# Patient Record
Sex: MALE | Race: WHITE | NOT HISPANIC OR LATINO | Employment: OTHER | URBAN - METROPOLITAN AREA
[De-identification: names, ages, dates, MRNs, and addresses within clinical notes are randomized per-mention and may not be internally consistent; named-entity substitution may affect disease eponyms.]

---

## 2017-01-04 ENCOUNTER — LAB CONVERSION - ENCOUNTER (OUTPATIENT)
Dept: OTHER | Facility: OTHER | Age: 52
End: 2017-01-04

## 2017-01-04 LAB
BUN SERPL-MCNC: 42 MG/DL (ref 7–25)
BUN/CREA RATIO (HISTORICAL): 25 (CALC) (ref 6–22)
CALCIUM SERPL-MCNC: 9.2 MG/DL (ref 8.6–10.3)
CHLORIDE SERPL-SCNC: 104 MMOL/L (ref 98–110)
CO2 SERPL-SCNC: 25 MMOL/L (ref 20–31)
CREAT SERPL-MCNC: 1.69 MG/DL (ref 0.6–1.35)
EGFR AFRICAN AMERICAN (HISTORICAL): 54 ML/MIN/1.73M2
EGFR-AMERICAN CALC (HISTORICAL): 47 ML/MIN/1.73M2
GLUCOSE (HISTORICAL): 171 MG/DL (ref 65–99)
POTASSIUM SERPL-SCNC: 4.7 MMOL/L (ref 3.5–5.3)
SODIUM SERPL-SCNC: 138 MMOL/L (ref 135–146)

## 2017-01-10 ENCOUNTER — ALLSCRIPTS OFFICE VISIT (OUTPATIENT)
Dept: OTHER | Facility: OTHER | Age: 52
End: 2017-01-10

## 2017-01-17 ENCOUNTER — GENERIC CONVERSION - ENCOUNTER (OUTPATIENT)
Dept: OTHER | Facility: OTHER | Age: 52
End: 2017-01-17

## 2017-02-03 ENCOUNTER — GENERIC CONVERSION - ENCOUNTER (OUTPATIENT)
Dept: OTHER | Facility: OTHER | Age: 52
End: 2017-02-03

## 2017-02-11 ENCOUNTER — APPOINTMENT (EMERGENCY)
Dept: RADIOLOGY | Facility: HOSPITAL | Age: 52
DRG: 208 | End: 2017-02-11
Payer: MEDICARE

## 2017-02-11 ENCOUNTER — HOSPITAL ENCOUNTER (INPATIENT)
Facility: HOSPITAL | Age: 52
LOS: 6 days | Discharge: NURSING, REHAB OR LONG-TERM CARE | DRG: 208 | End: 2017-02-17
Attending: EMERGENCY MEDICINE | Admitting: INTERNAL MEDICINE
Payer: MEDICARE

## 2017-02-11 ENCOUNTER — APPOINTMENT (INPATIENT)
Dept: RADIOLOGY | Facility: HOSPITAL | Age: 52
DRG: 208 | End: 2017-02-11
Payer: MEDICARE

## 2017-02-11 DIAGNOSIS — Z79.01 ON WARFARIN THERAPY: ICD-10-CM

## 2017-02-11 DIAGNOSIS — J96.00 ACUTE RESPIRATORY FAILURE (HCC): Primary | ICD-10-CM

## 2017-02-11 DIAGNOSIS — I50.9 CHF (CONGESTIVE HEART FAILURE) (HCC): ICD-10-CM

## 2017-02-11 DIAGNOSIS — E13.9 DIABETES 1.5, MANAGED AS TYPE 2 (HCC): ICD-10-CM

## 2017-02-11 PROBLEM — E87.5 HYPERKALEMIA: Status: ACTIVE | Noted: 2017-02-11

## 2017-02-11 PROBLEM — D72.829 LEUKOCYTOSIS: Status: ACTIVE | Noted: 2017-02-11

## 2017-02-11 PROBLEM — I21.A1 NON-ST ELEVATION MYOCARDIAL INFARCTION (NSTEMI), TYPE 2: Status: ACTIVE | Noted: 2017-02-11

## 2017-02-11 PROBLEM — J96.01 ACUTE RESPIRATORY FAILURE WITH HYPOXIA (HCC): Status: ACTIVE | Noted: 2017-02-11

## 2017-02-11 PROBLEM — I25.5 ISCHEMIC CARDIOMYOPATHY: Status: ACTIVE | Noted: 2017-02-11

## 2017-02-11 LAB
ADDITIONAL SETTING: 14
ALBUMIN SERPL BCP-MCNC: 3.6 G/DL (ref 3.5–5)
ALP SERPL-CCNC: 124 U/L (ref 46–116)
ALT SERPL W P-5'-P-CCNC: 29 U/L (ref 12–78)
ANCILLARY VALUES: ABNORMAL
ANION GAP SERPL CALCULATED.3IONS-SCNC: 10 MMOL/L (ref 4–13)
ANION GAP SERPL CALCULATED.3IONS-SCNC: 12 MMOL/L (ref 4–13)
ANION GAP SERPL CALCULATED.3IONS-SCNC: 8 MMOL/L (ref 4–13)
ANION GAP SERPL CALCULATED.3IONS-SCNC: 8 MMOL/L (ref 4–13)
APTT PPP: 33 SECONDS (ref 24–36)
ARTERIAL PATENCY WRIST A: ABNORMAL
ARTERIAL PATENCY WRIST A: YES
AST SERPL W P-5'-P-CCNC: 23 U/L (ref 5–45)
ATRIAL RATE: 55 BPM
ATRIAL RATE: 61 BPM
BASE EXCESS BLDA CALC-SCNC: -4 MMOL/L (ref -2–3)
BASE EXCESS BLDA CALC-SCNC: -4.8 MMOL/L
BASE EXCESS BLDA CALC-SCNC: -6 MMOL/L (ref -2–3)
BASOPHILS # BLD AUTO: 0.02 THOUSANDS/ΜL (ref 0–0.1)
BASOPHILS # BLD AUTO: 0.06 THOUSANDS/ΜL (ref 0–0.1)
BASOPHILS NFR BLD AUTO: 0 % (ref 0–1)
BASOPHILS NFR BLD AUTO: 0 % (ref 0–1)
BILIRUB SERPL-MCNC: 0.7 MG/DL (ref 0.2–1)
BUN SERPL-MCNC: 42 MG/DL (ref 5–25)
BUN SERPL-MCNC: 43 MG/DL (ref 5–25)
BUN SERPL-MCNC: 47 MG/DL (ref 5–25)
BUN SERPL-MCNC: 51 MG/DL (ref 5–25)
CA-I BLD-SCNC: 1.08 MMOL/L (ref 1.12–1.32)
CA-I BLD-SCNC: 1.15 MMOL/L (ref 1.12–1.32)
CA-I BLD-SCNC: 1.21 MMOL/L (ref 1.12–1.32)
CALCIUM SERPL-MCNC: 8.2 MG/DL (ref 8.3–10.1)
CALCIUM SERPL-MCNC: 8.2 MG/DL (ref 8.3–10.1)
CALCIUM SERPL-MCNC: 8.6 MG/DL (ref 8.3–10.1)
CALCIUM SERPL-MCNC: 9.1 MG/DL (ref 8.3–10.1)
CHLORIDE SERPL-SCNC: 101 MMOL/L (ref 100–108)
CHLORIDE SERPL-SCNC: 102 MMOL/L (ref 100–108)
CHLORIDE SERPL-SCNC: 103 MMOL/L (ref 100–108)
CHLORIDE SERPL-SCNC: 106 MMOL/L (ref 100–108)
CO2 SERPL-SCNC: 21 MMOL/L (ref 21–32)
CO2 SERPL-SCNC: 22 MMOL/L (ref 21–32)
CO2 SERPL-SCNC: 23 MMOL/L (ref 21–32)
CO2 SERPL-SCNC: 24 MMOL/L (ref 21–32)
CREAT SERPL-MCNC: 1.95 MG/DL (ref 0.6–1.3)
CREAT SERPL-MCNC: 2.14 MG/DL (ref 0.6–1.3)
CREAT SERPL-MCNC: 2.24 MG/DL (ref 0.6–1.3)
CREAT SERPL-MCNC: 2.5 MG/DL (ref 0.6–1.3)
DS:DELIVERY SYSTEM: ABNORMAL
EOSINOPHIL # BLD AUTO: 0.01 THOUSAND/ΜL (ref 0–0.61)
EOSINOPHIL # BLD AUTO: 0.16 THOUSAND/ΜL (ref 0–0.61)
EOSINOPHIL NFR BLD AUTO: 0 % (ref 0–6)
EOSINOPHIL NFR BLD AUTO: 1 % (ref 0–6)
ERYTHROCYTE [DISTWIDTH] IN BLOOD BY AUTOMATED COUNT: 15.3 % (ref 11.6–15.1)
ERYTHROCYTE [DISTWIDTH] IN BLOOD BY AUTOMATED COUNT: 15.4 % (ref 11.6–15.1)
FIO2 GAS DIL.REBREATH: 60 L
GFR SERPL CREATININE-BSD FRML MDRD: 27.6 ML/MIN/1.73SQ M
GFR SERPL CREATININE-BSD FRML MDRD: 31.3 ML/MIN/1.73SQ M
GFR SERPL CREATININE-BSD FRML MDRD: 33 ML/MIN/1.73SQ M
GFR SERPL CREATININE-BSD FRML MDRD: 36.7 ML/MIN/1.73SQ M
GLUCOSE SERPL-MCNC: 162 MG/DL (ref 65–140)
GLUCOSE SERPL-MCNC: 180 MG/DL (ref 65–140)
GLUCOSE SERPL-MCNC: 182 MG/DL (ref 65–140)
GLUCOSE SERPL-MCNC: 206 MG/DL (ref 65–140)
GLUCOSE SERPL-MCNC: 248 MG/DL (ref 65–140)
GLUCOSE SERPL-MCNC: 291 MG/DL (ref 65–140)
GLUCOSE SERPL-MCNC: 292 MG/DL (ref 65–140)
GLUCOSE SERPL-MCNC: 298 MG/DL (ref 65–140)
GLUCOSE SERPL-MCNC: 320 MG/DL (ref 65–140)
GLUCOSE SERPL-MCNC: 323 MG/DL (ref 65–140)
HCO3 BLDA-SCNC: 19.9 MMOL/L (ref 22–28)
HCO3 BLDA-SCNC: 20.8 MMOL/L (ref 22–28)
HCO3 BLDA-SCNC: 21.7 MMOL/L (ref 24–30)
HCT VFR BLD AUTO: 34.5 % (ref 36.5–49.3)
HCT VFR BLD AUTO: 36.3 % (ref 36.5–49.3)
HCT VFR BLD CALC: 34 % (ref 36.5–49.3)
HCT VFR BLD CALC: 36 % (ref 36.5–49.3)
HGB BLD-MCNC: 11 G/DL (ref 12–17)
HGB BLD-MCNC: 11.6 G/DL (ref 12–17)
HGB BLDA-MCNC: 11.6 G/DL (ref 12–17)
HGB BLDA-MCNC: 12.2 G/DL (ref 12–17)
HOROWITZ INDEX BLDA+IHG-RTO: 80 MM[HG]
INR PPP: 2.98 (ref 0.86–1.16)
INR PPP: 2.98 (ref 0.86–1.16)
LACTATE SERPL-SCNC: 1.2 MMOL/L (ref 0.5–2)
LYMPHOCYTES # BLD AUTO: 0.37 THOUSANDS/ΜL (ref 0.6–4.47)
LYMPHOCYTES # BLD AUTO: 1.83 THOUSANDS/ΜL (ref 0.6–4.47)
LYMPHOCYTES NFR BLD AUTO: 13 % (ref 14–44)
LYMPHOCYTES NFR BLD AUTO: 2 % (ref 14–44)
MAGNESIUM SERPL-MCNC: 2.2 MG/DL (ref 1.6–2.6)
MCH RBC QN AUTO: 27.1 PG (ref 26.8–34.3)
MCH RBC QN AUTO: 27.3 PG (ref 26.8–34.3)
MCHC RBC AUTO-ENTMCNC: 31.9 G/DL (ref 31.4–37.4)
MCHC RBC AUTO-ENTMCNC: 32 G/DL (ref 31.4–37.4)
MCV RBC AUTO: 85 FL (ref 82–98)
MCV RBC AUTO: 85 FL (ref 82–98)
MONOCYTES # BLD AUTO: 1.32 THOUSAND/ΜL (ref 0.17–1.22)
MONOCYTES # BLD AUTO: 1.4 THOUSAND/ΜL (ref 0.17–1.22)
MONOCYTES NFR BLD AUTO: 9 % (ref 4–12)
MONOCYTES NFR BLD AUTO: 9 % (ref 4–12)
NEUTROPHILS # BLD AUTO: 10.95 THOUSANDS/ΜL (ref 1.85–7.62)
NEUTROPHILS # BLD AUTO: 14.61 THOUSANDS/ΜL (ref 1.85–7.62)
NEUTS SEG NFR BLD AUTO: 77 % (ref 43–75)
NEUTS SEG NFR BLD AUTO: 89 % (ref 43–75)
NT-PROBNP SERPL-MCNC: 2871 PG/ML
O2 CT BLDA-SCNC: 15.1 ML/DL (ref 16–23)
OXYHGB MFR BLDA: 95.8 % (ref 94–97)
P AXIS: 12 DEGREES
P AXIS: 78 DEGREES
PCO2 BLD: 22 MMOL/L (ref 21–32)
PCO2 BLD: 23 MMOL/L (ref 21–32)
PCO2 BLD: 42.3 MM HG (ref 42–50)
PCO2 BLD: 43.8 MM HG (ref 36–44)
PCO2 BLDA: 35.3 MM HG (ref 36–44)
PEEP RESPIRATORY: 10 CM[H2O]
PH BLD: 7.28 [PH] (ref 7.35–7.45)
PH BLD: 7.32 [PH] (ref 7.3–7.4)
PH BLDA: 7.37 [PH] (ref 7.35–7.45)
PHOSPHATE SERPL-MCNC: 3.8 MG/DL (ref 2.7–4.5)
PLATELET # BLD AUTO: 249 THOUSANDS/UL (ref 149–390)
PLATELET # BLD AUTO: 328 THOUSANDS/UL (ref 149–390)
PMV BLD AUTO: 12.6 FL (ref 8.9–12.7)
PMV BLD AUTO: 12.6 FL (ref 8.9–12.7)
PO2 BLD: 36 MM HG (ref 35–45)
PO2 BLD: 85 MM HG (ref 75–129)
PO2 BLDA: 103.9 MM HG (ref 75–129)
POTASSIUM BLD-SCNC: 4.8 MMOL/L (ref 3.5–5.3)
POTASSIUM BLD-SCNC: 6 MMOL/L (ref 3.5–5.3)
POTASSIUM SERPL-SCNC: 4.9 MMOL/L (ref 3.5–5.3)
POTASSIUM SERPL-SCNC: 4.9 MMOL/L (ref 3.5–5.3)
POTASSIUM SERPL-SCNC: 5.2 MMOL/L (ref 3.5–5.3)
POTASSIUM SERPL-SCNC: 6.3 MMOL/L (ref 3.5–5.3)
POTASSIUM SERPL-SCNC: 6.3 MMOL/L (ref 3.5–5.3)
PR INTERVAL: 168 MS
PR INTERVAL: 228 MS
PRESSURE SETTING: 5
PROT SERPL-MCNC: 8.2 G/DL (ref 6.4–8.2)
PROTHROMBIN TIME: 29.7 SECONDS (ref 12–14.3)
PROTHROMBIN TIME: 29.7 SECONDS (ref 12–14.3)
QRS AXIS: -10 DEGREES
QRS AXIS: 4 DEGREES
QRSD INTERVAL: 120 MS
QRSD INTERVAL: 162 MS
QT INTERVAL: 442 MS
QT INTERVAL: 462 MS
QTC INTERVAL: 465 MS
QTC INTERVAL: 561 MS
RBC # BLD AUTO: 4.06 MILLION/UL (ref 3.88–5.62)
RBC # BLD AUTO: 4.25 MILLION/UL (ref 3.88–5.62)
RESPIRATORY RATE: 10
SAMPLE SITE: ABNORMAL
SAO2 % BLD FROM PO2: 64 % (ref 95–98)
SAO2 % BLD FROM PO2: 95 % (ref 95–98)
SODIUM BLD-SCNC: 135 MMOL/L (ref 136–145)
SODIUM BLD-SCNC: 137 MMOL/L (ref 136–145)
SODIUM SERPL-SCNC: 133 MMOL/L (ref 136–145)
SODIUM SERPL-SCNC: 133 MMOL/L (ref 136–145)
SODIUM SERPL-SCNC: 136 MMOL/L (ref 136–145)
SODIUM SERPL-SCNC: 138 MMOL/L (ref 136–145)
SPECIMEN SOURCE: ABNORMAL
T WAVE AXIS: 132 DEGREES
T WAVE AXIS: 226 DEGREES
TROPONIN I SERPL-MCNC: 0.03 NG/ML
TROPONIN I SERPL-MCNC: 0.39 NG/ML
TROPONIN I SERPL-MCNC: 0.9 NG/ML
TROPONIN I SERPL-MCNC: 1.31 NG/ML
TROPONIN I SERPL-MCNC: 1.58 NG/ML
TROPONIN I SERPL-MCNC: 1.61 NG/ML
VENT AC: 16
VENT- AC: AC
VENTILATION VALUE: 0
VENTRICULAR RATE: 61 BPM
VENTRICULAR RATE: 97 BPM
VT SETTING VENT: 500 ML
WBC # BLD AUTO: 14.32 THOUSAND/UL (ref 4.31–10.16)
WBC # BLD AUTO: 16.41 THOUSAND/UL (ref 4.31–10.16)

## 2017-02-11 PROCEDURE — 84100 ASSAY OF PHOSPHORUS: CPT | Performed by: NURSE PRACTITIONER

## 2017-02-11 PROCEDURE — 80048 BASIC METABOLIC PNL TOTAL CA: CPT | Performed by: NURSE PRACTITIONER

## 2017-02-11 PROCEDURE — 71010 HB CHEST X-RAY 1 VIEW FRONTAL: CPT

## 2017-02-11 PROCEDURE — 0BH18EZ INSERTION OF ENDOTRACHEAL AIRWAY INTO TRACHEA, VIA NATURAL OR ARTIFICIAL OPENING ENDOSCOPIC: ICD-10-PCS | Performed by: INTERNAL MEDICINE

## 2017-02-11 PROCEDURE — 83605 ASSAY OF LACTIC ACID: CPT | Performed by: NURSE PRACTITIONER

## 2017-02-11 PROCEDURE — 84295 ASSAY OF SERUM SODIUM: CPT

## 2017-02-11 PROCEDURE — 3E0336Z INTRODUCTION OF NUTRITIONAL SUBSTANCE INTO PERIPHERAL VEIN, PERCUTANEOUS APPROACH: ICD-10-PCS | Performed by: INTERNAL MEDICINE

## 2017-02-11 PROCEDURE — 82330 ASSAY OF CALCIUM: CPT

## 2017-02-11 PROCEDURE — 84132 ASSAY OF SERUM POTASSIUM: CPT

## 2017-02-11 PROCEDURE — 94150 VITAL CAPACITY TEST: CPT

## 2017-02-11 PROCEDURE — 85014 HEMATOCRIT: CPT

## 2017-02-11 PROCEDURE — 94002 VENT MGMT INPAT INIT DAY: CPT

## 2017-02-11 PROCEDURE — 82948 REAGENT STRIP/BLOOD GLUCOSE: CPT

## 2017-02-11 PROCEDURE — 83735 ASSAY OF MAGNESIUM: CPT | Performed by: NURSE PRACTITIONER

## 2017-02-11 PROCEDURE — 84484 ASSAY OF TROPONIN QUANT: CPT | Performed by: EMERGENCY MEDICINE

## 2017-02-11 PROCEDURE — 85025 COMPLETE CBC W/AUTO DIFF WBC: CPT | Performed by: EMERGENCY MEDICINE

## 2017-02-11 PROCEDURE — C9113 INJ PANTOPRAZOLE SODIUM, VIA: HCPCS | Performed by: NURSE PRACTITIONER

## 2017-02-11 PROCEDURE — 71010 HB CHEST X-RAY 1 VIEW FRONTAL (PORTABLE): CPT

## 2017-02-11 PROCEDURE — 93005 ELECTROCARDIOGRAM TRACING: CPT | Performed by: NURSE PRACTITIONER

## 2017-02-11 PROCEDURE — 80053 COMPREHEN METABOLIC PANEL: CPT | Performed by: EMERGENCY MEDICINE

## 2017-02-11 PROCEDURE — 94660 CPAP INITIATION&MGMT: CPT

## 2017-02-11 PROCEDURE — 83880 ASSAY OF NATRIURETIC PEPTIDE: CPT | Performed by: EMERGENCY MEDICINE

## 2017-02-11 PROCEDURE — 82330 ASSAY OF CALCIUM: CPT | Performed by: NURSE PRACTITIONER

## 2017-02-11 PROCEDURE — 84132 ASSAY OF SERUM POTASSIUM: CPT | Performed by: NURSE PRACTITIONER

## 2017-02-11 PROCEDURE — 36600 WITHDRAWAL OF ARTERIAL BLOOD: CPT

## 2017-02-11 PROCEDURE — 82805 BLOOD GASES W/O2 SATURATION: CPT | Performed by: NURSE PRACTITIONER

## 2017-02-11 PROCEDURE — 82803 BLOOD GASES ANY COMBINATION: CPT

## 2017-02-11 PROCEDURE — 82947 ASSAY GLUCOSE BLOOD QUANT: CPT

## 2017-02-11 PROCEDURE — 85730 THROMBOPLASTIN TIME PARTIAL: CPT | Performed by: EMERGENCY MEDICINE

## 2017-02-11 PROCEDURE — 93005 ELECTROCARDIOGRAM TRACING: CPT | Performed by: EMERGENCY MEDICINE

## 2017-02-11 PROCEDURE — 96375 TX/PRO/DX INJ NEW DRUG ADDON: CPT

## 2017-02-11 PROCEDURE — 85610 PROTHROMBIN TIME: CPT | Performed by: EMERGENCY MEDICINE

## 2017-02-11 PROCEDURE — 93005 ELECTROCARDIOGRAM TRACING: CPT

## 2017-02-11 PROCEDURE — 85025 COMPLETE CBC W/AUTO DIFF WBC: CPT | Performed by: NURSE PRACTITIONER

## 2017-02-11 PROCEDURE — 94760 N-INVAS EAR/PLS OXIMETRY 1: CPT

## 2017-02-11 PROCEDURE — 36415 COLL VENOUS BLD VENIPUNCTURE: CPT | Performed by: EMERGENCY MEDICINE

## 2017-02-11 PROCEDURE — 99291 CRITICAL CARE FIRST HOUR: CPT

## 2017-02-11 PROCEDURE — 5A1945Z RESPIRATORY VENTILATION, 24-96 CONSECUTIVE HOURS: ICD-10-PCS | Performed by: INTERNAL MEDICINE

## 2017-02-11 PROCEDURE — 85610 PROTHROMBIN TIME: CPT | Performed by: NURSE PRACTITIONER

## 2017-02-11 PROCEDURE — 96374 THER/PROPH/DIAG INJ IV PUSH: CPT

## 2017-02-11 PROCEDURE — 84484 ASSAY OF TROPONIN QUANT: CPT | Performed by: NURSE PRACTITIONER

## 2017-02-11 RX ORDER — ACETAMINOPHEN 160 MG/5ML
650 SUSPENSION, ORAL (FINAL DOSE FORM) ORAL EVERY 6 HOURS PRN
Status: DISCONTINUED | OUTPATIENT
Start: 2017-02-11 | End: 2017-02-17 | Stop reason: HOSPADM

## 2017-02-11 RX ORDER — ETOMIDATE 2 MG/ML
20 INJECTION INTRAVENOUS ONCE
Status: COMPLETED | OUTPATIENT
Start: 2017-02-11 | End: 2017-02-11

## 2017-02-11 RX ORDER — INSULIN GLARGINE 100 [IU]/ML
45 INJECTION, SOLUTION SUBCUTANEOUS EVERY 12 HOURS SCHEDULED
Status: DISCONTINUED | OUTPATIENT
Start: 2017-02-11 | End: 2017-02-14

## 2017-02-11 RX ORDER — FUROSEMIDE 10 MG/ML
60 INJECTION INTRAMUSCULAR; INTRAVENOUS
Status: DISCONTINUED | OUTPATIENT
Start: 2017-02-12 | End: 2017-02-12

## 2017-02-11 RX ORDER — ONDANSETRON 2 MG/ML
4 INJECTION INTRAMUSCULAR; INTRAVENOUS EVERY 4 HOURS PRN
Status: DISCONTINUED | OUTPATIENT
Start: 2017-02-11 | End: 2017-02-17 | Stop reason: HOSPADM

## 2017-02-11 RX ORDER — NITROGLYCERIN 2.5 MG/D
1 PATCH TRANSDERMAL
COMMUNITY
End: 2018-01-22 | Stop reason: HOSPADM

## 2017-02-11 RX ORDER — ATORVASTATIN CALCIUM 40 MG/1
80 TABLET, FILM COATED ORAL
Status: DISCONTINUED | OUTPATIENT
Start: 2017-02-11 | End: 2017-02-17 | Stop reason: HOSPADM

## 2017-02-11 RX ORDER — CHLORHEXIDINE GLUCONATE 0.12 MG/ML
15 RINSE ORAL EVERY 12 HOURS SCHEDULED
Status: DISCONTINUED | OUTPATIENT
Start: 2017-02-11 | End: 2017-02-14

## 2017-02-11 RX ORDER — WARFARIN SODIUM 2.5 MG/1
5 TABLET ORAL
Status: DISCONTINUED | OUTPATIENT
Start: 2017-02-14 | End: 2017-02-11

## 2017-02-11 RX ORDER — WARFARIN SODIUM 7.5 MG/1
7.5 TABLET ORAL
Status: DISCONTINUED | OUTPATIENT
Start: 2017-02-11 | End: 2017-02-11

## 2017-02-11 RX ORDER — SUCCINYLCHOLINE/SOD CL,ISO/PF 100 MG/5ML
150 SYRINGE (ML) INTRAVENOUS ONCE
Status: COMPLETED | OUTPATIENT
Start: 2017-02-11 | End: 2017-02-11

## 2017-02-11 RX ORDER — FUROSEMIDE 10 MG/ML
60 INJECTION INTRAMUSCULAR; INTRAVENOUS ONCE
Status: COMPLETED | OUTPATIENT
Start: 2017-02-11 | End: 2017-02-11

## 2017-02-11 RX ORDER — FENTANYL CITRATE 50 UG/ML
50 INJECTION, SOLUTION INTRAMUSCULAR; INTRAVENOUS
Status: DISCONTINUED | OUTPATIENT
Start: 2017-02-11 | End: 2017-02-14

## 2017-02-11 RX ORDER — AMLODIPINE BESYLATE 5 MG/1
5 TABLET ORAL DAILY
Status: DISCONTINUED | OUTPATIENT
Start: 2017-02-11 | End: 2017-02-17 | Stop reason: HOSPADM

## 2017-02-11 RX ORDER — PROPOFOL 10 MG/ML
5-50 INJECTION, EMULSION INTRAVENOUS
Status: DISCONTINUED | OUTPATIENT
Start: 2017-02-11 | End: 2017-02-14

## 2017-02-11 RX ORDER — ASPIRIN 81 MG/1
81 TABLET, CHEWABLE ORAL DAILY
Status: DISCONTINUED | OUTPATIENT
Start: 2017-02-11 | End: 2017-02-17 | Stop reason: HOSPADM

## 2017-02-11 RX ORDER — CARVEDILOL 12.5 MG/1
12.5 TABLET ORAL 2 TIMES DAILY WITH MEALS
Status: DISCONTINUED | OUTPATIENT
Start: 2017-02-11 | End: 2017-02-17 | Stop reason: HOSPADM

## 2017-02-11 RX ORDER — LORAZEPAM 2 MG/ML
0.5 INJECTION INTRAMUSCULAR ONCE
Status: COMPLETED | OUTPATIENT
Start: 2017-02-11 | End: 2017-02-11

## 2017-02-11 RX ORDER — DEXTROSE MONOHYDRATE 25 G/50ML
12.5 INJECTION, SOLUTION INTRAVENOUS ONCE
Status: COMPLETED | OUTPATIENT
Start: 2017-02-11 | End: 2017-02-11

## 2017-02-11 RX ORDER — NITROGLYCERIN 20 MG/100ML
5-200 INJECTION INTRAVENOUS
Status: DISCONTINUED | OUTPATIENT
Start: 2017-02-11 | End: 2017-02-14

## 2017-02-11 RX ORDER — AMIODARONE HYDROCHLORIDE 200 MG/1
200 TABLET ORAL
Status: DISCONTINUED | OUTPATIENT
Start: 2017-02-11 | End: 2017-02-17 | Stop reason: HOSPADM

## 2017-02-11 RX ORDER — CLOPIDOGREL BISULFATE 75 MG/1
75 TABLET ORAL DAILY
Status: DISCONTINUED | OUTPATIENT
Start: 2017-02-11 | End: 2017-02-17 | Stop reason: HOSPADM

## 2017-02-11 RX ORDER — PANTOPRAZOLE SODIUM 40 MG/1
40 INJECTION, POWDER, FOR SOLUTION INTRAVENOUS
Status: DISCONTINUED | OUTPATIENT
Start: 2017-02-11 | End: 2017-02-15

## 2017-02-11 RX ADMIN — INSULIN GLARGINE 45 UNITS: 100 INJECTION, SOLUTION SUBCUTANEOUS at 08:14

## 2017-02-11 RX ADMIN — NITROGLYCERIN 5 MCG/MIN: 20 INJECTION INTRAVENOUS at 04:26

## 2017-02-11 RX ADMIN — NITROGLYCERIN 1 INCH: 20 OINTMENT TOPICAL at 00:57

## 2017-02-11 RX ADMIN — LORAZEPAM 0.5 MG: 2 INJECTION INTRAMUSCULAR; INTRAVENOUS at 01:08

## 2017-02-11 RX ADMIN — ETOMIDATE 20 MG: 2 INJECTION, SOLUTION INTRAVENOUS at 02:19

## 2017-02-11 RX ADMIN — Medication 160 MG: at 02:17

## 2017-02-11 RX ADMIN — CALCIUM GLUCONATE 1 G: 94 INJECTION, SOLUTION INTRAVENOUS at 05:47

## 2017-02-11 RX ADMIN — ASPIRIN 81 MG 81 MG: 81 TABLET ORAL at 08:15

## 2017-02-11 RX ADMIN — FUROSEMIDE 60 MG: 10 INJECTION, SOLUTION INTRAMUSCULAR; INTRAVENOUS at 10:45

## 2017-02-11 RX ADMIN — ATORVASTATIN CALCIUM 80 MG: 40 TABLET, FILM COATED ORAL at 15:59

## 2017-02-11 RX ADMIN — FENTANYL CITRATE 50 MCG: 50 INJECTION, SOLUTION INTRAMUSCULAR; INTRAVENOUS at 04:30

## 2017-02-11 RX ADMIN — PROPOFOL 20 MCG/KG/MIN: 10 INJECTION, EMULSION INTRAVENOUS at 19:56

## 2017-02-11 RX ADMIN — CHLORHEXIDINE GLUCONATE 15 ML: 1.2 RINSE ORAL at 21:25

## 2017-02-11 RX ADMIN — FUROSEMIDE 60 MG: 10 INJECTION, SOLUTION INTRAMUSCULAR; INTRAVENOUS at 00:54

## 2017-02-11 RX ADMIN — PROPOFOL 20 MCG/KG/MIN: 10 INJECTION, EMULSION INTRAVENOUS at 15:12

## 2017-02-11 RX ADMIN — CHLORHEXIDINE GLUCONATE 15 ML: 1.2 RINSE ORAL at 08:14

## 2017-02-11 RX ADMIN — INSULIN LISPRO 4 UNITS: 100 INJECTION, SOLUTION INTRAVENOUS; SUBCUTANEOUS at 11:52

## 2017-02-11 RX ADMIN — INSULIN GLARGINE 45 UNITS: 100 INJECTION, SOLUTION SUBCUTANEOUS at 21:26

## 2017-02-11 RX ADMIN — CARVEDILOL 12.5 MG: 12.5 TABLET, FILM COATED ORAL at 08:15

## 2017-02-11 RX ADMIN — INSULIN LISPRO 4 UNITS: 100 INJECTION, SOLUTION INTRAVENOUS; SUBCUTANEOUS at 06:49

## 2017-02-11 RX ADMIN — DEXTROSE MONOHYDRATE 13 ML: 25 INJECTION, SOLUTION INTRAVENOUS at 05:50

## 2017-02-11 RX ADMIN — CLOPIDOGREL BISULFATE 75 MG: 75 TABLET ORAL at 08:15

## 2017-02-11 RX ADMIN — AMIODARONE HYDROCHLORIDE 200 MG: 200 TABLET ORAL at 08:15

## 2017-02-11 RX ADMIN — INSULIN HUMAN 10 UNITS: 100 INJECTION, SOLUTION PARENTERAL at 05:43

## 2017-02-11 RX ADMIN — INSULIN LISPRO 2 UNITS: 100 INJECTION, SOLUTION INTRAVENOUS; SUBCUTANEOUS at 17:56

## 2017-02-11 RX ADMIN — PROPOFOL 10 MCG/KG/MIN: 10 INJECTION, EMULSION INTRAVENOUS at 02:33

## 2017-02-11 RX ADMIN — PANTOPRAZOLE SODIUM 40 MG: 40 INJECTION, POWDER, FOR SOLUTION INTRAVENOUS at 08:14

## 2017-02-11 RX ADMIN — CARVEDILOL 12.5 MG: 12.5 TABLET, FILM COATED ORAL at 15:59

## 2017-02-11 RX ADMIN — PROPOFOL 15 MCG/KG/MIN: 10 INJECTION, EMULSION INTRAVENOUS at 08:13

## 2017-02-11 RX ADMIN — AMLODIPINE BESYLATE 5 MG: 5 TABLET ORAL at 08:15

## 2017-02-12 ENCOUNTER — APPOINTMENT (INPATIENT)
Dept: RADIOLOGY | Facility: HOSPITAL | Age: 52
DRG: 208 | End: 2017-02-12
Payer: MEDICARE

## 2017-02-12 PROBLEM — E87.5 HYPERKALEMIA: Status: RESOLVED | Noted: 2017-02-11 | Resolved: 2017-02-12

## 2017-02-12 PROBLEM — T45.515A WARFARIN-INDUCED COAGULOPATHY (HCC): Status: ACTIVE | Noted: 2017-02-12

## 2017-02-12 PROBLEM — D68.32 WARFARIN-INDUCED COAGULOPATHY (HCC): Status: ACTIVE | Noted: 2017-02-12

## 2017-02-12 LAB
ANION GAP SERPL CALCULATED.3IONS-SCNC: 11 MMOL/L (ref 4–13)
ATRIAL RATE: 64 BPM
BACTERIA UR QL AUTO: ABNORMAL /HPF
BILIRUB UR QL STRIP: NEGATIVE
BUN SERPL-MCNC: 53 MG/DL (ref 5–25)
CALCIUM SERPL-MCNC: 8.5 MG/DL (ref 8.3–10.1)
CHLORIDE SERPL-SCNC: 105 MMOL/L (ref 100–108)
CLARITY UR: CLEAR
CO2 SERPL-SCNC: 23 MMOL/L (ref 21–32)
COLOR UR: YELLOW
CREAT SERPL-MCNC: 2.59 MG/DL (ref 0.6–1.3)
ERYTHROCYTE [DISTWIDTH] IN BLOOD BY AUTOMATED COUNT: 16.1 % (ref 11.6–15.1)
GFR SERPL CREATININE-BSD FRML MDRD: 26.5 ML/MIN/1.73SQ M
GLUCOSE SERPL-MCNC: 163 MG/DL (ref 65–140)
GLUCOSE SERPL-MCNC: 178 MG/DL (ref 65–140)
GLUCOSE SERPL-MCNC: 187 MG/DL (ref 65–140)
GLUCOSE SERPL-MCNC: 190 MG/DL (ref 65–140)
GLUCOSE SERPL-MCNC: 317 MG/DL (ref 65–140)
GLUCOSE UR STRIP-MCNC: NEGATIVE MG/DL
HCT VFR BLD AUTO: 31.9 % (ref 36.5–49.3)
HGB BLD-MCNC: 10 G/DL (ref 12–17)
HGB UR QL STRIP.AUTO: ABNORMAL
INR PPP: 3.14 (ref 0.86–1.16)
KETONES UR STRIP-MCNC: NEGATIVE MG/DL
L PNEUMO1 AG UR QL IA.RAPID: NEGATIVE
LEUKOCYTE ESTERASE UR QL STRIP: ABNORMAL
MAGNESIUM SERPL-MCNC: 2.2 MG/DL (ref 1.6–2.6)
MCH RBC QN AUTO: 26.9 PG (ref 26.8–34.3)
MCHC RBC AUTO-ENTMCNC: 31.3 G/DL (ref 31.4–37.4)
MCV RBC AUTO: 86 FL (ref 82–98)
NITRITE UR QL STRIP: NEGATIVE
NON-SQ EPI CELLS URNS QL MICRO: ABNORMAL /HPF
P AXIS: 41 DEGREES
PH UR STRIP.AUTO: 5.5 [PH] (ref 4.5–8)
PLATELET # BLD AUTO: 201 THOUSANDS/UL (ref 149–390)
PMV BLD AUTO: 12.5 FL (ref 8.9–12.7)
POTASSIUM SERPL-SCNC: 4.7 MMOL/L (ref 3.5–5.3)
PR INTERVAL: 210 MS
PROT UR STRIP-MCNC: ABNORMAL MG/DL
PROTHROMBIN TIME: 30.9 SECONDS (ref 12–14.3)
QRS AXIS: 4 DEGREES
QRSD INTERVAL: 132 MS
QT INTERVAL: 474 MS
QTC INTERVAL: 489 MS
RBC # BLD AUTO: 3.72 MILLION/UL (ref 3.88–5.62)
RBC #/AREA URNS AUTO: ABNORMAL /HPF
S PNEUM AG UR QL: NEGATIVE
SODIUM SERPL-SCNC: 139 MMOL/L (ref 136–145)
SP GR UR STRIP.AUTO: 1.01 (ref 1–1.03)
T WAVE AXIS: 164 DEGREES
UROBILINOGEN UR QL STRIP.AUTO: 0.2 E.U./DL
VENTRICULAR RATE: 64 BPM
WBC # BLD AUTO: 13.24 THOUSAND/UL (ref 4.31–10.16)
WBC #/AREA URNS AUTO: ABNORMAL /HPF

## 2017-02-12 PROCEDURE — 94640 AIRWAY INHALATION TREATMENT: CPT

## 2017-02-12 PROCEDURE — 81001 URINALYSIS AUTO W/SCOPE: CPT | Performed by: PHYSICIAN ASSISTANT

## 2017-02-12 PROCEDURE — 83735 ASSAY OF MAGNESIUM: CPT | Performed by: INTERNAL MEDICINE

## 2017-02-12 PROCEDURE — 94003 VENT MGMT INPAT SUBQ DAY: CPT

## 2017-02-12 PROCEDURE — 87798 DETECT AGENT NOS DNA AMP: CPT | Performed by: PHYSICIAN ASSISTANT

## 2017-02-12 PROCEDURE — 80048 BASIC METABOLIC PNL TOTAL CA: CPT | Performed by: INTERNAL MEDICINE

## 2017-02-12 PROCEDURE — 87147 CULTURE TYPE IMMUNOLOGIC: CPT | Performed by: PHYSICIAN ASSISTANT

## 2017-02-12 PROCEDURE — 85027 COMPLETE CBC AUTOMATED: CPT | Performed by: INTERNAL MEDICINE

## 2017-02-12 PROCEDURE — 87205 SMEAR GRAM STAIN: CPT | Performed by: PHYSICIAN ASSISTANT

## 2017-02-12 PROCEDURE — 87040 BLOOD CULTURE FOR BACTERIA: CPT | Performed by: PHYSICIAN ASSISTANT

## 2017-02-12 PROCEDURE — 87186 SC STD MICRODIL/AGAR DIL: CPT | Performed by: PHYSICIAN ASSISTANT

## 2017-02-12 PROCEDURE — 71010 HB CHEST X-RAY 1 VIEW FRONTAL (PORTABLE): CPT

## 2017-02-12 PROCEDURE — C9113 INJ PANTOPRAZOLE SODIUM, VIA: HCPCS | Performed by: NURSE PRACTITIONER

## 2017-02-12 PROCEDURE — 94760 N-INVAS EAR/PLS OXIMETRY 1: CPT

## 2017-02-12 PROCEDURE — 87086 URINE CULTURE/COLONY COUNT: CPT | Performed by: PHYSICIAN ASSISTANT

## 2017-02-12 PROCEDURE — 82948 REAGENT STRIP/BLOOD GLUCOSE: CPT

## 2017-02-12 PROCEDURE — 87449 NOS EACH ORGANISM AG IA: CPT | Performed by: PHYSICIAN ASSISTANT

## 2017-02-12 PROCEDURE — 94150 VITAL CAPACITY TEST: CPT

## 2017-02-12 PROCEDURE — 87070 CULTURE OTHR SPECIMN AEROBIC: CPT | Performed by: PHYSICIAN ASSISTANT

## 2017-02-12 PROCEDURE — 85610 PROTHROMBIN TIME: CPT | Performed by: INTERNAL MEDICINE

## 2017-02-12 RX ORDER — SODIUM CHLORIDE FOR INHALATION 0.9 %
3 VIAL, NEBULIZER (ML) INHALATION
Status: DISCONTINUED | OUTPATIENT
Start: 2017-02-12 | End: 2017-02-17 | Stop reason: HOSPADM

## 2017-02-12 RX ORDER — ALBUTEROL SULFATE 2.5 MG/3ML
2.5 SOLUTION RESPIRATORY (INHALATION) EVERY 6 HOURS PRN
Status: DISCONTINUED | OUTPATIENT
Start: 2017-02-12 | End: 2017-02-17 | Stop reason: HOSPADM

## 2017-02-12 RX ORDER — LEVALBUTEROL 1.25 MG/.5ML
1.25 SOLUTION, CONCENTRATE RESPIRATORY (INHALATION)
Status: DISCONTINUED | OUTPATIENT
Start: 2017-02-12 | End: 2017-02-17 | Stop reason: HOSPADM

## 2017-02-12 RX ADMIN — ACETAMINOPHEN 650 MG: 160 SUSPENSION ORAL at 08:16

## 2017-02-12 RX ADMIN — PROPOFOL 20 MCG/KG/MIN: 10 INJECTION, EMULSION INTRAVENOUS at 17:58

## 2017-02-12 RX ADMIN — METRONIDAZOLE 500 MG: 500 INJECTION, SOLUTION INTRAVENOUS at 09:35

## 2017-02-12 RX ADMIN — INSULIN LISPRO 2 UNITS: 100 INJECTION, SOLUTION INTRAVENOUS; SUBCUTANEOUS at 00:50

## 2017-02-12 RX ADMIN — ISODIUM CHLORIDE 3 ML: 0.03 SOLUTION RESPIRATORY (INHALATION) at 20:07

## 2017-02-12 RX ADMIN — ISODIUM CHLORIDE 3 ML: 0.03 SOLUTION RESPIRATORY (INHALATION) at 14:19

## 2017-02-12 RX ADMIN — METRONIDAZOLE 500 MG: 500 INJECTION, SOLUTION INTRAVENOUS at 16:07

## 2017-02-12 RX ADMIN — ASPIRIN 81 MG 81 MG: 81 TABLET ORAL at 08:00

## 2017-02-12 RX ADMIN — CLOPIDOGREL BISULFATE 75 MG: 75 TABLET ORAL at 08:00

## 2017-02-12 RX ADMIN — INSULIN LISPRO 8 UNITS: 100 INJECTION, SOLUTION INTRAVENOUS; SUBCUTANEOUS at 17:56

## 2017-02-12 RX ADMIN — CEFEPIME 2000 MG: 2 INJECTION, POWDER, FOR SOLUTION INTRAVENOUS at 10:17

## 2017-02-12 RX ADMIN — CEFEPIME 2000 MG: 2 INJECTION, POWDER, FOR SOLUTION INTRAVENOUS at 20:59

## 2017-02-12 RX ADMIN — FENTANYL CITRATE 50 MCG: 50 INJECTION, SOLUTION INTRAMUSCULAR; INTRAVENOUS at 00:38

## 2017-02-12 RX ADMIN — INSULIN LISPRO 2 UNITS: 100 INJECTION, SOLUTION INTRAVENOUS; SUBCUTANEOUS at 11:49

## 2017-02-12 RX ADMIN — INSULIN LISPRO 2 UNITS: 100 INJECTION, SOLUTION INTRAVENOUS; SUBCUTANEOUS at 06:11

## 2017-02-12 RX ADMIN — PROPOFOL 20 MCG/KG/MIN: 10 INJECTION, EMULSION INTRAVENOUS at 06:15

## 2017-02-12 RX ADMIN — FUROSEMIDE 60 MG: 10 INJECTION, SOLUTION INTRAMUSCULAR; INTRAVENOUS at 08:00

## 2017-02-12 RX ADMIN — LEVALBUTEROL HYDROCHLORIDE 1.25 MG: 1.25 SOLUTION, CONCENTRATE RESPIRATORY (INHALATION) at 14:19

## 2017-02-12 RX ADMIN — INSULIN GLARGINE 45 UNITS: 100 INJECTION, SOLUTION SUBCUTANEOUS at 21:05

## 2017-02-12 RX ADMIN — AMIODARONE HYDROCHLORIDE 200 MG: 200 TABLET ORAL at 08:01

## 2017-02-12 RX ADMIN — CHLORHEXIDINE GLUCONATE 15 ML: 1.2 RINSE ORAL at 21:05

## 2017-02-12 RX ADMIN — PANTOPRAZOLE SODIUM 40 MG: 40 INJECTION, POWDER, FOR SOLUTION INTRAVENOUS at 08:00

## 2017-02-12 RX ADMIN — CHLORHEXIDINE GLUCONATE 15 ML: 1.2 RINSE ORAL at 08:00

## 2017-02-12 RX ADMIN — ATORVASTATIN CALCIUM 80 MG: 40 TABLET, FILM COATED ORAL at 16:07

## 2017-02-12 RX ADMIN — CARVEDILOL 12.5 MG: 12.5 TABLET, FILM COATED ORAL at 16:07

## 2017-02-12 RX ADMIN — VANCOMYCIN HYDROCHLORIDE 1750 MG: 1 INJECTION, POWDER, LYOPHILIZED, FOR SOLUTION INTRAVENOUS at 10:55

## 2017-02-12 RX ADMIN — DOXYCYCLINE 100 MG: 100 INJECTION, POWDER, LYOPHILIZED, FOR SOLUTION INTRAVENOUS at 14:10

## 2017-02-12 RX ADMIN — AMLODIPINE BESYLATE 5 MG: 5 TABLET ORAL at 08:02

## 2017-02-12 RX ADMIN — PROPOFOL 20 MCG/KG/MIN: 10 INJECTION, EMULSION INTRAVENOUS at 01:42

## 2017-02-12 RX ADMIN — CARVEDILOL 12.5 MG: 12.5 TABLET, FILM COATED ORAL at 08:01

## 2017-02-12 RX ADMIN — INSULIN GLARGINE 45 UNITS: 100 INJECTION, SOLUTION SUBCUTANEOUS at 08:00

## 2017-02-12 RX ADMIN — LEVALBUTEROL HYDROCHLORIDE 1.25 MG: 1.25 SOLUTION, CONCENTRATE RESPIRATORY (INHALATION) at 20:07

## 2017-02-13 ENCOUNTER — APPOINTMENT (INPATIENT)
Dept: RADIOLOGY | Facility: HOSPITAL | Age: 52
DRG: 208 | End: 2017-02-13
Payer: MEDICARE

## 2017-02-13 PROBLEM — J18.9 PNEUMONIA: Status: ACTIVE | Noted: 2017-02-13

## 2017-02-13 LAB
ANION GAP SERPL CALCULATED.3IONS-SCNC: 8 MMOL/L (ref 4–13)
ARTERIAL PATENCY WRIST A: YES
BACTERIA UR CULT: NORMAL
BASE EXCESS BLDA CALC-SCNC: -1.6 MMOL/L
BASOPHILS # BLD AUTO: 0.02 THOUSANDS/ΜL (ref 0–0.1)
BASOPHILS NFR BLD AUTO: 0 % (ref 0–1)
BUN SERPL-MCNC: 55 MG/DL (ref 5–25)
CALCIUM SERPL-MCNC: 8.6 MG/DL (ref 8.3–10.1)
CHLORIDE SERPL-SCNC: 106 MMOL/L (ref 100–108)
CO2 SERPL-SCNC: 24 MMOL/L (ref 21–32)
CREAT SERPL-MCNC: 2.35 MG/DL (ref 0.6–1.3)
EOSINOPHIL # BLD AUTO: 0.05 THOUSAND/ΜL (ref 0–0.61)
EOSINOPHIL NFR BLD AUTO: 1 % (ref 0–6)
ERYTHROCYTE [DISTWIDTH] IN BLOOD BY AUTOMATED COUNT: 16 % (ref 11.6–15.1)
FLUAV AG SPEC QL: NORMAL
FLUBV AG SPEC QL: NORMAL
GFR SERPL CREATININE-BSD FRML MDRD: 29.6 ML/MIN/1.73SQ M
GLUCOSE SERPL-MCNC: 239 MG/DL (ref 65–140)
GLUCOSE SERPL-MCNC: 258 MG/DL (ref 65–140)
GLUCOSE SERPL-MCNC: 273 MG/DL (ref 65–140)
GLUCOSE SERPL-MCNC: 275 MG/DL (ref 65–140)
GLUCOSE SERPL-MCNC: 297 MG/DL (ref 65–140)
GLUCOSE SERPL-MCNC: 302 MG/DL (ref 65–140)
GLUCOSE SERPL-MCNC: 306 MG/DL (ref 65–140)
GLUCOSE SERPL-MCNC: 311 MG/DL (ref 65–140)
GLUCOSE SERPL-MCNC: 329 MG/DL (ref 65–140)
HCO3 BLDA-SCNC: 22.9 MMOL/L (ref 22–28)
HCT VFR BLD AUTO: 28.5 % (ref 36.5–49.3)
HGB BLD-MCNC: 8.9 G/DL (ref 12–17)
HOROWITZ INDEX BLDA+IHG-RTO: 40 MM[HG]
INR PPP: 3.53 (ref 0.86–1.16)
LYMPHOCYTES # BLD AUTO: 0.8 THOUSANDS/ΜL (ref 0.6–4.47)
LYMPHOCYTES NFR BLD AUTO: 9 % (ref 14–44)
MAGNESIUM SERPL-MCNC: 2.4 MG/DL (ref 1.6–2.6)
MCH RBC QN AUTO: 27 PG (ref 26.8–34.3)
MCHC RBC AUTO-ENTMCNC: 31.2 G/DL (ref 31.4–37.4)
MCV RBC AUTO: 86 FL (ref 82–98)
MONOCYTES # BLD AUTO: 1.18 THOUSAND/ΜL (ref 0.17–1.22)
MONOCYTES NFR BLD AUTO: 13 % (ref 4–12)
NEUTROPHILS # BLD AUTO: 6.89 THOUSANDS/ΜL (ref 1.85–7.62)
NEUTS SEG NFR BLD AUTO: 77 % (ref 43–75)
O2 CT BLDA-SCNC: 13.6 ML/DL (ref 16–23)
OXYHGB MFR BLDA: 93.2 % (ref 94–97)
PCO2 BLDA: 37.4 MM HG (ref 36–44)
PEEP RESPIRATORY: 5 CM[H2O]
PH BLDA: 7.4 [PH] (ref 7.35–7.45)
PHOSPHATE SERPL-MCNC: 3.5 MG/DL (ref 2.7–4.5)
PLATELET # BLD AUTO: 172 THOUSANDS/UL (ref 149–390)
PMV BLD AUTO: 12.2 FL (ref 8.9–12.7)
PO2 BLDA: 77.7 MM HG (ref 75–129)
POTASSIUM SERPL-SCNC: 4.5 MMOL/L (ref 3.5–5.3)
PROTHROMBIN TIME: 33.8 SECONDS (ref 12–14.3)
RBC # BLD AUTO: 3.3 MILLION/UL (ref 3.88–5.62)
RSV B RNA SPEC QL NAA+PROBE: NORMAL
SODIUM SERPL-SCNC: 138 MMOL/L (ref 136–145)
SPECIMEN SOURCE: ABNORMAL
VENT AC: 16
VENT- AC: AC
VT SETTING VENT: 500 ML
WBC # BLD AUTO: 8.94 THOUSAND/UL (ref 4.31–10.16)

## 2017-02-13 PROCEDURE — 85025 COMPLETE CBC W/AUTO DIFF WBC: CPT | Performed by: PHYSICIAN ASSISTANT

## 2017-02-13 PROCEDURE — C9113 INJ PANTOPRAZOLE SODIUM, VIA: HCPCS | Performed by: NURSE PRACTITIONER

## 2017-02-13 PROCEDURE — 80048 BASIC METABOLIC PNL TOTAL CA: CPT | Performed by: PHYSICIAN ASSISTANT

## 2017-02-13 PROCEDURE — 94003 VENT MGMT INPAT SUBQ DAY: CPT

## 2017-02-13 PROCEDURE — 85610 PROTHROMBIN TIME: CPT | Performed by: PHYSICIAN ASSISTANT

## 2017-02-13 PROCEDURE — 82805 BLOOD GASES W/O2 SATURATION: CPT | Performed by: PHYSICIAN ASSISTANT

## 2017-02-13 PROCEDURE — 71010 HB CHEST X-RAY 1 VIEW FRONTAL (PORTABLE): CPT

## 2017-02-13 PROCEDURE — 82948 REAGENT STRIP/BLOOD GLUCOSE: CPT

## 2017-02-13 PROCEDURE — 84100 ASSAY OF PHOSPHORUS: CPT | Performed by: PHYSICIAN ASSISTANT

## 2017-02-13 PROCEDURE — 94760 N-INVAS EAR/PLS OXIMETRY 1: CPT

## 2017-02-13 PROCEDURE — 36600 WITHDRAWAL OF ARTERIAL BLOOD: CPT

## 2017-02-13 PROCEDURE — 83735 ASSAY OF MAGNESIUM: CPT | Performed by: PHYSICIAN ASSISTANT

## 2017-02-13 PROCEDURE — 94150 VITAL CAPACITY TEST: CPT

## 2017-02-13 PROCEDURE — 94640 AIRWAY INHALATION TREATMENT: CPT

## 2017-02-13 RX ORDER — AMOXICILLIN 250 MG
1 CAPSULE ORAL
Status: DISCONTINUED | OUTPATIENT
Start: 2017-02-13 | End: 2017-02-15

## 2017-02-13 RX ORDER — FUROSEMIDE 10 MG/ML
60 INJECTION INTRAMUSCULAR; INTRAVENOUS DAILY
Status: DISCONTINUED | OUTPATIENT
Start: 2017-02-13 | End: 2017-02-15

## 2017-02-13 RX ORDER — SCOLOPAMINE TRANSDERMAL SYSTEM 1 MG/1
1 PATCH, EXTENDED RELEASE TRANSDERMAL
Status: DISCONTINUED | OUTPATIENT
Start: 2017-02-13 | End: 2017-02-15

## 2017-02-13 RX ORDER — FUROSEMIDE 10 MG/ML
40 INJECTION INTRAMUSCULAR; INTRAVENOUS DAILY
Status: DISCONTINUED | OUTPATIENT
Start: 2017-02-13 | End: 2017-02-13

## 2017-02-13 RX ADMIN — INSULIN GLARGINE 45 UNITS: 100 INJECTION, SOLUTION SUBCUTANEOUS at 20:05

## 2017-02-13 RX ADMIN — CEFEPIME 2000 MG: 2 INJECTION, POWDER, FOR SOLUTION INTRAVENOUS at 20:05

## 2017-02-13 RX ADMIN — SODIUM CHLORIDE 6 UNITS/HR: 9 INJECTION, SOLUTION INTRAVENOUS at 11:36

## 2017-02-13 RX ADMIN — DOXYCYCLINE 100 MG: 100 INJECTION, POWDER, LYOPHILIZED, FOR SOLUTION INTRAVENOUS at 02:16

## 2017-02-13 RX ADMIN — CHLORHEXIDINE GLUCONATE 15 ML: 1.2 RINSE ORAL at 08:25

## 2017-02-13 RX ADMIN — CARVEDILOL 12.5 MG: 12.5 TABLET, FILM COATED ORAL at 07:34

## 2017-02-13 RX ADMIN — AMLODIPINE BESYLATE 5 MG: 5 TABLET ORAL at 08:24

## 2017-02-13 RX ADMIN — SCOPALAMINE 1 PATCH: 1 PATCH, EXTENDED RELEASE TRANSDERMAL at 11:09

## 2017-02-13 RX ADMIN — PANTOPRAZOLE SODIUM 40 MG: 40 INJECTION, POWDER, FOR SOLUTION INTRAVENOUS at 08:25

## 2017-02-13 RX ADMIN — LEVALBUTEROL HYDROCHLORIDE 1.25 MG: 1.25 SOLUTION, CONCENTRATE RESPIRATORY (INHALATION) at 13:10

## 2017-02-13 RX ADMIN — METRONIDAZOLE 500 MG: 500 INJECTION, SOLUTION INTRAVENOUS at 00:16

## 2017-02-13 RX ADMIN — ISODIUM CHLORIDE 3 ML: 0.03 SOLUTION RESPIRATORY (INHALATION) at 19:50

## 2017-02-13 RX ADMIN — PROPOFOL 20 MCG/KG/MIN: 10 INJECTION, EMULSION INTRAVENOUS at 01:34

## 2017-02-13 RX ADMIN — DOXYCYCLINE 100 MG: 100 INJECTION, POWDER, LYOPHILIZED, FOR SOLUTION INTRAVENOUS at 13:58

## 2017-02-13 RX ADMIN — INSULIN LISPRO 8 UNITS: 100 INJECTION, SOLUTION INTRAVENOUS; SUBCUTANEOUS at 06:05

## 2017-02-13 RX ADMIN — AMIODARONE HYDROCHLORIDE 200 MG: 200 TABLET ORAL at 07:33

## 2017-02-13 RX ADMIN — LEVALBUTEROL HYDROCHLORIDE 1.25 MG: 1.25 SOLUTION, CONCENTRATE RESPIRATORY (INHALATION) at 19:50

## 2017-02-13 RX ADMIN — PROPOFOL 20 MCG/KG/MIN: 10 INJECTION, EMULSION INTRAVENOUS at 06:54

## 2017-02-13 RX ADMIN — PROPOFOL 10 MCG/KG/MIN: 10 INJECTION, EMULSION INTRAVENOUS at 20:55

## 2017-02-13 RX ADMIN — FUROSEMIDE 60 MG: 10 INJECTION, SOLUTION INTRAMUSCULAR; INTRAVENOUS at 08:55

## 2017-02-13 RX ADMIN — FENTANYL CITRATE 50 MCG: 50 INJECTION, SOLUTION INTRAMUSCULAR; INTRAVENOUS at 09:06

## 2017-02-13 RX ADMIN — METRONIDAZOLE 500 MG: 500 INJECTION, SOLUTION INTRAVENOUS at 08:21

## 2017-02-13 RX ADMIN — ISODIUM CHLORIDE 3 ML: 0.03 SOLUTION RESPIRATORY (INHALATION) at 07:26

## 2017-02-13 RX ADMIN — CLOPIDOGREL BISULFATE 75 MG: 75 TABLET ORAL at 08:24

## 2017-02-13 RX ADMIN — CEFEPIME 2000 MG: 2 INJECTION, POWDER, FOR SOLUTION INTRAVENOUS at 09:01

## 2017-02-13 RX ADMIN — SENNOSIDES AND DOCUSATE SODIUM 1 TABLET: 8.6; 5 TABLET ORAL at 21:57

## 2017-02-13 RX ADMIN — INSULIN GLARGINE 45 UNITS: 100 INJECTION, SOLUTION SUBCUTANEOUS at 08:24

## 2017-02-13 RX ADMIN — ASPIRIN 81 MG 81 MG: 81 TABLET ORAL at 08:25

## 2017-02-13 RX ADMIN — ACETAMINOPHEN 650 MG: 160 SUSPENSION ORAL at 13:05

## 2017-02-13 RX ADMIN — METRONIDAZOLE 500 MG: 500 INJECTION, SOLUTION INTRAVENOUS at 16:41

## 2017-02-13 RX ADMIN — VANCOMYCIN HYDROCHLORIDE 1750 MG: 1 INJECTION, POWDER, LYOPHILIZED, FOR SOLUTION INTRAVENOUS at 09:53

## 2017-02-13 RX ADMIN — CARVEDILOL 12.5 MG: 12.5 TABLET, FILM COATED ORAL at 16:36

## 2017-02-13 RX ADMIN — CHLORHEXIDINE GLUCONATE 15 ML: 1.2 RINSE ORAL at 20:05

## 2017-02-13 RX ADMIN — ATORVASTATIN CALCIUM 80 MG: 40 TABLET, FILM COATED ORAL at 16:37

## 2017-02-13 RX ADMIN — PROPOFOL 20 MCG/KG/MIN: 10 INJECTION, EMULSION INTRAVENOUS at 16:00

## 2017-02-13 RX ADMIN — ISODIUM CHLORIDE 3 ML: 0.03 SOLUTION RESPIRATORY (INHALATION) at 13:10

## 2017-02-13 RX ADMIN — LEVALBUTEROL HYDROCHLORIDE 1.25 MG: 1.25 SOLUTION, CONCENTRATE RESPIRATORY (INHALATION) at 07:26

## 2017-02-13 RX ADMIN — INSULIN LISPRO 8 UNITS: 100 INJECTION, SOLUTION INTRAVENOUS; SUBCUTANEOUS at 00:12

## 2017-02-14 ENCOUNTER — APPOINTMENT (INPATIENT)
Dept: RADIOLOGY | Facility: HOSPITAL | Age: 52
DRG: 208 | End: 2017-02-14
Payer: MEDICARE

## 2017-02-14 PROBLEM — D72.829 LEUKOCYTOSIS: Status: RESOLVED | Noted: 2017-02-11 | Resolved: 2017-02-14

## 2017-02-14 LAB
ANION GAP SERPL CALCULATED.3IONS-SCNC: 8 MMOL/L (ref 4–13)
BASOPHILS # BLD AUTO: 0.04 THOUSANDS/ΜL (ref 0–0.1)
BASOPHILS NFR BLD AUTO: 1 % (ref 0–1)
BUN SERPL-MCNC: 53 MG/DL (ref 5–25)
CALCIUM SERPL-MCNC: 8.6 MG/DL (ref 8.3–10.1)
CHLORIDE SERPL-SCNC: 108 MMOL/L (ref 100–108)
CO2 SERPL-SCNC: 25 MMOL/L (ref 21–32)
CREAT SERPL-MCNC: 1.98 MG/DL (ref 0.6–1.3)
EOSINOPHIL # BLD AUTO: 0.12 THOUSAND/ΜL (ref 0–0.61)
EOSINOPHIL NFR BLD AUTO: 1 % (ref 0–6)
ERYTHROCYTE [DISTWIDTH] IN BLOOD BY AUTOMATED COUNT: 16 % (ref 11.6–15.1)
GFR SERPL CREATININE-BSD FRML MDRD: 36.1 ML/MIN/1.73SQ M
GLUCOSE SERPL-MCNC: 102 MG/DL (ref 65–140)
GLUCOSE SERPL-MCNC: 102 MG/DL (ref 65–140)
GLUCOSE SERPL-MCNC: 133 MG/DL (ref 65–140)
GLUCOSE SERPL-MCNC: 152 MG/DL (ref 65–140)
GLUCOSE SERPL-MCNC: 166 MG/DL (ref 65–140)
GLUCOSE SERPL-MCNC: 179 MG/DL (ref 65–140)
GLUCOSE SERPL-MCNC: 200 MG/DL (ref 65–140)
GLUCOSE SERPL-MCNC: 235 MG/DL (ref 65–140)
GLUCOSE SERPL-MCNC: 245 MG/DL (ref 65–140)
GLUCOSE SERPL-MCNC: 263 MG/DL (ref 65–140)
GLUCOSE SERPL-MCNC: 287 MG/DL (ref 65–140)
HCT VFR BLD AUTO: 27.3 % (ref 36.5–49.3)
HGB BLD-MCNC: 8.5 G/DL (ref 12–17)
INR PPP: 2.26 (ref 0.86–1.16)
LYMPHOCYTES # BLD AUTO: 0.93 THOUSANDS/ΜL (ref 0.6–4.47)
LYMPHOCYTES NFR BLD AUTO: 11 % (ref 14–44)
MAGNESIUM SERPL-MCNC: 2.6 MG/DL (ref 1.6–2.6)
MCH RBC QN AUTO: 27.2 PG (ref 26.8–34.3)
MCHC RBC AUTO-ENTMCNC: 31.1 G/DL (ref 31.4–37.4)
MCV RBC AUTO: 87 FL (ref 82–98)
MONOCYTES # BLD AUTO: 1.04 THOUSAND/ΜL (ref 0.17–1.22)
MONOCYTES NFR BLD AUTO: 12 % (ref 4–12)
NEUTROPHILS # BLD AUTO: 6.36 THOUSANDS/ΜL (ref 1.85–7.62)
NEUTS SEG NFR BLD AUTO: 75 % (ref 43–75)
PLATELET # BLD AUTO: 193 THOUSANDS/UL (ref 149–390)
PMV BLD AUTO: 12.5 FL (ref 8.9–12.7)
POTASSIUM SERPL-SCNC: 5.2 MMOL/L (ref 3.5–5.3)
PROTHROMBIN TIME: 24.2 SECONDS (ref 12–14.3)
RBC # BLD AUTO: 3.13 MILLION/UL (ref 3.88–5.62)
SODIUM SERPL-SCNC: 141 MMOL/L (ref 136–145)
WBC # BLD AUTO: 8.49 THOUSAND/UL (ref 4.31–10.16)

## 2017-02-14 PROCEDURE — 94640 AIRWAY INHALATION TREATMENT: CPT

## 2017-02-14 PROCEDURE — 93005 ELECTROCARDIOGRAM TRACING: CPT

## 2017-02-14 PROCEDURE — 83735 ASSAY OF MAGNESIUM: CPT | Performed by: INTERNAL MEDICINE

## 2017-02-14 PROCEDURE — 94760 N-INVAS EAR/PLS OXIMETRY 1: CPT

## 2017-02-14 PROCEDURE — C9113 INJ PANTOPRAZOLE SODIUM, VIA: HCPCS | Performed by: NURSE PRACTITIONER

## 2017-02-14 PROCEDURE — 94150 VITAL CAPACITY TEST: CPT

## 2017-02-14 PROCEDURE — 80048 BASIC METABOLIC PNL TOTAL CA: CPT | Performed by: INTERNAL MEDICINE

## 2017-02-14 PROCEDURE — 85025 COMPLETE CBC W/AUTO DIFF WBC: CPT | Performed by: INTERNAL MEDICINE

## 2017-02-14 PROCEDURE — 85610 PROTHROMBIN TIME: CPT | Performed by: PHYSICIAN ASSISTANT

## 2017-02-14 PROCEDURE — 94003 VENT MGMT INPAT SUBQ DAY: CPT

## 2017-02-14 PROCEDURE — 82948 REAGENT STRIP/BLOOD GLUCOSE: CPT

## 2017-02-14 PROCEDURE — 71010 HB CHEST X-RAY 1 VIEW FRONTAL (PORTABLE): CPT

## 2017-02-14 RX ORDER — WARFARIN SODIUM 7.5 MG/1
7.5 TABLET ORAL
Status: DISCONTINUED | OUTPATIENT
Start: 2017-02-17 | End: 2017-02-15

## 2017-02-14 RX ORDER — INSULIN GLARGINE 100 [IU]/ML
50 INJECTION, SOLUTION SUBCUTANEOUS EVERY 12 HOURS SCHEDULED
Status: DISCONTINUED | OUTPATIENT
Start: 2017-02-14 | End: 2017-02-15

## 2017-02-14 RX ORDER — WARFARIN SODIUM 5 MG/1
5 TABLET ORAL
Status: DISCONTINUED | OUTPATIENT
Start: 2017-02-14 | End: 2017-02-15

## 2017-02-14 RX ADMIN — INSULIN LISPRO 6 UNITS: 100 INJECTION, SOLUTION INTRAVENOUS; SUBCUTANEOUS at 15:58

## 2017-02-14 RX ADMIN — WARFARIN SODIUM 5 MG: 5 TABLET ORAL at 18:44

## 2017-02-14 RX ADMIN — CHLORHEXIDINE GLUCONATE 15 ML: 1.2 RINSE ORAL at 08:21

## 2017-02-14 RX ADMIN — METRONIDAZOLE 500 MG: 500 INJECTION, SOLUTION INTRAVENOUS at 15:54

## 2017-02-14 RX ADMIN — AMLODIPINE BESYLATE 5 MG: 5 TABLET ORAL at 08:13

## 2017-02-14 RX ADMIN — LEVALBUTEROL HYDROCHLORIDE 1.25 MG: 1.25 SOLUTION, CONCENTRATE RESPIRATORY (INHALATION) at 12:38

## 2017-02-14 RX ADMIN — LEVALBUTEROL HYDROCHLORIDE 1.25 MG: 1.25 SOLUTION, CONCENTRATE RESPIRATORY (INHALATION) at 19:18

## 2017-02-14 RX ADMIN — INSULIN GLARGINE 50 UNITS: 100 INJECTION, SOLUTION SUBCUTANEOUS at 21:33

## 2017-02-14 RX ADMIN — SENNOSIDES AND DOCUSATE SODIUM 1 TABLET: 8.6; 5 TABLET ORAL at 21:33

## 2017-02-14 RX ADMIN — AMIODARONE HYDROCHLORIDE 200 MG: 200 TABLET ORAL at 07:21

## 2017-02-14 RX ADMIN — DOXYCYCLINE 100 MG: 100 INJECTION, POWDER, LYOPHILIZED, FOR SOLUTION INTRAVENOUS at 13:38

## 2017-02-14 RX ADMIN — LEVALBUTEROL HYDROCHLORIDE 1.25 MG: 1.25 SOLUTION, CONCENTRATE RESPIRATORY (INHALATION) at 07:53

## 2017-02-14 RX ADMIN — CARVEDILOL 12.5 MG: 12.5 TABLET, FILM COATED ORAL at 15:53

## 2017-02-14 RX ADMIN — CARVEDILOL 12.5 MG: 12.5 TABLET, FILM COATED ORAL at 07:21

## 2017-02-14 RX ADMIN — CLOPIDOGREL BISULFATE 75 MG: 75 TABLET ORAL at 08:29

## 2017-02-14 RX ADMIN — ISODIUM CHLORIDE 3 ML: 0.03 SOLUTION RESPIRATORY (INHALATION) at 19:18

## 2017-02-14 RX ADMIN — METRONIDAZOLE 500 MG: 500 INJECTION, SOLUTION INTRAVENOUS at 08:17

## 2017-02-14 RX ADMIN — CEFEPIME 2000 MG: 2 INJECTION, POWDER, FOR SOLUTION INTRAVENOUS at 21:33

## 2017-02-14 RX ADMIN — FUROSEMIDE 60 MG: 10 INJECTION, SOLUTION INTRAMUSCULAR; INTRAVENOUS at 08:12

## 2017-02-14 RX ADMIN — ISODIUM CHLORIDE 3 ML: 0.03 SOLUTION RESPIRATORY (INHALATION) at 12:38

## 2017-02-14 RX ADMIN — ATORVASTATIN CALCIUM 80 MG: 40 TABLET, FILM COATED ORAL at 15:53

## 2017-02-14 RX ADMIN — ASPIRIN 81 MG 81 MG: 81 TABLET ORAL at 08:20

## 2017-02-14 RX ADMIN — CEFEPIME 2000 MG: 2 INJECTION, POWDER, FOR SOLUTION INTRAVENOUS at 08:59

## 2017-02-14 RX ADMIN — DOXYCYCLINE 100 MG: 100 INJECTION, POWDER, LYOPHILIZED, FOR SOLUTION INTRAVENOUS at 01:07

## 2017-02-14 RX ADMIN — METRONIDAZOLE 500 MG: 500 INJECTION, SOLUTION INTRAVENOUS at 00:04

## 2017-02-14 RX ADMIN — INSULIN GLARGINE 45 UNITS: 100 INJECTION, SOLUTION SUBCUTANEOUS at 08:36

## 2017-02-14 RX ADMIN — SODIUM CHLORIDE 12 UNITS/HR: 9 INJECTION, SOLUTION INTRAVENOUS at 01:50

## 2017-02-14 RX ADMIN — PROPOFOL 20 MCG/KG/MIN: 10 INJECTION, EMULSION INTRAVENOUS at 04:20

## 2017-02-14 RX ADMIN — PANTOPRAZOLE SODIUM 40 MG: 40 INJECTION, POWDER, FOR SOLUTION INTRAVENOUS at 08:12

## 2017-02-14 RX ADMIN — ISODIUM CHLORIDE 3 ML: 0.03 SOLUTION RESPIRATORY (INHALATION) at 07:53

## 2017-02-15 LAB
ANION GAP SERPL CALCULATED.3IONS-SCNC: 6 MMOL/L (ref 4–13)
ATRIAL RATE: 66 BPM
BASOPHILS # BLD AUTO: 0.02 THOUSANDS/ΜL (ref 0–0.1)
BASOPHILS NFR BLD AUTO: 0 % (ref 0–1)
BUN SERPL-MCNC: 54 MG/DL (ref 5–25)
CALCIUM SERPL-MCNC: 8.7 MG/DL (ref 8.3–10.1)
CHLORIDE SERPL-SCNC: 104 MMOL/L (ref 100–108)
CO2 SERPL-SCNC: 26 MMOL/L (ref 21–32)
CREAT SERPL-MCNC: 1.82 MG/DL (ref 0.6–1.3)
EOSINOPHIL # BLD AUTO: 0.23 THOUSAND/ΜL (ref 0–0.61)
EOSINOPHIL NFR BLD AUTO: 2 % (ref 0–6)
ERYTHROCYTE [DISTWIDTH] IN BLOOD BY AUTOMATED COUNT: 15.6 % (ref 11.6–15.1)
GFR SERPL CREATININE-BSD FRML MDRD: 39.8 ML/MIN/1.73SQ M
GLUCOSE SERPL-MCNC: 210 MG/DL (ref 65–140)
GLUCOSE SERPL-MCNC: 229 MG/DL (ref 65–140)
GLUCOSE SERPL-MCNC: 238 MG/DL (ref 65–140)
GLUCOSE SERPL-MCNC: 240 MG/DL (ref 65–140)
GLUCOSE SERPL-MCNC: 342 MG/DL (ref 65–140)
HCT VFR BLD AUTO: 29.2 % (ref 36.5–49.3)
HGB BLD-MCNC: 9.3 G/DL (ref 12–17)
INR PPP: 1.67 (ref 0.86–1.16)
LYMPHOCYTES # BLD AUTO: 0.89 THOUSANDS/ΜL (ref 0.6–4.47)
LYMPHOCYTES NFR BLD AUTO: 9 % (ref 14–44)
MAGNESIUM SERPL-MCNC: 2.1 MG/DL (ref 1.6–2.6)
MCH RBC QN AUTO: 27.4 PG (ref 26.8–34.3)
MCHC RBC AUTO-ENTMCNC: 31.8 G/DL (ref 31.4–37.4)
MCV RBC AUTO: 86 FL (ref 82–98)
MONOCYTES # BLD AUTO: 1.13 THOUSAND/ΜL (ref 0.17–1.22)
MONOCYTES NFR BLD AUTO: 11 % (ref 4–12)
NEUTROPHILS # BLD AUTO: 7.66 THOUSANDS/ΜL (ref 1.85–7.62)
NEUTS SEG NFR BLD AUTO: 78 % (ref 43–75)
P AXIS: 55 DEGREES
PLATELET # BLD AUTO: 227 THOUSANDS/UL (ref 149–390)
PMV BLD AUTO: 12 FL (ref 8.9–12.7)
POTASSIUM SERPL-SCNC: 4.4 MMOL/L (ref 3.5–5.3)
PR INTERVAL: 206 MS
PROTHROMBIN TIME: 19.2 SECONDS (ref 12–14.3)
QRS AXIS: -9 DEGREES
QRSD INTERVAL: 110 MS
QT INTERVAL: 370 MS
QTC INTERVAL: 387 MS
RBC # BLD AUTO: 3.4 MILLION/UL (ref 3.88–5.62)
SODIUM SERPL-SCNC: 136 MMOL/L (ref 136–145)
T WAVE AXIS: 173 DEGREES
VENTRICULAR RATE: 66 BPM
WBC # BLD AUTO: 9.93 THOUSAND/UL (ref 4.31–10.16)

## 2017-02-15 PROCEDURE — 83735 ASSAY OF MAGNESIUM: CPT | Performed by: INTERNAL MEDICINE

## 2017-02-15 PROCEDURE — G8979 MOBILITY GOAL STATUS: HCPCS

## 2017-02-15 PROCEDURE — 94660 CPAP INITIATION&MGMT: CPT

## 2017-02-15 PROCEDURE — G8978 MOBILITY CURRENT STATUS: HCPCS

## 2017-02-15 PROCEDURE — 80048 BASIC METABOLIC PNL TOTAL CA: CPT | Performed by: INTERNAL MEDICINE

## 2017-02-15 PROCEDURE — 94640 AIRWAY INHALATION TREATMENT: CPT

## 2017-02-15 PROCEDURE — 82948 REAGENT STRIP/BLOOD GLUCOSE: CPT

## 2017-02-15 PROCEDURE — 94760 N-INVAS EAR/PLS OXIMETRY 1: CPT

## 2017-02-15 PROCEDURE — 97163 PT EVAL HIGH COMPLEX 45 MIN: CPT

## 2017-02-15 PROCEDURE — 97110 THERAPEUTIC EXERCISES: CPT

## 2017-02-15 PROCEDURE — C9113 INJ PANTOPRAZOLE SODIUM, VIA: HCPCS | Performed by: NURSE PRACTITIONER

## 2017-02-15 PROCEDURE — 94668 MNPJ CHEST WALL SBSQ: CPT

## 2017-02-15 PROCEDURE — 85610 PROTHROMBIN TIME: CPT | Performed by: PHYSICIAN ASSISTANT

## 2017-02-15 PROCEDURE — 85025 COMPLETE CBC W/AUTO DIFF WBC: CPT | Performed by: INTERNAL MEDICINE

## 2017-02-15 RX ORDER — INSULIN GLARGINE 100 [IU]/ML
55 INJECTION, SOLUTION SUBCUTANEOUS EVERY 12 HOURS SCHEDULED
Status: DISCONTINUED | OUTPATIENT
Start: 2017-02-15 | End: 2017-02-17 | Stop reason: HOSPADM

## 2017-02-15 RX ORDER — PANTOPRAZOLE SODIUM 40 MG/1
40 TABLET, DELAYED RELEASE ORAL
Status: DISCONTINUED | OUTPATIENT
Start: 2017-02-15 | End: 2017-02-17 | Stop reason: HOSPADM

## 2017-02-15 RX ORDER — DOCUSATE SODIUM 100 MG/1
100 CAPSULE, LIQUID FILLED ORAL 2 TIMES DAILY
Status: DISCONTINUED | OUTPATIENT
Start: 2017-02-15 | End: 2017-02-17 | Stop reason: HOSPADM

## 2017-02-15 RX ORDER — WARFARIN SODIUM 7.5 MG/1
7.5 TABLET ORAL
Status: COMPLETED | OUTPATIENT
Start: 2017-02-15 | End: 2017-02-15

## 2017-02-15 RX ORDER — FUROSEMIDE 40 MG/1
40 TABLET ORAL DAILY
Status: DISCONTINUED | OUTPATIENT
Start: 2017-02-16 | End: 2017-02-17 | Stop reason: HOSPADM

## 2017-02-15 RX ORDER — SPIRONOLACTONE 25 MG/1
12.5 TABLET ORAL DAILY
Status: DISCONTINUED | OUTPATIENT
Start: 2017-02-16 | End: 2017-02-15

## 2017-02-15 RX ORDER — SENNOSIDES 8.6 MG
1 TABLET ORAL
Status: DISCONTINUED | OUTPATIENT
Start: 2017-02-15 | End: 2017-02-17 | Stop reason: HOSPADM

## 2017-02-15 RX ORDER — BISACODYL 10 MG
10 SUPPOSITORY, RECTAL RECTAL DAILY PRN
Status: DISCONTINUED | OUTPATIENT
Start: 2017-02-15 | End: 2017-02-17 | Stop reason: HOSPADM

## 2017-02-15 RX ADMIN — ISODIUM CHLORIDE 3 ML: 0.03 SOLUTION RESPIRATORY (INHALATION) at 08:37

## 2017-02-15 RX ADMIN — INSULIN LISPRO 8 UNITS: 100 INJECTION, SOLUTION INTRAVENOUS; SUBCUTANEOUS at 13:07

## 2017-02-15 RX ADMIN — DOXYCYCLINE 100 MG: 100 INJECTION, POWDER, LYOPHILIZED, FOR SOLUTION INTRAVENOUS at 01:00

## 2017-02-15 RX ADMIN — INSULIN LISPRO 4 UNITS: 100 INJECTION, SOLUTION INTRAVENOUS; SUBCUTANEOUS at 17:00

## 2017-02-15 RX ADMIN — DOCUSATE SODIUM 100 MG: 100 CAPSULE, LIQUID FILLED ORAL at 10:06

## 2017-02-15 RX ADMIN — INSULIN GLARGINE 55 UNITS: 100 INJECTION, SOLUTION SUBCUTANEOUS at 21:00

## 2017-02-15 RX ADMIN — CEFEPIME 2000 MG: 2 INJECTION, POWDER, FOR SOLUTION INTRAVENOUS at 21:06

## 2017-02-15 RX ADMIN — LEVALBUTEROL HYDROCHLORIDE 1.25 MG: 1.25 SOLUTION, CONCENTRATE RESPIRATORY (INHALATION) at 19:23

## 2017-02-15 RX ADMIN — ONDANSETRON 4 MG: 2 INJECTION INTRAMUSCULAR; INTRAVENOUS at 11:20

## 2017-02-15 RX ADMIN — LEVALBUTEROL HYDROCHLORIDE 1.25 MG: 1.25 SOLUTION, CONCENTRATE RESPIRATORY (INHALATION) at 13:55

## 2017-02-15 RX ADMIN — ASPIRIN 81 MG 81 MG: 81 TABLET ORAL at 08:12

## 2017-02-15 RX ADMIN — LEVALBUTEROL HYDROCHLORIDE 1.25 MG: 1.25 SOLUTION, CONCENTRATE RESPIRATORY (INHALATION) at 08:37

## 2017-02-15 RX ADMIN — DOXYCYCLINE 100 MG: 100 INJECTION, POWDER, LYOPHILIZED, FOR SOLUTION INTRAVENOUS at 13:26

## 2017-02-15 RX ADMIN — CLOPIDOGREL BISULFATE 75 MG: 75 TABLET ORAL at 08:12

## 2017-02-15 RX ADMIN — FUROSEMIDE 60 MG: 10 INJECTION, SOLUTION INTRAMUSCULAR; INTRAVENOUS at 08:12

## 2017-02-15 RX ADMIN — AMIODARONE HYDROCHLORIDE 200 MG: 200 TABLET ORAL at 07:59

## 2017-02-15 RX ADMIN — ISODIUM CHLORIDE 3 ML: 0.03 SOLUTION RESPIRATORY (INHALATION) at 13:55

## 2017-02-15 RX ADMIN — METRONIDAZOLE 500 MG: 500 INJECTION, SOLUTION INTRAVENOUS at 17:07

## 2017-02-15 RX ADMIN — WARFARIN SODIUM 7.5 MG: 7.5 TABLET ORAL at 18:57

## 2017-02-15 RX ADMIN — PANTOPRAZOLE SODIUM 40 MG: 40 INJECTION, POWDER, FOR SOLUTION INTRAVENOUS at 08:12

## 2017-02-15 RX ADMIN — INSULIN LISPRO 4 UNITS: 100 INJECTION, SOLUTION INTRAVENOUS; SUBCUTANEOUS at 08:01

## 2017-02-15 RX ADMIN — CEFEPIME 2000 MG: 2 INJECTION, POWDER, FOR SOLUTION INTRAVENOUS at 08:47

## 2017-02-15 RX ADMIN — ATORVASTATIN CALCIUM 80 MG: 40 TABLET, FILM COATED ORAL at 17:06

## 2017-02-15 RX ADMIN — AMLODIPINE BESYLATE 5 MG: 5 TABLET ORAL at 08:12

## 2017-02-15 RX ADMIN — INSULIN LISPRO 2 UNITS: 100 INJECTION, SOLUTION INTRAVENOUS; SUBCUTANEOUS at 22:03

## 2017-02-15 RX ADMIN — METRONIDAZOLE 500 MG: 500 INJECTION, SOLUTION INTRAVENOUS at 08:05

## 2017-02-15 RX ADMIN — DOCUSATE SODIUM 100 MG: 100 CAPSULE, LIQUID FILLED ORAL at 18:57

## 2017-02-15 RX ADMIN — CARVEDILOL 12.5 MG: 12.5 TABLET, FILM COATED ORAL at 17:44

## 2017-02-15 RX ADMIN — METRONIDAZOLE 500 MG: 500 INJECTION, SOLUTION INTRAVENOUS at 00:10

## 2017-02-15 RX ADMIN — VANCOMYCIN HYDROCHLORIDE 1750 MG: 1 INJECTION, POWDER, LYOPHILIZED, FOR SOLUTION INTRAVENOUS at 22:10

## 2017-02-15 RX ADMIN — CARVEDILOL 12.5 MG: 12.5 TABLET, FILM COATED ORAL at 07:59

## 2017-02-15 RX ADMIN — ISODIUM CHLORIDE 3 ML: 0.03 SOLUTION RESPIRATORY (INHALATION) at 19:23

## 2017-02-15 RX ADMIN — INSULIN GLARGINE 50 UNITS: 100 INJECTION, SOLUTION SUBCUTANEOUS at 08:55

## 2017-02-16 LAB
ANION GAP SERPL CALCULATED.3IONS-SCNC: 10 MMOL/L (ref 4–13)
BACTERIA SPT RESP CULT: NORMAL
BACTERIA SPT RESP CULT: NORMAL
BASOPHILS # BLD AUTO: 0.02 THOUSANDS/ΜL (ref 0–0.1)
BASOPHILS NFR BLD AUTO: 0 % (ref 0–1)
BUN SERPL-MCNC: 58 MG/DL (ref 5–25)
CALCIUM SERPL-MCNC: 8.7 MG/DL (ref 8.3–10.1)
CHLORIDE SERPL-SCNC: 104 MMOL/L (ref 100–108)
CO2 SERPL-SCNC: 25 MMOL/L (ref 21–32)
CREAT SERPL-MCNC: 1.81 MG/DL (ref 0.6–1.3)
EOSINOPHIL # BLD AUTO: 0.27 THOUSAND/ΜL (ref 0–0.61)
EOSINOPHIL NFR BLD AUTO: 4 % (ref 0–6)
ERYTHROCYTE [DISTWIDTH] IN BLOOD BY AUTOMATED COUNT: 15.3 % (ref 11.6–15.1)
GFR SERPL CREATININE-BSD FRML MDRD: 40 ML/MIN/1.73SQ M
GLUCOSE SERPL-MCNC: 161 MG/DL (ref 65–140)
GLUCOSE SERPL-MCNC: 171 MG/DL (ref 65–140)
GLUCOSE SERPL-MCNC: 184 MG/DL (ref 65–140)
GLUCOSE SERPL-MCNC: 209 MG/DL (ref 65–140)
GLUCOSE SERPL-MCNC: 241 MG/DL (ref 65–140)
GLUCOSE SERPL-MCNC: 252 MG/DL (ref 65–140)
GRAM STN SPEC: NORMAL
HCT VFR BLD AUTO: 29.5 % (ref 36.5–49.3)
HGB BLD-MCNC: 9.1 G/DL (ref 12–17)
LYMPHOCYTES # BLD AUTO: 1.03 THOUSANDS/ΜL (ref 0.6–4.47)
LYMPHOCYTES NFR BLD AUTO: 13 % (ref 14–44)
MCH RBC QN AUTO: 26.4 PG (ref 26.8–34.3)
MCHC RBC AUTO-ENTMCNC: 30.8 G/DL (ref 31.4–37.4)
MCV RBC AUTO: 86 FL (ref 82–98)
MONOCYTES # BLD AUTO: 0.9 THOUSAND/ΜL (ref 0.17–1.22)
MONOCYTES NFR BLD AUTO: 12 % (ref 4–12)
NEUTROPHILS # BLD AUTO: 5.54 THOUSANDS/ΜL (ref 1.85–7.62)
NEUTS SEG NFR BLD AUTO: 71 % (ref 43–75)
PLATELET # BLD AUTO: 260 THOUSANDS/UL (ref 149–390)
PMV BLD AUTO: 12.5 FL (ref 8.9–12.7)
POTASSIUM SERPL-SCNC: 4.1 MMOL/L (ref 3.5–5.3)
RBC # BLD AUTO: 3.45 MILLION/UL (ref 3.88–5.62)
SODIUM SERPL-SCNC: 139 MMOL/L (ref 136–145)
WBC # BLD AUTO: 7.76 THOUSAND/UL (ref 4.31–10.16)

## 2017-02-16 PROCEDURE — 94660 CPAP INITIATION&MGMT: CPT

## 2017-02-16 PROCEDURE — 80048 BASIC METABOLIC PNL TOTAL CA: CPT | Performed by: PHYSICIAN ASSISTANT

## 2017-02-16 PROCEDURE — 94760 N-INVAS EAR/PLS OXIMETRY 1: CPT

## 2017-02-16 PROCEDURE — 94640 AIRWAY INHALATION TREATMENT: CPT

## 2017-02-16 PROCEDURE — 97110 THERAPEUTIC EXERCISES: CPT

## 2017-02-16 PROCEDURE — 97116 GAIT TRAINING THERAPY: CPT

## 2017-02-16 PROCEDURE — 85025 COMPLETE CBC W/AUTO DIFF WBC: CPT | Performed by: PHYSICIAN ASSISTANT

## 2017-02-16 PROCEDURE — 82948 REAGENT STRIP/BLOOD GLUCOSE: CPT

## 2017-02-16 RX ORDER — SENNOSIDES 8.6 MG
1 TABLET ORAL
Qty: 30 TABLET | Refills: 0 | Status: SHIPPED | OUTPATIENT
Start: 2017-02-16 | End: 2018-04-15

## 2017-02-16 RX ORDER — CLOPIDOGREL BISULFATE 75 MG/1
75 TABLET ORAL DAILY
Qty: 30 TABLET | Refills: 0 | Status: SHIPPED | OUTPATIENT
Start: 2017-02-16 | End: 2018-01-20

## 2017-02-16 RX ORDER — FUROSEMIDE 40 MG/1
40 TABLET ORAL DAILY
Qty: 30 TABLET | Refills: 0 | Status: SHIPPED | OUTPATIENT
Start: 2017-02-16 | End: 2018-01-22 | Stop reason: HOSPADM

## 2017-02-16 RX ORDER — CEPHALEXIN 500 MG/1
500 CAPSULE ORAL 4 TIMES DAILY
Qty: 8 CAPSULE | Refills: 0 | Status: SHIPPED | OUTPATIENT
Start: 2017-02-16 | End: 2017-02-18

## 2017-02-16 RX ORDER — ASPIRIN 81 MG/1
81 TABLET, CHEWABLE ORAL DAILY
Qty: 30 TABLET | Refills: 0 | Status: SHIPPED | OUTPATIENT
Start: 2017-02-16 | End: 2018-04-15

## 2017-02-16 RX ORDER — ALBUTEROL SULFATE 2.5 MG/3ML
2.5 SOLUTION RESPIRATORY (INHALATION) EVERY 6 HOURS PRN
Qty: 75 ML | Refills: 0 | Status: SHIPPED | OUTPATIENT
Start: 2017-02-16 | End: 2017-03-18

## 2017-02-16 RX ORDER — WARFARIN SODIUM 7.5 MG/1
7.5 TABLET ORAL
Status: DISCONTINUED | OUTPATIENT
Start: 2017-02-16 | End: 2017-02-17 | Stop reason: HOSPADM

## 2017-02-16 RX ORDER — GUAIFENESIN 600 MG
600 TABLET, EXTENDED RELEASE 12 HR ORAL 2 TIMES DAILY
Status: DISCONTINUED | OUTPATIENT
Start: 2017-02-16 | End: 2017-02-17 | Stop reason: HOSPADM

## 2017-02-16 RX ORDER — WARFARIN SODIUM 7.5 MG/1
7.5 TABLET ORAL
Qty: 30 TABLET | Refills: 0 | Status: SHIPPED | OUTPATIENT
Start: 2017-02-16 | End: 2018-01-22 | Stop reason: HOSPADM

## 2017-02-16 RX ORDER — AMLODIPINE BESYLATE 5 MG/1
5 TABLET ORAL DAILY
Qty: 30 TABLET | Refills: 0 | Status: SHIPPED | OUTPATIENT
Start: 2017-02-16 | End: 2018-09-20

## 2017-02-16 RX ORDER — GUAIFENESIN 600 MG
600 TABLET, EXTENDED RELEASE 12 HR ORAL 2 TIMES DAILY
Qty: 60 TABLET | Refills: 0 | Status: SHIPPED | OUTPATIENT
Start: 2017-02-16 | End: 2017-03-18

## 2017-02-16 RX ORDER — DOCUSATE SODIUM 100 MG/1
100 CAPSULE, LIQUID FILLED ORAL 2 TIMES DAILY
Qty: 60 CAPSULE | Refills: 0 | Status: SHIPPED | OUTPATIENT
Start: 2017-02-16 | End: 2018-04-15

## 2017-02-16 RX ADMIN — CEFEPIME 2000 MG: 2 INJECTION, POWDER, FOR SOLUTION INTRAVENOUS at 09:24

## 2017-02-16 RX ADMIN — INSULIN GLARGINE 55 UNITS: 100 INJECTION, SOLUTION SUBCUTANEOUS at 08:09

## 2017-02-16 RX ADMIN — METRONIDAZOLE 500 MG: 500 INJECTION, SOLUTION INTRAVENOUS at 08:10

## 2017-02-16 RX ADMIN — ISODIUM CHLORIDE 3 ML: 0.03 SOLUTION RESPIRATORY (INHALATION) at 13:49

## 2017-02-16 RX ADMIN — LEVALBUTEROL HYDROCHLORIDE 1.25 MG: 1.25 SOLUTION, CONCENTRATE RESPIRATORY (INHALATION) at 19:14

## 2017-02-16 RX ADMIN — VANCOMYCIN HYDROCHLORIDE 1750 MG: 1 INJECTION, POWDER, LYOPHILIZED, FOR SOLUTION INTRAVENOUS at 10:33

## 2017-02-16 RX ADMIN — ISODIUM CHLORIDE 3 ML: 0.03 SOLUTION RESPIRATORY (INHALATION) at 19:14

## 2017-02-16 RX ADMIN — GUAIFENESIN 600 MG: 600 TABLET, EXTENDED RELEASE ORAL at 17:15

## 2017-02-16 RX ADMIN — CARVEDILOL 12.5 MG: 12.5 TABLET, FILM COATED ORAL at 08:09

## 2017-02-16 RX ADMIN — INSULIN LISPRO 2 UNITS: 100 INJECTION, SOLUTION INTRAVENOUS; SUBCUTANEOUS at 08:08

## 2017-02-16 RX ADMIN — PANTOPRAZOLE SODIUM 40 MG: 40 TABLET, DELAYED RELEASE ORAL at 06:35

## 2017-02-16 RX ADMIN — CEFAZOLIN SODIUM 1000 MG: 1 SOLUTION INTRAVENOUS at 22:54

## 2017-02-16 RX ADMIN — INSULIN LISPRO 6 UNITS: 100 INJECTION, SOLUTION INTRAVENOUS; SUBCUTANEOUS at 16:09

## 2017-02-16 RX ADMIN — ATORVASTATIN CALCIUM 80 MG: 40 TABLET, FILM COATED ORAL at 16:08

## 2017-02-16 RX ADMIN — INSULIN GLARGINE 55 UNITS: 100 INJECTION, SOLUTION SUBCUTANEOUS at 21:54

## 2017-02-16 RX ADMIN — INSULIN LISPRO 5 UNITS: 100 INJECTION, SOLUTION INTRAVENOUS; SUBCUTANEOUS at 16:09

## 2017-02-16 RX ADMIN — METRONIDAZOLE 500 MG: 500 INJECTION, SOLUTION INTRAVENOUS at 01:04

## 2017-02-16 RX ADMIN — FUROSEMIDE 40 MG: 40 TABLET ORAL at 08:09

## 2017-02-16 RX ADMIN — AMLODIPINE BESYLATE 5 MG: 5 TABLET ORAL at 08:09

## 2017-02-16 RX ADMIN — CARVEDILOL 12.5 MG: 12.5 TABLET, FILM COATED ORAL at 16:07

## 2017-02-16 RX ADMIN — LEVALBUTEROL HYDROCHLORIDE 1.25 MG: 1.25 SOLUTION, CONCENTRATE RESPIRATORY (INHALATION) at 08:33

## 2017-02-16 RX ADMIN — ASPIRIN 81 MG 81 MG: 81 TABLET ORAL at 08:09

## 2017-02-16 RX ADMIN — INSULIN LISPRO 4 UNITS: 100 INJECTION, SOLUTION INTRAVENOUS; SUBCUTANEOUS at 12:09

## 2017-02-16 RX ADMIN — ISODIUM CHLORIDE 3 ML: 0.03 SOLUTION RESPIRATORY (INHALATION) at 08:33

## 2017-02-16 RX ADMIN — WARFARIN SODIUM 7.5 MG: 7.5 TABLET ORAL at 17:16

## 2017-02-16 RX ADMIN — DOXYCYCLINE 100 MG: 100 INJECTION, POWDER, LYOPHILIZED, FOR SOLUTION INTRAVENOUS at 01:49

## 2017-02-16 RX ADMIN — LEVALBUTEROL HYDROCHLORIDE 1.25 MG: 1.25 SOLUTION, CONCENTRATE RESPIRATORY (INHALATION) at 13:49

## 2017-02-16 RX ADMIN — INSULIN LISPRO 2 UNITS: 100 INJECTION, SOLUTION INTRAVENOUS; SUBCUTANEOUS at 22:49

## 2017-02-16 RX ADMIN — CEFAZOLIN SODIUM 1000 MG: 1 SOLUTION INTRAVENOUS at 16:08

## 2017-02-16 RX ADMIN — CLOPIDOGREL BISULFATE 75 MG: 75 TABLET ORAL at 08:09

## 2017-02-16 RX ADMIN — AMIODARONE HYDROCHLORIDE 200 MG: 200 TABLET ORAL at 08:09

## 2017-02-17 VITALS
HEIGHT: 72 IN | OXYGEN SATURATION: 95 % | TEMPERATURE: 98 F | BODY MASS INDEX: 36.37 KG/M2 | WEIGHT: 268.52 LBS | SYSTOLIC BLOOD PRESSURE: 172 MMHG | DIASTOLIC BLOOD PRESSURE: 75 MMHG | HEART RATE: 72 BPM | RESPIRATION RATE: 20 BRPM

## 2017-02-17 LAB
ANION GAP SERPL CALCULATED.3IONS-SCNC: 9 MMOL/L (ref 4–13)
BACTERIA BLD CULT: NORMAL
BACTERIA BLD CULT: NORMAL
BASOPHILS # BLD AUTO: 0.03 THOUSANDS/ΜL (ref 0–0.1)
BASOPHILS NFR BLD AUTO: 0 % (ref 0–1)
BUN SERPL-MCNC: 55 MG/DL (ref 5–25)
CALCIUM SERPL-MCNC: 8.9 MG/DL (ref 8.3–10.1)
CHLORIDE SERPL-SCNC: 104 MMOL/L (ref 100–108)
CO2 SERPL-SCNC: 26 MMOL/L (ref 21–32)
CREAT SERPL-MCNC: 1.66 MG/DL (ref 0.6–1.3)
EOSINOPHIL # BLD AUTO: 0.27 THOUSAND/ΜL (ref 0–0.61)
EOSINOPHIL NFR BLD AUTO: 3 % (ref 0–6)
ERYTHROCYTE [DISTWIDTH] IN BLOOD BY AUTOMATED COUNT: 15.2 % (ref 11.6–15.1)
EST. AVERAGE GLUCOSE BLD GHB EST-MCNC: 214 MG/DL
GFR SERPL CREATININE-BSD FRML MDRD: 44.3 ML/MIN/1.73SQ M
GLUCOSE SERPL-MCNC: 105 MG/DL (ref 65–140)
GLUCOSE SERPL-MCNC: 121 MG/DL (ref 65–140)
HBA1C MFR BLD: 9.1 % (ref 4.2–6.3)
HCT VFR BLD AUTO: 27.3 % (ref 36.5–49.3)
HGB BLD-MCNC: 8.6 G/DL (ref 12–17)
INR PPP: 2.22 (ref 0.86–1.16)
LYMPHOCYTES # BLD AUTO: 1.32 THOUSANDS/ΜL (ref 0.6–4.47)
LYMPHOCYTES NFR BLD AUTO: 15 % (ref 14–44)
MCH RBC QN AUTO: 26.7 PG (ref 26.8–34.3)
MCHC RBC AUTO-ENTMCNC: 31.5 G/DL (ref 31.4–37.4)
MCV RBC AUTO: 85 FL (ref 82–98)
MONOCYTES # BLD AUTO: 1.11 THOUSAND/ΜL (ref 0.17–1.22)
MONOCYTES NFR BLD AUTO: 13 % (ref 4–12)
NEUTROPHILS # BLD AUTO: 6.06 THOUSANDS/ΜL (ref 1.85–7.62)
NEUTS SEG NFR BLD AUTO: 69 % (ref 43–75)
PLATELET # BLD AUTO: 276 THOUSANDS/UL (ref 149–390)
PMV BLD AUTO: 11.6 FL (ref 8.9–12.7)
POTASSIUM SERPL-SCNC: 3.7 MMOL/L (ref 3.5–5.3)
PROTHROMBIN TIME: 23.8 SECONDS (ref 12–14.3)
RBC # BLD AUTO: 3.22 MILLION/UL (ref 3.88–5.62)
SODIUM SERPL-SCNC: 139 MMOL/L (ref 136–145)
WBC # BLD AUTO: 8.79 THOUSAND/UL (ref 4.31–10.16)

## 2017-02-17 PROCEDURE — 94660 CPAP INITIATION&MGMT: CPT

## 2017-02-17 PROCEDURE — 80048 BASIC METABOLIC PNL TOTAL CA: CPT | Performed by: PHYSICIAN ASSISTANT

## 2017-02-17 PROCEDURE — G0009 ADMIN PNEUMOCOCCAL VACCINE: HCPCS | Performed by: INTERNAL MEDICINE

## 2017-02-17 PROCEDURE — 85025 COMPLETE CBC W/AUTO DIFF WBC: CPT | Performed by: PHYSICIAN ASSISTANT

## 2017-02-17 PROCEDURE — 94640 AIRWAY INHALATION TREATMENT: CPT

## 2017-02-17 PROCEDURE — 85610 PROTHROMBIN TIME: CPT | Performed by: PHYSICIAN ASSISTANT

## 2017-02-17 PROCEDURE — 83036 HEMOGLOBIN GLYCOSYLATED A1C: CPT | Performed by: PHYSICIAN ASSISTANT

## 2017-02-17 PROCEDURE — 90732 PPSV23 VACC 2 YRS+ SUBQ/IM: CPT | Performed by: INTERNAL MEDICINE

## 2017-02-17 PROCEDURE — 82948 REAGENT STRIP/BLOOD GLUCOSE: CPT

## 2017-02-17 PROCEDURE — 94760 N-INVAS EAR/PLS OXIMETRY 1: CPT

## 2017-02-17 RX ORDER — ATORVASTATIN CALCIUM 80 MG/1
80 TABLET, FILM COATED ORAL
Qty: 30 TABLET | Refills: 0 | Status: SHIPPED | OUTPATIENT
Start: 2017-02-17 | End: 2018-09-20

## 2017-02-17 RX ORDER — AMIODARONE HYDROCHLORIDE 200 MG/1
200 TABLET ORAL
Qty: 30 TABLET | Refills: 0 | Status: SHIPPED | OUTPATIENT
Start: 2017-02-17 | End: 2018-09-20

## 2017-02-17 RX ADMIN — CEFAZOLIN SODIUM 1000 MG: 1 SOLUTION INTRAVENOUS at 06:02

## 2017-02-17 RX ADMIN — GUAIFENESIN 600 MG: 600 TABLET, EXTENDED RELEASE ORAL at 08:33

## 2017-02-17 RX ADMIN — FUROSEMIDE 40 MG: 40 TABLET ORAL at 08:33

## 2017-02-17 RX ADMIN — CARVEDILOL 12.5 MG: 12.5 TABLET, FILM COATED ORAL at 08:33

## 2017-02-17 RX ADMIN — INSULIN GLARGINE 55 UNITS: 100 INJECTION, SOLUTION SUBCUTANEOUS at 08:33

## 2017-02-17 RX ADMIN — CLOPIDOGREL BISULFATE 75 MG: 75 TABLET ORAL at 08:33

## 2017-02-17 RX ADMIN — LEVALBUTEROL HYDROCHLORIDE 1.25 MG: 1.25 SOLUTION, CONCENTRATE RESPIRATORY (INHALATION) at 07:23

## 2017-02-17 RX ADMIN — PNEUMOCOCCAL VACCINE POLYVALENT 0.5 ML
25; 25; 25; 25; 25; 25; 25; 25; 25; 25; 25; 25; 25; 25; 25; 25; 25; 25; 25; 25; 25; 25; 25 INJECTION, SOLUTION INTRAMUSCULAR; SUBCUTANEOUS at 10:07

## 2017-02-17 RX ADMIN — ISODIUM CHLORIDE 3 ML: 0.03 SOLUTION RESPIRATORY (INHALATION) at 07:23

## 2017-02-17 RX ADMIN — INSULIN LISPRO 5 UNITS: 100 INJECTION, SOLUTION INTRAVENOUS; SUBCUTANEOUS at 08:35

## 2017-02-17 RX ADMIN — PANTOPRAZOLE SODIUM 40 MG: 40 TABLET, DELAYED RELEASE ORAL at 06:02

## 2017-02-17 RX ADMIN — AMLODIPINE BESYLATE 5 MG: 5 TABLET ORAL at 08:33

## 2017-02-17 RX ADMIN — AMIODARONE HYDROCHLORIDE 200 MG: 200 TABLET ORAL at 08:33

## 2017-02-17 RX ADMIN — ASPIRIN 81 MG 81 MG: 81 TABLET ORAL at 08:33

## 2017-02-22 ENCOUNTER — GENERIC CONVERSION - ENCOUNTER (OUTPATIENT)
Dept: OTHER | Facility: OTHER | Age: 52
End: 2017-02-22

## 2017-03-16 ENCOUNTER — GENERIC CONVERSION - ENCOUNTER (OUTPATIENT)
Dept: OTHER | Facility: OTHER | Age: 52
End: 2017-03-16

## 2017-03-17 ENCOUNTER — GENERIC CONVERSION - ENCOUNTER (OUTPATIENT)
Dept: OTHER | Facility: OTHER | Age: 52
End: 2017-03-17

## 2017-04-19 ENCOUNTER — GENERIC CONVERSION - ENCOUNTER (OUTPATIENT)
Dept: OTHER | Facility: OTHER | Age: 52
End: 2017-04-19

## 2017-07-25 ENCOUNTER — GENERIC CONVERSION - ENCOUNTER (OUTPATIENT)
Dept: OTHER | Facility: OTHER | Age: 52
End: 2017-07-25

## 2017-09-01 ENCOUNTER — GENERIC CONVERSION - ENCOUNTER (OUTPATIENT)
Dept: OTHER | Facility: OTHER | Age: 52
End: 2017-09-01

## 2018-01-09 NOTE — MISCELLANEOUS
Physical Exam    Wound #1 Assessment wound #1 Location:, Right foot, started on 03/2015, Care for this wound started on 7/31/15  Wound Status: not healed  Diabetic Ulcer Grade/Lower Extremity: Deravindra Anguianoch 2  Length: 2cm x Width: 0 3cm x Depth: 0 1cm   Total: 0 6sq cm   Wound Volume: 0 06cm3           Tissue type: Granulation, Slough and Callus   Color of Wound: Yellow - 2% and Pink - 98%   Exudate Amount: Moderate   Exudate Type: Serosangiunous and brown   Odor: None   Exudate Color: Yellow, Tan and brown   Wound Edges: Callous   Periwound Skin Condition: Scaly   Comments: 3 areas with skin bridges between wound for a combined measurement  Physician/Provider Wound #1 Exam   I agree with the nursing assessment and documentation  Constitutional - General appearance: No acute distress, well appearing and well nourished  Wound Drsg  Orders/Instructions  Wound Identification Dressing Orders--Instructions:   Wound Identification and Instructions   Wound #1: Right foot    Wound Care Instructions  Discussed with Patient/Caregiver  Dressing Type: Lai Millers with mild soap and water, normal saline, wound cleanser  As specified, use: NSS, and wound cleanser  Shelvy Mingle Apply specified dressing to wound base/bed  To periwound apply: Skin prep barrier  Secondary dressing apply: Gauze, 1/2 ABD, 4x4  Secure with: Kerlix  Dressing change frequency: Weekly  Offloading: Felt pad to periwound  Comments/Other:   Bulky dressing applied 5/6/2016 with 3 cast padding, 1 Kerlix, 1 Coban  Apply compression using: Coban and Tubifast yellow  Wound Goals  Wound Goals:   Healing Goals:   Fair healing potential secondary to moderate comorbid conditions     Wound edges will appear with evidence of contraction and epithelialization   Patient will achieve full wound closure with ability to wear appropriate shoewear       Future Appointments    Date/Time Provider Specialty Site   05/13/2016 11:00 AM Bhumi Thompson DPM [facility] hospital     [id number] [facility] Wound Care Plan  Wound Nursing Care Plan [facility]: Wound Nursing Care Plan   Impaired Tissue Integrity related to: right foot and left lateral ankle wounds   Risk for Infection related to open wound:   Impaired Mobility related to: bulky dressing on right foot   Goals   Patient will achieve 100% epithelialization:  Patient will maintain skin integrity:  Wound Nursing Care Interventions:   Provide moist wound healing:  Implement offloading measures (turn & reposition schedule/method, ortho shoes/boots, floating heels, crutches, specialty surfaces:  Teach and evaluate effectiveness of offloading measures:   Plan of care initiated 7/31/15, reviewed 8/21/15, reviewed 9/25/15, reviewed 10/30/15 , reviewed 11/20/15, reviewed 12/29/15, reviewed 1/8/16, reviewed 2/12/2016, 3/11/16, 4/8/16, 5/6/16      Signatures   Electronically signed by : Rudy Patel DPM; May 10 2016  1:15PM EST                       (Author)

## 2018-01-09 NOTE — PROGRESS NOTES
Assessment    1  Postoperative wound dehiscence, subsequent encounter (V58 89,998 32) (T81 31XD)    Plan  Postoperative wound dehiscence, subsequent encounter    · Dermagran instructions given; Status:Complete;   Done: 13XRO5736   Ordered; For:Postoperative wound dehiscence, subsequent encounter; Ordered By:Lázaro Valenzuela;   · Partial weight bearing ; Status:Complete;   Done: 41IMK6410   Ordered; For:Postoperative wound dehiscence, subsequent encounter; Ordered By:Vanessa Valenzuela;   · *1 - Eli 13 VNA Physician Referral  Consult  Status: Hold For - Scheduling   Requested for: 15AOH2393   Ordered; For: Postoperative wound dehiscence, subsequent encounter; Ordered By: Fam Rubin Performed:  Due: 75LAH3444  Care Summary provided  : Yes   · Follow-up visit in 1 week Evaluation and Treatment  Follow-up  Status: Complete  Done:  41LVX3280   Ordered; For: Postoperative wound dehiscence, subsequent encounter; Ordered By: Fam Rubin Performed:  Due: 77ICU0447; Last Updated By: Leonila Tariq; 2/5/2016 11:55:32 AM    Wound Care Orders/Instructions    Wound Identification and Instructions   Wound #1: Right foot    Wound Care Instructions  Discussed with Patient/Caregiver  Dressing Type: Acticoat 7  Wash with mild soap and water, normal saline, wound cleanser or as specified  Apply specified dressing to wound base/bed  Secondary dressing apply: Gauze, ABD, cast padding  Secure with: Kerlix, and tape  Dressing change frequency: Weekly  Offloading: Surgical shoeto-- right foot with bulky dressing  Comments/Other:   Bulky dressing applied today; Acticoat flex 7 to wound base, x3 cast padding, Kerlix, Coban and TubiFast yellow  dressing to stay clean dry and intact until appointment next week  Apply compression using: Coban and Tubifast yellow  Wound #7:      Wound Goals  Wound Goals:   Healing Goals:   Fair healing potential secondary to moderate comorbid conditions     Wound edges will appear with evidence of contraction and epithelialization   Patient will achieve full wound closure with ability to wear appropriate shoewear       Discussion/Summary    VNA PT consult for LE strengthening  Continue same local wound care for last wound on the Right foot  The treatment plan was reviewed with the patient/guardian  The patient/guardian understands and agrees with the treatment plan      Chief Complaint  Follow up for right foot wound  History of Present Illness    Wound Identification HPI   Wound #1: Right foot    Wound #7: left lateral ankle healed 1/29/2016          The patient came to Wound Care via wheelchair  The patient is being seen for a follow-up with MD at the [address] [place] [address]  The patient is accompanied by his fiance  The patient's identification was verified  A secondary verification process was completed  Orientation: oriented to person, oriented to place and oriented to time  Blood Glucose:  198 mg/dL as reported by patient  7/31/15: Patient arrived via wheelchair for right foot wound  Has had wound since March 2015  Pt was seen by Dr Rodrigo Demarco at [address] [place] [address] and was debrided at the bedside  Ace wrap with strike through drainage removed with 2 5x9s, adaptic and packing  Dressing was in place since Wednesday 7/29/15 per patient  [facility]  Dr Rodrigo Demarco relates that patient had two open wounds that communicated and upon MRI abscess was noted as well; bedside debridement removed skin bridge between two open areas and I&D of abscess  Plan for [facility] then total contact cast once enough granular tissue is present to d/c VAC  8/7/15: The patient arrived with VAC intact to wound  The patient has multiple abrasions from knee to toes and is bleeding  The patient relates the he crawled from his house, across grass and concrete into the car so that he didn't put any weight on his right foot   DPM had conversation with patient and significant other to come up with a solution; a manual light weight wheelchair will be prescribed  The SDTI that was on right dorsal foot has now opened, it will be wound #2  New wounds to right foot today traumatic injury  Patient has a small abrasion to the right knee that will be dressed and continue to assess  Follow up in one week  Copy of orders faxed to [facility]  8/21/15: Pt arrived with wound vac intact to right foot wound @ 125, with 2 pieces of black foam, no bridge  Dressings intact to right dorsal ffot and right 2nd toe wounds but pt had foam dressing on, not dermagran gauze as ordered  Pt's family states he was recently hospitalized for low hemoglobin and was seen by Dr Luz Lee while in hospital  8/28/15: Patient arrived with wound vac intact to right lower extremity at 125mmhg  2 pieces of black foam removed with some foam noted on periwound  Denies any changes since last visit  Patient arrived with foam dressings to right foot instead of ordered dry gauze  Patient has received wheelchair since last visit and states  Follow up in one week  Copy of orders faxed to [facility]  9/4/15: Patient arrived with wound vac intact  1 piece of black foam removed  Denies changes since last visit  9/11/15: Arrived with wound vac intact to right foot  No changes since last visit  9/18/2015: arrived with dressings intact  For right 2nd toe amp 9/19/2015  Hold KCI wound VAC today  9/25/15: The patient arrived with NPWT and ordered dressings intact to wounds  The patient was seen by Dr Luz Lee 9/19/15 at which time the second toe was amputated  The patient arrived today with 98% intact suture line; the distal aspect is somewhat  (0 2cm)  10/9/2015: Arrived with NPWT intact to right foot, alginate intact to right dorsal foot and right 2nd toe  10/16/15: The patient arrived with dressings intact to wounds  NPWT functioning properly   10/23/2015: Arrived with dressing intact, KCI wound VAC  10/30/2015: Patient arrived with dressing intact to right foot and right thigh donor site  Patient states wound vac was discontinued at last MD visit  11/6/15: Arrived with dressing intact to right foot  Right leg donor site 4 x 1 5 cm  Purulent drainage assessed beneath right lateral foot eschar  11/13/2015: Arrived with dressings intact  New wound on left ankle; Venetie was rubbing, has been corrected since new wound appeared  11/20/15: Patient arrived with dressings intact to all wounds  Denies changes since last visit  12/4/15: Patient arrived with dressing intact to right foot and no dressing ion left ankle but patient states it has been draining a few days  Denies changes to medications or recent falls  Left lateral ankle wound presets reopened today  12/11/15: Patient arrived with dressings intact to left and right foot  Denies changes since last visit  States he has been ambulating with bulky dressing on right foot  12/18/15: Patient arived with cast intact to RLE  Denies changes since last visit  12/29/2015: Patient arrived with TCC intact to RLE and CROW with dressing intact to LLE  Patient relates that the CROW was adjusted to relieve pressure from the lateral ankle area; feels that it is working well  Right third toe open area measuring 0 4 cm x 0 4 cm x 0 1 cm  Covered with Acticoat Flex 7 to stay intact under the TCC  1/8/16: The patient arrived with dressing intact to left lateral ankle wound and TCC intact to RLE  1/15/16: Patient arrived with crow intact to LLE, and dressings intact to right foot  1/22/16: Patient arrived with dressings intact to left and right foot  Denies changes since last visit  1/29/16: Patient arrived with bulky dressing with Acticoat intact to right foot wound  Patient arrived with Dermagran, 4x4 gauze and bhavna intact to left lateral ankle  2/5/16: Patient arrived with Acticoat and bulky dressing intact to right root  Patient arrived 4x4 gauze and tape to left lateral ankle             History of Falling: No = 0   Secondary Diagnosis: Yes = 15   Ambulatory Aid None, Bedrest, Nurse Assist = 0   No = 0   Gait: Impaired = 20 -- Short steps with shuffle, may have difficulty arising from chair, head down, significantly impaired balance, requiring furniture, support person, or walking aid to walk  Mental Status: Oriented to own ability = 0   Total Score: 35    25 - 45 = Moderate Risk        The most recent fall occurred patient denies any falls since last visit  Number of falls in the last year were 0  Nutrition Assessment Screening: Food intake over the last 3 months due to the loss of appetite, digestive problems, chewing or swallowing difficulties is graded as: 2 = no decrease in food intake   Weight loss during the last 3 months: 3 = no weight loss   Mobility scored as: 2 = goes out  Psychological Stress and Acute Disease Scored as: 0 = The patient has experienced psychological stress or acute disease in the last 3 months  Neuropsychological problems scored as: 2 = no psychological problems  Body Mass Index (BMI) scored as: 3 = BMI 23 or greater  Nutritional Assessment Screening Score: 12 - 14 points - Normal nutritional status  Provider Wound Care HPI: Jo-Ann Neff is here for follow-up of his Right LE wounds  He continues to wear an offloading dressing Right and CROW Left  Pain Assessment   the patient states they do not have pain  (on a scale of 0 to 10, the patient rates the pain at 0 )   Abuse And Domestic Violence Screen   Domestic violence screen not done today  Reason DV Screen not done: fiance present in exam room    Depression And Suicide Screen  Suicide screen not done today  Reason suicide screen not done: fiance present in exam room  Prefered Language is  [place]  Primary Language is  English  Readiness To Learn: Receptive  Barriers To Learning: affect     Preferred Learning: demonstration and verbal   Education Completed: further treatment/follow-up and treatment/procedure   Teaching Method: verbal and demonstration   Person Taught: patient   Evaluation Of Learning: verbalized/demonstrated understanding and needs reinforcement      Review of Systems    Constitutional: no fever, not feeling poorly, no chills and not feeling tired  Active Problems    1  Abnormal kidney function (593 9) (N28 9)   2  Benign essential hypertension (401 1) (I10)   3  Charcot's Joint Of The Foot (713 5)   4  Chronic foot ulcer (707 15) (L97 509)   5  Chronic kidney disease (CKD) stage G3a/A1, moderately decreased glomerular filtration   rate (GFR) between 45-59 mL/min/1 73 square meter and albuminuria creatinine ratio   less than 30 mg/g (585 3) (N18 3)   6  Chronic ulcer of left ankle with fat layer exposed (707 13) (L97 322)   7  Congestive heart failure (428 0) (I50 9)   8  Dehiscence of incision (998 32) (T81 31XA)   9  Diabetes mellitus with neurological 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 60) (E11 49)   10  Diabetic Nephropathy (583 81)   11  DM type 2 (diabetes mellitus, type 2) (250 00) (E11 9)   12  Postoperative wound dehiscence, subsequent encounter (V58 89,998 32) (T81 31XD)   13  Status post skin graft (V42 3) (Z94 5)    Past Medical History    1  History of High cholesterol (272 0) (E78 0)   2  History of myocardial infarction (412) (I25 2)   3  History of stroke (V12 54) (Z86 73)   4  History of Irregular heartbeat (427 9) (I49 9)    The active problems and past medical history were reviewed and updated today  Surgical History    1  History of Appendectomy (47 0)   2  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    Family History    1  Family history of Diabetes Mellitus (250 00)    2  Family history of cardiac disorder (V17 49) (Z82 49)    Social History    · Never a smoker    Current Meds   1  Atorvastatin Calcium 80 MG Oral Tablet; TAKE 1 TABLET DAILY; Therapy: 18XFX3544 to Recorded   2  Carvedilol 12 5 MG Oral Tablet; Take 1 tablet twice daily;    Therapy: 81LZQ7341 to (Evaluate:30Jun2016) Recorded   3  Citalopram Hydrobromide 20 MG Oral Tablet; TAKE 1 TABLET DAILY; Therapy: 41QWZ3999 to Recorded   4  Furosemide 40 MG Oral Tablet; as needed; Therapy: 47BZU3352 to Recorded   5  HumaLOG 100 UNIT/ML Subcutaneous Solution; Therapy: (Recorded:62Txp7322) to Recorded   6  Lantus 100 UNIT/ML Subcutaneous Solution; 40 untis bid; Therapy: 60JDJ3582 to Recorded   7  Lidocaine HCl (Local Anesth ) 4 % SOLN; Apply prn to wound prior to debridement for   pain control; Therapy: (Recorded:42Mal9609) to Recorded   8  Lisinopril 2 5 MG Oral Tablet; Therapy: (Franny Gess) to Recorded   9  Pacerone 200 MG Oral Tablet; TAKE 1 TABLET DAILY; Therapy: 01RBP5705 to Recorded   10  Spironolactone 25 MG Oral Tablet; 1/2 tablet daily; Therapy: 02SAK8732 to Recorded   11  Vitamin C TABS; Therapy: (Dorma Sink) to Recorded   12  Warfarin Sodium 5 MG Oral Tablet; TAKE 1 TABLET DAILY; Therapy: 02MCY0044 to Recorded    Allergies    1  No Known Drug Allergies    Vitals  Vital Signs [Data Includes: Current Encounter]    Recorded: 14ACQ0256 10:47AM   Temperature 98 1 F, Tympanic   Heart Rate 66, R Radial   Pulse Quality Normal, R Radial   Respiration 20   Respiration Quality Normal   Systolic 480, RUE, Sitting   Diastolic 78, RUE, Sitting   Height 5 ft 11 in   Weight 264 lb    BMI Calculated 36 82   BSA Calculated 2 37   Pain Scale 0     Physical Exam    Wound #1 Assessment wound #1 Location:, Right foot, started on 03/2015, Care for this wound started on 7/31/15  Wound Status: not healed  Diabetic Ulcer Grade/Lower Extremity: Lissette Robison 2     Length: 0 6cm x Width: 0 1cm x Depth: 0 2cm   Total: 0 06sq cm   Wound Volume: 0 012cm3           Tissue type: Subcutaneous and Granulation   Color of Wound: Red - 99% and Yellow - 1%   Exudate Type: Serosangiunous and small   Odor: None   Wound Edges: Intact and Callous   Periwound Skin Condition: Intact, Callus, Scaly, dry Physician/Provider Wound #1 Exam   I agree with the nursing assessment and documentation  Constitutional - General appearance: No acute distress, well appearing and well nourished  Trg Mookie 1: Wound Nursing Care Plan   Impaired Tissue Integrity related to: right foot and left lateral ankle wounds   Risk for Infection related to open wound:   Impaired Mobility related to: bulky dressing on right foot   Goals   Patient will achieve 100% epithelialization:  Patient will maintain skin integrity:  Wound Nursing Care Interventions:   Provide moist wound healing:  Implement offloading measures (turn & reposition schedule/method, ortho shoes/boots, floating heels, crutches, specialty surfaces:  Teach and evaluate effectiveness of offloading measures:  Plan of care initiated 7/31/15, reviewed 8/21/15, reviewed 9/25/15, reviewed 10/30/15 , reviewed 11/20/15, reviewed 12/29/15, reviewed 1/8/16      Procedure      Wound #1: Right foot     Nurse Dressing Change:   Wound #1 The wound located on the Right foot  Wound care rendered as per Physician/Advanced Practitioner order/plan  Order sent to Home Care  Return to [address] [place] [address] 1 week  Comments:, Dr Kallie Vila would like the patient to have PT in his home  Excisional Debridement Subcutaneous Tissue:   Wound was excisionally debrided to subcutaneous tissue as follows  Prior to the procedure, the patient was identified using two identifiers, the general consent was signed, the proper site of procedure was identified, and a time out was taken  Anesthesia: Local anesthesia with 4% topical lidocaine was utilized prior to the procedure for pain control  #10 surgical blade was utilized to surgically excise devitalized tissue and/or slough through epidermis, dermis and into the subcutaneous tissue  The total sq cm excised was  06sq cm  See wound assessment for further details   There was minimal bleeding controlled with gentle pressure  the patient tolerated the procedure well without complication  CPT Code(s)   [id number] - Excisional DebridementTo Subcutaneous Tissue; first 20 sq cm        Future Appointments    Date/Time Provider Specialty Site   02/12/2016 11:00 AM Karen Vizcaino DPM Wound Care [place] [id number]     Signatures   Electronically signed by : Katelynn Mercado DPM; Feb 10 2016  9:13AM EST                       (Author)

## 2018-01-09 NOTE — MISCELLANEOUS
Physical Exam    Wound #1 Assessment wound #1 Location:, Right foot, started on 03/2015, Care for this wound started on 7/31/15  Wound Status: not healed  Diabetic Ulcer Grade/Lower Extremity: Wilhemina Heath 2  Length: 1 7cm x Width: 0 8cm x Depth: 0 2cm   Total: 1 36sq cm   Wound Volume: 0 272cm3           Tissue type: Subcutaneous, Granulation and Slough   Color of Wound: Yellow - 20% and Pink - 80%   Exudate Amount: Moderate   Exudate Type: Serosangiunous   Odor: None   Exudate Color: brown   Wound Edges: Callous   Periwound Skin Condition: Macerated, Callus, slight maceration        Physician/Provider Wound #1 Exam   I agree with the nursing assessment and documentation  Constitutional - General appearance: No acute distress, well appearing and well nourished  Wound Drsg  Orders/Instructions  Wound Identification Dressing Orders--Instructions:   Wound Identification and Instructions   Wound #1: Right foot    Wound Care Instructions  Discussed with Patient/Caregiver  Dressing Type: Acticoat 7  Wash with mild soap and water, normal saline, wound cleanser or as specified  Apply specified dressing to wound base/bed  Secondary dressing apply: Gauze, 1/2 ABD  Secure with: Kerlix, and tape  Dressing change frequency: Weekly  Total contact cast -- Change every 7 days and as needed for increased drainage, discomfort or excessive swelling of toes  Comments/Other:   with alginate      Wound Goals  Wound Goals:   Healing Goals:   Fair healing potential secondary to moderate comorbid conditions  Wound edges will appear with evidence of contraction and epithelialization   Patient will achieve full wound closure with ability to wear appropriate shoewear       Future Appointments    Date/Time Provider Specialty Site   04/01/2016 11:15 AM Kelsey Prado DPM [address]:    Wound Nursing Care Plan   Impaired Tissue Integrity related to: right foot and left lateral ankle wounds   Risk for Infection related to open wound:   Impaired Mobility related to: bulky dressing on right foot   Goals   Patient will achieve 100% epithelialization:  Patient will maintain skin integrity:  Wound Nursing Care Interventions:   Provide moist wound healing:  Implement offloading measures (turn & reposition schedule/method, ortho shoes/boots, floating heels, crutches, specialty surfaces:  Teach and evaluate effectiveness of offloading measures:   Plan of care initiated 7/31/15, reviewed 8/21/15, reviewed 9/25/15, reviewed 10/30/15 , reviewed 11/20/15, reviewed 12/29/15, reviewed 1/8/16, reviewed 2/12/2016, 3/11/16      Signatures   Electronically signed by : James Liao DPM; Mar 29 2016 12:37PM EST                       (Author)

## 2018-01-10 NOTE — MISCELLANEOUS
Physical Exam    Wound #1 Assessment wound #1 Location:, Right foot, started on 03/2015, Care for this wound started on 7/31/15  Wound Status: not healed  Diabetic Ulcer Grade/Lower Extremity: Damion Alisha 2  Length: 2cm x Width: 0 2cm x Depth: 0 2cm   Total: 0 4sq cm   Wound Volume: 0 08cm3           Tissue type: Subcutaneous, Granulation and Slough   Color of Wound: Red - 20%, Yellow - 20% and Pink - 60%   Exudate Amount: Large   Exudate Type: Serosangiunous   Odor: None   Exudate Color: Tan   Wound Edges: Intact and Callous   Periwound Skin Condition: Intact, Macerated, Callus, Edema               Physician/Provider Wound #1 Exam   I agree with the nursing assessment and documentation  Wound Drsg  Orders/Instructions  Wound Identification Dressing Orders--Instructions:   Wound Identification and Instructions   Wound #1: Right foot    Wound Care Instructions  Discussed with Patient/Caregiver  Dressing Type: Acticoat 7  Wash with mild soap and water, normal saline, wound cleanser  As specified, use: NSS, and wound cleanser  Giacomo Cook Apply specified dressing to wound base/bed  Secondary dressing apply: Gauze, 1/2 ABD  Secure with: Kerlix, and tape  Dressing change frequency: Weekly  Total contact cast -- Change every 7 days and as needed for increased drainage, discomfort or excessive swelling of toes  Comments/Other:   Apply skin prep, Acticoat 7, Maxorb to wound base  Total Contact Cast was applied today  Wound Goals  Wound Goals:   Healing Goals:   Fair healing potential secondary to moderate comorbid conditions     Wound edges will appear with evidence of contraction and epithelialization   Patient will achieve full wound closure with ability to wear appropriate shoewear       Future Appointments    Date/Time Provider Specialty Site   04/22/2016 11:00 AM Nneka Marie DPM Wound Care [place] [address]   04/29/2016 11:15 AM Diana Underwood [address] [facility] [address]: Wound Nursing Care Plan   Impaired Tissue Integrity related to: right foot and left lateral ankle wounds   Risk for Infection related to open wound:   Impaired Mobility related to: bulky dressing on right foot   Goals   Patient will achieve 100% epithelialization:  Patient will maintain skin integrity:  Wound Nursing Care Interventions:   Provide moist wound healing:  Implement offloading measures (turn & reposition schedule/method, ortho shoes/boots, floating heels, crutches, specialty surfaces:  Teach and evaluate effectiveness of offloading measures:   Plan of care initiated 7/31/15, reviewed 8/21/15, reviewed 9/25/15, reviewed 10/30/15 , reviewed 11/20/15, reviewed 12/29/15, reviewed 1/8/16, reviewed 2/12/2016, 3/11/16, 4/8/16      Signatures   Electronically signed by : Mary Kay Ruffin DPM; Apr 19 2016  1:33PM EST                       (Author)

## 2018-01-10 NOTE — PROGRESS NOTES
Assessment    1  Diabetes mellitus with neurological 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 60) (E11 49)   2  Chronic foot ulcer (867 15) (L97 509)    Plan  Chronic foot ulcer    · We have put a total contact cast on your foot  Do not walk on the cast or put weight on  it for 24 hours ; Status:Complete;   Done: 84MVT1801 01:32PM   Ordered; For:Chronic foot ulcer; Ordered By:Luba Valenzuela; Unlinked    · HumaLOG 100 UNIT/ML Subcutaneous Solution   Dispense: 0 Days ; #: Sufficient SOLN; Refill: 0; GIO = N; Record; Last Updated By: Maribeth Mendoza; 4/15/2016 11:21:30 AM    Wound Care Orders/Instructions    Wound Identification and Instructions   Wound #1: Right foot    Wound Care Instructions  Discussed with Patient/Caregiver  Dressing Type: Acticoat 7  Wash with mild soap and water, normal saline, wound cleanser  As specified, use: NSS, and wound cleanser  Arcelia Barberton Apply specified dressing to wound base/bed  Secondary dressing apply: Gauze, 1/2 ABD  Secure with: Kerlix, and tape  Dressing change frequency: Weekly  Total contact cast -- Change every 7 days and as needed for increased drainage, discomfort or excessive swelling of toes  Comments/Other:   Apply skin prep, Acticoat 7, Maxorb to wound base  Total Contact Cast was applied today  Wound Goals  Wound Goals:   Healing Goals:   Fair healing potential secondary to moderate comorbid conditions  Wound edges will appear with evidence of contraction and epithelialization   Patient will achieve full wound closure with ability to wear appropriate shoewear       Discussion/Summary    Continue TCC  The treatment plan was reviewed with the patient/guardian  The patient/guardian understands and agrees with the treatment plan      Chief Complaint  Follow up for right foot wound      History of Present Illness    Wound Identification HPI   Wound #1: Right foot          The patient came to Wound Care via wheelchair   The patient is being seen for a follow-up with [facility]  The patient is accompanied by his fiance  The patient's identification was verified  A secondary verification process was completed  Orientation: oriented to person, oriented to place and oriented to time  Blood Glucose:  198 mg/dL as reported by patient  7/31/15: Patient arrived via wheelchair for right foot wound  Has had wound since March 2015  Pt was seen by Dr Luz Lee at [address] [place] [address] and was debrided at the bedside  Ace wrap with strike through drainage removed with 2 5x9s, adaptic and packing  Dressing was in place since Wednesday 7/29/15 per patient  Pt has [facility] for [facility] care  Dr Luz Lee relates that patient had two open wounds that communicated and upon MRI abscess was noted as well; bedside debridement removed skin bridge between two open areas and I&D of abscess  Plan for [facility] then total contact cast once enough granular tissue is present to d/c VAC  8/7/15: The patient arrived with VAC intact to wound  The patient has multiple abrasions from knee to toes and is bleeding  The patient relates the he crawled from his house, across grass and concrete into the car so that he didn't put any weight on his right foot  DPM had conversation with patient and significant other to come up with a solution; a manual light weight wheelchair will be prescribed  The SDTI that was on right dorsal foot has now opened, it will be wound #2  New wounds to right foot today traumatic injury  Patient has a small abrasion to the right knee that will be dressed and continue to assess  Follow up in one week  Copy of orders faxed to ARIANNA RIZVI  8/21/15: Pt arrived with wound vac intact to right foot wound @ 125, with 2 pieces of black foam, no bridge  Dressings intact to right dorsal ffot and right 2nd toe wounds but pt had foam dressing on, not dermagran gauze as ordered   Pt's family states he was recently hospitalized for low hemoglobin and was seen by Dr Luz Lee while in hospital  8/28/15: Patient arrived with wound vac intact to right lower extremity at 125mmhg  2 pieces of black foam removed with some foam noted on periwound  Denies any changes since last visit  Patient arrived with foam dressings to right foot instead of ordered dry gauze  Patient has received wheelchair since last visit and states  Follow up in one week  Copy of orders faxed to ARIANNA RIZVI  9/4/15: Patient arrived with wound vac intact  1 piece of black foam removed  Denies changes since last visit  9/11/15: Arrived with wound vac intact to right foot  No changes since last visit  9/18/2015: arrived with dressings intact  For right 2nd toe amp 9/19/2015  Hold KCI wound VAC today  9/25/15: The patient arrived with NPWT and ordered dressings intact to wounds  The patient was seen by Dr Eneida Mo 9/19/15 at which time the second toe was amputated  The patient arrived today with 98% intact suture line; the distal aspect is somewhat  (0 2cm)  10/9/2015: Arrived with NPWT intact to right foot, alginate intact to right dorsal foot and right 2nd toe  10/16/15: The patient arrived with dressings intact to wounds  NPWT functioning properly  10/23/2015: Arrived with dressing intact, KCI wound VAC  10/30/2015: Patient arrived with dressing intact to right foot and right thigh donor site  Patient states wound vac was discontinued at last MD visit  11/6/15: Arrived with dressing intact to right foot  Right leg donor site 4 x 1 5 cm  Purulent drainage assessed beneath right lateral foot eschar  11/13/2015: Arrived with dressings intact  New wound on left ankle; [place] was rubbing, has been corrected since new wound appeared  11/20/15: Patient arrived with dressings intact to all wounds  Denies changes since last visit  12/4/15: Patient arrived with dressing intact to right foot and no dressing ion left ankle but patient states it has been draining a few days  Denies changes to medications or recent falls   Left lateral ankle wound presets reopened today  12/11/15: Patient arrived with dressings intact to left and right foot  Denies changes since last visit  States he has been ambulating with bulky dressing on right foot  12/18/15: Patient arived with cast intact to RLE  Denies changes since last visit  12/29/2015: Patient arrived with TCC intact to RLE and CROW with dressing intact to LLE  Patient relates that the CROW was adjusted to relieve pressure from the lateral ankle area; feels that it is working well  Right third toe open area measuring 0 4 cm x 0 4 cm x 0 1 cm  Covered with Acticoat Flex 7 to stay intact under the TCC  1/8/16: The patient arrived with dressing intact to left lateral ankle wound and TCC intact to RLE  1/15/16: Patient arrived with crow intact to LLE, and dressings intact to right foot  1/22/16: Patient arrived with dressings intact to left and right foot  Denies changes since last visit  1/29/16: Patient arrived with bulky dressing with Acticoat intact to right foot wound  Patient arrived with Dermagran, 4x4 gauze and bhavna intact to left lateral ankle  2/5/16: Patient arrived with Acticoat and bulky dressing intact to right root  Patient arrived 4x4 gauze and tape to left lateral ankle  2/12/16: Arrived with bulky dressing intact to right foot  Strike through drainage to [employer]  Patient reported he is doing home Physical therapy currently, nursing has discharged from services  2/19/16: Patient arrived with Acticoat flex, 4x4 gauze, 5x9 ABD, Kerlix, coban intact to right foot wound  2/25/16: The patient arrived with dressing clean dry and intact  No issues since last visit  3/4/16: The patient arrived with dressing nicely intact to right foot  No issues since last visit  3/11/16: The patient arrived with dressing intact, strikethrough drainage to but not through coban  The patient relates that he is doing "a lot" with physical therapy  3/18/16: Patient arrived with cast to right foot   Denies any changes since last visit  3/25/16: Patient arrived with cast intact to right foot  4/1/16: Patient arrived with cast to right foot  4/8/16: Patient arrived with cast intact to right lower extremity  Denies changes since last visit  4/15/2016 Patient arrived with TCC intact to RLE  Denies any changes since last visit  History of Falling: No = 0   Secondary Diagnosis: Yes = 15   Ambulatory Aid None, Bedrest, Nurse Assist = 0   No = 0   Gait: Impaired = 20 -- Short steps with shuffle, may have difficulty arising from chair, head down, significantly impaired balance, requiring furniture, support person, or walking aid to walk  Mental Status: Oriented to own ability = 0   Total Score: 35    25 - 45 = Moderate Risk        The most recent fall occurred patient denies any falls since last visit  Number of falls in the last year were 0  Nutrition Assessment Screening: Food intake over the last 3 months due to the loss of appetite, digestive problems, chewing or swallowing difficulties is graded as: 2 = no decrease in food intake   Weight loss during the last 3 months: 3 = no weight loss   Mobility scored as: 2 = goes out  Psychological Stress and Acute Disease Scored as: 0 = The patient has experienced psychological stress or acute disease in the last 3 months  Neuropsychological problems scored as: 2 = no psychological problems  Body Mass Index (BMI) scored as: 3 = BMI 23 or greater  Nutritional Assessment Screening Score: 12 - 14 points - Normal nutritional status  Provider Wound Care HPI: aGbriela Ponce is here for TCC change  Pain Assessment   the patient states they do not have pain  (on a scale of 0 to 10, the patient rates the pain at 0 )   Abuse And Domestic Violence Screen   Domestic violence screen not done today  Reason DV Screen not done: fiance present in exam room    Depression And Suicide Screen  Suicide screen not done today   Reason suicide screen not done: fiance present in exam room  Prefered Language is  [place]  Primary Language is  English  Readiness To Learn: Receptive  Barriers To Learning: affect  Preferred Learning: demonstration and verbal   Education Completed: further treatment/follow-up and treatment/procedure   Teaching Method: verbal and demonstration   Person Taught: patient   Evaluation Of Learning: verbalized/demonstrated understanding and needs reinforcement      Review of Systems    Constitutional: no fever, not feeling poorly, no chills and not feeling tired  Active Problems    1  Abnormal kidney function (593 9) (N28 9)   2  Benign essential hypertension (401 1) (I10)   3  Charcot's Joint Of The Foot (713 5)   4  Chronic foot ulcer (707 15) (L97 509)   5  Chronic kidney disease (CKD) stage G3a/A1, moderately decreased glomerular filtration   rate (GFR) between 45-59 mL/min/1 73 square meter and albuminuria creatinine ratio   less than 30 mg/g (585 3) (N18 3)   6  Chronic ulcer of left ankle with fat layer exposed (707 13) (L97 322)   7  Congestive heart failure (428 0) (I50 9)   8  Dehiscence of incision (998 32) (T81 31XA)   9  Diabetes mellitus with neurological 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 60) (E11 49)   10  Diabetic Nephropathy (583 81)   11  DM type 2 (diabetes mellitus, type 2) (250 00) (E11 9)   12  Postoperative wound dehiscence, subsequent encounter (V58 89,998 32) (T81 31XD)   13  Status post skin graft (V42 3) (Z94 5)    Past Medical History    1  History of High cholesterol (272 0) (E78 0)   2  History of myocardial infarction (412) (I25 2)   3  History of stroke (V12 54) (Z86 73)   4  History of Irregular heartbeat (427 9) (I49 9)    The active problems and past medical history were reviewed and updated today  Surgical History    1  History of Appendectomy (47 0)   2  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    Family History    1  Family history of Diabetes Mellitus (250 00)    2   Family history of cardiac disorder (V17 49) (Z82 49)    Social History    · Never a smoker    Current Meds   1  Atorvastatin Calcium 80 MG Oral Tablet; TAKE 1 TABLET DAILY; Therapy: 81APH4968 to Recorded   2  Carvedilol 12 5 MG Oral Tablet; Take 1 tablet twice daily; Therapy: 76DWN2722 to (Evaluate:30Jun2016) Recorded   3  Citalopram Hydrobromide 20 MG Oral Tablet; TAKE 1 TABLET DAILY; Therapy: 77UIF2807 to Recorded   4  Furosemide 40 MG Oral Tablet; as needed; Therapy: 23CNS4840 to Recorded   5  HumaLOG 100 UNIT/ML Subcutaneous Solution; Therapy: (Recorded:99Htn6584) to Recorded   6  Lantus 100 UNIT/ML Subcutaneous Solution; 40 untis bid; Therapy: 88CXH8771 to Recorded   7  Lidocaine HCl (Local Anesth ) 4 % SOLN; Apply prn to wound prior to debridement for   pain control; Therapy: (Recorded:67Rel5866) to Recorded   8  Lisinopril 2 5 MG Oral Tablet; Therapy: (Ángel Roldan) to Recorded   9  NovoLOG SOLN;   Therapy: (Recorded:15Apr2016) to Recorded   10  Pacerone 200 MG Oral Tablet; TAKE 1 TABLET DAILY; Therapy: 01UFC4944 to Recorded   11  Spironolactone 25 MG Oral Tablet; 1/2 tablet daily; Therapy: 82NNJ9161 to Recorded   12  Vitamin C TABS; Therapy: (Colette Perez) to Recorded   13  Warfarin Sodium 5 MG Oral Tablet; TAKE 1 TABLET DAILY; Therapy: 63LSV1498 to Recorded    Allergies    1  No Known Drug Allergies    Vitals  Vital Signs [Data Includes: Current Encounter]    Recorded: 15Apr2016 11:21AM   Temperature 97 8 F, Tympanic   Heart Rate 78, R Radial   Pulse Quality Normal, R Radial   Respiration 18   Respiration Quality Normal   Systolic 163, RUE, Sitting   Diastolic 80, RUE, Sitting   Height 5 ft 11 in   Weight 275 lb 7 oz   BMI Calculated 38 42   BSA Calculated 2 42   Pain Scale 0     Physical Exam    Wound #1 Assessment wound #1 Location:, Right foot, started on 03/2015, Care for this wound started on 7/31/15  Wound Status: not healed  Diabetic Ulcer Grade/Lower Extremity: Ifeoma Panda 2     Length: 2cm x Width: 0 2cm x Depth: 0 2cm   Total: 0 4sq cm   Wound Volume: 0 08cm3           Tissue type: Subcutaneous, Granulation and Slough   Color of Wound: Red - 20%, Yellow - 20% and Pink - 60%   Exudate Amount: Large   Exudate Type: Serosangiunous   Odor: None   Exudate Color: Tan   Wound Edges: Intact and Callous   Periwound Skin Condition: Intact, Macerated, Callus, Edema               Physician/Provider Wound #1 Exam   I agree with the nursing assessment and documentation  Addi Damico 1: Wound Nursing Care Plan   Impaired Tissue Integrity related to: right foot and left lateral ankle wounds   Risk for Infection related to open wound:   Impaired Mobility related to: bulky dressing on right foot   Goals   Patient will achieve 100% epithelialization:  Patient will maintain skin integrity:  Wound Nursing Care Interventions:   Provide moist wound healing:  Implement offloading measures (turn & reposition schedule/method, ortho shoes/boots, floating heels, crutches, specialty surfaces:  Teach and evaluate effectiveness of offloading measures:  Plan of care initiated 7/31/15, reviewed 8/21/15, reviewed 9/25/15, reviewed 10/30/15 , reviewed 11/20/15, reviewed 12/29/15, reviewed 1/8/16, reviewed 2/12/2016, 3/11/16, 4/8/16      Procedure      Wound #1: right foot     Nurse Dressing Change:   Wound #wound one The wound located on the right foot  Wound care rendered as per Physician/Advanced Practitioner order/plan  Return to [address] [place] [address] In one week to f/u with Dr Yanni Jones     Comments: Total Contact Cast for Offloading DFU:   Application of total contact cast for offloading diabetic foot ulcer with cast shoe (CPT 28024) To the left lower extremity-- All primary and secondary dressings were secured   A rigid total contact cast was applied in the Rodriguez zackery Method utilizing multiple rolls of fiberglass from the popliteal fossa to the distal foot over generous layers of cast padding, adhesive foam, and felt to protect the toes, malleoli, and tibial crest from all protruding edges of cast material  The toes were left completely encompassed  A cast shoe was applied for safe ambulation  Patient tolerated procedure without complications  Patient instructed to contact the [facility] if they experience any pain or discomfort associated with the cast      Excisional Debridement Subcutaneous Tissue:   Wound was excisionally debrided to subcutaneous tissue as follows  Prior to the procedure, the patient was identified using two identifiers, the general consent was signed, the proper site of procedure was identified, and a time out was taken  Anesthesia: Local anesthesia with 4% topical lidocaine was utilized prior to the procedure for pain control  #10 surgical blade was utilized to surgically excise devitalized tissue and/or slough through epidermis, dermis and into the subcutaneous tissue  The total sq cm excised was  4sq cm  See wound assessment for further details  There was minimal bleeding controlled with gentle pressure  CPT Code(s)   [id number] - Excisional DebridementTo Subcutaneous Tissue; first 20 sq cm        Future Appointments    Date/Time Provider Specialty Site   04/22/2016 11:00 AM Pieter Jarquin DPM Wound Care  [address] [facility]   04/29/2016 11:15 AM [address]   Electronically signed by : Ashley Martinez DPM; Apr 19 2016  1:33PM EST                       (Author)

## 2018-01-10 NOTE — MISCELLANEOUS
Physical Exam    Wound #1 Assessment wound #1 Location:, Right foot, started on 03/2015, Care for this wound started on 7/31/15  Wound Status: not healed  Diabetic Ulcer Grade/Lower Extremity: Ramses Reasoner 2  Length: 0 6cm x Width: 0 1cm x Depth: 0 2cm   Total: 0 06sq cm   Wound Volume: 0 012cm3           Tissue type: Subcutaneous and Granulation   Color of Wound: Red - 99% and Yellow - 1%   Exudate Type: Serosangiunous and small   Odor: None   Wound Edges: Intact and Callous   Periwound Skin Condition: Intact, Callus, Scaly, dry            Physician/Provider Wound #1 Exam   I agree with the nursing assessment and documentation  Constitutional - General appearance: No acute distress, well appearing and well nourished  Wound Drsg  Orders/Instructions  Wound Identification Dressing Orders--Instructions:   Wound Identification and Instructions   Wound #1: Right foot    Wound Care Instructions  Discussed with Patient/Caregiver  Dressing Type: Acticoat 7  Wash with mild soap and water, normal saline, wound cleanser or as specified  Apply specified dressing to wound base/bed  Secondary dressing apply: Gauze, ABD, cast padding  Secure with: Kerlix, and tape  Dressing change frequency: Weekly  Offloading: Surgical shoeto-- right foot with bulky dressing  Comments/Other:   Bulky dressing applied today; Acticoat flex 7 to wound base, x3 cast padding, Kerlix, Coban and TubiFast yellow  dressing to stay clean dry and intact until appointment next week  Apply compression using: Coban and Tubifast yellow  Wound #7:      Wound Goals  Wound Goals:   Healing Goals:   Fair healing potential secondary to moderate comorbid conditions     Wound edges will appear with evidence of contraction and epithelialization   Patient will achieve full wound closure with ability to wear appropriate shoewear       Future Appointments    Date/Time Provider Specialty Site   02/12/2016 11:00 AM Darlene Kraft DPM Wound Care [address] [facility] Wound Care Plan  Wound Nursing Care Plan Concetta Kirbyerd: Wound Nursing Care Plan   Impaired Tissue Integrity related to: right foot and left lateral ankle wounds   Risk for Infection related to open wound:   Impaired Mobility related to: bulky dressing on right foot   Goals   Patient will achieve 100% epithelialization:  Patient will maintain skin integrity:  Wound Nursing Care Interventions:   Provide moist wound healing:  Implement offloading measures (turn & reposition schedule/method, ortho shoes/boots, floating heels, crutches, specialty surfaces:  Teach and evaluate effectiveness of offloading measures:   Plan of care initiated 7/31/15, reviewed 8/21/15, reviewed 9/25/15, reviewed 10/30/15 , reviewed 11/20/15, reviewed 12/29/15, reviewed 1/8/16      Signatures   Electronically signed by : Real Anderson DPM; Feb 10 2016  9:13AM EST                       (Author)

## 2018-01-10 NOTE — PROGRESS NOTES
Assessment    1  Diabetes mellitus with neurological 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 60) (E11 49)   2  Chronic foot ulcer (707 15) (L97 509)    Plan  Chronic foot ulcer    · Follow-up visit in 1 week Evaluation and Treatment  Follow-up  Status: Hold For -  Scheduling  Requested for: [id number]   Ordered; For: Chronic foot ulcer; Ordered By: Migel Fletcher Performed:  Due: [id number]    Wound Care Orders/Instructions    Wound Identification and Instructions   Wound #1: Right foot    Wound Care Instructions  Discussed with Patient/Caregiver  Dressing Type: Alginate  Secondary dressing apply: Gauze, ABD  Secure with: Kerlix  Dressing change frequency: Weekly  Offloading: Felt pad to periwound  Comments/Other:   Bulky dressing of ABD, 3 cast padding, kerliz and coban applied with felt pad to periwound and adaptic and gauze to wound  Apply compression using: Coban and Tubifast yellow  Wound Goals  Wound Goals:   Healing Goals:   Fair healing potential secondary to moderate comorbid conditions  Wound edges will appear with evidence of contraction and epithelialization   Patient will achieve full wound closure with ability to wear appropriate shoewear       Discussion/Summary    We will reinstitute offloading dressing until healed but when healed - he will remain in the offloading dressing or TCC until he has the JOSE performed to prevent recurrence of the wound  The treatment plan was reviewed with the patient/guardian  The patient/guardian understands and agrees with the treatment plan      Chief Complaint  Follow up for reopened right foot wound      History of Present Illness    Wound Identification HPI   Wound #1: Right foot-reopened 7/8/16          The patient came to Wound Care via wheelchair  The patient is being seen for a follow-up with MD at the [address] [place] [address]  The patient is accompanied by his significant other  The patient's identification was verified   A secondary verification process was completed  Orientation: oriented to person, oriented to place and oriented to time  Blood Glucose:  175 mg/dL as reported by patient  7/31/15: Patient arrived via wheelchair for right foot wound  Has had wound since March 2015  Pt was seen by Dr Mel Jensen at [address] [place] [address] and was debrided at the bedside  Ace wrap with strike through drainage removed with 2 5x9s, Adaptic and packing  Dressing was in place since Wednesday 7/29/15 per patient  Pt has [facility]ANNA Morrow for wound care  Dr Mel Jensen relates that patient had two open wounds that communicated and upon MRI abscess was noted as well; bedside debridement removed skin bridge between two open areas and I&D of abscess  Plan for [facility] then total contact cast once enough granular tissue is present to d/c VAC  8/7/15: The patient arrived with VAC intact to wound  The patient has multiple abrasions from knee to toes and is bleeding  The patient relates the he crawled from his house, across grass and concrete into the car so that he didn't put any weight on his right foot  DPM had conversation with patient and significant other to come up with a solution; a manual light weight wheelchair will be prescribed  The SDTI that was on right dorsal foot has now opened, it will be wound #2  New wounds to right foot today traumatic injury  Patient has a small abrasion to the right knee that will be dressed and continue to assess  Follow up in one week  Copy of orders faxed to [facility]ANNA RIZVI  8/21/15: Pt arrived with wound vac intact to right foot wound @ 125, with 2 pieces of black foam, no bridge  Dressings intact to right dorsal ffot and right 2nd toe wounds but pt had foam dressing on, not dermagran gauze as ordered  Pt's family states he was recently hospitalized for low hemoglobin and was seen by Dr Mel Jensen while in hospital  8/28/15: Patient arrived with wound vac intact to right lower extremity at 125mmhg   2 pieces of black foam removed with some foam noted on periwound  Denies any changes since last visit  Patient arrived with foam dressings to right foot instead of ordered dry gauze  Patient has received wheelchair since last visit and states  Follow up in one week  Copy of orders faxed to RAIANNA RIZVI  9/4/15: Patient arrived with wound vac intact  1 piece of black foam removed  Denies changes since last visit  9/11/15: Arrived with wound vac intact to right foot  No changes since last visit  9/18/2015: arrived with dressings intact  For right 2nd toe amp 9/19/2015  Hold KCI wound VAC today  9/25/15: The patient arrived with NPWT and ordered dressings intact to wounds  The patient was seen by Dr Sushila Meyer 9/19/15 at which time the second toe was amputated  The patient arrived today with 98% intact suture line; the distal aspect is somewhat  (0 2cm)  10/9/2015: Arrived with NPWT intact to right foot, alginate intact to right dorsal foot and right 2nd toe  10/16/15: The patient arrived with dressings intact to wounds  NPWT functioning properly  10/23/2015: Arrived with dressing intact, KCI wound VAC  10/30/2015: Patient arrived with dressing intact to right foot and right thigh donor site  Patient states wound vac was discontinued at last MD visit  11/6/15: Arrived with dressing intact to right foot  Right leg donor site 4 x 1 5 cm  Purulent drainage assessed beneath right lateral foot eschar  11/13/2015: Arrived with dressings intact  New wound on left ankle; [place] was rubbing, has been corrected since new wound appeared  11/20/15: Patient arrived with dressings intact to all wounds  Denies changes since last visit  12/4/15: Patient arrived with dressing intact to right foot and no dressing ion left ankle but patient states it has been draining a few days  Denies changes to medications or recent falls  Left lateral ankle wound presets reopened today  12/11/15: Patient arrived with dressings intact to left and right foot   Denies changes since last visit  States he has been ambulating with bulky dressing on right foot  12/18/15: Patient arived with cast intact to RLE  Denies changes since last visit  12/29/2015: Patient arrived with TCC intact to RLE and CROW with dressing intact to LLE  Patient relates that the CROW was adjusted to relieve pressure from the lateral ankle area; feels that it is working well  Right third toe open area measuring 0 4 cm x 0 4 cm x 0 1 cm  Covered with Acticoat Flex 7 to stay intact under the TCC  1/8/16: The patient arrived with dressing intact to left lateral ankle wound and TCC intact to RLE  1/15/16: Patient arrived with crow intact to LLE, and dressings intact to right foot  1/22/16: Patient arrived with dressings intact to left and right foot  Denies changes since last visit  1/29/16: Patient arrived with bulky dressing with Acticoat intact to right foot wound  Patient arrived with Dermagran, 4x4 gauze and bhavna intact to left lateral ankle  2/5/16: Patient arrived with Acticoat and bulky dressing intact to right root  Patient arrived 4x4 gauze and tape to left lateral ankle  2/12/16: Arrived with bulky dressing intact to right foot  Strike through drainage to [employer]  Patient reported he is doing home Physical therapy currently, nursing has discharged from services  2/19/16: Patient arrived with Acticoat flex, 4x4 gauze, 5x9 ABD, Kerlix, coban intact to right foot wound  2/25/16: The patient arrived with dressing clean dry and intact  No issues since last visit  3/4/16: The patient arrived with dressing nicely intact to right foot  No issues since last visit  3/11/16: The patient arrived with dressing intact, strikethrough drainage to but not through coban  The patient relates that he is doing "a lot" with physical therapy  3/18/16: Patient arrived with cast to right foot  Denies any changes since last visit  3/25/16: Patient arrived with TCC intact to right foot  4/1/16: Patient arrived with cast to right foot   4/8/16: Patient arrived with cast intact to right lower extremity  Denies changes since last visit  4/15/2016 Patient arrived with TCC intact to RLE  Denies any changes since last visit  4/22/16:Patient arrived with TCC intact to RLE  4/29/16: Patient arrived with bulky dressing intact to right foot  Patient denies any changes since last visit  5/6/16: Arrived with Dermagran, 2x2, 4x4, cast padding, ace wrap and Coban intact to right foot  5/13/16: Patient arrived with bulky dressing intact  Denies changes since last visit  5/20/2016: Arrived with bulky dressing intact to right foot  Patient reports no problems with the dressing  5/27/2016: Patient arrived with bulky dressing intact to right foot wound  Patient denies any changes since last visit  6/3/16: Patient arrived with bulky dressing intact  Denies any changes since last visit  6/10/2016: Arrived with bulky dressing intact to right foot  6/24/2016: Patient arrived with TCC intact to Right foot  Denies changes since last visit  6/29/2016: Patient called to report while he was showering he got the bulky dressing wet  Significant other removed bulky dressing and applied 5x9, Kerlix to protect wound bed  Arrived with 5x9, Kerlix multiple layers intact to right foot  7/8/16: The patient arrived with bulky dressing intact to right foot  Patient is healed  Discharged from [facility] today  7/15/16: Patient states that he went home 7/8/16 after being discharged from [facility] and states wound reopened that evening  Denies any other changes since last visit  Arrived with adaptic, gauze, ABD and bhavna to wound  History of Falling: No = 0   Secondary Diagnosis: Yes = 15   Ambulatory Aid None, Bedrest, Nurse Assist = 0   No = 0   Gait: Impaired = 20 -- Short steps with shuffle, may have difficulty arising from chair, head down, significantly impaired balance, requiring furniture, support person, or walking aid to walk     Mental Status: Oriented to own ability = 0   Total Score: 35 25 - 45 = Moderate Risk        The most recent fall occurred patient denies any falls since last visit  Number of falls in the last year were 0  Nutrition Assessment Screening: Food intake over the last 3 months due to the loss of appetite, digestive problems, chewing or swallowing difficulties is graded as: 2 = no decrease in food intake   Weight loss during the last 3 months: 3 = no weight loss   Mobility scored as: 2 = goes out  Psychological Stress and Acute Disease Scored as: 0 = The patient has experienced psychological stress or acute disease in the last 3 months  Neuropsychological problems scored as: 2 = no psychological problems  Body Mass Index (BMI) scored as: 3 = BMI 23 or greater  Nutritional Assessment Screening Score: 12 - 14 points - Normal nutritional status  Provider Wound Care HPI: Rabia Meza is here a recurrent wound on the foot at the previous wound site  He relates it reopened very shortly after leaving here  He had never been able to schedule for the Achilles tendon lengthening obviously  Pain Assessment   the patient states they do not have pain  (on a scale of 0 to 10, the patient rates the pain at 0 )   Abuse And Domestic Violence Screen   Domestic violence screen not done today  Reason DV Screen not done: fiance present in exam room    Depression And Suicide Screen  Suicide screen not done today  Reason suicide screen not done: fiance present in exam room  Prefered Language is  [place]  Primary Language is  English  Readiness To Learn: Receptive  Barriers To Learning: affect  Preferred Learning: demonstration and verbal   Education Completed: further treatment/follow-up and treatment/procedure   Teaching Method: verbal and demonstration   Person Taught: patient   Evaluation Of Learning: verbalized/demonstrated understanding and needs reinforcement      Review of Systems    Constitutional: no fever  Active Problems    1   Abnormal kidney function (593 9) (N28 9)   2  Benign essential hypertension (401 1) (I10)   3  Charcot's Joint Of The Foot (713 5)   4  Chronic foot ulcer (707 15) (L97 509)   5  Chronic kidney disease (CKD) stage G3a/A1, moderately decreased glomerular filtration   rate (GFR) between 45-59 mL/min/1 73 square meter and albuminuria creatinine ratio   less than 30 mg/g (585 3) (N18 3)   6  Chronic ulcer of left ankle with fat layer exposed (707 13) (L97 322)   7  Congestive heart failure (428 0) (I50 9)   8  Dehiscence of incision (998 32) (T81 31XA)   9  Diabetes mellitus with neurological 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 60) (E11 49)   10  Diabetic Nephropathy (583 81)   11  DM type 2 (diabetes mellitus, type 2) (250 00) (E11 9)   12  Postoperative wound dehiscence, subsequent encounter (V58 89,998 32) (T81 31XD)   13  Status post skin graft (V42 3) (Z94 5)    Past Medical History    1  History of High cholesterol (272 0) (E78 0)   2  History of myocardial infarction (412) (I25 2)   3  History of stroke (V12 54) (Z86 73)   4  History of Irregular heartbeat (427 9) (I49 9)    The active problems and past medical history were reviewed and updated today  Surgical History    1  History of Appendectomy (47 0)   2  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    Family History    1  Family history of Diabetes Mellitus (250 00)    2  Family history of cardiac disorder (V17 49) (Z82 49)    Social History    · Never a smoker    Current Meds   1  Atorvastatin Calcium 80 MG Oral Tablet; TAKE 1 TABLET DAILY; Therapy: 61HZT0692 to Recorded   2  Carvedilol 12 5 MG Oral Tablet; Take 1 tablet twice daily; Therapy: 48IEQ2829 to (Evaluate:96Vme0021) Recorded   3  Citalopram Hydrobromide 20 MG Oral Tablet; TAKE 1 TABLET DAILY; Therapy: 16GNI0526 to Recorded   4  Furosemide 40 MG Oral Tablet; as needed; Therapy: 40QGF5528 to Recorded   5  Lantus 100 UNIT/ML Subcutaneous Solution; 40 untis bid; Therapy: 26BAM8048 to Recorded   6   Lidocaine HCl (Local Anesth ) 4 % SOLN; Apply prn to wound prior to debridement for   pain control; Therapy: (Recorded:97Vqn7588) to Recorded   7  Lisinopril 2 5 MG Oral Tablet; Therapy: (Felicia Lin) to Recorded   8  NovoLOG SOLN;   Therapy: (Recorded:69Xbn5769) to Recorded   9  Pacerone 200 MG Oral Tablet; TAKE 1 TABLET DAILY; Therapy: 19OQM3510 to Recorded   10  Spironolactone 25 MG Oral Tablet; 1/2 tablet daily; Therapy: 65JKO9174 to Recorded   11  Vitamin C TABS; Therapy: (Jericho Ley) to Recorded   12  Warfarin Sodium 5 MG Oral Tablet; TAKE 1 TABLET DAILY; Therapy: 14TKS8758 to Recorded    Allergies    1  No Known Drug Allergies    Vitals  Vital Signs    Recorded: 86VHR2349 54:49HG   Systolic 693, RUE   Diastolic 74, RUE   Heart Rate 76, R Radial   Respiration Quality Normal   Respiration 16   Temperature 98 2 F, Tympanic   Pain Scale 0   Weight 273 lb 3 oz   BMI Calculated 38 1   BSA Calculated 2 41     Physical Exam    Wound #1 Assessment wound #1 Location:, Right foot, started on 03/2015 reopened 7/8/16, Care for this wound started on 7/31/15, healed on 7/8/2016  Wound Status: not healed  Diabetic Ulcer Grade/Lower Extremity: Lalit Axe 2  Length: 2cm x Width: 3cm x Depth: 0 1cm   Total: 6sq cm   Wound Volume: 0 6cm3           Tissue type: Granulation and Slough   Color of Wound: Yellow - 10% and Pink - 90%   Exudate Amount: Scant   Exudate Type: Serosangiunous   Odor: None   Wound Edges: Intact   Periwound Skin Condition: Macerated, Callus   Comments: Post debridement measurement 3x3x0 1  Macerated island within wound  Physician/Provider Wound #1 Exam   I agree with the nursing assessment and documentation  Constitutional - General appearance: No acute distress, well appearing and well nourished  Addi Damico 1:    Wound Nursing Care Plan   Impaired Tissue Integrity related to: right foot and left lateral ankle wounds Risk for Infection related to open wound:   Impaired Mobility related to: bulky dressing on right foot   Goals   Patient will achieve 100% epithelialization:  Patient will maintain skin integrity:  Wound Nursing Care Interventions:   Provide moist wound healing:  Implement offloading measures (turn & reposition schedule/method, ortho shoes/boots, floating heels, crutches, specialty surfaces:  Teach and evaluate effectiveness of offloading measures:  Plan of care initiated 7/31/15, reviewed 8/21/15, reviewed 9/25/15, reviewed 10/30/15, reviewed 11/20/15, reviewed 12/29/15, reviewed 1/8/16, reviewed 2/12/2016, 3/11/16, 4/8/16, 5/6/16,  6/3/16, Reviewed 6/30/2016, all goals achieved plan of care resolved 7/8/2016      Procedure      Wound #1: Right foot     Nurse Dressing Change:   Wound #1 The wound located on the Right foot  Wound care rendered as per Physician/Advanced Practitioner order/plan  Return to [address] [place] [address] 1 week  Comments:    Excisional Debridement Subcutaneous Tissue:   Wound was excisionally debrided to subcutaneous tissue as follows  Prior to the procedure, the patient was identified using two identifiers, the general consent was signed, the proper site of procedure was identified, and a time out was taken  Anesthesia: Local anesthesia with 4% topical lidocaine was utilized prior to the procedure for pain control  #10 surgical blade was utilized to surgically excise devitalized tissue and/or slough through epidermis, dermis and into the subcutaneous tissue  The total sq cm excised was 6sq cm  See wound assessment for further details  There was minimal bleeding controlled with gentle pressure  the patient tolerated the procedure well without complication  CPT Code(s)   [id number] - Excisional DebridementTo Subcutaneous Tissue; first 20 sq cm        Future Appointments    Date/Time Provider Specialty Site   07/22/2016 10:45 AM Darlene Kraft, [address] [place] [address] [facility]     Signatures   Electronically signed by : Angel Chambers DPM; Jul 19 2016  1:56PM EST                       (Author)

## 2018-01-10 NOTE — PROGRESS NOTES
Assessment    1  Chronic foot ulcer (707 15) (L97 509)   2  Diabetes mellitus with neurological 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 60) (E11 49)    Wound Care Orders/Instructions    Wound Identification and Instructions   Wound #1: Right foot    Wound Care Instructions  Discussed with Patient/Caregiver  Dressing Type: Franko Zamoar with mild soap and water, normal saline, wound cleanser  As specified, use: NSS, and wound cleanser  Maribel Amin Apply specified dressing to wound base/bed  To periwound apply: Skin prep barrier  Secondary dressing apply: Gauze, 1/2 ABD  Secure with: Kerlix, and tape  Dressing change frequency: Weekly  Offloading: Foam pad to periwound  Comments/Other:   Bulky dressing applied 4/22/16 with 3 cast pading, 1 kerlix, 1 coban   Apply compression using: Coban  Wound Goals  Wound Goals:   Healing Goals:   Fair healing potential secondary to moderate comorbid conditions  Wound edges will appear with evidence of contraction and epithelialization   Patient will achieve full wound closure with ability to wear appropriate shoewear       Discussion/Summary    Discontinue TCC  An offloading football-type dressing was applied  The treatment plan was reviewed with the patient/guardian  The patient/guardian understands and agrees with the treatment plan      Chief Complaint  Follow up for right foot wound      History of Present Illness    Wound Identification HPI   Wound #1: Right foot          The patient came to Wound Care via wheelchair  The patient is being seen for a follow-up with MD at the [address] [place] [address]  The patient is accompanied by his fiance  The patient's identification was verified  A secondary verification process was completed  Orientation: oriented to person, oriented to place and oriented to time  Blood Glucose:  125 mg/dL as reported by patient  7/31/15: Patient arrived via wheelchair for right foot wound  Has had wound since March 2015   Pt was seen by Dr Darby Colin at [place] [address] [place] and was debrided at the bedside  Ace wrap with strike through drainage removed with 2 5x9s, adaptic and packing  Dressing was in place since Wednesday 7/29/15 per patient  Pt has [facility] for wound care  Dr Hogn Wilburn relates that patient had two open wounds that communicated and upon MRI abscess was noted as well; bedside debridement removed skin bridge between two open areas and I&D of abscess  Plan for [facility] then total contact cast once enough granular tissue is present to d/c VAC  8/7/15: The patient arrived with VAC intact to wound  The patient has multiple abrasions from knee to toes and is bleeding  The patient relates the he crawled from his house, across grass and concrete into the car so that he didn't put any weight on his right foot  DPM had conversation with patient and significant other to come up with a solution; a manual light weight wheelchair will be prescribed  The SDTI that was on right dorsal foot has now opened, it will be wound #2  New wounds to right foot today traumatic injury  Patient has a small abrasion to the right knee that will be dressed and continue to assess  Follow up in one week  Copy of orders faxed to [facility]  8/21/15: Pt arrived with wound vac intact to right foot wound @ 125, with 2 pieces of black foam, no bridge  Dressings intact to right dorsal ffot and right 2nd toe wounds but pt had foam dressing on, not dermagran gauze as ordered  Pt's family states he was recently hospitalized for low hemoglobin and was seen by Dr Hong Wilburn while in hospital  8/28/15: Patient arrived with wound vac intact to right lower extremity at 125mmhg  2 pieces of black foam removed with some foam noted on periwound  Denies any changes since last visit  Patient arrived with foam dressings to right foot instead of ordered dry gauze  Patient has received wheelchair since last visit and states  Follow up in one week  Copy of orders faxed to [facility]   9/4/15: Patient arrived with wound vac intact  1 piece of black foam removed  Denies changes since last visit  9/11/15: Arrived with wound vac intact to right foot  No changes since last visit  9/18/2015: arrived with dressings intact  For right 2nd toe amp 9/19/2015  Hold KCI wound VAC today  9/25/15: The patient arrived with NPWT and ordered dressings intact to wounds  The patient was seen by Dr Stephanie Camilo 9/19/15 at which time the second toe was amputated  The patient arrived today with 98% intact suture line; the distal aspect is somewhat  (0 2cm)  10/9/2015: Arrived with NPWT intact to right foot, alginate intact to right dorsal foot and right 2nd toe  10/16/15: The patient arrived with dressings intact to wounds  NPWT functioning properly  10/23/2015: Arrived with dressing intact, KCI wound VAC  10/30/2015: Patient arrived with dressing intact to right foot and right thigh donor site  Patient states wound vac was discontinued at last MD visit  11/6/15: Arrived with dressing intact to right foot  Right leg donor site 4 x 1 5 cm  Purulent drainage assessed beneath right lateral foot eschar  11/13/2015: Arrived with dressings intact  New wound on left ankle; BJORN was rubbing, has been corrected since new wound appeared  11/20/15: Patient arrived with dressings intact to all wounds  Denies changes since last visit  12/4/15: Patient arrived with dressing intact to right foot and no dressing ion left ankle but patient states it has been draining a few days  Denies changes to medications or recent falls  Left lateral ankle wound presets reopened today  12/11/15: Patient arrived with dressings intact to left and right foot  Denies changes since last visit  States he has been ambulating with bulky dressing on right foot  12/18/15: Patient arived with cast intact to RLE  Denies changes since last visit  12/29/2015: Patient arrived with TCC intact to RLE and CROW with dressing intact to LLE   Patient relates that the CROW was adjusted to relieve pressure from the lateral ankle area; feels that it is working well  Right third toe open area measuring 0 4 cm x 0 4 cm x 0 1 cm  Covered with Acticoat Flex 7 to stay intact under the TCC  1/8/16: The patient arrived with dressing intact to left lateral ankle wound and TCC intact to RLE  1/15/16: Patient arrived with crow intact to LLE, and dressings intact to right foot  1/22/16: Patient arrived with dressings intact to left and right foot  Denies changes since last visit  1/29/16: Patient arrived with bulky dressing with Acticoat intact to right foot wound  Patient arrived with Dermagran, 4x4 gauze and bhavna intact to left lateral ankle  2/5/16: Patient arrived with Acticoat and bulky dressing intact to right root  Patient arrived 4x4 gauze and tape to left lateral ankle  2/12/16: Arrived with bulky dressing intact to right foot  Strike through drainage to [employer]  Patient reported he is doing home Physical therapy currently, nursing has discharged from services  2/19/16: Patient arrived with Acticoat flex, 4x4 gauze, 5x9 ABD, Kerlix, coban intact to right foot wound  2/25/16: The patient arrived with dressing clean dry and intact  No issues since last visit  3/4/16: The patient arrived with dressing nicely intact to right foot  No issues since last visit  3/11/16: The patient arrived with dressing intact, strikethrough drainage to but not through coban  The patient relates that he is doing "a lot" with physical therapy  3/18/16: Patient arrived with cast to right foot  Denies any changes since last visit  3/25/16: Patient arrived with TCC intact to right foot  4/1/16: Patient arrived with cast to right foot  4/8/16: Patient arrived with cast intact to right lower extremity  Denies changes since last visit  4/15/2016 Patient arrived with TCC intact to RLE  Denies any changes since last visit  4/22/16:Patient arrived with TCC intact to RLE             History of Falling: No = 0 Secondary Diagnosis: Yes = 15   Ambulatory Aid None, Bedrest, Nurse Assist = 0   No = 0   Gait: Impaired = 20 -- Short steps with shuffle, may have difficulty arising from chair, head down, significantly impaired balance, requiring furniture, support person, or walking aid to walk  Mental Status: Oriented to own ability = 0   Total Score: 35    25 - 45 = Moderate Risk        The most recent fall occurred patient denies any falls since last visit  Number of falls in the last year were 0  Nutrition Assessment Screening: Food intake over the last 3 months due to the loss of appetite, digestive problems, chewing or swallowing difficulties is graded as: 2 = no decrease in food intake   Weight loss during the last 3 months: 3 = no weight loss   Mobility scored as: 2 = goes out  Psychological Stress and Acute Disease Scored as: 0 = The patient has experienced psychological stress or acute disease in the last 3 months  Neuropsychological problems scored as: 2 = no psychological problems  Body Mass Index (BMI) scored as: 3 = BMI 23 or greater  Nutritional Assessment Screening Score: 12 - 14 points - Normal nutritional status  Provider Wound Care HPI: Gabriela Mass is here for TCC change  Pain Assessment   the patient states they do not have pain  (on a scale of 0 to 10, the patient rates the pain at 0 )   Abuse And Domestic Violence Screen   Domestic violence screen not done today  Reason DV Screen not done: fiance present in exam room    Depression And Suicide Screen  Suicide screen not done today  Reason suicide screen not done: fiance present in exam room  Prefered Language is  [place]  Primary Language is  English  Readiness To Learn: Receptive  Barriers To Learning: affect     Preferred Learning: demonstration and verbal   Education Completed: further treatment/follow-up and treatment/procedure   Teaching Method: verbal and demonstration   Person Taught: patient   Evaluation Of Learning: verbalized/demonstrated understanding and needs reinforcement      Review of Systems    Constitutional: no fever, not feeling poorly and no chills  Active Problems    1  Abnormal kidney function (593 9) (N28 9)   2  Benign essential hypertension (401 1) (I10)   3  Charcot's Joint Of The Foot (713 5)   4  Chronic foot ulcer (707 15) (L97 509)   5  Chronic kidney disease (CKD) stage G3a/A1, moderately decreased glomerular filtration   rate (GFR) between 45-59 mL/min/1 73 square meter and albuminuria creatinine ratio   less than 30 mg/g (585 3) (N18 3)   6  Chronic ulcer of left ankle with fat layer exposed (707 13) (L97 322)   7  Congestive heart failure (428 0) (I50 9)   8  Dehiscence of incision (998 32) (T81 31XA)   9  Diabetes mellitus with neurological 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 60) (E11 49)   10  Diabetic Nephropathy (583 81)   11  DM type 2 (diabetes mellitus, type 2) (250 00) (E11 9)   12  Postoperative wound dehiscence, subsequent encounter (V58 89,998 32) (T81 31XD)   13  Status post skin graft (V42 3) (Z94 5)    Past Medical History    1  History of High cholesterol (272 0) (E78 0)   2  History of myocardial infarction (412) (I25 2)   3  History of stroke (V12 54) (Z86 73)   4  History of Irregular heartbeat (427 9) (I49 9)    The active problems and past medical history were reviewed and updated today  Surgical History    1  History of Appendectomy (47 0)   2  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    Family History    1  Family history of Diabetes Mellitus (250 00)    2  Family history of cardiac disorder (V17 49) (Z82 49)    Social History    · Never a smoker    Current Meds   1  Atorvastatin Calcium 80 MG Oral Tablet; TAKE 1 TABLET DAILY; Therapy: 88VRC5052 to Recorded   2  Carvedilol 12 5 MG Oral Tablet; Take 1 tablet twice daily; Therapy: 80FDN9375 to (Evaluate:30Jun2016) Recorded   3  Citalopram Hydrobromide 20 MG Oral Tablet; TAKE 1 TABLET DAILY;    Therapy: 52SKX5941 to Recorded   4  Furosemide 40 MG Oral Tablet; as needed; Therapy: 71JKR6447 to Recorded   5  Lantus 100 UNIT/ML Subcutaneous Solution; 40 untis bid; Therapy: 35FTH6665 to Recorded   6  Lidocaine HCl (Local Anesth ) 4 % SOLN; Apply prn to wound prior to debridement for   pain control; Therapy: (Recorded:27Ggt6747) to Recorded   7  Lisinopril 2 5 MG Oral Tablet; Therapy: (Marika Diana) to Recorded   8  NovoLOG SOLN;   Therapy: (Recorded:49Hvs9866) to Recorded   9  Pacerone 200 MG Oral Tablet; TAKE 1 TABLET DAILY; Therapy: 85SSC2182 to Recorded   10  Spironolactone 25 MG Oral Tablet; 1/2 tablet daily; Therapy: 89MPR7911 to Recorded   11  Vitamin C TABS; Therapy: (Antwan Aguilar) to Recorded   12  Warfarin Sodium 5 MG Oral Tablet; TAKE 1 TABLET DAILY; Therapy: 73FWV6736 to Recorded    Allergies    1  No Known Drug Allergies    Vitals  Vital Signs [Data Includes: Current Encounter]    Recorded: 22Apr2016 11:23AM   Temperature 98 9 F, Tympanic   Heart Rate 68, R Radial   Pulse Quality Normal, R Radial   Respiration 18   Respiration Quality Normal   Systolic 837, RUE, Sitting   Diastolic 68, RUE, Sitting   Height 5 ft 11 in   Weight 275 lb 7 oz   BMI Calculated 38 42   BSA Calculated 2 42   Pain Scale 0     Physical Exam    Wound #1 Assessment wound #1 Location:, Right foot, started on 03/2015, Care for this wound started on 7/31/15  Wound Status: not healed  Diabetic Ulcer Grade/Lower Extremity: [place] 2  Length: 0 2cm x Width: 0 2cm x Depth: 0 1cm   Total: 0 04sq cm   Wound Volume: 0 004cm3           Tissue type: Subcutaneous, Granulation and Slough   Color of Wound: Yellow - 2% and Pink - 98%   Exudate Amount: Scant   Odor: None   Exudate Color: Yellow and Tan   Wound Edges: Intact and Callous   Periwound Skin Condition: Scaly        Physician/Provider Wound #1 Exam   I agree with the nursing assessment and documentation       Constitutional - General appearance: No acute distress, well appearing and well nourished  Addi alida 1: Wound Nursing Care Plan   Impaired Tissue Integrity related to: right foot and left lateral ankle wounds   Risk for Infection related to open wound:   Impaired Mobility related to: bulky dressing on right foot   Goals   Patient will achieve 100% epithelialization:  Patient will maintain skin integrity:  Wound Nursing Care Interventions:   Provide moist wound healing:  Implement offloading measures (turn & reposition schedule/method, ortho shoes/boots, floating heels, crutches, specialty surfaces:  Teach and evaluate effectiveness of offloading measures:  Plan of care initiated 7/31/15, reviewed 8/21/15, reviewed 9/25/15, reviewed 10/30/15 , reviewed 11/20/15, reviewed 12/29/15, reviewed 1/8/16, reviewed 2/12/2016, 3/11/16, 4/8/16      Procedure      Wound #1: Right foot     Nurse Dressing Change:   Wound #1 The wound located on the Right foot  Wound care rendered as per Physician/Advanced Practitioner order/plan  Return to [address] [place] [address] 1 week  Comments:    Excisional Debridement Subcutaneous Tissue:   Wound was excisionally debrided to subcutaneous tissue as follows  Prior to the procedure, the patient was identified using two identifiers, the general consent was signed, the proper site of procedure was identified, and a time out was taken  Anesthesia: Local anesthesia with 4% topical lidocaine was utilized prior to the procedure for pain control  #10 surgical blade was utilized to surgically excise devitalized tissue and/or slough through epidermis, dermis and into the subcutaneous tissue  The total sq cm excised was  04sq cm  See wound assessment for further details  There was minimal bleeding controlled with gentle pressure  the patient tolerated the procedure well without complication  CPT Code(s)   [id number] - Excisional DebridementTo Subcutaneous Tissue; first 20 sq cm        Future Appointments    Date/Time Provider Specialty Site   04/29/2016 09:45 AM Jorge Crockett DPM Wound Care [place] [address]   05/06/2016 11:00 AM Corazon Glalego     Signatures   Electronically signed by : Elif Tan DPM; Apr 26 2016  1:23PM EST                       (Author)

## 2018-01-11 NOTE — PROGRESS NOTES
Assessment    1  Diabetes mellitus with neurological 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 60) (E11 49)   2  Chronic foot ulcer (707 15) (L97 509)    Plan  Chronic ulcer of left ankle with fat layer exposed    · Clotrimazole-Betamethasone 1-0 05 % External Cream; Apply to Left leg rash once  daily   Rx By: Karla Batres; Dispense: 0 Days ; #:30 GM; Refill: 0; For: Chronic ulcer of left ankle with fat layer exposed; GIO = N; Faxed To: [id number]   Wound Care Orders/Instructions    Wound Identification and Instructions   Wound #1: Right foot    Wound Care Instructions  Discussed with Patient/Caregiver  Comments/Other: Total contact cast applied by Dr Rigo Gonzalez  Patient to f/u in Dr Jennifer Alarcon office for cast removal within 3 weeks  (Healed 8/19/16)   Wound #8: left posterior lower extremity multi   Wound Care Instructions  Discussed with Patient/Caregiver  Dressing Type: Corrie Bugler with mild soap and water, normal saline, wound cleanser  To periwound apply: Fluocinolone ointment  Secondary dressing apply: Gauze  Secure with: Tape  Dressing change frequency: Three times per week  Comments/Other:   Wound will be followed in Dr Jennifer Alarcon office  Patient has [employer] supplies at this time  Wound Goals  Wound Goals:   Healing Goals:   Fair healing potential secondary to moderate comorbid conditions  Wound edges will appear with evidence of contraction and epithelialization   Patient will achieve full wound closure with ability to wear appropriate shoewear       Discussion/Summary    Continue TCC for now to maintain the healed state  Jo-Annsylvia Neff was instructed to make an appointment at my office for percutaneous Achilles tendon lengthening  The treatment plan was reviewed with the patient/guardian   The patient/guardian understands and agrees with the treatment plan      Chief Complaint  Follow up for right foot wound and left posterior lower extremity      History of Present Illness    Wound Identification HPI   Wound #1: Right foot    Wound #8: left posterior lower extremity multi          The patient came to Wound Care via wheelchair  The patient is being seen for a follow-up with MD at the [address] [place] [address]  The patient is accompanied by his significant other  The patient's identification was verified  A secondary verification process was completed  Orientation: oriented to person, oriented to place and oriented to time  Blood Glucose:   177 mg/dL as reported by patient  7/31/15: Patient arrived via wheelchair for right foot wound  Has had wound since March 2015  Pt was seen by Dr Elle Novak at [facility] and was debrided at the bedside  Ace wrap with strike through drainage removed with 2 5x9s, Adaptic and packing  Dressing was in place since Wednesday 7/29/15 per patient  Pt has [facility] for wound care  Dr Elle Novak relates that patient had two open wounds that communicated and upon MRI abscess was noted as well; bedside debridement removed skin bridge between two open areas and I&D of abscess  Plan for [facility] then total contact cast once enough granular tissue is present to d/c VAC  8/7/15: The patient arrived with VAC intact to wound  The patient has multiple abrasions from knee to toes and is bleeding  The patient relates the he crawled from his house, across grass and concrete into the car so that he didn't put any weight on his right foot  DPM had conversation with patient and significant other to come up with a solution; a manual light weight wheelchair will be prescribed  The SDTI that was on right dorsal foot has now opened, it will be wound #2  New wounds to right foot today traumatic injury  Patient has a small abrasion to the right knee that will be dressed and continue to assess  Follow up in one week  Copy of orders faxed to ARIANNA RIZVI  8/21/15: Pt arrived with wound vac intact to right foot wound @ 125, with 2 pieces of black foam, no bridge  Dressings intact to right dorsal ffot and right 2nd toe wounds but pt had foam dressing on, not dermagran gauze as ordered  Pt's family states he was recently hospitalized for low hemoglobin and was seen by Dr Areli Melgar while in hospital  8/28/15: Patient arrived with wound vac intact to right lower extremity at 125mmhg  2 pieces of black foam removed with some foam noted on periwound  Denies any changes since last visit  Patient arrived with foam dressings to right foot instead of ordered dry gauze  Patient has received wheelchair since last visit and states  Follow up in one week  Copy of orders faxed to [facility]  9/4/15: Patient arrived with wound vac intact  1 piece of black foam removed  Denies changes since last visit  9/11/15: Arrived with wound vac intact to right foot  No changes since last visit  9/18/2015: arrived with dressings intact  For right 2nd toe amp 9/19/2015  Hold KCI wound VAC today  9/25/15: The patient arrived with NPWT and ordered dressings intact to wounds  The patient was seen by Dr Areli Melgar 9/19/15 at which time the second toe was amputated  The patient arrived today with 98% intact suture line; the distal aspect is somewhat  (0 2cm)  10/9/2015: Arrived with NPWT intact to right foot, alginate intact to right dorsal foot and right 2nd toe  10/16/15: The patient arrived with dressings intact to wounds  NPWT functioning properly  10/23/2015: Arrived with dressing intact, KCI wound VAC  10/30/2015: Patient arrived with dressing intact to right foot and right thigh donor site  Patient states wound vac was discontinued at last MD visit  11/6/15: Arrived with dressing intact to right foot  Right leg donor site 4 x 1 5 cm  Purulent drainage assessed beneath right lateral foot eschar  11/13/2015: Arrived with dressings intact  New wound on left ankle; [place] was rubbing, has been corrected since new wound appeared  11/20/15: Patient arrived with dressings intact to all wounds  Denies changes since last visit  12/4/15: Patient arrived with dressing intact to right foot and no dressing ion left ankle but patient states it has been draining a few days  Denies changes to medications or recent falls  Left lateral ankle wound presets reopened today  12/11/15: Patient arrived with dressings intact to left and right foot  Denies changes since last visit  States he has been ambulating with bulky dressing on right foot  12/18/15: Patient arived with cast intact to RLE  Denies changes since last visit  12/29/2015: Patient arrived with TCC intact to RLE and CROW with dressing intact to LLE  Patient relates that the CROW was adjusted to relieve pressure from the lateral ankle area; feels that it is working well  Right third toe open area measuring 0 4 cm x 0 4 cm x 0 1 cm  Covered with Acticoat Flex 7 to stay intact under the TCC  1/8/16: The patient arrived with dressing intact to left lateral ankle wound and TCC intact to RLE  1/15/16: Patient arrived with crow intact to LLE, and dressings intact to right foot  1/22/16: Patient arrived with dressings intact to left and right foot  Denies changes since last visit  1/29/16: Patient arrived with bulky dressing with Acticoat intact to right foot wound  Patient arrived with Dermagran, 4x4 gauze and bhavna intact to left lateral ankle  2/5/16: Patient arrived with Acticoat and bulky dressing intact to right root  Patient arrived 4x4 gauze and tape to left lateral ankle  2/12/16: Arrived with bulky dressing intact to right foot  Strike through drainage to [employer]  Patient reported he is doing home Physical therapy currently, nursing has discharged from services  2/19/16: Patient arrived with Acticoat flex, 4x4 gauze, 5x9 ABD, Kerlix, coban intact to right foot wound  2/25/16: The patient arrived with dressing clean dry and intact  No issues since last visit  3/4/16: The patient arrived with dressing nicely intact to right foot  No issues since last visit  3/11/16: The patient arrived with dressing intact, strikethrough drainage to but not through coban  The patient relates that he is doing "a lot" with physical therapy  3/18/16: Patient arrived with cast to right foot  Denies any changes since last visit  3/25/16: Patient arrived with TCC intact to right foot  4/1/16: Patient arrived with cast to right foot  4/8/16: Patient arrived with cast intact to right lower extremity  Denies changes since last visit  4/15/2016 Patient arrived with TCC intact to RLE  Denies any changes since last visit  4/22/16:Patient arrived with TCC intact to RLE  4/29/16: Patient arrived with bulky dressing intact to right foot  Patient denies any changes since last visit  5/6/16: Arrived with Dermagran, 2x2, 4x4, cast padding, ace wrap and Coban intact to right foot  5/13/16: Patient arrived with bulky dressing intact  Denies changes since last visit  5/20/2016: Arrived with bulky dressing intact to right foot  Patient reports no problems with the dressing  5/27/2016: Patient arrived with bulky dressing intact to right foot wound  Patient denies any changes since last visit  6/3/16: Patient arrived with bulky dressing intact  Denies any changes since last visit  6/10/2016: Arrived with bulky dressing intact to right foot  6/24/2016: Patient arrived with TCC intact to Right foot  Denies changes since last visit  6/29/2016: Patient called to report while he was showering he got the bulky dressing wet  Significant other removed bulky dressing and applied 5x9, Kerlix to protect wound bed  Arrived with 5x9, Kerlix multiple layers intact to right foot  7/8/16: The patient arrived with bulky dressing intact to right foot  Patient is healed  Discharged from [facility] today  7/15/16: Patient states that he went home 7/8/16 after being discharged from [facility] and states wound reopened that evening  Denies any other changes since last visit  Arrived with Adaptic, gauze, ABD and Gill to wound   7/22/2016: Patient arrived with bulky dressing intact to right foot wound  7/29/2016: Patient arrived with bulky dressing to right foot wound with strikethrough drainage to the Coban  Patient is picking up Imodium for diarrhea today at pharmacy  8/5/2016: Arrived with bulky dressing intact with strike through drainage to the ace wraps  Patient has a new area to left posterior lower extremity multi  8/10/2016: Arrived with bulky dressing intact to right lower extremity  Left lower extremity not assessed at this visit  8/19/16: Patient arrived with cast intact to RLE and boot to LLE without dressing  Rash noted to LLE  Right foot appears healed at this time  Cast applied for protection  Patient will f/u in Dr Erin Gamez office for cast removal and achilles procedure  LLE wound will be followed by Dr Darby Colin in his office  History of Falling: No = 0   Secondary Diagnosis: Yes = 15   Ambulatory Aid None, Bedrest, Nurse Assist = 0   No = 0   Gait: Impaired = 20 -- Short steps with shuffle, may have difficulty arising from chair, head down, significantly impaired balance, requiring furniture, support person, or walking aid to walk  Mental Status: Oriented to own ability = 0   Total Score: 35    25 - 45 = Moderate Risk        The most recent fall occurred patient denies any falls since last visit  Number of falls in the last year were 0  Nutrition Assessment Screening: Food intake over the last 3 months due to the loss of appetite, digestive problems, chewing or swallowing difficulties is graded as: 2 = no decrease in food intake   Weight loss during the last 3 months: 3 = no weight loss   Mobility scored as: 2 = goes out  Psychological Stress and Acute Disease Scored as: 0 = The patient has experienced psychological stress or acute disease in the last 3 months  Neuropsychological problems scored as: 2 = no psychological problems  Body Mass Index (BMI) scored as: 3 = BMI 23 or greater     Nutritional Assessment Screening Score: 12 - 14 points - Normal nutritional status  Provider Wound Care HPI: Isatu Rodriguez is here for TCC change  Pain Assessment   the patient states they do not have pain  (on a scale of 0 to 10, the patient rates the pain at 0 )   Abuse And Domestic Violence Screen   Domestic violence screen not done today  Reason DV Screen not done: fiance present in exam room    Depression And Suicide Screen  Suicide screen not done today  Reason suicide screen not done: fiance present in exam room  Prefered Language is  [place]  Primary Language is  English  Readiness To Learn: Receptive  Barriers To Learning: affect  Preferred Learning: demonstration and verbal   Education Completed: further treatment/follow-up and treatment/procedure   Teaching Method: verbal and demonstration   Person Taught: patient   Evaluation Of Learning: verbalized/demonstrated understanding and needs reinforcement      Review of Systems    Constitutional: no fever, not feeling poorly, no chills and not feeling tired  Active Problems    1  Abnormal kidney function (593 9) (N28 9)   2  Benign essential hypertension (401 1) (I10)   3  Charcot's Joint Of The Foot (713 5)   4  Chronic foot ulcer (707 15) (L97 509)   5  Chronic kidney disease (CKD) stage G3a/A1, moderately decreased glomerular filtration   rate (GFR) between 45-59 mL/min/1 73 square meter and albuminuria creatinine ratio   less than 30 mg/g (585 3) (N18 3)   6  Chronic ulcer of left ankle with fat layer exposed (707 13) (L97 322)   7  Congestive heart failure (428 0) (I50 9)   8  Dehiscence of incision (998 32) (T81 31XA)   9  Diabetes mellitus with neurological 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 60) (E11 49)   10  Diabetic Nephropathy (583 81)   11  DM type 2 (diabetes mellitus, type 2) (250 00) (E11 9)   12   Postoperative wound dehiscence, subsequent encounter (V58 89,998 32) (T81 31XD)   13  Status post skin graft (V42 3) (Z94 5)    Past Medical History    1  History of High cholesterol (272 0) (E78 0)   2  History of myocardial infarction (412) (I25 2)   3  History of stroke (V12 54) (Z86 73)   4  History of Irregular heartbeat (427 9) (I49 9)    The active problems and past medical history were reviewed and updated today  Surgical History    1  History of Appendectomy (47 0)   2  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    Family History    1  Family history of Diabetes Mellitus (250 00)    2  Family history of cardiac disorder (V17 49) (Z82 49)    Social History    · Never a smoker    Current Meds   1  Atorvastatin Calcium 80 MG Oral Tablet; TAKE 1 TABLET DAILY; Therapy: [id number] to Recorded   2  Carvedilol 12 5 MG Oral Tablet; Take 1 tablet twice daily; Therapy: [id number] to (Evaluate:30Jun2016) Recorded   3  Citalopram Hydrobromide 20 MG Oral Tablet; TAKE 1 TABLET DAILY; Therapy: [id number] to Recorded   4  Lantus 100 UNIT/ML Subcutaneous Solution; 40 untis bid; Therapy: 84DAS7048 to Recorded   5  Lidocaine HCl (Local Anesth ) 4 % SOLN; Apply prn to wound prior to debridement for   pain control; Therapy: (Recorded:40Okm2633) to Recorded   6  Lisinopril 2 5 MG Oral Tablet; Therapy: (Tu Lopes) to Recorded   7  NovoLOG SOLN;   Therapy: (Recorded:79Ieh1082) to Recorded   8  Pacerone 200 MG Oral Tablet; TAKE 1 TABLET DAILY; Therapy: 97YTX0840 to Recorded   9  Spironolactone 25 MG Oral Tablet; 1/2 tablet daily; Therapy: [id number] to Recorded   10  Vitamin C TABS; Therapy: (Yolanda Jacinto) to Recorded   11  Warfarin Sodium 5 MG Oral Tablet; TAKE 1 TABLET DAILY; Therapy: 59ZXK1474 to Recorded    Allergies    1   No Known Drug Allergies    Vitals  Vital Signs    Recorded: [id number] 97:31PM   Systolic 558, RUE, Sitting   Diastolic 70, RUE, Sitting   Heart Rate 68, R Radial   Respiration Quality Normal   Respiration 18   Temperature 98 1 F, Tympanic   Pain Scale 0   Weight Unobtainable Yes     Physical Exam    Wound #1 Assessment wound #1 Location:, Right foot, started on 03/2015 reopened 7/8/16, Care for this wound started on 7/31/15, healed on 8/19/16  Wound Status: healed  Tissue type: Granulation        Physician/Provider Wound #1 Exam   I agree with the nursing assessment and documentation  Wound #8 Assessment wound #8 Location:, left posterior lower extremity multi, started on 8/3/2016, Care for this wound started on 8/5/2016  Wound Status: not healed  Length: 1cm x Width: 0 6cm x Depth: 0 1cm   Total: 0 6sq cm   Wound Volume: 0 06cm3           Tissue type: Granulation and Slough   Color of Wound: Red - 10% and Yellow - 90%   Exudate Amount: None   Odor: None   Wound Edges: Intact   Periwound Skin Condition: Erythematous, Fungal rash   Comments: Wound will be followed at Dr Brice Litten office going forward  Rash noted to RAFI Damico 1: Wound Nursing Care Plan   Impaired Tissue Integrity related to: right foot and left lateral ankle wounds   Risk for Infection related to open wound:   Impaired Mobility related to: bulky dressing on right foot   Goals   Patient will achieve 100% epithelialization:  Patient will maintain skin integrity:  Wound Nursing Care Interventions:   Provide moist wound healing:  Implement offloading measures (turn & reposition schedule/method, ortho shoes/boots, floating heels, crutches, specialty surfaces:  Teach and evaluate effectiveness of offloading measures:  Plan of care initiated 7/31/15, reviewed 8/21/15, reviewed 9/25/15, reviewed 10/30/15, reviewed  11/20/15, reviewed 12/29/15, reviewed 1/8/16, reviewed 2/12/2016, 3/11/16, 4/8/16, 5/6/16,  6/3/16, Reviewed 6/30/2016, all goals achieved plan of care resolved 7/8/2016, reviewed 8/5/2016  Patient discharged and careplan Resolved 8/19/16 and patient discharged        Procedure      Wound #1: Right foot   (Healed and patient discharged 8/19/16) Nurse Dressing Change:   Wound #1 The wound located on the Right foot  Wound care rendered as per Physician/Advanced Practitioner order/plan  Comments:, Cast reapplied for protection  Patient to follow up in Dr Trevor Tamez office within the next 3 weeks for cast removal and achilles procedure  Total Contact Cast for Offloading DFU:   Application of total contact cast for offloading diabetic foot ulcer with walking heel (CPT 22242) To the right lower extremity-- All primary and secondary dressings were secured  A rigid total contact cast was applied in the Rodriguez zackery Method utilizing multiple rolls of fiberglass from the popliteal fossa to the distal foot over generous layers of cast padding, adhesive foam, and felt to protect the toes, malleoli, and tibial crest from all protruding edges of cast material  The toes were left completely encompassed  A rubber cast walking heel applied with cyanoacralate to the plantar surface of the cast for safe ambulation  Patient tolerated procedure without complications  Patient instructed to contact the [facility] if they experience any pain or discomfort associated with the cast     Wound #8: left posterior lower extremity multi     Nurse Dressing Change:   Wound #8 The wound located on the left posterior lower extremity multi  Wound care rendered as per Physician/Advanced Practitioner order/plan  Comments:, Discharged from wound center and wound will continue to be followed by Dr Riley Hernandez, but in his office        Signatures   Electronically signed by : Coby Rodriguez DPM; Aug 23 2016  2:16PM EST                       (Author)

## 2018-01-11 NOTE — PROGRESS NOTES
Assessment    1  Chronic foot ulcer (707 15) (L97 509)   2  Diabetes mellitus with neurological 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 60) (E11 49)    Plan  Diabetes mellitus with neurological manifestation    · We have put a total contact cast on your foot  Do not walk on the cast or put weight on  it for 24 hours ; Status:Complete;   Done: [id number]   Ordered; For:Diabetes mellitus with neurological manifestation; Ordered By:Lázaro Valenzuela;   · Follow-up visit in 1 week Evaluation and Treatment  Follow-up  Status: Complete  Done:  40MKQ1199   Ordered; For: Diabetes mellitus with neurological manifestation; Ordered By: Neto Meek Performed:  Due: 22CFU6928; Last Updated By: Kevin Warner; 3/11/2016 12:41:49 PM    Wound Care Orders/Instructions    Wound Identification and Instructions   Wound #1: Right foot    Wound Care Instructions  Discussed with Patient/Caregiver  Dressing Type: Acticoat 7  Wash with mild soap and water, normal saline, wound cleanser or as specified  Apply specified dressing to wound base/bed  Secondary dressing apply: Gauze, 1/2 ABD  Secure with: Kerlix, and tape  Dressing change frequency: Weekly  Total contact cast -- Change every 7 days and as needed for increased drainage, discomfort or excessive swelling of toes      Wound Goals  Wound Goals:   Healing Goals:   Fair healing potential secondary to moderate comorbid conditions  Wound edges will appear with evidence of contraction and epithelialization   Patient will achieve full wound closure with ability to wear appropriate shoewear       Discussion/Summary    In light of the amount of weightbearing he is doing, the increase in callous, and the lack of wound progression - I recommended and applied a TCC today  The treatment plan was reviewed with the patient/guardian   The patient/guardian understands and agrees with the treatment plan      Chief Complaint  Follow up for right foot wound      History of Present Illness    Wound Identification HPI   Wound #1: Right foot          The patient came to Wound Care via wheelchair  The patient is being seen for a follow-up with MD at the [address] [place] [address]  The patient is accompanied by his fiance  The patient's identification was verified  A secondary verification process was completed  Orientation: oriented to person, oriented to place and oriented to time  Blood Glucose:  145 mg/dL as reported by patient  7/31/15: Patient arrived via wheelchair for right foot wound  Has had wound since March 2015  Pt was seen by Dr Mel Jensen at [facility] and was debrided at the bedside  Ace wrap with strike through drainage removed with 2 5x9s, adaptic and packing  Dressing was in place since Wednesday 7/29/15 per patient  [facility]  Dr Mel Jensen relates that patient had two open wounds that communicated and upon MRI abscess was noted as well; bedside debridement removed skin bridge between two open areas and I&D of abscess  Plan for [facility] then total contact cast once enough granular tissue is present to d/c VAC  8/7/15: The patient arrived with VAC intact to wound  The patient has multiple abrasions from knee to toes and is bleeding  The patient relates the he crawled from his house, across grass and concrete into the car so that he didn't put any weight on his right foot  DPM had conversation with patient and significant other to come up with a solution; a manual light weight wheelchair will be prescribed  The SDTI that was on right dorsal foot has now opened, it will be wound #2  New wounds to right foot today traumatic injury  Patient has a small abrasion to the right knee that will be dressed and continue to assess  Follow up in one week  Copy of orders faxed to [facility]  8/21/15: Pt arrived with wound vac intact to right foot wound @ 125, with 2 pieces of black foam, no bridge   Dressings intact to right dorsal ffot and right 2nd toe wounds but pt had foam dressing on, not dermagran gauze as ordered  Pt's family states he was recently hospitalized for low hemoglobin and was seen by Dr Frank Pinzon while in hospital  8/28/15: Patient arrived with wound vac intact to right lower extremity at 125mmhg  2 pieces of black foam removed with some foam noted on periwound  Denies any changes since last visit  Patient arrived with foam dressings to right foot instead of ordered dry gauze  Patient has received wheelchair since last visit and states  Follow up in one week  [facility]  9/[facility]/15: Patient arrived with wound vac intact  1 piece of black foam removed  Denies changes since last visit  9/11/15: Arrived with wound vac intact to right foot  No changes since last visit  9/18/2015: arrived with dressings intact  For right 2nd toe amp 9/19/2015  Hold KCI wound VAC today  9/25/15: The patient arrived with NPWT and ordered dressings intact to wounds  The patient was seen by Dr Frank Pinzon 9/19/15 at which time the second toe was amputated  The patient arrived today with 98% intact suture line; the distal aspect is somewhat  (0 2cm)  10/9/2015: Arrived with NPWT intact to right foot, alginate intact to right dorsal foot and right 2nd toe  10/16/15: The patient arrived with dressings intact to wounds  NPWT functioning properly  10/23/2015: Arrived with dressing intact, KCI wound VAC  10/30/2015: Patient arrived with dressing intact to right foot and right thigh donor site  Patient states wound vac was discontinued at last MD visit  11/6/15: Arrived with dressing intact to right foot  Right leg donor site 4 x 1 5 cm  Purulent drainage assessed beneath right lateral foot eschar  11/13/2015: Arrived with dressings intact  New wound on left ankle; [place] was rubbing, has been corrected since new wound appeared  11/20/15: Patient arrived with dressings intact to all wounds  Denies changes since last visit   12/4/15: Patient arrived with dressing intact to right foot and no dressing ion left ankle but patient states it has been draining a few days  Denies changes to medications or recent falls  Left lateral ankle wound presets reopened today  12/11/15: Patient arrived with dressings intact to left and right foot  Denies changes since last visit  States he has been ambulating with bulky dressing on right foot  12/18/15: Patient arived with cast intact to RLE  Denies changes since last visit  12/29/2015: Patient arrived with TCC intact to RLE and CROW with dressing intact to LLE  Patient relates that the CROW was adjusted to relieve pressure from the lateral ankle area; feels that it is working well  Right third toe open area measuring 0 4 cm x 0 4 cm x 0 1 cm  Covered with Acticoat Flex 7 to stay intact under the TCC  1/8/16: The patient arrived with dressing intact to left lateral ankle wound and TCC intact to RLE  1/15/16: Patient arrived with crow intact to LLE, and dressings intact to right foot  1/22/16: Patient arrived with dressings intact to left and right foot  Denies changes since last visit  1/29/16: Patient arrived with bulky dressing with Acticoat intact to right foot wound  Patient arrived with Dermagran, 4x4 gauze and bhavna intact to left lateral ankle  2/5/16: Patient arrived with Acticoat and bulky dressing intact to right root  Patient arrived 4x4 gauze and tape to left lateral ankle  2/12/16: Arrived with bulky dressing intact to right foot  Strike through drainage to [employer]  Patient reported he is doing home Physical therapy currently, nursing has discharged from services  2/19/16: Patient arrived with Acticoat flex, 4x4 gauze, 5x9 ABD, Kerlix, coban intact to right foot wound  2/25/16: The patient arrived with dressing clean dry and intact  No issues since last visit  3/4/16: The patient arrived with dressing nicely intact to right foot  No issues since last visit   3/11/16: The patient arrived with dressing intact, strikethrough drainage to but not through coban  The patient relates that he is doing "a lot" with physical therapy  History of Falling: No = 0   Secondary Diagnosis: Yes = 15   Ambulatory Aid None, Bedrest, Nurse Assist = 0   No = 0   Gait: Impaired = 20 -- Short steps with shuffle, may have difficulty arising from chair, head down, significantly impaired balance, requiring furniture, support person, or walking aid to walk  Mental Status: Oriented to own ability = 0   Total Score: 35    25 - 45 = Moderate Risk        Number of falls in the last year were 0  Nutrition Assessment Screening: Food intake over the last 3 months due to the loss of appetite, digestive problems, chewing or swallowing difficulties is graded as: 2 = no decrease in food intake   Weight loss during the last 3 months: 3 = no weight loss   Mobility scored as: 2 = goes out  Psychological Stress and Acute Disease Scored as: 0 = The patient has experienced psychological stress or acute disease in the last 3 months  Neuropsychological problems scored as: 2 = no psychological problems  Body Mass Index (BMI) scored as: 3 = BMI 23 or greater  Nutritional Assessment Screening Score: 12 - 14 points - Normal nutritional status  Provider Wound Care HPI: Isatu Rodriguez is here for follow-up of his Right LE wounds  He relates a lot of WB in PT on the foot  Pain Assessment   the patient states they do not have pain  (on a scale of 0 to 10, the patient rates the pain at 0 )   Abuse And Domestic Violence Screen   Domestic violence screen not done today  Reason DV Screen not done: fiance present in exam room    Depression And Suicide Screen  Suicide screen not done today  Reason suicide screen not done: fiance present in exam room  Prefered Language is  [place]  Primary Language is  English  Readiness To Learn: Receptive  Barriers To Learning: affect     Preferred Learning: demonstration and verbal   Education Completed: further treatment/follow-up and treatment/procedure   Teaching Method: verbal and demonstration   Person Taught: patient   Evaluation Of Learning: verbalized/demonstrated understanding and needs reinforcement      Review of Systems    Constitutional: no fever, not feeling poorly, no chills and not feeling tired  Active Problems    1  Abnormal kidney function (593 9) (N28 9)   2  Benign essential hypertension (401 1) (I10)   3  Charcot's Joint Of The Foot (713 5)   4  Chronic foot ulcer (707 15) (L97 509)   5  Chronic kidney disease (CKD) stage G3a/A1, moderately decreased glomerular filtration   rate (GFR) between 45-59 mL/min/1 73 square meter and albuminuria creatinine ratio   less than 30 mg/g (585 3) (N18 3)   6  Chronic ulcer of left ankle with fat layer exposed (707 13) (L97 322)   7  Congestive heart failure (428 0) (I50 9)   8  Dehiscence of incision (998 32) (T81 31XA)   9  Diabetes mellitus with neurological 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 60) (E11 49)   10  Diabetic Nephropathy (583 81)   11  DM type 2 (diabetes mellitus, type 2) (250 00) (E11 9)   12  Postoperative wound dehiscence, subsequent encounter (V58 89,998 32) (T81 31XD)   13  Status post skin graft (V42 3) (Z94 5)    Past Medical History    1  History of High cholesterol (272 0) (E78 0)   2  History of myocardial infarction (412) (I25 2)   3  History of stroke (V12 54) (Z86 73)   4  History of Irregular heartbeat (427 9) (I49 9)    The active problems and past medical history were reviewed and updated today  Surgical History    1  History of Appendectomy (47 0)   2  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    Family History    1  Family history of Diabetes Mellitus (250 00)    2  Family history of cardiac disorder (V17 49) (Z82 49)    Social History    · Never a smoker    Current Meds   1  Atorvastatin Calcium 80 MG Oral Tablet; TAKE 1 TABLET DAILY; Therapy: 56KXH1403 to Recorded   2  Carvedilol 12 5 MG Oral Tablet;  Take 1 tablet twice daily; Therapy: 29MBU7319 to (Evaluate:30Jun2016) Recorded   3  Citalopram Hydrobromide 20 MG Oral Tablet; TAKE 1 TABLET DAILY; Therapy: 75KXB3840 to Recorded   4  Furosemide 40 MG Oral Tablet; as needed; Therapy: 22CAG9054 to Recorded   5  HumaLOG 100 UNIT/ML Subcutaneous Solution; Therapy: (Recorded:30Qib3596) to Recorded   6  Lantus 100 UNIT/ML Subcutaneous Solution; 40 untis bid; Therapy: 48WVI2128 to Recorded   7  Lidocaine HCl (Local Anesth ) 4 % SOLN; Apply prn to wound prior to debridement for   pain control; Therapy: (Recorded:64Kuq5842) to Recorded   8  Lisinopril 2 5 MG Oral Tablet; Therapy: (Trudy Wang) to Recorded   9  Pacerone 200 MG Oral Tablet; TAKE 1 TABLET DAILY; Therapy: 45DAE5041 to Recorded   10  Spironolactone 25 MG Oral Tablet; 1/2 tablet daily; Therapy: 26SAX3467 to Recorded   11  Vitamin C TABS; Therapy: (Halle Shape) to Recorded   12  Warfarin Sodium 5 MG Oral Tablet; TAKE 1 TABLET DAILY; Therapy: 62UIQ9961 to Recorded    Allergies    1  No Known Drug Allergies    Vitals  Vital Signs [Data Includes: Current Encounter]    Recorded: 44SZD3753 11:23AM   Temperature 98 3 F, Tympanic   Heart Rate 70   Respiration 18   Systolic 98   Diastolic 54   Height 5 ft 11 in   Weight 278 lb 3 oz   BMI Calculated 38 8   BSA Calculated 2 43   Pain Scale 0     Physical Exam    Wound #1 Assessment wound #1 Location:, Right foot, started on 03/2015, Care for this wound started on 7/31/15  Wound Status: not healed  Diabetic Ulcer Grade/Lower Extremity: Roselind Drier 2  Length: 1 6cm x Width: 1cm x Depth: 0 2cm   Total: 1 6sq cm   Wound Volume: 0 32cm3           Tissue type: Subcutaneous, Granulation and Slough   Color of Wound: Yellow - 5% and Pink - 95%   Exudate Amount: Moderate   Exudate Type: Serosangiunous   Odor: None   Exudate Color:  Tan   Wound Edges: Callous   Periwound Skin Condition: Macerated, Callus, slight maceration            Physician/Provider Wound #1 Exam I agree with the nursing assessment and documentation  Constitutional - General appearance: No acute distress, well appearing and well nourished  Addi Valadezshiramichelle 1: Wound Nursing Care Plan   Impaired Tissue Integrity related to: right foot and left lateral ankle wounds   Risk for Infection related to open wound:   Impaired Mobility related to: bulky dressing on right foot   Goals   Patient will achieve 100% epithelialization:  Patient will maintain skin integrity:  Wound Nursing Care Interventions:   Provide moist wound healing:  Implement offloading measures (turn & reposition schedule/method, ortho shoes/boots, floating heels, crutches, specialty surfaces:  Teach and evaluate effectiveness of offloading measures:  Plan of care initiated 7/31/15, reviewed 8/21/15, reviewed 9/25/15, reviewed 10/30/15 , reviewed 11/20/15, reviewed 12/29/15, reviewed 1/8/16, reviewed 2/12/2016, 3/11/16      Procedure      Wound #1: Right foot     Nurse Dressing Change:   Wound #1 The wound located on the Right foot  Wound care rendered as per Physician/Advanced Practitioner order/plan  Return to [address] [place] [address] 1 week  Comments: Total Contact Cast for Offloading DFU:   Application of total contact cast for offloading diabetic foot ulcer with cast shoe (CPT 58009) To the right lower extremity-- All primary and secondary dressings were secured  A rigid total contact cast was applied in the Rodriguez zackery Method utilizing multiple rolls of fiberglass from the popliteal fossa to the distal foot over generous layers of cast padding, adhesive foam, and felt to protect the toes, malleoli, and tibial crest from all protruding edges of cast material  The toes were left completely encompassed  A cast shoe was applied for safe ambulation  Patient tolerated procedure without complications   Patient instructed to contact the [facility] if they experience any pain or discomfort associated with the cast      Excisional Debridement Subcutaneous Tissue:   Wound was excisionally debrided to subcutaneous tissue as follows  Prior to the procedure, the patient was identified using two identifiers, the general consent was signed, the proper site of procedure was identified, and a time out was taken  Anesthesia: Local anesthesia with 4% topical lidocaine was utilized prior to the procedure for pain control  #10 surgical blade was utilized to surgically excise devitalized tissue and/or slough through epidermis, dermis and into the subcutaneous tissue  The total sq cm excised was 1 5sq cm  See wound assessment for further details  There was minimal bleeding controlled with gentle pressure  the patient tolerated the procedure well without complication  CPT Code(s)   O0011609 - Excisional DebridementTo Subcutaneous Tissue; first 20 sq cm        Future Appointments    Date/Time Provider Specialty Site   03/18/2016 11:15 AM Pieter Jarquin DPM Wound Care [place] [address]   03/25/2016 11:00 AM Pieter Jarquin DPM [address]     Signatures   Electronically signed by : Ashley Martinez DPM; Mar 15 2016  3:01PM EST                       (Author)

## 2018-01-11 NOTE — RESULT NOTES
Verified Results  (1) HEMOGLOBIN A1C 41Xyu6364 12:00AM Leata Orn     Test Name Result Flag Reference   HEMOGLOBIN A1C 9 1     EST  AVG   GLUCOSE 214         Plan  Diabetes mellitus with neurological manifestation    · (1) HEMOGLOBIN A1C; Status:Complete;   Done: 00CNY9064 12:00AM    Signatures   Electronically signed by : Lamberto Olmstead DO; Mar 16 2017  1:19PM EST                       (Author)

## 2018-01-11 NOTE — PROGRESS NOTES
Assessment    1  Chronic foot ulcer (707 15) (L97 509)   2  Diabetes mellitus with neurological 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 60) (E11 49)    Plan  Chronic foot ulcer, Diabetes mellitus with neurological manifestation    · Follow-up visit in 1 week Evaluation and Treatment  Follow-up  Status: Hold For -  Scheduling  Requested for: [id number]   Ordered; For: Chronic foot ulcer, Diabetes mellitus with neurological manifestation; Ordered By: Darlene Kraft Performed:  Due: [id number]    Wound Care Orders/Instructions    Wound Identification and Instructions   Wound #1: Right foot    Wound Care Instructions  Discussed with Patient/Caregiver  Dressing Type: Leonel Jessica with mild soap and water, normal saline, wound cleanser  As specified, use: NSS, and wound cleanser  Fadia Soulier Apply specified dressing to wound base/bed  To periwound apply: Skin prep barrier  Secondary dressing apply: Gauze, ABD, 4x4  Secure with: Kerlix  Dressing change frequency: Weekly  Offloading: Felt pad to periwound  Comments/Other:   Bulky dressing applied 5/27/2016 with 3 cast padding, 1 Kerlix, 1 Coban and tubifast yellow   Apply compression using: Coban and Tubifast yellow  Wound Goals  Wound Goals:   Healing Goals:   Fair healing potential secondary to moderate comorbid conditions  Wound edges will appear with evidence of contraction and epithelialization   Patient will achieve full wound closure with ability to wear appropriate shoewear       Discussion/Summary    An offloading football-type dressing was applied  The treatment plan was reviewed with the patient/guardian  The patient/guardian understands and agrees with the treatment plan      Chief Complaint  Follow up for right foot wound      History of Present Illness    Wound Identification HPI   Wound #1: Right foot          The patient came to Wound Care via wheelchair  The patient is being seen for a follow-up with MD at the [address] [place] [address]   The patient is accompanied by his fiance  The patient's identification was verified  A secondary verification process was completed  Orientation: oriented to person, oriented to place and oriented to time  Blood Glucose:  180 mg/dL as reported by patient  7/31/15: Patient arrived via wheelchair for right foot wound  Has had wound since March 2015  Pt was seen by Dr Hong Wilburn at [facility] and was debrided at the bedside  Ace wrap with strike through drainage removed with 2 5x9s, adaptic and packing  Dressing was in place since Wednesday 7/29/15 per patient  Pt has [facility] for wound care  Dr Hong Wilburn relates that patient had two open wounds that communicated and upon MRI abscess was noted as well; bedside debridement removed skin bridge between two open areas and I&D of abscess  Plan for [facility] then total contact cast once enough granular tissue is present to d/c VAC  8/7/15: The patient arrived with VAC intact to wound  The patient has multiple abrasions from knee to toes and is bleeding  The patient relates the he crawled from his house, across grass and concrete into the car so that he didn't put any weight on his right foot  DPM had conversation with patient and significant other to come up with a solution; a manual light weight wheelchair will be prescribed  The SDTI that was on right dorsal foot has now opened, it will be wound #2  New wounds to right foot today traumatic injury  Patient has a small abrasion to the right knee that will be dressed and continue to assess  Follow up in one week  Copy of orders faxed to [facility]  8/21/15: Pt arrived with wound vac intact to right foot wound @ 125, with 2 pieces of black foam, no bridge  Dressings intact to right dorsal ffot and right 2nd toe wounds but pt had foam dressing on, not dermagran gauze as ordered   Pt's family states he was recently hospitalized for low hemoglobin and was seen by Dr Hong Wilburn while in hospital  8/28/15: Patient arrived with wound vac intact to right lower extremity at 125mmhg  2 pieces of black foam removed with some foam noted on periwound  Denies any changes since last visit  Patient arrived with foam dressings to right foot instead of ordered dry gauze  Patient has received wheelchair since last visit and states  Follow up in one week  Copy of orders faxed to [facility]  9/4/15: Patient arrived with wound vac intact  1 piece of black foam removed  Denies changes since last visit  9/11/15: Arrived with wound vac intact to right foot  No changes since last visit  9/18/2015: arrived with dressings intact  For right 2nd toe amp 9/19/2015  Hold KCI wound VAC today  9/25/15: The patient arrived with NPWT and ordered dressings intact to wounds  The patient was seen by Dr Frank Valadez 9/19/15 at which time the second toe was amputated  The patient arrived today with 98% intact suture line; the distal aspect is somewhat  (0 2cm)  10/9/2015: Arrived with NPWT intact to right foot, alginate intact to right dorsal foot and right 2nd toe  10/16/15: The patient arrived with dressings intact to wounds  NPWT functioning properly  10/23/2015: Arrived with dressing intact, KCI wound VAC  10/30/2015: Patient arrived with dressing intact to right foot and right thigh donor site  Patient states wound vac was discontinued at last MD visit  11/6/15: Arrived with dressing intact to right foot  Right leg donor site 4 x 1 5 cm  Purulent drainage assessed beneath right lateral foot eschar  11/13/2015: Arrived with dressings intact  New wound on left ankle; [place] was rubbing, has been corrected since new wound appeared  11/20/15: Patient arrived with dressings intact to all wounds  Denies changes since last visit  12/4/15: Patient arrived with dressing intact to right foot and no dressing ion left ankle but patient states it has been draining a few days  Denies changes to medications or recent falls  Left lateral ankle wound presets reopened today  12/11/15: Patient arrived with dressings intact to left and right foot  Denies changes since last visit  States he has been ambulating with bulky dressing on right foot  12/18/15: Patient arived with cast intact to RLE  Denies changes since last visit  12/29/2015: Patient arrived with TCC intact to RLE and CROW with dressing intact to LLE  Patient relates that the CROW was adjusted to relieve pressure from the lateral ankle area; feels that it is working well  Right third toe open area measuring 0 4 cm x 0 4 cm x 0 1 cm  Covered with Acticoat Flex 7 to stay intact under the TCC  1/8/16: The patient arrived with dressing intact to left lateral ankle wound and TCC intact to RLE  1/15/16: Patient arrived with crow intact to LLE, and dressings intact to right foot  1/22/16: Patient arrived with dressings intact to left and right foot  Denies changes since last visit  1/29/16: Patient arrived with bulky dressing with Acticoat intact to right foot wound  Patient arrived with Dermagran, 4x4 gauze and bhavna intact to left lateral ankle  2/5/16: Patient arrived with Acticoat and bulky dressing intact to right root  Patient arrived 4x4 gauze and tape to left lateral ankle  2/12/16: Arrived with bulky dressing intact to right foot  Strike through drainage to [employer]  Patient reported he is doing home Physical therapy currently, nursing has discharged from services  2/19/16: Patient arrived with Acticoat flex, 4x4 gauze, 5x9 ABD, Kerlix, coban intact to right foot wound  2/25/16: The patient arrived with dressing clean dry and intact  No issues since last visit  3/4/16: The patient arrived with dressing nicely intact to right foot  No issues since last visit  3/11/16: The patient arrived with dressing intact, strikethrough drainage to but not through coban  The patient relates that he is doing "a lot" with physical therapy  3/18/16: Patient arrived with cast to right foot  Denies any changes since last visit   3/25/16: Patient arrived with TCC intact to right foot  4/1/16: Patient arrived with cast to right foot  4/8/16: Patient arrived with cast intact to right lower extremity  Denies changes since last visit  4/15/2016 Patient arrived with TCC intact to RLE  Denies any changes since last visit  4/22/16:Patient arrived with TCC intact to RLE  4/29/16: Patient arrived with bulky dressing intact to right foot  Patient denies any changes since last visit  5/6/16: Arrived with Dermagran, 2x2, 4x4, cast padding, ace wrap and Coban intact to right foot  5/13/16: Patient arrived with bulky dressing intact  Denies changes since last visit  5/20/2016: Arrived with bulky dressing intact to right foot  Patient reports no problems with the dressing  5/27/2016: Patient arrived with bulky dressing intact to right foot wound  Patient denies any changes since last visit  6/3/16: Patient arrived with bulky dressing intact  Denies any changes since last visit  History of Falling: No = 0   Secondary Diagnosis: Yes = 15   Ambulatory Aid None, Bedrest, Nurse Assist = 0   No = 0   Gait: Impaired = 20 -- Short steps with shuffle, may have difficulty arising from chair, head down, significantly impaired balance, requiring furniture, support person, or walking aid to walk  Mental Status: Oriented to own ability = 0   Total Score: 35    25 - 45 = Moderate Risk        The most recent fall occurred patient denies any falls since last visit  Number of falls in the last year were 0  Nutrition Assessment Screening: Food intake over the last 3 months due to the loss of appetite, digestive problems, chewing or swallowing difficulties is graded as: 2 = no decrease in food intake   Weight loss during the last 3 months: 3 = no weight loss   Mobility scored as: 2 = goes out  Psychological Stress and Acute Disease Scored as: 0 = The patient has experienced psychological stress or acute disease in the last 3 months     Neuropsychological problems scored as: 2 = no psychological problems  Body Mass Index (BMI) scored as: 3 = BMI 23 or greater  Nutritional Assessment Screening Score: 12 - 14 points - Normal nutritional status  Provider Wound Care HPI: Sybil De Los Santos is here for offloading dressing change  Pain Assessment   the patient states they do not have pain  (on a scale of 0 to 10, the patient rates the pain at 0 )   Abuse And Domestic Violence Screen   Domestic violence screen not done today  Reason DV Screen not done: fiance present in exam room    Depression And Suicide Screen  Suicide screen not done today  Reason suicide screen not done: fiance present in exam room  Prefered Language is  [place]  Primary Language is  English  Readiness To Learn: Receptive  Barriers To Learning: affect  Preferred Learning: demonstration and verbal   Education Completed: further treatment/follow-up and treatment/procedure   Teaching Method: verbal and demonstration   Person Taught: patient   Evaluation Of Learning: verbalized/demonstrated understanding and needs reinforcement      Review of Systems    Constitutional: no fever, not feeling poorly, no chills and not feeling tired  Active Problems    1  Abnormal kidney function (593 9) (N28 9)   2  Benign essential hypertension (401 1) (I10)   3  [place]'s [facility] Of The Foot (713 5)   4  Chronic foot ulcer (707 15) (L97 509)   5  Chronic kidney disease (CKD) stage G3a/A1, moderately decreased glomerular filtration   rate (GFR) between 45-59 mL/min/1 73 square meter and albuminuria creatinine ratio   less than 30 mg/g (585 3) (N18 3)   6  Chronic ulcer of left ankle with fat layer exposed (707 13) (L97 322)   7  Congestive heart failure (428 0) (I50 9)   8  Dehiscence of incision (998 32) (T81 31XA)   9  Diabetes mellitus with neurological 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 60) (E11 49)   10  Diabetic Nephropathy (583 81)   11  DM type 2 (diabetes mellitus, type 2) (250 00) (E11 9)   12   Postoperative wound dehiscence, subsequent encounter (V58 89,998 32) (T81 31XD)   13  Status post skin graft (V42 3) (Z94 5)    Past Medical History    1  History of High cholesterol (272 0) (E78 0)   2  History of myocardial infarction (412) (I25 2)   3  History of stroke (V12 54) (Z86 73)   4  History of Irregular heartbeat (427 9) (I49 9)    The active problems and past medical history were reviewed and updated today  Surgical History    1  History of Appendectomy (47 0)   2  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    Family History    1  Family history of Diabetes Mellitus (250 00)    2  Family history of cardiac disorder (V17 49) (Z82 49)    Social History    · Never a smoker    Current Meds   1  Atorvastatin Calcium 80 MG Oral Tablet; TAKE 1 TABLET DAILY; Therapy: 01FFH5662 to Recorded   2  Carvedilol 12 5 MG Oral Tablet; Take 1 tablet twice daily; Therapy: 44YQU1997 to (Evaluate:30Jun2016) Recorded   3  Citalopram Hydrobromide 20 MG Oral Tablet; TAKE 1 TABLET DAILY; Therapy: 29LXN3683 to Recorded   4  Furosemide 40 MG Oral Tablet; as needed; Therapy: 75HTU2306 to Recorded   5  Lantus 100 UNIT/ML Subcutaneous Solution; 40 untis bid; Therapy: 90IYL2944 to Recorded   6  Lidocaine HCl (Local Anesth ) 4 % SOLN; Apply prn to wound prior to debridement for   pain control; Therapy: (Recorded:93Yfq8297) to Recorded   7  Lisinopril 2 5 MG Oral Tablet; Therapy: (Clemencia Rodriguez) to Recorded   8  NovoLOG SOLN;   Therapy: (Recorded:90Eiw4665) to Recorded   9  Pacerone 200 MG Oral Tablet; TAKE 1 TABLET DAILY; Therapy: 02RET0162 to Recorded   10  Spironolactone 25 MG Oral Tablet; 1/2 tablet daily; Therapy: 83YMR0180 to Recorded   11  Vitamin C TABS; Therapy: (Sandra Norman) to Recorded   12  Warfarin Sodium 5 MG Oral Tablet; TAKE 1 TABLET DAILY; Therapy: 31XIV9520 to Recorded    Allergies    1   No Known Drug Allergies    Vitals  Vital Signs [Data Includes: Current Encounter]    Recorded: 10IIM1357 11: 14AM   Temperature 98 5 F, Tympanic   Heart Rate 64, L Radial   Pulse Quality Regular, L Radial   Respiration 18   Respiration Quality Normal   Systolic 767, RUE, Sitting   Diastolic 64, RUE, Sitting   Pain Scale 0     Physical Exam    Wound #1 Assessment wound #1 Location:, Right foot, started on 03/2015, Care for this wound started on 7/31/15  Wound Status: not healed  Diabetic Ulcer Grade/Lower Extremity: Daphine Odor 2  Length: 0 2cm x Width: 0 2cm x Depth: 0 2cm   Total: 0 04sq cm   Wound Volume: 0 008cm3           Tissue type: Granulation and Slough   Color of Wound: Red - 99% and Yellow - 1%   Exudate Amount: Minimal   Exudate Type: Serosangiunous   Odor: None   Exudate Color: Yellow and brown   Wound Edges: Callous   Periwound Skin Condition: Callus        Physician/Provider Wound #1 Exam   I agree with the nursing assessment and documentation  Addi Damico 1: Wound Nursing Care Plan   Impaired Tissue Integrity related to: right foot and left lateral ankle wounds   Risk for Infection related to open wound:   Impaired Mobility related to: bulky dressing on right foot   Goals   Patient will achieve 100% epithelialization:  Patient will maintain skin integrity:  Wound Nursing Care Interventions:   Provide moist wound healing:  Implement offloading measures (turn & reposition schedule/method, ortho shoes/boots, floating heels, crutches, specialty surfaces:  Teach and evaluate effectiveness of offloading measures:  Plan of care initiated 7/31/15, reviewed 8/21/15, reviewed 9/25/15, reviewed 10/30/15 , reviewed 11/20/15, reviewed 12/29/15, reviewed 1/8/16, reviewed 2/12/2016, 3/11/16, 4/8/16, 5/6/16, 6/3/16      Procedure      Wound #1: Right foot     Nurse Dressing Change:   Wound #1 The wound located on the Right foot  Wound care rendered as per Physician/Advanced Practitioner order/plan  Return to [address] [place] [address] 1 week    Comments: Excisional Debridement Subcutaneous Tissue:   Wound was excisionally debrided to subcutaneous tissue as follows  Prior to the procedure, the patient was identified using two identifiers, the general consent was signed, the proper site of procedure was identified, and a time out was taken  Anesthesia: Local anesthesia with 4% topical lidocaine was utilized prior to the procedure for pain control  #10 surgical blade was utilized to surgically excise devitalized tissue and/or slough through epidermis, dermis and into the subcutaneous tissue  The total sq cm excised was  04sq cm  See wound assessment for further details  There was minimal bleeding controlled with gentle pressure  the patient tolerated the procedure well without complication  CPT Code(s)   [id number] - Excisional DebridementTo Subcutaneous Tissue; first 20 sq cm        Future Appointments    Date/Time Provider Specialty Site   06/10/2016 10:45 AM Elva Sanches DPM Wound Care [place] [address]   06/17/2016 11:15 AM Elva Sanches DPM [address]     Signatures   Electronically signed by : Virgil Elaine DPM; Jun 7 2016  2:04PM EST                       (Author)

## 2018-01-11 NOTE — MISCELLANEOUS
Assessment    1  Hospital discharge follow-up (V67 59) (Z09)   2  Atrial fibrillation (427 31) (I48 91)   3  Congestive heart failure (428 0) (I50 9)   4  Myocardial infarction (410 90) (I21 3)   5  Benign essential hypertension (401 1) (I10)    Discussion/Summary  Discussion Summary:   1  see cardiologist for ongoing care  2  low salt diet  3  fluid restriction as recommended  4  return to see me in 3 months or sooner if questions  Counseling Documentation With Imm: The patient, patient's family was counseled regarding instructions for management, patient and family education, impressions  Medication SE Review and Pt Understands Tx: The treatment plan was reviewed with the patient/guardian  The patient/guardian understands and agrees with the treatment plan      History of Present Illness  TCM Communication [facility]: The patient is being contacted for follow-up after hospitalization and 1/10/17 @3pm  He was hospitalized at [facility]  The date of admission: 12/25/16, date of discharge: 12/28/16  Diagnosis: Shortness of breath  He was discharged to home  Medications reviewed and updated today  He scheduled a follow up appointment  Follow-up appointments with other specialists: Cardiology 1/3/17  The patient is currently asymptomatic  Counseling was provided to patient's family  [facility]     Communication performed and completed by AE 12/29/16   HPI: 55 y/o M here for hospital discharge follow up    as above - was admitted for CHF/MI - troponin peaked at ~12  rec'd hep gtt but no cath and he has no CAD that is amenable to PTCA/stent per patient  feeling better, furosemide dose increased  monitoring is salt intake - fluid restriction is 1800ml per day but tends to go up to 2L per day  had mild SOB last night but used Bipap with relief and feeling better today    re-started warfarin for Afib and has INR monitored by his cardiologist office at [address]- Dr Jae Marcano  had a f/u appt with his cardiologist already who also rec'd his post discharge BMP test results, pt reports he is aware of results already including the increase in creatinine    denies any other concerns or complaints      Review of Systems  Complete-Male:   Constitutional: no fever  Cardiovascular: no chest pain  Respiratory: no shortness of breath  Gastrointestinal: no abdominal pain  Integumentary: no rashes  ROS Reviewed:   ROS reviewed  Active Problems    1  Abnormal kidney function (593 9) (N28 9)   2  Atrial fibrillation (427 31) (I48 91)   3  Benign essential hypertension (401 1) (I10)   4  Charcot's arthropathy of forefoot (713 5) (M14 679)   5  Chronic kidney disease (CKD) stage G3a/A1, moderately decreased glomerular filtration   rate (GFR) between 45-59 mL/min/1 73 square meter and albuminuria creatinine ratio   less than 30 mg/g (585 3) (N18 3)   6  Chronic ulcer of left ankle with fat layer exposed (707 13) (L97 322)   7  Congestive heart failure (428 0) (I50 9)   8  Dehiscence of incision (998 32) (T81 31XA)   9  Dermatitis (692 9) (L30 9)   10  Diabetes mellitus with neurological 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 60) (E11 49)   11  Diabetic Nephropathy (583 81)   12  Need for influenza vaccination (V04 81) (Z23)   13  Postoperative wound dehiscence, subsequent encounter (V58 89,998 32) (T81 31XD)   14  Status post skin graft (V42 3) (Z94 5)    Past Medical History    1  History of Chronic foot ulcer (707 15) (L97 509)   2  History of High cholesterol (272 0) (E78 00)   3  History of stroke (V12 54) (Z86 73)    Surgical History    1  History of Appendectomy (47 0)   2  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    Family History  Mother    1  Family history of Diabetes Mellitus (250 00)  Father    2  Family history of heart disease (V17 49) (Z82 49)  Family History    3   Family history of No history of cancer    Social History    · Disabled   · Drinks coffee   · Engaged to be    · Never a smoker   · Occasional alcohol use  Social History Reviewed: The social history was reviewed and updated today  Current Meds   1  Amiodarone HCl - 200 MG Oral Tablet; TAKE 1 TABLET DAILY; Last Rx:[id number]   Ordered   2  AmLODIPine Besylate 5 MG Oral Tablet; Therapy: (Recorded:32Ynj8700) to Recorded   3  Aspirin 81 MG CAPS; Therapy: (Deepa Teran) to Recorded   4  Atorvastatin Calcium 80 MG Oral Tablet; TAKE 1 TABLET DAILY; Therapy: 64SSR8167 to Recorded   5  Carvedilol 12 5 MG Oral Tablet; Take 1 tablet twice daily; Therapy: 98YRV0148 to (Evaluate:30Jun2016) Recorded   6  Furosemide 40 MG Oral Tablet; TAKES 1 TABLET EVERY OTHER DAY ALTERNATING   WITH 1/2 TABLET ON OPPOSING DAYS; Last Rx:23Yci6915 Ordered   7  Lantus 100 UNIT/ML Subcutaneous Solution; 60 units BID; Therapy: 56VXL1249 to (Last Rx:22Vuk6553) Ordered   8  Lisinopril 2 5 MG Oral Tablet; TAKE 1 TABLET DAILY AS DIRECTED; Last Rx:68Ihe5167   Ordered   9  NovoLOG FlexPen 100 UNIT/ML SOLN; INJECT 10-40 UNITS SUBCUTANEOUSLY 15-30   MINUTES BEFORE EACH MEAL AS DIRECTED; Therapy: (Recorded:30Qyn7689) to Recorded   10  Plavix 75 MG Oral Tablet; Therapy: (Recorded:30Obr7301) to Recorded   11  Spironolactone 25 MG Oral Tablet; 1/2 tablet daily; Therapy: 84XVU8919 to Recorded   12  Warfarin Sodium 5 MG Oral Tablet; TAKE 1 TABLET DAILY; Therapy: 87GMR0348 to Recorded  Medication List Reviewed: The medication list was reviewed and updated today  Allergies    1  No Known Drug Allergies    Vitals  Signs   Recorded: 67SEE7533 60:92HW   Systolic: 357  Diastolic: 80  Recorded: 58LVJ2851 08:57AM   Temperature: 98 1 F  Heart Rate: 78  Respiration: 18  Systolic: 904  Diastolic: 82  Height: 5 ft 11 in  Weight: 269 lb 0 96 oz  BMI Calculated: 37 53  BSA Calculated: 2 39  O2 Saturation: 98    Physical Exam    Constitutional   General appearance: No acute distress, well appearing and well nourished  Eyes   Pupils and irises: Equal, round, reactive to light      Ears, Nose, Mouth, and Throat   Oropharynx: Normal with no erythema, edema, exudate or lesions  Pulmonary   Respiratory effort: No increased work of breathing or signs of respiratory distress  Auscultation of lungs: Clear to auscultation  Cardiovascular +S1/+S2, no M/R/G  left foot in boot 2nd to charcot foot  Abdomen   Abdomen: Non-tender, no masses  Psychiatric   Judgment and insight: Normal     Mood and affect: Normal        Results/Data  (1) CULTURE, BLOOD BACT [id number] 01:15PM Anna Blanco Geraldo Christy     Test Name Result Flag Reference   CLINICAL REPORT (Report)     Test:        Blood culture  Specimen Source:  Hand, Left  Specimen Type:   Blood  Specimen Date:   12/28/2016 1:15 PM  Result Date:    1/2/2017 4:01 PM  Result Status:   Final result  Resulting Lab:   [facility] [address] [address]            Tel: [phone]      CULTURE                                       ------------------                                   No Growth After 5 Days  (1) CULTURE, BLOOD BACT [id number] 01:15PM Anna SilvaGeraldo Flores     Test Name Result Flag Reference   CLINICAL REPORT (Report)     Test:        Blood culture  Specimen Source:  Arm, Left  Specimen Type:   Blood  Specimen Date:   12/28/2016 1:15 PM  Result Date:    1/2/2017 4:01 PM  Result Status:   Final result  Resulting Lab:   [facility] [address] [place] [address]            Tel: [phone]      CULTURE                                       ------------------                                   No Growth After 5 Days       Provider Comments  Provider Comments:   repeat blood cultures from 12/28 neg/both sets    f/u cardiology -> t/c 2nd cardiologic opinion for his care at [facility] in [place] or [place] [address] in [facility]i [facility], pt will ask his cardiologist about this      Future Appointments    Date/Time Provider Specialty Site   03/22/2017 04:00 PM Gogo Jacobson, DO Internal Medicine [facility] INTERNAL MED   04/11/2017 10:30 AM Lor Humphrey DO Internal Medicine [facility] INTERNAL MED     Signatures   Electronically signed by : Jesus Watson DO; Michael 10 2017  2:45PM EST                       (Author)

## 2018-01-11 NOTE — MISCELLANEOUS
History of Present Illness    The patient is being contacted for follow-up after hospitalization  Hospitalization The hospitalization was not a readmission, The patient was discharged on 2/17/17  He has a moderate risk for readmission  He was discharged to a SNF for rehabilitation  The patient's status is short term  The facility name is: [address] [place] [address]  Phone number: [phone]  I spoke with Nurse, "Shashank"  Dr Stephanie Beltrna  The patient's discharge weight is 268  5- hospital The patient's weight today is 266  Patient is experiencing the following symptoms:  The patient is currently asymptomatic  [facility] Issues include: none  Treatment Questions: Diet ordered is LUIS EDUARDO, staff is aware of symptoms to report, discharge medications were reconciled with the facility provider/PCP, the patient/family is compliant with diet and a plan is in place for who to call for worsening symptoms, but daily weights are not being done  The patient has the following appointments:    need to make with cardiology and ? sooner with PCP  Staff re-education topics include diet, need for daily weights, fluid restriction, symptoms to report, patient and family education, importance of compliance with treatment and need to call for cardiology appointment  Current Meds   1  Amiodarone HCl - 200 MG Oral Tablet; TAKE 1 TABLET DAILY; Last Rx:[id number]   Ordered   2  AmLODIPine Besylate 5 MG Oral Tablet; Therapy: (Recorded:90Xng6728) to Recorded   3  Aspirin 81 MG CAPS; Therapy: (Nam Preston) to Recorded   4  Atorvastatin Calcium 80 MG Oral Tablet; TAKE 1 TABLET DAILY; Therapy: 90ZYI9567 to Recorded   5  Carvedilol 12 5 MG Oral Tablet; Take 1 tablet twice daily; Therapy: 34VNW6610 to (Evaluate:30Jun2016) Recorded   6  Furosemide 40 MG Oral Tablet; TAKES 1 TABLET EVERY OTHER DAY ALTERNATING   WITH 1/2 TABLET ON OPPOSING DAYS; Last Rx:[id number] Ordered   7   Lantus 100 UNIT/ML Subcutaneous Solution; 60 units BID; Therapy: 70ZRS8030 to (Last Rx:85Fdi8205) Ordered   8  Lisinopril 2 5 MG Oral Tablet; TAKE 1 TABLET DAILY AS DIRECTED; Last Rx:55Gdu5055   Ordered   9  NovoLOG FlexPen 100 UNIT/ML SOLN; INJECT 10-40 UNITS SUBCUTANEOUSLY 15-30   MINUTES BEFORE EACH MEAL AS DIRECTED; Therapy: (Recorded:39Xvx1691) to Recorded   10  Plavix 75 MG Oral Tablet (Clopidogrel Bisulfate); Therapy: (Recorded:28Lsp3949) to Recorded   11  Spironolactone 25 MG Oral Tablet; 1/2 tablet daily; Therapy: 33PEI9990 to Recorded   12  Warfarin Sodium 5 MG Oral Tablet; TAKE 1 TABLET DAILY; Therapy: 68FNW5361 to Recorded    Allergies    1   No Known Drug Allergies    Future Appointments    Date/Time Provider Specialty Site   03/22/2017 04:00 PM El Zuniga DO Internal Medicine [facility] INTERNAL MED   04/11/2017 10:30 AM El Zuniga DO Internal Medicine [facility] INTERNAL MED     Signatures   Electronically signed by : Destiny Watson, ; Feb 22 2017  2:09PM EST                       (Author)

## 2018-01-11 NOTE — PROGRESS NOTES
Assessment    1  Chronic foot ulcer (707 15) (L97 509)   2  Diabetes mellitus with neurological 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 60) (E11 49)    Plan  Diabetes mellitus with neurological manifestation    · Dermagran instructions given; Status:Complete;   Done: [id number]   Ordered; For:Diabetes mellitus with neurological manifestation; Ordered By:Sourav Valenzuela;   · We have prescribed a CAM walker boot ; Status:Complete;   Done: [id number]   Ordered; For:Diabetes mellitus with neurological manifestation; Ordered By:Lázaro Valenzuela;   · Follow-up visit in 3 weeks Evaluation and Treatment  Follow-up  Status: Complete  Done:  [id number]   Ordered; For: Diabetes mellitus with neurological manifestation; Ordered By: Chuck Zaidi Performed:  Due: 55KYO2228; Last Updated By: Patricia Lafleur; 2/19/2016 1:08:18 PM    Wound Care Orders/Instructions    Wound Identification and Instructions   Wound #1: Right foot    Wound Care Instructions  Discussed with Patient/Caregiver  Dressing Type: Acticoat 7  Wash with mild soap and water, normal saline, wound cleanser or as specified  Apply specified dressing to wound base/bed  Secondary dressing apply: Gauze, ABD, cast padding  Secure with: Kerlix, and tape  Dressing change frequency: Weekly  Offloading: Surgical shoeto-- right foot with bulky dressing  Comments/Other:   Bulky dressing applied today; Acticoat flex 7 to wound base, Maxorb applied over acticoat flex, x3 cast padding, Kerlix, Coban and TubiFast yellow  dressing to stay clean dry and intact until appointment next week  Apply compression using: Coban and Tubifast yellow  Wound Goals  Wound Goals:   Healing Goals:   Fair healing potential secondary to moderate comorbid conditions  Wound edges will appear with evidence of contraction and epithelialization   Patient will achieve full wound closure with ability to wear appropriate shoewear       Discussion/Summary    All of the other wounds remain healed   His primary wound is larger (PT?)  I told him that if the offloading bulky dressing isn't enough - we may need to resume TCC at the next visit (based on wound measurements)  The treatment plan was reviewed with the patient/guardian  The patient/guardian understands and agrees with the treatment plan      Chief Complaint  Follow up for right foot wound  History of Present Illness    Wound Identification HPI   Wound #1: Right foot      The patient came to Wound Care via wheelchair  The patient is being seen for a follow-up with MD at the [facility]  The patient is accompanied by his fiance  The patient's identification was verified  A secondary verification process was completed  Orientation: oriented to person, oriented to place and oriented to time  Blood Glucose:  133 mg/dL as reported by patient  7/31/15: Patient arrived via wheelchair for right foot wound  Has had wound since March 2015  Pt was seen by Dr Donnell Lopez at [facility] and was debrided at the bedside  Ace wrap with strike through drainage removed with 2 5x9s, adaptic and packing  Dressing was in place since Wednesday 7/29/15 per patient  Pt has ARIANNA RIZVI for wound care  Dr Donnell Lopez relates that patient had two open wounds that communicated and upon MRI abscess was noted as well; bedside debridement removed skin bridge between two open areas and I&D of abscess  Plan for [facility] then total contact cast once enough granular tissue is present to d/c VAC  8/7/15: The patient arrived with VAC intact to wound  The patient has multiple abrasions from knee to toes and is bleeding  The patient relates the he crawled from his house, across grass and concrete into the car so that he didn't put any weight on his right foot  DPM had conversation with patient and significant other to come up with a solution; a manual light weight wheelchair will be prescribed   The SDTI that was on right dorsal foot has now opened, it will be wound #2  New wounds to right foot today traumatic injury  Patient has a small abrasion to the right knee that will be dressed and continue to assess  Follow up in one week  Copy of orders faxed to [facility]  8/21/15: Pt arrived with wound vac intact to right foot wound @ 125, with 2 pieces of black foam, no bridge  Dressings intact to right dorsal ffot and right 2nd toe wounds but pt had foam dressing on, not dermagran gauze as ordered  Pt's family states he was recently hospitalized for low hemoglobin and was seen by Dr Brandon Higginbotham while in hospital  8/28/15: Patient arrived with wound vac intact to right lower extremity at 125mmhg  2 pieces of black foam removed with some foam noted on periwound  Denies any changes since last visit  Patient arrived with foam dressings to right foot instead of ordered dry gauze  Patient has received wheelchair since last visit and states  Follow up in one week  Copy of orders faxed to [facility]  9/4/15: Patient arrived with wound vac intact  1 piece of black foam removed  Denies changes since last visit  9/11/15: Arrived with wound vac intact to right foot  No changes since last visit  9/18/2015: arrived with dressings intact  For right 2nd toe amp 9/19/2015  Hold KCI wound VAC today  9/25/15: The patient arrived with NPWT and ordered dressings intact to wounds  The patient was seen by Dr Brandon Higginbotham 9/19/15 at which time the second toe was amputated  The patient arrived today with 98% intact suture line; the distal aspect is somewhat  (0 2cm)  10/9/2015: Arrived with NPWT intact to right foot, alginate intact to right dorsal foot and right 2nd toe  10/16/15: The patient arrived with dressings intact to wounds  NPWT functioning properly  10/23/2015: Arrived with dressing intact, KCI wound VAC  10/30/2015: Patient arrived with dressing intact to right foot and right thigh donor site  Patient states wound vac was discontinued at last MD visit   11/6/15: Arrived with dressing intact to right foot  Right leg donor site 4 x 1 5 cm  Purulent drainage assessed beneath right lateral foot eschar  11/13/2015: Arrived with dressings intact  New wound on left ankle; [place] was rubbing, has been corrected since new wound appeared  11/20/15: Patient arrived with dressings intact to all wounds  Denies changes since last visit  12/4/15: Patient arrived with dressing intact to right foot and no dressing ion left ankle but patient states it has been draining a few days  Denies changes to medications or recent falls  Left lateral ankle wound presets reopened today  12/11/15: Patient arrived with dressings intact to left and right foot  Denies changes since last visit  States he has been ambulating with bulky dressing on right foot  12/18/15: Patient arived with cast intact to RLE  Denies changes since last visit  12/29/2015: Patient arrived with TCC intact to RLE and CROW with dressing intact to LLE  Patient relates that the CROW was adjusted to relieve pressure from the lateral ankle area; feels that it is working well  Right third toe open area measuring 0 4 cm x 0 4 cm x 0 1 cm  Covered with Acticoat Flex 7 to stay intact under the TCC  1/8/16: The patient arrived with dressing intact to left lateral ankle wound and TCC intact to RLE  1/15/16: Patient arrived with crow intact to LLE, and dressings intact to right foot  1/22/16: Patient arrived with dressings intact to left and right foot  Denies changes since last visit  1/29/16: Patient arrived with bulky dressing with Acticoat intact to right foot wound  Patient arrived with Dermagran, 4x4 gauze and bhavna intact to left lateral ankle  2/5/16: Patient arrived with Acticoat and bulky dressing intact to right root  Patient arrived 4x4 gauze and tape to left lateral ankle  2/12/16: Arrived with bulky dressing intact to right foot  Strike through drainage to [employer]   Patient reported he is doing home Physical therapy currently, nursing has discharged from services  2/19/16: Patient arrived with Acticoat flex, 4x4 gauze, 5x9 ABD, Kerlix, coban intact to right foot wound  History of Falling: No = 0   Secondary Diagnosis: Yes = 15   Ambulatory Aid None, Bedrest, Nurse Assist = 0   No = 0   Gait: Impaired = 20 -- Short steps with shuffle, may have difficulty arising from chair, head down, significantly impaired balance, requiring furniture, support person, or walking aid to walk  Mental Status: Oriented to own ability = 0   Total Score: 35    25 - 45 = Moderate Risk    The most recent fall occurred patient denies any falls since last visit  Number of falls in the last year were 0  Nutrition Assessment Screening: Food intake over the last 3 months due to the loss of appetite, digestive problems, chewing or swallowing difficulties is graded as: 2 = no decrease in food intake   Weight loss during the last 3 months: 3 = no weight loss   Mobility scored as: 2 = goes out  Psychological Stress and Acute Disease Scored as: 0 = The patient has experienced psychological stress or acute disease in the last 3 months  Neuropsychological problems scored as: 2 = no psychological problems  Body Mass Index (BMI) scored as: 3 = BMI 23 or greater  Nutritional Assessment Screening Score: 12 - 14 points - Normal nutritional status  Pain Assessment   the patient states they do not have pain  (on a scale of 0 to 10, the patient rates the pain at 0 )   Abuse And Domestic Violence Screen   Domestic violence screen not done today  Reason DV Screen not done: fiance present in exam room    Depression And Suicide Screen  Suicide screen not done today  Reason suicide screen not done: fiance present in exam room  Prefered Language is  [place]  Primary Language is  English  Readiness To Learn: Receptive  Barriers To Learning: affect     Preferred Learning: demonstration and verbal   Education Completed: further treatment/follow-up and treatment/procedure   Teaching Method: verbal and demonstration   Person Taught: patient   Evaluation Of Learning: verbalized/demonstrated understanding and needs reinforcement     Provider Wound Care HPI: Chely Thomas is here for follow-up of his Right LE wounds  He continues to wear an offloading dressing Right and CROW Left  He's been on his foot more in PT this past week  Review of Systems    Constitutional: no fever, not feeling poorly, no chills and not feeling tired  Active Problems    1  Abnormal kidney function (593 9) (N28 9)   2  Benign essential hypertension (401 1) (I10)   3  Charcot's Joint Of The Foot (713 5)   4  Chronic foot ulcer (707 15) (L97 509)   5  Chronic kidney disease (CKD) stage G3a/A1, moderately decreased glomerular filtration   rate (GFR) between 45-59 mL/min/1 73 square meter and albuminuria creatinine ratio   less than 30 mg/g (585 3) (N18 3)   6  Chronic ulcer of left ankle with fat layer exposed (707 13) (L97 322)   7  Congestive heart failure (428 0) (I50 9)   8  Dehiscence of incision (998 32) (T81 31XA)   9  Diabetes mellitus with neurological 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 60) (E11 49)   10  Diabetic Nephropathy (583 81)   11  DM type 2 (diabetes mellitus, type 2) (250 00) (E11 9)   12  Postoperative wound dehiscence, subsequent encounter (V58 89,998 32) (T81 31XD)   13  Status post skin graft (V42 3) (Z94 5)    Past Medical History    1  History of High cholesterol (272 0) (E78 0)   2  History of myocardial infarction (412) (I25 2)   3  History of stroke (V12 54) (Z86 73)   4  History of Irregular heartbeat (427 9) (I49 9)    The active problems and past medical history were reviewed and updated today  Surgical History    1  History of Appendectomy (47 0)   2  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    Family History    1  Family history of Diabetes Mellitus (250 00)    2   Family history of cardiac disorder (V17 49) (Z82 49)    Social History    · Never a smoker    Current Meds   1  Atorvastatin Calcium 80 MG Oral Tablet; TAKE 1 TABLET DAILY; Therapy: 75YEP0928 to Recorded   2  Carvedilol 12 5 MG Oral Tablet; Take 1 tablet twice daily; Therapy: 99LDD9898 to (Evaluate:30Jun2016) Recorded   3  Citalopram Hydrobromide 20 MG Oral Tablet; TAKE 1 TABLET DAILY; Therapy: 87QBA2523 to Recorded   4  Furosemide 40 MG Oral Tablet; as needed; Therapy: 69JRR3861 to Recorded   5  HumaLOG 100 UNIT/ML Subcutaneous Solution; Therapy: (Recorded:73Dqf2248) to Recorded   6  Lantus 100 UNIT/ML Subcutaneous Solution; 40 untis bid; Therapy: 65EVM7927 to Recorded   7  Lidocaine HCl (Local Anesth ) 4 % SOLN; Apply prn to wound prior to debridement for   pain control; Therapy: (Recorded:43Mcf1745) to Recorded   8  Lisinopril 2 5 MG Oral Tablet; Therapy: (Otilio Lima) to Recorded   9  Pacerone 200 MG Oral Tablet; TAKE 1 TABLET DAILY; Therapy: 03RNP1662 to Recorded   10  Spironolactone 25 MG Oral Tablet; 1/2 tablet daily; Therapy: 39RXU6007 to Recorded   11  Vitamin C TABS; Therapy: (Jhonatan Recinos) to Recorded   12  Warfarin Sodium 5 MG Oral Tablet; TAKE 1 TABLET DAILY; Therapy: 69RTP4556 to Recorded    Allergies    1  No Known Drug Allergies    Vitals  Vital Signs [Data Includes: Current Encounter]    Recorded: 32OPQ9916 11:09AM   Temperature 98 1 F, Tympanic   Heart Rate 56, R Radial   Pulse Quality Regular, R Radial   Respiration 18   Respiration Quality Normal   Systolic 98, LUE, Sitting   Diastolic 58, LUE, Sitting   Height 5 ft 11 in   Weight 270 lb    BMI Calculated 37 66   BSA Calculated 2 4   Pain Scale 0     Physical Exam    Wound #1 Assessment wound #1 Location:, Right foot, started on 03/2015, Care for this wound started on 7/31/15  Wound Status: not healed  Diabetic Ulcer Grade/Lower Extremity: Neena Hebobbi 2     Length: 1 7cm x Width: 0 6cm x Depth: 0 2cm   Total: 1 02sq cm   Wound Volume: 0 204cm3           Tissue type: Subcutaneous, Granulation and Slough   Color of Wound: Red - 99% and Yellow - 1%   Exudate Amount: Moderate   Exudate Type: Serosangiunous   Odor: None   Exudate Color: Green   Wound Edges: Callous   Periwound Skin Condition: Macerated   Comments: Post debridment meausrements: 1 7x0 6x0 2  Physician/Provider Wound #1 Exam   I agree with the nursing assessment and documentation  Constitutional - General appearance: No acute distress, well appearing and well nourished  Addi Damico 1: Wound Nursing Care Plan   Impaired Tissue Integrity related to: right foot and left lateral ankle wounds   Risk for Infection related to open wound:   Impaired Mobility related to: bulky dressing on right foot   Goals   Patient will achieve 100% epithelialization:  Patient will maintain skin integrity:  Wound Nursing Care Interventions:   Provide moist wound healing:  Implement offloading measures (turn & reposition schedule/method, ortho shoes/boots, floating heels, crutches, specialty surfaces:  Teach and evaluate effectiveness of offloading measures:  Plan of care initiated 7/31/15, reviewed 8/21/15, reviewed 9/25/15, reviewed 10/30/15 , reviewed 11/20/15, reviewed 12/29/15, reviewed 1/8/16, reviewed 2/12/2016      Procedure      Wound #1: Right foot     Nurse Dressing Change:   Wound #1 The wound located on the Right foot  Wound care rendered as per Physician/Advanced Practitioner order/plan  Return to [address] [place] [address] 1 and 2 week RN dressing change and 3 week Dr Gautam Mckeon     Comments:    Excisional Debridement Subcutaneous Tissue:   Wound was excisionally debrided to subcutaneous tissue as follows  Prior to the procedure, the patient was identified using two identifiers, the general consent was signed, the proper site of procedure was identified, and a time out was taken     Anesthesia: Local anesthesia with 4% topical lidocaine was utilized prior to the procedure for pain control  #10 surgical blade was utilized to surgically excise devitalized tissue and/or slough through epidermis, dermis and into the subcutaneous tissue  The total sq cm excised was 1sq cm  See wound assessment for further details  There was minimal bleeding controlled with gentle pressure  the patient tolerated the procedure well without complication  CPT Code(s)   [id number] - Excisional DebridementTo Subcutaneous Tissue; first 20 sq cm        Future Appointments    Date/Time Provider Specialty Site   03/11/2016 11:15 AM Kelsey Prado DPM Wound Care [place] [address]   02/25/2016 02:30 PM Wound Care Da, Nurse Schedule  [place] [address]   03/04/2016 10:30 AM Wound Care Da, Nurse Schedule  [place] [address]     Signatures   Electronically signed by : Rosaura Marcial DPM; Feb 23 2016  4:53PM EST                       (Author)

## 2018-01-11 NOTE — PROGRESS NOTES
Assessment    1  Chronic foot ulcer (707 15) (L97 509)   2  Diabetes mellitus with neurological 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 60) (E11 49)    Plan  Abnormal kidney function, Benign essential hypertension, Charcot's Joint Of The Foot,  Chronic kidney disease (CKD) stage G3a/A1, moderately decreased glomerular filtration  rate (GFR) between 45-59 mL/min/1 73 square meter and albuminuria creatinine ratio  less than 30 mg/g, Chronic ulcer of left ankle with fat layer  exposed, Congestive heart failure, Dehiscence of incision, Diabetes mellitus with  neurological manifestation    · Silver Alginate (Maxsorb AG, Silvercell, Aquacel Silver) instructions given;  Status:Complete;   Done: 47CKH6996   Ordered; For:Abnormal kidney function, Benign essential hypertension, Charcot's Joint Of The Foot, Chronic kidney disease (CKD) stage G3a/A1, moderately decreased glomerular filtration rate (GFR) between 45-59 mL/min/1 73 square meter and albuminuria creatinine ratio less than 30 mg/g, Chronic ulcer of left ankle with fat layer exposed, Congestive heart failure, Dehiscence of incision, Diabetes mellitus with neurological manifestation; Ordered By:Lázaro Valenzuela;   · Follow-up visit in 1 week Evaluation and Treatment  Follow-up  Status: Hold For -  Scheduling  Requested for: [id number]   Ordered; For: Abnormal kidney function, Benign essential hypertension, Charcot's Joint Of The Foot, Chronic kidney disease (CKD) stage G3a/A1, moderately decreased glomerular filtration rate (GFR) between 45-59 mL/min/1 73 square meter and albuminuria creatinine ratio less than 30 mg/g, Chronic ulcer of left ankle with fat layer exposed, Congestive heart failure, Dehiscence of incision, Diabetes mellitus with neurological manifestation; Ordered By: Deb Lei Performed:  Due: 23MPT4133    Wound Care Orders/Instructions    Wound Identification and Instructions   Wound #1: Right foot    Wound Care Instructions  Discussed with Patient/Caregiver  Dressing Type: Alginate  Wash with mild soap and water, normal saline, wound cleanser  Apply specified dressing to wound base/bed  Secondary dressing apply: Gauze, ABD  Secure with: Kerlix  Dressing change frequency: Weekly  Offloading: Felt pad to periwound  Comments/Other:   maxorb ag, 5x9 , felt pad around wound bed  Bulky dressing of ABD, 3 cast padding, Kerlix and Coban  Apply compression using: Coban and Tubifast yellow  Wound Goals  Wound Goals:   Healing Goals:   Fair healing potential secondary to moderate comorbid conditions  Wound edges will appear with evidence of contraction and epithelialization   Patient will achieve full wound closure with ability to wear appropriate shoewear       Discussion/Summary    Continue weekly sharp debridements and offloading until healed at which point he will be placed in an a cast and sent to our office for percutaneous Achilles tendon lengtheining  The treatment plan was reviewed with the patient/guardian  The patient/guardian understands and agrees with the treatment plan      Chief Complaint  Follow up for reopened right foot wound      History of Present Illness    Wound Identification HPI   Wound #1: Right foot          The patient came to Wound Care via wheelchair  The patient is being seen for a follow-up with MD at the [facility]  The patient is accompanied by his significant other  The patient's identification was verified  A secondary verification process was completed  Orientation: oriented to person, oriented to place and oriented to time  Blood Glucose:  191 mg/dL as reported by patient  7/31/15: Patient arrived via wheelchair for right foot wound  Has had wound since March 2015  Pt was seen by Dr Mel Jensen at [facility] and was debrided at the bedside  Ace wrap with strike through drainage removed with 2 5x9s, Adaptic and packing  Dressing was in place since Wednesday 7/29/15 per patient   Pt has [facility]AN[facility] RIZVI for wound care  Dr Sushila Meyer relates that patient had two open wounds that communicated and upon MRI abscess was noted as well; bedside debridement removed skin bridge between two open areas and I&D of abscess  Plan for [facility] then total contact cast once enough granular tissue is present to d/c VAC  8/7/15: The patient arrived with VAC intact to wound  The patient has multiple abrasions from knee to toes and is bleeding  The patient relates the he crawled from his house, across grass and concrete into the car so that he didn't put any weight on his right foot  DPM had conversation with patient and significant other to come up with a solution; a manual light weight wheelchair will be prescribed  The SDTI that was on right dorsal foot has now opened, it will be wound #2  New wounds to right foot today traumatic injury  Patient has a small abrasion to the right knee that will be dressed and continue to assess  Follow up in one week  Copy of orders faxed to [facility]  8/21/15: Pt arrived with wound vac intact to right foot wound @ 125, with 2 pieces of black foam, no bridge  Dressings intact to right dorsal ffot and right 2nd toe wounds but pt had foam dressing on, not dermagran gauze as ordered  Pt's family states he was recently hospitalized for low hemoglobin and was seen by Dr Sushila Meyer while in hospital  8/28/15: Patient arrived with wound vac intact to right lower extremity at 125mmhg  2 pieces of black foam removed with some foam noted on periwound  Denies any changes since last visit  Patient arrived with foam dressings to right foot instead of ordered dry gauze  Patient has received wheelchair since last visit and states  Follow up in one week  Copy of orders faxed to [facility]  9/4/15: Patient arrived with wound vac intact  1 piece of black foam removed  Denies changes since last visit  9/11/15: Arrived with wound vac intact to right foot  No changes since last visit   9/18/2015: arrived with dressings intact  For right 2nd toe amp 9/19/2015  Hold KCI wound VAC today  9/25/15: The patient arrived with NPWT and ordered dressings intact to wounds  The patient was seen by Dr Ayah Edge 9/19/15 at which time the second toe was amputated  The patient arrived today with 98% intact suture line; the distal aspect is somewhat  (0 2cm)  10/9/2015: Arrived with NPWT intact to right foot, alginate intact to right dorsal foot and right 2nd toe  10/16/15: The patient arrived with dressings intact to wounds  NPWT functioning properly  10/23/2015: Arrived with dressing intact, KCI wound VAC  10/30/2015: Patient arrived with dressing intact to right foot and right thigh donor site  Patient states wound vac was discontinued at last MD visit  11/6/15: Arrived with dressing intact to right foot  Right leg donor site 4 x 1 5 cm  Purulent drainage assessed beneath right lateral foot eschar  11/13/2015: Arrived with dressings intact  New wound on left ankle; BJORN was rubbing, has been corrected since new wound appeared  11/20/15: Patient arrived with dressings intact to all wounds  Denies changes since last visit  12/4/15: Patient arrived with dressing intact to right foot and no dressing ion left ankle but patient states it has been draining a few days  Denies changes to medications or recent falls  Left lateral ankle wound presets reopened today  12/11/15: Patient arrived with dressings intact to left and right foot  Denies changes since last visit  States he has been ambulating with bulky dressing on right foot  12/18/15: Patient arived with cast intact to RLE  Denies changes since last visit  12/29/2015: Patient arrived with TCC intact to RLE and CROW with dressing intact to LLE  Patient relates that the CROW was adjusted to relieve pressure from the lateral ankle area; feels that it is working well  Right third toe open area measuring 0 4 cm x 0 4 cm x 0 1 cm   Covered with Acticoat Flex 7 to stay intact under the TCC  1/8/16: The patient arrived with dressing intact to left lateral ankle wound and TCC intact to RLE  1/15/16: Patient arrived with crow intact to LLE, and dressings intact to right foot  1/22/16: Patient arrived with dressings intact to left and right foot  Denies changes since last visit  1/29/16: Patient arrived with bulky dressing with Acticoat intact to right foot wound  Patient arrived with Dermagran, 4x4 gauze and bhavna intact to left lateral ankle  2/5/16: Patient arrived with Acticoat and bulky dressing intact to right root  Patient arrived 4x4 gauze and tape to left lateral ankle  2/12/16: Arrived with bulky dressing intact to right foot  Strike through drainage to [employer]  Patient reported he is doing home Physical therapy currently, nursing has discharged from services  2/19/16: Patient arrived with Acticoat flex, 4x4 gauze, 5x9 ABD, Kerlix, coban intact to right foot wound  2/25/16: The patient arrived with dressing clean dry and intact  No issues since last visit  3/4/16: The patient arrived with dressing nicely intact to right foot  No issues since last visit  3/11/16: The patient arrived with dressing intact, strikethrough drainage to but not through coban  The patient relates that he is doing "a lot" with physical therapy  3/18/16: Patient arrived with cast to right foot  Denies any changes since last visit  3/25/16: Patient arrived with TCC intact to right foot  4/1/16: Patient arrived with cast to right foot  4/8/16: Patient arrived with cast intact to right lower extremity  Denies changes since last visit  4/15/2016 Patient arrived with TCC intact to RLE  Denies any changes since last visit  4/22/16:Patient arrived with TCC intact to RLE  4/29/16: Patient arrived with bulky dressing intact to right foot  Patient denies any changes since last visit  5/6/16: Arrived with Dermagran, 2x2, 4x4, cast padding, ace wrap and Coban intact to right foot   5/13/16: Patient arrived with bulky dressing intact  Denies changes since last visit  5/20/2016: Arrived with bulky dressing intact to right foot  Patient reports no problems with the dressing  5/27/2016: Patient arrived with bulky dressing intact to right foot wound  Patient denies any changes since last visit  6/3/16: Patient arrived with bulky dressing intact  Denies any changes since last visit  6/10/2016: Arrived with bulky dressing intact to right foot  6/24/2016: Patient arrived with TCC intact to Right foot  Denies changes since last visit  6/29/2016: Patient called to report while he was showering he got the bulky dressing wet  Significant other removed bulky dressing and applied 5x9, Kerlix to protect wound bed  Arrived with 5x9, Kerlix multiple layers intact to right foot  7/8/16: The patient arrived with bulky dressing intact to right foot  Patient is healed  Discharged from [facility] today  7/15/16: Patient states that he went home 7/8/16 after being discharged from [facility] and states wound reopened that evening  Denies any other changes since last visit  Arrived with adaptic, gauze, ABD and bhavna to wound  7/22/2016: Patient arrived with bulky dressing intact to right foot wound  History of Falling: No = 0   Secondary Diagnosis: Yes = 15   Ambulatory Aid None, Bedrest, Nurse Assist = 0   No = 0   Gait: Impaired = 20 -- Short steps with shuffle, may have difficulty arising from chair, head down, significantly impaired balance, requiring furniture, support person, or walking aid to walk  Mental Status: Oriented to own ability = 0   Total Score: 35    25 - 45 = Moderate Risk        The most recent fall occurred patient denies any falls since last visit  Number of falls in the last year were 0     Nutrition Assessment Screening: Food intake over the last 3 months due to the loss of appetite, digestive problems, chewing or swallowing difficulties is graded as: 2 = no decrease in food intake   Weight loss during the last 3 months: 3 = no weight loss   Mobility scored as: 2 = goes out  Psychological Stress and Acute Disease Scored as: 0 = The patient has experienced psychological stress or acute disease in the last 3 months  Neuropsychological problems scored as: 2 = no psychological problems  Body Mass Index (BMI) scored as: 3 = BMI 23 or greater  Nutritional Assessment Screening Score: 12 - 14 points - Normal nutritional status  Provider Wound Care HPI: Ailin Peñaloza is here for offloading dressing change and debridement  Pain Assessment   the patient states they do not have pain  (on a scale of 0 to 10, the patient rates the pain at 0 )   Abuse And Domestic Violence Screen   Domestic violence screen not done today  Reason DV Screen not done: fiance present in exam room    Depression And Suicide Screen  Suicide screen not done today  Reason suicide screen not done: fiance present in exam room  Prefered Language is  [place]  Primary Language is  English  Readiness To Learn: Receptive  Barriers To Learning: affect  Preferred Learning: demonstration and verbal   Education Completed: further treatment/follow-up and treatment/procedure   Teaching Method: verbal and demonstration   Person Taught: patient   Evaluation Of Learning: verbalized/demonstrated understanding and needs reinforcement      Review of Systems    Constitutional: no fever, not feeling poorly, no chills and not feeling tired  Active Problems    1  Abnormal kidney function (593 9) (N28 9)   2  Benign essential hypertension (401 1) (I10)   3  Charcot's Joint Of The Foot (713 5)   4  Chronic foot ulcer (707 15) (L97 509)   5  Chronic kidney disease (CKD) stage G3a/A1, moderately decreased glomerular filtration   rate (GFR) between 45-59 mL/min/1 73 square meter and albuminuria creatinine ratio   less than 30 mg/g (585 3) (N18 3)   6  Chronic ulcer of left ankle with fat layer exposed (707 13) (L97 322)   7   Congestive heart failure (428 0) (I50 9) 8  Dehiscence of incision (998 32) (T81 31XA)   9  Diabetes mellitus with neurological 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 60) (E11 49)   10  Diabetic Nephropathy (583 81)   11  DM type 2 (diabetes mellitus, type 2) (250 00) (E11 9)   12  Postoperative wound dehiscence, subsequent encounter (V58 89,998 32) (T81 31XD)   13  Status post skin graft (V42 3) (Z94 5)    Past Medical History    1  History of High cholesterol (272 0) (E78 0)   2  History of myocardial infarction (412) (I25 2)   3  History of stroke (V12 54) (Z86 73)   4  History of Irregular heartbeat (427 9) (I49 9)    The active problems and past medical history were reviewed and updated today  Surgical History    1  History of Appendectomy (47 0)   2  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    Family History    1  Family history of Diabetes Mellitus (250 00)    2  Family history of cardiac disorder (V17 49) (Z82 49)    Social History    · Never a smoker    Current Meds   1  Atorvastatin Calcium 80 MG Oral Tablet; TAKE 1 TABLET DAILY; Therapy: 74LHR3936 to Recorded   2  Carvedilol 12 5 MG Oral Tablet; Take 1 tablet twice daily; Therapy: 49CAW6018 to (Evaluate:30Jun2016) Recorded   3  Citalopram Hydrobromide 20 MG Oral Tablet; TAKE 1 TABLET DAILY; Therapy: 68CAU3899 to Recorded   4  Lantus 100 UNIT/ML Subcutaneous Solution; 40 untis bid; Therapy: 58UWY2826 to Recorded   5  Lidocaine HCl (Local Anesth ) 4 % SOLN; Apply prn to wound prior to debridement for   pain control; Therapy: (Recorded:09Ajf2382) to Recorded   6  Lisinopril 2 5 MG Oral Tablet; Therapy: (Justin Bailey) to Recorded   7  NovoLOG SOLN;   Therapy: (Recorded:63Css9233) to Recorded   8  Pacerone 200 MG Oral Tablet; TAKE 1 TABLET DAILY; Therapy: 00OWJ4735 to Recorded   9  Spironolactone 25 MG Oral Tablet; 1/2 tablet daily; Therapy: 72EXX5079 to Recorded   10  Vitamin C TABS; Therapy: (Lonza Fare) to Recorded   11   Warfarin Sodium 5 MG Oral Tablet; TAKE 1 TABLET DAILY; Therapy: 15CCX8486 to Recorded    Allergies    1  No Known Drug Allergies    Vitals  Vital Signs    Recorded: 78RZG9642 12:36MT   Systolic 652   Diastolic 74   Heart Rate 64   Respiration 16   Temperature 98 4 F   Pain Scale 0   Height 5 ft 11 in   Weight 269 lb 8 oz   BMI Calculated 37 59   BSA Calculated 2 39     Physical Exam    Wound #1 Assessment wound #1 Location:, Right foot, started on 03/2015 reopened 7/8/16, Care for this wound started on 7/31/15, healed on 7/8/2016  Wound Status: not healed  Diabetic Ulcer Grade/Lower Extremity: Luis Carlos Rank 2  Length: 0 5cm x Width: 0 1cm x Depth: 0 1cm   Total: 0 05sq cm   Wound Volume: 0 005cm3           Tissue type: Granulation and Slough   Color of Wound: Yellow - 1% and Pink - 99%   Exudate Amount: Moderate   Exudate Type: Serosangiunous   Odor: None   Exudate Color: Tan   Wound Edges: Intact   Periwound Skin Condition: Callus   Comments: Post debridement measurement  Physician/Provider Wound #1 Exam   I agree with the nursing assessment and documentation  Constitutional - General appearance: No acute distress, well appearing and well nourished  Tr Mookie 1: Wound Nursing Care Plan   Impaired Tissue Integrity related to: right foot and left lateral ankle wounds   Risk for Infection related to open wound:   Impaired Mobility related to: bulky dressing on right foot   Goals   Patient will achieve 100% epithelialization:  Patient will maintain skin integrity:  Wound Nursing Care Interventions:   Provide moist wound healing:  Implement offloading measures (turn & reposition schedule/method, ortho shoes/boots, floating heels, crutches, specialty surfaces:  Teach and evaluate effectiveness of offloading measures:   Plan of care initiated 7/31/15, reviewed 8/21/15, reviewed 9/25/15, reviewed 10/30/15, reviewed 11/20/15, reviewed 12/29/15, reviewed 1/8/16, reviewed 2/12/2016, 3/11/16, 4/8/16, 5/6/16,  6/3/16, Reviewed 6/30/2016, all goals achieved plan of care resolved 7/8/2016      Procedure      Wound #1: Right foot     Nurse Dressing Change:   Wound #1 The wound located on the Right foot  Wound care rendered as per Physician/Advanced Practitioner order/plan  Return to [address] [place] [address] 1 week  Comments:    Excisional Debridement Subcutaneous Tissue:   Wound was excisionally debrided to subcutaneous tissue as follows  Prior to the procedure, the patient was identified using two identifiers, the general consent was signed, the proper site of procedure was identified, and a time out was taken  Anesthesia: Local anesthesia with 4% topical lidocaine was utilized prior to the procedure for pain control  #10 surgical blade was utilized to surgically excise devitalized tissue and/or slough through epidermis, dermis and into the subcutaneous tissue  The total sq cm excised was  05sq cm  See wound assessment for further details  There was minimal bleeding controlled with gentle pressure  the patient tolerated the procedure well without complication  CPT Code(s)   [id number] - Excisional DebridementTo Subcutaneous Tissue; first 20 sq cm        Future Appointments    Date/Time Provider Specialty Site   07/29/2016 11:30 AM Ankur Camacho DPM Wound Care [facility] 28     Signatures   Electronically signed by : Stephanie De Dios DPM; Jul 26 2016  2:39PM EST                       (Author)

## 2018-01-11 NOTE — MISCELLANEOUS
Physical Exam    Wound #1 Assessment wound #1 Location:, Right foot, started on 03/2015, Care for this wound started on 7/31/15  Wound Status: not healed  Diabetic Ulcer Grade/Lower Extremity: [place] [address]  Length: 1 7cm x Width: 0 6cm x Depth: 0 2cm   Total: 1 02sq cm   Wound Volume: 0 204cm3           Tissue type: Subcutaneous, Granulation and Slough   Color of Wound: Red - 99% and Yellow - 1%   Exudate Amount: Moderate   Exudate Type: Serosangiunous   Odor: None   Exudate Color: Green   Wound Edges: Callous   Periwound Skin Condition: Macerated   Comments: Post debridment meausrements: 1 7x0 6x0 2  Physician/Provider Wound #1 Exam   I agree with the nursing assessment and documentation  Constitutional - General appearance: No acute distress, well appearing and well nourished  Wound Drsg  Orders/Instructions  Wound Identification Dressing Orders--Instructions:   Wound Identification and Instructions   Wound #1: Right foot    Wound Care Instructions  Discussed with Patient/Caregiver  Dressing Type: Acticoat 7  Wash with mild soap and water, normal saline, wound cleanser or as specified  Apply specified dressing to wound base/bed  Secondary dressing apply: Gauze, ABD, cast padding  Secure with: Kerlix, and tape  Dressing change frequency: Weekly  Offloading: Surgical shoeto-- right foot with bulky dressing  Comments/Other:   Bulky dressing applied today; Acticoat flex 7 to wound base, Maxorb applied over acticoat flex, x3 cast padding, Kerlix, Coban and TubiFast yellow  dressing to stay clean dry and intact until appointment next week  Apply compression using: Coban and Tubifast yellow  Wound Goals  Wound Goals:   Healing Goals:   Fair healing potential secondary to moderate comorbid conditions     Wound edges will appear with evidence of contraction and epithelialization   Patient will achieve full wound closure with ability to wear appropriate shoewear       Future Appointments    Date/Time Provider Specialty Site   03/11/2016 11:15 AM Eliz Marie DPM Wound Care [facility] 28 02/25/2016 02:30 PM Donald Pelaez, Nurse Schedule  [facility] 28 03/04/2016 10:30 AM Wound Care Da, Nurse Schedule  [facility] 28     [id number] [facility] Wound Care Plan  Wound Nursing Care Plan [facility]: Wound Nursing Care Plan   Impaired Tissue Integrity related to: right foot and left lateral ankle wounds   Risk for Infection related to open wound:   Impaired Mobility related to: bulky dressing on right foot   Goals   Patient will achieve 100% epithelialization:  Patient will maintain skin integrity:  Wound Nursing Care Interventions:   Provide moist wound healing:  Implement offloading measures (turn & reposition schedule/method, ortho shoes/boots, floating heels, crutches, specialty surfaces:  Teach and evaluate effectiveness of offloading measures:   Plan of care initiated 7/31/15, reviewed 8/21/15, reviewed 9/25/15, reviewed 10/30/15 , reviewed 11/20/15, reviewed 12/29/15, reviewed 1/8/16, reviewed 2/12/2016      Signatures   Electronically signed by : Shon Lee DPM; Feb 23 2016  4:53PM EST                       (Author)

## 2018-01-11 NOTE — MISCELLANEOUS
Message  Telephone call from patient's [facility] stating that there is drainage to the outer layer of the cast and the coban is no longer intact to plantar aspect of TCC  RN t/c to Dr Stephany Pack advising of same; patient was healed at last visit and is scheduled 9/7/16 in Dr Aparna Mccoy office for tendon release  Dr Stephany Pack advised that patient should go to [facility] or [facility] ED, ask for podiatry resident to have TCC removed and foot assessed  RN t/c to [facility] who verbalized understanding  Active Problems    1  Abnormal kidney function (593 9) (N28 9)   2  Benign essential hypertension (401 1) (I10)   3  Charcot's Joint Of The Foot (713 5)   4  Chronic foot ulcer (707 15) (L97 509)   5  Chronic kidney disease (CKD) stage G3a/A1, moderately decreased glomerular filtration   rate (GFR) between 45-59 mL/min/1 73 square meter and albuminuria creatinine ratio   less than 30 mg/g (585 3) (N18 3)   6  Chronic ulcer of left ankle with fat layer exposed (707 13) (L97 322)   7  Congestive heart failure (428 0) (I50 9)   8  Dehiscence of incision (998 32) (T81 31XA)   9  Diabetes mellitus with neurological 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 60) (E11 49)   10  Diabetic Nephropathy (583 81)   11  DM type 2 (diabetes mellitus, type 2) (250 00) (E11 9)   12  Postoperative wound dehiscence, subsequent encounter (V58 89,998 32) (T81 31XD)   13  Status post skin graft (V42 3) (Z94 5)    Current Meds   1  Atorvastatin Calcium 80 MG Oral Tablet; TAKE 1 TABLET DAILY; Therapy: 80VJC5800 to Recorded   2  Carvedilol 12 5 MG Oral Tablet; Take 1 tablet twice daily; Therapy: 23SQO6171 to (Evaluate:30Jun2016) Recorded   3  Citalopram Hydrobromide 20 MG Oral Tablet; TAKE 1 TABLET DAILY; Therapy: 27CNS7463 to Recorded   4  Clotrimazole-Betamethasone 1-0 05 % External Cream; Apply to Left leg rash once   daily; Therapy: 41SOU6519 to (Last Rx:19Aug2016) Ordered   5  Lantus 100 UNIT/ML Subcutaneous Solution; 40 untis bid;    Therapy: 39RCE7111 to Recorded   6  Lidocaine HCl (Local Anesth ) 4 % SOLN; Apply prn to wound prior to debridement for   pain control; Therapy: (Recorded:37Kbh7190) to Recorded   7  Lisinopril 2 5 MG Oral Tablet; Therapy: (Radha Ferrell) to Recorded   8  NovoLOG SOLN;   Therapy: (Recorded:74Xlv4817) to Recorded   9  Pacerone 200 MG Oral Tablet; TAKE 1 TABLET DAILY; Therapy: 41XAT4358 to Recorded   10  Spironolactone 25 MG Oral Tablet; 1/2 tablet daily; Therapy: 58DTT1987 to Recorded   11  Vitamin C TABS; Therapy: (Cyril Tineo) to Recorded   12  Warfarin Sodium 5 MG Oral Tablet; TAKE 1 TABLET DAILY; Therapy: 33KDO4884 to Recorded    Allergies    1   No Known Drug Allergies    Signatures   Electronically signed by : Jackie Strauss RN; Aug 29 2016  9:41AM EST                       (Author)

## 2018-01-11 NOTE — MISCELLANEOUS
Physical Exam    Wound #1 Assessment wound #1 Location:, Right foot, started on 03/2015, Care for this wound started on 7/31/15  Wound Status: not healed  Diabetic Ulcer Grade/Lower Extremity: Ewelina Europe 2  Length: 0 2cm x Width: 0 2cm x Depth: 0 2cm   Total: 0 04sq cm   Wound Volume: 0 008cm3           Tissue type: Granulation and Slough   Color of Wound: Red - 99% and Yellow - 1%   Exudate Amount: Minimal   Exudate Type: Serosangiunous   Odor: None   Exudate Color: Yellow and brown   Wound Edges: Callous   Periwound Skin Condition: Callus        Physician/Provider Wound #1 Exam   I agree with the nursing assessment and documentation  Wound Drsg  Orders/Instructions  Wound Identification Dressing Orders--Instructions:   Wound Identification and Instructions   Wound #1: Right foot    Wound Care Instructions  Discussed with Patient/Caregiver  Dressing Type: Sherrine Concord with mild soap and water, normal saline, wound cleanser  As specified, use: NSS, and wound cleanser  Liat [place] Apply specified dressing to wound base/bed  To periwound apply: Skin prep barrier  Secondary dressing apply: Gauze, ABD, 4x4  Secure with: Kerlix  Dressing change frequency: Weekly  Offloading: Felt pad to periwound  Comments/Other:   Bulky dressing applied 5/27/2016 with 3 cast padding, 1 Kerlix, 1 Coban and tubifast yellow   Apply compression using: Coban and Tubifast yellow  Wound Goals  Wound Goals:   Healing Goals:   Fair healing potential secondary to moderate comorbid conditions     Wound edges will appear with evidence of contraction and epithelialization   Patient will achieve full wound closure with ability to wear appropriate shoewear       Future Appointments    Date/Time Provider Specialty Site   06/10/2016 10:45 AM Neto Meek DPM Wound Care [place] [address]   06/17/2016 11:15 AM Neto Meek DPM Wound Care [address] Plan Noble Silva: Wound Nursing Care Plan   Impaired Tissue Integrity related to: right foot and left lateral ankle wounds   Risk for Infection related to open wound:   Impaired Mobility related to: bulky dressing on right foot   Goals   Patient will achieve 100% epithelialization:  Patient will maintain skin integrity:  Wound Nursing Care Interventions:   Provide moist wound healing:  Implement offloading measures (turn & reposition schedule/method, ortho shoes/boots, floating heels, crutches, specialty surfaces:  Teach and evaluate effectiveness of offloading measures:   Plan of care initiated 7/31/15, reviewed 8/21/15, reviewed 9/25/15, reviewed 10/30/15 , reviewed 11/20/15, reviewed 12/29/15, reviewed 1/8/16, reviewed 2/12/2016, 3/11/16, 4/8/16, 5/6/16, 6/3/16      Signatures   Electronically signed by : Kevon Merino DPM; Jun 7 2016  2:04PM EST                       (Author)

## 2018-01-12 NOTE — PROGRESS NOTES
Assessment    1  Chronic foot ulcer (707 15) (L97 509)   2  Diabetes mellitus with neurological 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 60) (E11 49)   3  Dehiscence of incision (998 32) (T81 31XA)   4  Chronic ulcer of left ankle with fat layer exposed (707 13) (L97 322)    Plan  Dehiscence of incision    · Silver Alginate (Maxsorb AG, Silvercell, Aquacel Silver) instructions given;  Status:Complete;   Done: 40GOF8484   Ordered; For:Dehiscence of incision; Ordered By:Lois Valenzuela;   · We have prescribed a CAM walker boot ; Status:Complete;   Done: 69VBH3275   Ordered; For:Dehiscence of incision; Ordered By:Lois Valenzuela; Wound Care Orders/Instructions    Wound Identification and Instructions   Wound #1: Right foot    Wound Care Instructions  Discussed with Patient/Caregiver  Dressing Type: Acticoat 7  Wash with mild soap and water, normal saline, wound cleanser or as specified  Apply specified dressing to wound base/bed  Secondary dressing apply: Gauze, ABD, cast padding  Secure with: Kerlix, and tape  Dressing change frequency: Weekly  Offloading: Surgical shoeto-- right foot with bulky dressing  Comments/Other:   Bulky dressing applied today; to stay clean dry and intact until appointment next week  Apply compression using: Coban  Wound #7: left lateral ankle   Wound Care Instructions  Discussed with Patient/Caregiver  Dressing Type: Paula Lung with mild soap and water, normal saline, wound cleanser or as specified  Apply specified dressing to wound base/bed  Secondary dressing apply: Gauze  Secure with: tape, vac film or tegaderm  Dressing change frequency: Three times per week, M/W/F  Offloading: Crow braceto-- LLE      Wound Goals  Wound Goals:   Healing Goals:   Fair healing potential secondary to moderate comorbid conditions     Wound edges will appear with evidence of contraction and epithelialization   Patient will achieve full wound closure with ability to wear appropriate shoewear Discussion/Summary    We will continue the same treatment plan  The treatment plan was reviewed with the patient/guardian  The patient/guardian understands and agrees with the treatment plan      Chief Complaint  Follow up for right foot wound and left lateral ankle wound  History of Present Illness    Wound Identification HPI   Wound #1: Right foot    Wound #7: left lateral ankle          The patient came to Wound Care and was ambulatory  The patient is being seen for a follow-up with MD at the [facility] [address]  The patient is accompanied by his fiance  The patient's identification was verified  A secondary verification process was completed  Orientation: oriented to person, oriented to place and oriented to time  Blood Glucose:  185 mg/dL as reported by patient  7/31/15: Patient arrived via wheelchair for right foot wound  Has had wound since March 2015  Pt was seen by Dr Michelle Solorio at [facility] and was debrided at the bedside  Ace wrap with strike through drainage removed with 2 5x9s, adaptic and packing  Dressing was in place since Wednesday 7/29/15 per patient  Pt has ARIANNA Halma for wound care  Dr Michelle Solorio relates that patient had two open wounds that communicated and upon MRI abscess was noted as well; bedside debridement removed skin bridge between two open areas and I&D of abscess  Plan for [facility] then total contact cast once enough granular tissue is present to d/c VAC  8/7/15: The patient arrived with VAC intact to wound  The patient has multiple abrasions from knee to toes and is bleeding  The patient relates the he crawled from his house, across grass and concrete into the car so that he didn't put any weight on his right foot  DPM had conversation with patient and significant other to come up with a solution; a manual light weight wheelchair will be prescribed  The SDTI that was on right dorsal foot has now opened, it will be wound #2   New wounds to right foot today traumatic injury  Patient has a small abrasion to the right knee that will be dressed and continue to assess  Follow up in one week  Copy of orders faxed to [facility]  8/21/15: Pt arrived with wound vac intact to right foot wound @ 125, with 2 pieces of black foam, no bridge  Dressings intact to right dorsal ffot and right 2nd toe wounds but pt had foam dressing on, not dermagran gauze as ordered  Pt's family states he was recently hospitalized for low hemoglobin and was seen by Dr Mel Jensen while in hospital  8/28/15: Patient arrived with wound vac intact to right lower extremity at 125mmhg  2 pieces of black foam removed with some foam noted on periwound  Denies any changes since last visit  Patient arrived with foam dressings to right foot instead of ordered dry gauze  Patient has received wheelchair since last visit and states  Follow up in one week  Copy of orders faxed to [facility]  9/4/15: Patient arrived with wound vac intact  1 piece of black foam removed  Denies changes since last visit  9/11/15: Arrived with wound vac intact to right foot  No changes since last visit  9/18/2015: arrived with dressings intact  For right 2nd toe amp 9/19/2015  Hold KCI wound VAC today  9/25/15: The patient arrived with NPWT and ordered dressings intact to wounds  The patient was seen by Dr Mel Jensen 9/19/15 at which time the second toe was amputated  The patient arrived today with 98% intact suture line; the distal aspect is somewhat  (0 2cm)  10/9/2015: Arrived with NPWT intact to right foot, alginate intact to right dorsal foot and right 2nd toe  10/16/15: The patient arrived with dressings intact to wounds  NPWT functioning properly  10/23/2015: Arrived with dressing intact, KCI wound VAC  10/30/2015: Patient arrived with dressing intact to right foot and right thigh donor site  Patient states wound vac was discontinued at last MD visit   11/6/15: Arrived with dressing intact to right foot  Right leg donor site 4 x 1 5 cm  Purulent drainage assessed beneath right lateral foot eschar  11/13/2015: Arrived with dressings intact  New wound on left ankle; [place] was rubbing, has been corrected since new wound appeared  11/20/15: Patient arrived with dressings intact to all wounds  Denies changes since last visit  12/4/15: Patient arrived with dressing intact to right foot and no dressing ion left ankle but patient states it has been draining a few days  Denies changes to medications or recent falls  Left lateral ankle wound presets reopened today  12/11/15: Patient arrived with dressings intact to left and right foot  Denies changes since last visit  States he has been ambulating with bulky dressing on right foot  12/18/15: Patient arived with cast intact to RLE  Denies changes since last visit  12/29/2015: Patient arrived with TCC intact to RLE and CROW with dressing intact to LLE  Patient relates that the CROW was adjusted to relieve pressure from the lateral ankle area; feels that it is working well  Right third toe open area measuring 0 4 cm x 0 4 cm x 0 1 cm  Covered with Acticoat Flex 7 to stay intact under the TCC  1/8/16: The patient arrived with dressing intact to left lateral ankle wound and TCC intact to RLE  1/15/16: Patient arrived with crow intact to LLE, and dressings intact to right foot  History of Falling: No = 0   Secondary Diagnosis: Yes = 15   Ambulatory Aid None, Bedrest, Nurse Assist = 0   No = 0   Gait: Impaired = 20 -- Short steps with shuffle, may have difficulty arising from chair, head down, significantly impaired balance, requiring furniture, support person, or walking aid to walk  Mental Status: Oriented to own ability = 0   Total Score: 35    25 - 45 = Moderate Risk        Number of falls in the last year were 0     Nutrition Assessment Screening: Food intake over the last 3 months due to the loss of appetite, digestive problems, chewing or swallowing difficulties is graded as: 2 = no decrease in food intake   Weight loss during the last 3 months: 3 = no weight loss   Mobility scored as: 2 = goes out  Psychological Stress and Acute Disease Scored as: 0 = The patient has experienced psychological stress or acute disease in the last 3 months  Neuropsychological problems scored as: 2 = no psychological problems  Body Mass Index (BMI) scored as: 3 = BMI 23 or greater  Nutritional Assessment Screening Score: 12 - 14 points - Normal nutritional status  Pain Assessment   the patient states they do not have pain  (on a scale of 0 to 10, the patient rates the pain at 0 )   Abuse And Domestic Violence Screen   Domestic violence screen not done today  Reason DV Screen not done: fiance present in exam room    Depression And Suicide Screen  Suicide screen not done today  Reason suicide screen not done: fiance present in exam room  Prefered Language is  [place]  Primary Language is  English  Readiness To Learn: Receptive  Barriers To Learning: affect  Preferred Learning: demonstration and verbal   Education Completed: further treatment/follow-up and treatment/procedure   Teaching Method: verbal and demonstration   Person Taught: patient   Evaluation Of Learning: verbalized/demonstrated understanding and needs reinforcement         Provider Wound Care HPI: Gabriela Ponce is here for follow-up of his Right LE wounds  He presents in offloading dressing Right and CROW Left  Review of Systems    Constitutional: no fever, not feeling poorly, no chills and not feeling tired  Active Problems    1  Abnormal kidney function (593 9) (N28 9)   2  Benign essential hypertension (401 1) (I10)   3  Charcot's Joint Of The Foot (713 5)   4  Chronic foot ulcer (707 15) (L97 509)   5   Chronic kidney disease (CKD) stage G3a/A1, moderately decreased glomerular filtration   rate (GFR) between 45-59 mL/min/1 73 square meter and albuminuria creatinine ratio   less than 30 mg/g (585 3) (N18 3)   6  Congestive heart failure (428 0) (I50 9)   7  Dehiscence of incision (998 32) (T81 31XA)   8  Diabetes mellitus with neurological 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 60) (E11 49)   9  Diabetic Nephropathy (583 81)   10  DM type 2 (diabetes mellitus, type 2) (250 00) (E11 9)   11  Postoperative wound dehiscence, subsequent encounter (V58 89,998 32) (T81 31XD)   12  Status post skin graft (V42 3) (Z94 5)    Past Medical History    1  History of High cholesterol (272 0) (E78 0)   2  History of myocardial infarction (412) (I25 2)   3  History of stroke (V12 54) (Z86 73)   4  History of Irregular heartbeat (427 9) (I49 9)    The active problems and past medical history were reviewed and updated today  Surgical History    1  History of Appendectomy (47 0)   2  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    Family History    1  Family history of Diabetes Mellitus (250 00)    2  Family history of cardiac disorder (V17 49) (Z82 49)    Social History    · Never a smoker    Current Meds   1  Atorvastatin Calcium 80 MG Oral Tablet; TAKE 1 TABLET DAILY; Therapy: 37XWV4706 to Recorded   2  Carvedilol 12 5 MG Oral Tablet; Take 1 tablet twice daily; Therapy: 76OZQ8187 to (Evaluate:30Jun2016) Recorded   3  Citalopram Hydrobromide 20 MG Oral Tablet; TAKE 1 TABLET DAILY; Therapy: 55NLS1651 to Recorded   4  Furosemide 40 MG Oral Tablet; as needed; Therapy: 89XPL0217 to Recorded   5  HumaLOG 100 UNIT/ML Subcutaneous Solution; Therapy: (Recorded:86Scz1246) to Recorded   6  Lantus 100 UNIT/ML Subcutaneous Solution; 40 untis bid; Therapy: 82DBP0438 to Recorded   7  Lidocaine HCl (Local Anesth ) 4 % SOLN; Apply prn to wound prior to debridement for   pain control; Therapy: (Recorded:72Ibx6302) to Recorded   8  Lisinopril 2 5 MG Oral Tablet; Therapy: (Noelle Nguyễn) to Recorded   9  Pacerone 200 MG Oral Tablet; TAKE 1 TABLET DAILY; Therapy: 65DYV6397 to Recorded   10   Spironolactone 25 MG Oral Tablet; 1/2 tablet daily; Therapy: 19WUS7641 to Recorded   11  Vitamin C TABS; Therapy: (Wythai Orryordan) to Recorded   12  Warfarin Sodium 5 MG Oral Tablet; TAKE 1 TABLET DAILY; Therapy: 69GXE7399 to Recorded    Allergies    1  No Known Drug Allergies    Vitals  Vital Signs [Data Includes: Current Encounter]    Recorded: 35UES1464 11:06AM   Temperature 99 1 F, Tympanic   Heart Rate 60   Respiration 16   Systolic 972, Sitting   Diastolic 60, Sitting   Height 5 ft 11 in   Weight 262 lb    BMI Calculated 36 54   BSA Calculated 2 37   Pain Scale 0     Physical Exam    Wound #1 Assessment wound #1 Location:, Right foot, started on 03/2015, Care for this wound started on 7/31/15  Wound Status: not healed  Diabetic Ulcer Grade/Lower Extremity: Stephens Greening 2  Length: 1cm x Width: 0 1cm x Depth: 0 2cm   Total: 0 2sq cm   Wound Volume: 0 04cm3           Tissue type: Subcutaneous and Granulation   Color of Wound: Red - 98% and Yellow - 2%   Exudate Amount: Moderate   Odor: None   Exudate Color: Yellow   Wound Edges: Intact and Callous   Periwound Skin Condition: Callus        Physician/Provider Wound #1 Exam   I agree with the nursing assessment and documentation  Wound #7 Assessment wound #7 Location:, left lateral ankle, started on 11/1/2015, Care for this wound started on 12/5/15, healed on  Wound Status: not healed  Diabetic Ulcer Grade/Lower Extremity: Nory Greening 2  Length: 0 6cm x Width: 0 5cm x Depth: 0 1cm   Total: 0 3sq cm   Wound Volume: 0 03cm3           Tissue type: Subcutaneous and Slough   Color of Wound: Yellow - 90% and Pink - 10%   Exudate Amount: Minimal   Odor: None   Exudate Color: Yellow   Wound Edges: Intact   Periwound Skin Condition: Macerated, Callus      Physician/Provider Wound #7 Exam   I agree with the nursing assessment and documentation  Addi Damico 1:    Wound Nursing Care Plan   Impaired Tissue Integrity related to: right foot and left lateral ankle wounds   Risk for Infection related to open wound:   Impaired Mobility related to: bulky dressing on right foot   Goals   Patient will achieve 100% epithelialization:  Patient will maintain skin integrity:  Wound Nursing Care Interventions:   Provide moist wound healing:  Implement offloading measures (turn & reposition schedule/method, ortho shoes/boots, floating heels, crutches, specialty surfaces:  Teach and evaluate effectiveness of offloading measures:  Plan of care initiated 7/31/15, reviewed 8/21/15, reviewed 9/25/15, reviewed 10/30/15 , reviewed 11/20/15, reviewed 12/29/15, reviewed 1/8/16      Procedure      Wound #1: Right foot     Nurse Dressing Change:   Wound #1 The wound located on the Right foot  Wound care rendered as per Physician/Advanced Practitioner order/plan  Order sent to Home Care  Return to [address] [place] [address] 1 week  Comments:    Excisional Debridement Subcutaneous Tissue:   Wound was excisionally debrided to subcutaneous tissue as follows  Prior to the procedure, the patient was identified using two identifiers, the general consent was signed, the proper site of procedure was identified, and a time out was taken  Anesthesia: Local anesthesia with 4% topical lidocaine was utilized prior to the procedure for pain control  #10 surgical blade was utilized to surgically excise devitalized tissue and/or slough through epidermis, dermis and into the subcutaneous tissue  The total sq cm excised was  7sq cm  See wound assessment for further details  There was minimal bleeding controlled with gentle pressure  the patient tolerated the procedure well without complication  CPT Code(s)   [id number] - Excisional DebridementTo Subcutaneous Tissue; first 20 sq cm  Wound #7: left lateral ankle     Nurse Dressing Change:   Wound #7 The wound located on the left lateral ankle  Wound care rendered as per Physician/Advanced Practitioner order/plan      Order sent to Home Care  Return to [address] [place] [address] 1 week  Comments:    Excisional Debridement Subcutaneous Tissue:   Wound was excisionally debrided to subcutaneous tissue as follows  Prior to the procedure, the patient was identified using two identifiers, the general consent was signed, the proper site of procedure was identified, and a time out was taken  Anesthesia: Local anesthesia with 4% topical lidocaine was utilized prior to the procedure for pain control  #10 surgical blade was utilized to surgically excise devitalized tissue and/or slough through epidermis, dermis and into the subcutaneous tissue  The total sq cm excised was  3sq cm  See wound assessment for further details  There was minimal bleeding controlled with gentle pressure  the patient tolerated the procedure well without complication  CPT Code(s)   [id number] - Excisional DebridementTo Subcutaneous Tissue; first 20 sq cm        Future Appointments    Date/Time Provider Specialty Site   01/22/2016 11:00 AM Meme Dove DPM Wound Care [place] [address]     Signatures   Electronically signed by : Iron Michelle DPM; Jan 19 2016  1:28PM EST                       (Author)    Electronically signed by : Iron Michelle DPM; Jan 29 2016 10:32AM EST                       (Author)

## 2018-01-12 NOTE — PROGRESS NOTES
Assessment    1  Diabetes mellitus with neurological 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 60) (E11 49)   2  Chronic foot ulcer (707 15) (L97 509)    Plan  Chronic foot ulcer    · We have put a total contact cast on your foot  Do not walk on the cast or put weight on  it for 24 hours ; Status:Complete;   Done: [id number]   Ordered; For:Chronic foot ulcer; Ordered By:Julia Valenzuela; Wound Care Orders/Instructions    Wound Identification and Instructions   Wound #1: Right foot    Wound Care Instructions  Discussed with Patient/Caregiver  Dressing Type: Acticoat 7  Wash with mild soap and water, normal saline, wound cleanser or as specified  Apply specified dressing to wound base/bed  Secondary dressing apply: Gauze, 1/2 ABD  Secure with: Kerlix, and tape  Dressing change frequency: Weekly  Total contact cast -- Change every 7 days and as needed for increased drainage, discomfort or excessive swelling of toes  Comments/Other:   with alginate      Wound Goals  Wound Goals:   Healing Goals:   Fair healing potential secondary to moderate comorbid conditions  Wound edges will appear with evidence of contraction and epithelialization   Patient will achieve full wound closure with ability to wear appropriate shoewear       Discussion/Summary    Continue TCC  The treatment plan was reviewed with the patient/guardian  The patient/guardian understands and agrees with the treatment plan      Chief Complaint  Follow up for right foot wound      History of Present Illness    Wound Identification HPI   Wound #1: Right foot          The patient came to Wound Care via wheelchair  The patient is being seen for a follow-up with MD at the [address] [place] [address]  The patient is accompanied by his fiance  The patient's identification was verified  A secondary verification process was completed  Orientation: oriented to person, oriented to place and oriented to time  Blood Glucose:  165 mg/dL as reported by patient     7/31/15: Patient arrived via wheelchair for right foot wound  Has had wound since March 2015  Pt was seen by Dr Satish Sanders at [facility] and was debrided at the bedside  Ace wrap with strike through drainage removed with 2 5x9s, adaptic and packing  Dressing was in place since Wednesday 7/29/15 per patient  [facility]  Dr Satish Sanders relates that patient had two open wounds that communicated and upon MRI abscess was noted as well; bedside debridement removed skin bridge between two open areas and I&D of abscess  Plan for [facility] then total contact cast once enough granular tissue is present to d/c VAC  8/7/15: The patient arrived with VAC intact to wound  The patient has multiple abrasions from knee to toes and is bleeding  The patient relates the he crawled from his house, across grass and concrete into the car so that he didn't put any weight on his right foot  DPM had conversation with patient and significant other to come up with a solution; a manual light weight wheelchair will be prescribed  The SDTI that was on right dorsal foot has now opened, it will be wound #2  New wounds to right foot today traumatic injury  Patient has a small abrasion to the right knee that will be dressed and continue to assess  Follow up in one week  Copy of orders faxed to [facility]  8/21/15: Pt arrived with wound vac intact to right foot wound @ 125, with 2 pieces of black foam, no bridge  Dressings intact to right dorsal ffot and right 2nd toe wounds but pt had foam dressing on, not dermagran gauze as ordered  Pt's family states he was recently hospitalized for low hemoglobin and was seen by Dr Satish Sanders while in hospital  8/28/15: Patient arrived with wound vac intact to right lower extremity at 125mmhg  2 pieces of black foam removed with some foam noted on periwound  Denies any changes since last visit  Patient arrived with foam dressings to right foot instead of ordered dry gauze   Patient has received wheelchair since last visit and states  Follow up in one week  Copy of orders faxed to [facility]ANNA RIZVI  9/4/15: Patient arrived with wound vac intact  1 piece of black foam removed  Denies changes since last visit  9/11/15: Arrived with wound vac intact to right foot  No changes since last visit  9/18/2015: arrived with dressings intact  For right 2nd toe amp 9/19/2015  Hold KCI wound VAC today  9/25/15: The patient arrived with NPWT and ordered dressings intact to wounds  The patient was seen by Dr Marion Gupta 9/19/15 at which time the second toe was amputated  The patient arrived today with 98% intact suture line; the distal aspect is somewhat  (0 2cm)  10/9/2015: Arrived with NPWT intact to right foot, alginate intact to right dorsal foot and right 2nd toe  10/16/15: The patient arrived with dressings intact to wounds  NPWT functioning properly  10/23/2015: Arrived with dressing intact, KCI wound VAC  10/30/2015: Patient arrived with dressing intact to right foot and right thigh donor site  Patient states wound vac was discontinued at last MD visit  11/6/15: Arrived with dressing intact to right foot  Right leg donor site 4 x 1 5 cm  Purulent drainage assessed beneath right lateral foot eschar  11/13/2015: Arrived with dressings intact  New wound on left ankle; Nikolai was rubbing, has been corrected since new wound appeared  11/20/15: Patient arrived with dressings intact to all wounds  Denies changes since last visit  12/4/15: Patient arrived with dressing intact to right foot and no dressing ion left ankle but patient states it has been draining a few days  Denies changes to medications or recent falls  Left lateral ankle wound presets reopened today  12/11/15: Patient arrived with dressings intact to left and right foot  Denies changes since last visit  States he has been ambulating with bulky dressing on right foot  12/18/15: Patient arived with cast intact to RLE   Denies changes since last visit  12/29/2015: Patient arrived with TCC intact to RLE and CROW with dressing intact to LLE  Patient relates that the CROW was adjusted to relieve pressure from the lateral ankle area; feels that it is working well  Right third toe open area measuring 0 4 cm x 0 4 cm x 0 1 cm  Covered with Acticoat Flex 7 to stay intact under the TCC  1/8/16: The patient arrived with dressing intact to left lateral ankle wound and TCC intact to RLE  1/15/16: Patient arrived with crow intact to LLE, and dressings intact to right foot  1/22/16: Patient arrived with dressings intact to left and right foot  Denies changes since last visit  1/29/16: Patient arrived with bulky dressing with Acticoat intact to right foot wound  Patient arrived with Dermagran, 4x4 gauze and bhavna intact to left lateral ankle  2/5/16: Patient arrived with Acticoat and bulky dressing intact to right root  Patient arrived 4x4 gauze and tape to left lateral ankle  2/12/16: Arrived with bulky dressing intact to right foot  Strike through drainage to [employer]  Patient reported he is doing home Physical therapy currently, nursing has discharged from services  2/19/16: Patient arrived with Acticoat flex, 4x4 gauze, 5x9 ABD, Kerlix, coban intact to right foot wound  2/25/16: The patient arrived with dressing clean dry and intact  No issues since last visit  3/4/16: The patient arrived with dressing nicely intact to right foot  No issues since last visit  3/11/16: The patient arrived with dressing intact, strikethrough drainage to but not through coban  The patient relates that he is doing "a lot" with physical therapy  3/18/16: Patient arrived with cast to right foot  Denies any changes since last visit             History of Falling: No = 0   Secondary Diagnosis: Yes = 15   Ambulatory Aid None, Bedrest, Nurse Assist = 0   No = 0   Gait: Impaired = 20 -- Short steps with shuffle, may have difficulty arising from chair, head down, significantly impaired balance, requiring furniture, support person, or walking aid to walk  Mental Status: Oriented to own ability = 0   Total Score: 35    25 - 45 = Moderate Risk        Number of falls in the last year were 0  Nutrition Assessment Screening: Food intake over the last 3 months due to the loss of appetite, digestive problems, chewing or swallowing difficulties is graded as: 2 = no decrease in food intake   Weight loss during the last 3 months: 3 = no weight loss   Mobility scored as: 2 = goes out  Psychological Stress and Acute Disease Scored as: 0 = The patient has experienced psychological stress or acute disease in the last 3 months  Neuropsychological problems scored as: 2 = no psychological problems  Body Mass Index (BMI) scored as: 3 = BMI 23 or greater  Nutritional Assessment Screening Score: 12 - 14 points - Normal nutritional status  Provider Wound Care HPI: Jory Montero is here for TCC change  Pain Assessment   the patient states they do not have pain  (on a scale of 0 to 10, the patient rates the pain at 0 )   Abuse And Domestic Violence Screen   Domestic violence screen not done today  Reason DV Screen not done: fiance present in exam room    Depression And Suicide Screen  Suicide screen not done today  Reason suicide screen not done: fiance present in exam room  Prefered Language is  [place]  Primary Language is  English  Readiness To Learn: Receptive  Barriers To Learning: affect  Preferred Learning: demonstration and verbal   Education Completed: further treatment/follow-up and treatment/procedure   Teaching Method: verbal and demonstration   Person Taught: patient   Evaluation Of Learning: verbalized/demonstrated understanding and needs reinforcement      Review of Systems    Constitutional: no fever, not feeling poorly, no chills and not feeling tired  Active Problems    1  Abnormal kidney function (593 9) (N28 9)   2  Benign essential hypertension (401 1) (I10)   3  Charcot's Joint Of The Foot (713 5)   4  Chronic foot ulcer (707 15) (L97 509)   5  Chronic kidney disease (CKD) stage G3a/A1, moderately decreased glomerular filtration   rate (GFR) between 45-59 mL/min/1 73 square meter and albuminuria creatinine ratio   less than 30 mg/g (585 3) (N18 3)   6  Chronic ulcer of left ankle with fat layer exposed (707 13) (L97 322)   7  Congestive heart failure (428 0) (I50 9)   8  Dehiscence of incision (998 32) (T81 31XA)   9  Diabetes mellitus with neurological 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 60) (E11 49)   10  Diabetic Nephropathy (583 81)   11  DM type 2 (diabetes mellitus, type 2) (250 00) (E11 9)   12  Postoperative wound dehiscence, subsequent encounter (V58 89,998 32) (T81 31XD)   13  Status post skin graft (V42 3) (Z94 5)    Past Medical History    1  History of High cholesterol (272 0) (E78 0)   2  History of myocardial infarction (412) (I25 2)   3  History of stroke (V12 54) (Z86 73)   4  History of Irregular heartbeat (427 9) (I49 9)    The active problems and past medical history were reviewed and updated today  Surgical History    1  History of Appendectomy (47 0)   2  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    Family History    1  Family history of Diabetes Mellitus (250 00)    2  Family history of cardiac disorder (V17 49) (Z82 49)    Social History    · Never a smoker    Current Meds   1  Atorvastatin Calcium 80 MG Oral Tablet; TAKE 1 TABLET DAILY; Therapy: 14MYD7362 to Recorded   2  Carvedilol 12 5 MG Oral Tablet; Take 1 tablet twice daily; Therapy: 09MBT6229 to (Evaluate:30Jun2016) Recorded   3  Citalopram Hydrobromide 20 MG Oral Tablet; TAKE 1 TABLET DAILY; Therapy: 97LJI5380 to Recorded   4  Furosemide 40 MG Oral Tablet; as needed; Therapy: 74VEC7891 to Recorded   5  HumaLOG 100 UNIT/ML Subcutaneous Solution; Therapy: (Recorded:06Bvx9619) to Recorded   6  Lantus 100 UNIT/ML Subcutaneous Solution; 40 untis bid;    Therapy: 73BRT6023 to Recorded 7  Lidocaine HCl (Local Anesth ) 4 % SOLN; Apply prn to wound prior to debridement for   pain control; Therapy: (Recorded:69Jsj0665) to Recorded   8  Lisinopril 2 5 MG Oral Tablet; Therapy: ([place]) to Recorded   9  Pacerone 200 MG Oral Tablet; TAKE 1 TABLET DAILY; Therapy: 58YOQ8732 to Recorded   10  Spironolactone 25 MG Oral Tablet; 1/2 tablet daily; Therapy: 23YSX7854 to Recorded   11  Vitamin C TABS; Therapy: (Foster Seal) to Recorded   12  Warfarin Sodium 5 MG Oral Tablet; TAKE 1 TABLET DAILY; Therapy: 12POY0566 to Recorded    Allergies    1  No Known Drug Allergies    Vitals  Vital Signs [Data Includes: Current Encounter]    Recorded: 04IBJ3089 11:05AM   Temperature 98 7 F, Tympanic   Heart Rate 68, R Radial   Respiration 18   Respiration Quality Normal   Systolic 98, RUE   Diastolic 62, RUE   Weight Unobtainable Yes   Pain Scale 0     Physical Exam    Wound #1 Assessment wound #1 Location:, Right foot, started on 03/2015, Care for this wound started on 7/31/15  Wound Status: not healed  Diabetic Ulcer Grade/Lower Extremity: Angel Hilliard 2  Length: 2 1cm x Width: 1cm x Depth: 0 3cm   Total: 2 1sq cm   Wound Volume: 0 63cm3           Tissue type: Subcutaneous, Granulation and Slough   Color of Wound: Yellow - 20% and Pink - 80%   Exudate Amount: Moderate   Exudate Type: Serosangiunous   Odor: None   Exudate Color: brown   Wound Edges: Callous   Periwound Skin Condition: Macerated, Callus, slight maceration            Physician/Provider Wound #1 Exam   I agree with the nursing assessment and documentation  Constitutional - General appearance: No acute distress, well appearing and well nourished  Addi Damico 1:    Wound Nursing Care Plan   Impaired Tissue Integrity related to: right foot and left lateral ankle wounds   Risk for Infection related to open wound:   Impaired Mobility related to: bulky dressing on right foot   Goals Patient will achieve 100% epithelialization:  Patient will maintain skin integrity:  Wound Nursing Care Interventions:   Provide moist wound healing:  Implement offloading measures (turn & reposition schedule/method, ortho shoes/boots, floating heels, crutches, specialty surfaces:  Teach and evaluate effectiveness of offloading measures:  Plan of care initiated 7/31/15, reviewed 8/21/15, reviewed 9/25/15, reviewed 10/30/15 , reviewed 11/20/15, reviewed 12/29/15, reviewed 1/8/16, reviewed 2/12/2016, 3/11/16      Procedure      Wound #1: Right foot     Nurse Dressing Change:   Wound #1 The wound located on the Right foot  Wound care rendered as per Physician/Advanced Practitioner order/plan  Return to [facility] [address] 1 week     Comments: Total Contact Cast for Offloading DFU:   Application of total contact cast for offloading diabetic foot ulcer with cast shoe (CPT 24164) To the right lower extremity-- All primary and secondary dressings were secured  A rigid total contact cast was applied in the Rodriguez zackery Method utilizing multiple rolls of fiberglass from the popliteal fossa to the distal foot over generous layers of cast padding, adhesive foam, and felt to protect the toes, malleoli, and tibial crest from all protruding edges of cast material  The toes were left completely encompassed  A cast shoe was applied for safe ambulation  Patient tolerated procedure without complications  Patient instructed to contact the [facility] if they experience any pain or discomfort associated with the cast      Excisional Debridement Subcutaneous Tissue:   Wound was excisionally debrided to subcutaneous tissue as follows  Prior to the procedure, the patient was identified using two identifiers, the general consent was signed, the proper site of procedure was identified, and a time out was taken     Anesthesia: Local anesthesia with 4% topical lidocaine was utilized prior to the procedure for pain control  #10 surgical blade was utilized to surgically excise devitalized tissue and/or slough through epidermis, dermis and into the subcutaneous tissue  The total sq cm excised was 2 1sq cm  See wound assessment for further details  There was minimal bleeding controlled with gentle pressure  the patient tolerated the procedure well without complication  CPT Code(s)   V8276188 - Excisional DebridementTo Subcutaneous Tissue; first 20 sq cm        Future Appointments    Date/Time Provider Specialty Site   03/25/2016 11:00 AM Chuck Zaidi DPM Wound Care [place] [address]     Signatures   Electronically signed by : Albino Burch DPM; Mar 22 2016  1:37PM EST                       (Author)

## 2018-01-12 NOTE — MISCELLANEOUS
Physical Exam    Wound #1 Assessment wound #1 Location:, Right foot, started on 03/2015, Care for this wound started on 7/31/15  Wound Status: not healed  Diabetic Ulcer Grade/Lower Extremity: Angel Hilliard 2  Length: 0 2cm x Width: 0 2cm x Depth: 0 1cm   Total: 0 04sq cm   Wound Volume: 0 004cm3           Tissue type: Granulation, Slough and Callus   Color of Wound: Red - 99% and Yellow - 1%   Exudate Amount: Scant   Exudate Type: Serosangiunous and brown   Odor: None   Exudate Color: Tan and brown   Wound Edges: Callous   Periwound Skin Condition: Intact, Scaly   Comments: 1 open area noted  Physician/Provider Wound #1 Exam   I agree with the nursing assessment and documentation  Constitutional - General appearance: No acute distress, well appearing and well nourished  Wound Drsg  Orders/Instructions  Wound Identification Dressing Orders--Instructions:   Wound Identification and Instructions   Wound #1: Right foot    Wound Care Instructions  Discussed with Patient/Caregiver  Dressing Type: Arsalan Peaches with mild soap and water, normal saline, wound cleanser  As specified, use: NSS, and wound cleanser  Yuliana Flowers Apply specified dressing to wound base/bed  To periwound apply: Skin prep barrier  Secondary dressing apply: Gauze, 1/2 ABD, 4x4  Secure with: Kerlix  Dressing change frequency: Weekly  Offloading: Felt pad to periwound  Comments/Other:   Bulky dressing applied 5/13/2016 with 3 cast padding, 1 Kerlix, 1 Coban  Apply compression using: Coban and Tubifast yellow  Wound Goals  Wound Goals:   Healing Goals:   Fair healing potential secondary to moderate comorbid conditions     Wound edges will appear with evidence of contraction and epithelialization   Patient will achieve full wound closure with ability to wear appropriate shoewear       Future Appointments    Date/Time Provider Specialty Site   05/27/2016 11:15 AM Eladio Colón DPM [address] 05/20/2016 10:30 AM Wound Care [place], Nurse Schedule   Natalya      [id number] [facility] Wound Care Plan  Wound Nursing Care Plan [facility]: Wound Nursing Care Plan   Impaired Tissue Integrity related to: right foot and left lateral ankle wounds   Risk for Infection related to open wound:   Impaired Mobility related to: bulky dressing on right foot   Goals   Patient will achieve 100% epithelialization:  Patient will maintain skin integrity:  Wound Nursing Care Interventions:   Provide moist wound healing:  Implement offloading measures (turn & reposition schedule/method, ortho shoes/boots, floating heels, crutches, specialty surfaces:  Teach and evaluate effectiveness of offloading measures:   Plan of care initiated 7/31/15, reviewed 8/21/15, reviewed 9/25/15, reviewed 10/30/15 , reviewed 11/20/15, reviewed 12/29/15, reviewed 1/8/16, reviewed 2/12/2016, 3/11/16, 4/8/16, 5/6/16      Signatures   Electronically signed by : Aliya Herron DPM; May 24 2016  4:22PM EST                       (Author)

## 2018-01-12 NOTE — MISCELLANEOUS
Physical Exam    Wound #1 Assessment wound #1 Location:, Right foot, started on 03/2015, Care for this wound started on 7/31/15  Wound Status: not healed  Diabetic Ulcer Grade/Lower Extremity: Luis Carlos Rank 2  Length: 1 7cm x Width: 0 1cm x Depth: 0 1cm   Total: 0 17sq cm   Wound Volume: 0 017cm3           Tissue type: Subcutaneous, Granulation and Slough   Color of Wound: Yellow - 2% and Pink - 98%   Exudate Amount: Scant   Odor: None   Exudate Color: Yellow and Tan   Wound Edges: Intact and Callous   Periwound Skin Condition: Scaly   Comments: Post debridement there is a skin bridge medially with two small open areas within stated measurement  Physician/Provider Wound #1 Exam   I agree with the nursing assessment and documentation  Constitutional - General appearance: No acute distress, well appearing and well nourished  Wound Drsg  Orders/Instructions  Wound Identification Dressing Orders--Instructions:   Wound Identification and Instructions   Wound #1: Right foot    Wound Care Instructions  Discussed with Patient/Caregiver  Dressing Type: Marysue Olp with mild soap and water, normal saline, wound cleanser  As specified, use: NSS, and wound cleanser  Hernandez Heckler Apply specified dressing to wound base/bed  To periwound apply: Skin prep barrier  Secondary dressing apply: Gauze, 1/2 ABD  Secure with: Kerlix, and tape  Dressing change frequency: Weekly  Offloading: Foam pad to periwound  Comments/Other:   Bulky dressing applied 4/29/16 with 3 cast pading, 1 kerlix, 1 coban  Apply compression using: Coban  Wound Goals  Wound Goals:   Healing Goals:   Fair healing potential secondary to moderate comorbid conditions     Wound edges will appear with evidence of contraction and epithelialization   Patient will achieve full wound closure with ability to wear appropriate shoewear       Future Appointments    Date/Time Provider Specialty Site   05/06/2016 11:00 AM Michael Rosario DPM Wound Care [address] [facility] Wound Care Plan  Wound Nursing Care Plan [facility]: Wound Nursing Care Plan   Impaired Tissue Integrity related to: right foot and left lateral ankle wounds   Risk for Infection related to open wound:   Impaired Mobility related to: bulky dressing on right foot   Goals   Patient will achieve 100% epithelialization:  Patient will maintain skin integrity:  Wound Nursing Care Interventions:   Provide moist wound healing:  Implement offloading measures (turn & reposition schedule/method, ortho shoes/boots, floating heels, crutches, specialty surfaces:  Teach and evaluate effectiveness of offloading measures:   Plan of care initiated 7/31/15, reviewed 8/21/15, reviewed 9/25/15, reviewed 10/30/15 , reviewed 11/20/15, reviewed 12/29/15, reviewed 1/8/16, reviewed 2/12/2016, 3/11/16, 4/8/16      Signatures   Electronically signed by : Cheryl Sánchez DPM; May  3 2016  1:31PM EST                       (Author)

## 2018-01-12 NOTE — MISCELLANEOUS
Physical Exam    Wound #1 Assessment wound #1 Location:, Right foot, started on 03/2015, Care for this wound started on 7/31/15  Wound Status: not healed  Diabetic Ulcer Grade/Lower Extremity: Ruthellen Custer 2  Length: 1 6cm x Width: 1cm x Depth: 0 2cm   Total: 1 6sq cm   Wound Volume: 0 32cm3           Tissue type: Subcutaneous, Granulation and Slough   Color of Wound: Yellow - 5% and Pink - 95%   Exudate Amount: Moderate   Exudate Type: Serosangiunous   Odor: None   Exudate Color: Tan   Wound Edges: Callous   Periwound Skin Condition: Macerated, Callus, slight maceration            Physician/Provider Wound #1 Exam   I agree with the nursing assessment and documentation  Constitutional - General appearance: No acute distress, well appearing and well nourished  Wound Drsg  Orders/Instructions  Wound Identification Dressing Orders--Instructions:   Wound Identification and Instructions   Wound #1: Right foot    Wound Care Instructions  Discussed with Patient/Caregiver  Dressing Type: Acticoat 7  Wash with mild soap and water, normal saline, wound cleanser or as specified  Apply specified dressing to wound base/bed  Secondary dressing apply: Gauze, 1/2 ABD  Secure with: Kerlix, and tape  Dressing change frequency: Weekly  Total contact cast -- Change every 7 days and as needed for increased drainage, discomfort or excessive swelling of toes      Wound Goals  Wound Goals:   Healing Goals:   Fair healing potential secondary to moderate comorbid conditions     Wound edges will appear with evidence of contraction and epithelialization   Patient will achieve full wound closure with ability to wear appropriate shoewear       Future Appointments    Date/Time Provider Specialty Site   03/18/2016 11:15 AM Eliz Marie DPM Wound Care [place] [id number]   03/25/2016 11:00 AM Alexander Gaxiola Wound Care Plan  Wound Nursing Care Plan [facility]: Wound Nursing Care Plan   Impaired Tissue Integrity related to: right foot and left lateral ankle wounds   Risk for Infection related to open wound:   Impaired Mobility related to: bulky dressing on right foot   Goals   Patient will achieve 100% epithelialization:  Patient will maintain skin integrity:  Wound Nursing Care Interventions:   Provide moist wound healing:  Implement offloading measures (turn & reposition schedule/method, ortho shoes/boots, floating heels, crutches, specialty surfaces:  Teach and evaluate effectiveness of offloading measures:   Plan of care initiated 7/31/15, reviewed 8/21/15, reviewed 9/25/15, reviewed 10/30/15 , reviewed 11/20/15, reviewed 12/29/15, reviewed 1/8/16, reviewed 2/12/2016, 3/11/16      Signatures   Electronically signed by : Rosaura Marcial DPM; Mar 15 2016  3:01PM EST                       (Author)

## 2018-01-12 NOTE — PROGRESS NOTES
Assessment    1  Diabetes mellitus with neurological 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 60) (E11 49)   2  Chronic foot ulcer (707 15) (L97 509)    Plan  Chronic foot ulcer    · Partial weight bearing ; Status:Complete;   Done: 09HLB2463   Ordered; For:Chronic foot ulcer; Ordered By:Lázaro Valenzuela;   · Follow-up visit in 2 weeks Evaluation and Treatment  Follow-up  Status: Hold For -  Scheduling  Requested for: 40BKW1183   Ordered; For: Chronic foot ulcer; Ordered By: Malcom Ferrell Performed:  Due: 13DWM6503    Wound Care Orders/Instructions    Wound Identification and Instructions   Wound #1: Right foot    Wound Care Instructions  Discussed with Patient/Caregiver  Dressing Type: Alyssa Quick with mild soap and water, normal saline, wound cleanser  As specified, use: NSS, and wound cleanser  Mayelin Paul Apply specified dressing to wound base/bed  To periwound apply: Skin prep barrier  Secondary dressing apply: Gauze, 1/2 ABD, 4x4  Secure with: Kerlix  Dressing change frequency: Weekly  Offloading: Felt pad to periwound  Comments/Other:   Bulky dressing applied 5/13/2016 with 3 cast padding, 1 Kerlix, 1 Coban  Apply compression using: Coban and Tubifast yellow  Wound Goals  Wound Goals:   Healing Goals:   Fair healing potential secondary to moderate comorbid conditions  Wound edges will appear with evidence of contraction and epithelialization   Patient will achieve full wound closure with ability to wear appropriate shoewear       Chief Complaint  Follow up for right foot wound      History of Present Illness    Wound Identification HPI   Wound #1: Right foot          The patient came to Wound Care via wheelchair  The patient is being seen for a follow-up with MD at the [address] [place] [address]  The patient is accompanied by his fiance  The patient's identification was verified  A secondary verification process was completed  Orientation: oriented to person, oriented to place and oriented to time   Blood Glucose:  173 mg/dL as reported by patient  7/31/15: Patient arrived via wheelchair for right foot wound  Has had wound since March 2015  Pt was seen by Dr Fab Herrera at [facility] and was debrided at the bedside  Ace wrap with strike through drainage removed with 2 5x9s, adaptic and packing  Dressing was in place since Wednesday 7/29/15 per patient  Pt has [facility] for wound care  Dr Fab Herrera relates that patient had two open wounds that communicated and upon MRI abscess was noted as well; bedside debridement removed skin bridge between two open areas and I&D of abscess  Plan for [facility] then total contact cast once enough granular tissue is present to d/c VAC  8/7/15: The patient arrived with VAC intact to wound  The patient has multiple abrasions from knee to toes and is bleeding  The patient relates the he crawled from his house, across grass and concrete into the car so that he didn't put any weight on his right foot  DPM had conversation with patient and significant other to come up with a solution; a manual light weight wheelchair will be prescribed  The SDTI that was on right dorsal foot has now opened, it will be wound #2  New wounds to right foot today traumatic injury  Patient has a small abrasion to the right knee that will be dressed and continue to assess  Follow up in one week  Copy of orders faxed to [facility]  8/21/15: Pt arrived with wound vac intact to right foot wound @ 125, with 2 pieces of black foam, no bridge  Dressings intact to right dorsal ffot and right 2nd toe wounds but pt had foam dressing on, not dermagran gauze as ordered  Pt's family states he was recently hospitalized for low hemoglobin and was seen by Dr Fab Herrera while in hospital  8/28/15: Patient arrived with wound vac intact to right lower extremity at 125mmhg  2 pieces of black foam removed with some foam noted on periwound  Denies any changes since last visit   Patient arrived with foam dressings to right foot instead of ordered dry gauze  Patient has received wheelchair since last visit and states  Follow up in one week  Copy of orders faxed to [facility]  9/4/15: Patient arrived with wound vac intact  1 piece of black foam removed  Denies changes since last visit  9/11/15: Arrived with wound vac intact to right foot  No changes since last visit  9/18/2015: arrived with dressings intact  For right 2nd toe amp 9/19/2015  Hold KCI wound VAC today  9/25/15: The patient arrived with NPWT and ordered dressings intact to wounds  The patient was seen by Dr Luz Lee 9/19/15 at which time the second toe was amputated  The patient arrived today with 98% intact suture line; the distal aspect is somewhat  (0 2cm)  10/9/2015: Arrived with NPWT intact to right foot, alginate intact to right dorsal foot and right 2nd toe  10/16/15: The patient arrived with dressings intact to wounds  NPWT functioning properly  10/23/2015: Arrived with dressing intact, KCI wound VAC  10/30/2015: Patient arrived with dressing intact to right foot and right thigh donor site  Patient states wound vac was discontinued at last MD visit  11/6/15: Arrived with dressing intact to right foot  Right leg donor site 4 x 1 5 cm  Purulent drainage assessed beneath right lateral foot eschar  11/13/2015: Arrived with dressings intact  New wound on left ankle; Ramona was rubbing, has been corrected since new wound appeared  11/20/15: Patient arrived with dressings intact to all wounds  Denies changes since last visit  12/4/15: Patient arrived with dressing intact to right foot and no dressing ion left ankle but patient states it has been draining a few days  Denies changes to medications or recent falls  Left lateral ankle wound presets reopened today  12/11/15: Patient arrived with dressings intact to left and right foot  Denies changes since last visit  States he has been ambulating with bulky dressing on right foot   12/18/15: Patient arived with cast intact to RLE  Denies changes since last visit  12/29/2015: Patient arrived with TCC intact to RLE and CROW with dressing intact to LLE  Patient relates that the CROW was adjusted to relieve pressure from the lateral ankle area; feels that it is working well  Right third toe open area measuring 0 4 cm x 0 4 cm x 0 1 cm  Covered with Acticoat Flex 7 to stay intact under the TCC  1/8/16: The patient arrived with dressing intact to left lateral ankle wound and TCC intact to RLE  1/15/16: Patient arrived with crow intact to LLE, and dressings intact to right foot  1/22/16: Patient arrived with dressings intact to left and right foot  Denies changes since last visit  1/29/16: Patient arrived with bulky dressing with Acticoat intact to right foot wound  Patient arrived with Dermagran, 4x4 gauze and bhavna intact to left lateral ankle  2/5/16: Patient arrived with Acticoat and bulky dressing intact to right root  Patient arrived 4x4 gauze and tape to left lateral ankle  2/12/16: Arrived with bulky dressing intact to right foot  Strike through drainage to [employer]  Patient reported he is doing home Physical therapy currently, nursing has discharged from services  2/19/16: Patient arrived with Acticoat flex, 4x4 gauze, 5x9 ABD, Kerlix, coban intact to right foot wound  2/25/16: The patient arrived with dressing clean dry and intact  No issues since last visit  3/4/16: The patient arrived with dressing nicely intact to right foot  No issues since last visit  3/11/16: The patient arrived with dressing intact, strikethrough drainage to but not through coban  The patient relates that he is doing "a lot" with physical therapy  3/18/16: Patient arrived with cast to right foot  Denies any changes since last visit  3/25/16: Patient arrived with TCC intact to right foot  4/1/16: Patient arrived with cast to right foot  4/8/16: Patient arrived with cast intact to right lower extremity  Denies changes since last visit   4/15/2016 Patient arrived with TCC intact to RLE  Denies any changes since last visit  4/22/16:Patient arrived with TCC intact to RLE  4/29/16: Patient arrived with bulky dressing intact to right foot  Patient denies any changes since last visit  5/6/16: Arrived with Dermagran, 2x2, 4x4, cast padding, ace wrap and Coban intact to right foot  5/13/16: Patient arrived with bulky dressing intact  Denies changes since last visit  History of Falling: No = 0   Secondary Diagnosis: Yes = 15   Ambulatory Aid None, Bedrest, Nurse Assist = 0   No = 0   Gait: Impaired = 20 -- Short steps with shuffle, may have difficulty arising from chair, head down, significantly impaired balance, requiring furniture, support person, or walking aid to walk  Mental Status: Oriented to own ability = 0   Total Score: 35    25 - 45 = Moderate Risk        The most recent fall occurred patient denies any falls since last visit  Number of falls in the last year were 0  Nutrition Assessment Screening: Food intake over the last 3 months due to the loss of appetite, digestive problems, chewing or swallowing difficulties is graded as: 2 = no decrease in food intake   Weight loss during the last 3 months: 3 = no weight loss   Mobility scored as: 2 = goes out  Psychological Stress and Acute Disease Scored as: 0 = The patient has experienced psychological stress or acute disease in the last 3 months  Neuropsychological problems scored as: 2 = no psychological problems  Body Mass Index (BMI) scored as: 3 = BMI 23 or greater  Nutritional Assessment Screening Score: 12 - 14 points - Normal nutritional status  Provider Wound Care HPI: Iesha Bowden is here for offloading dressing change  Pain Assessment   the patient states they do not have pain  (on a scale of 0 to 10, the patient rates the pain at 0 )   Abuse And Domestic Violence Screen   Domestic violence screen not done today   Reason DV Screen not done: fiance present in exam room    Depression And Suicide Screen  Suicide screen not done today  Reason suicide screen not done: fiance present in exam room  Prefered Language is  [place]  Primary Language is  English  Readiness To Learn: Receptive  Barriers To Learning: affect  Preferred Learning: demonstration and verbal   Education Completed: further treatment/follow-up and treatment/procedure   Teaching Method: verbal and demonstration   Person Taught: patient   Evaluation Of Learning: verbalized/demonstrated understanding and needs reinforcement      Review of Systems    Constitutional: no fever, not feeling poorly, no chills and not feeling tired  Active Problems    1  Abnormal kidney function (593 9) (N28 9)   2  Benign essential hypertension (401 1) (I10)   3  Ch[facility]'s [facility] Of The Foot (713 5)   4  Chronic foot ulcer (707 15) (L97 509)   5  Chronic kidney disease (CKD) stage G3a/A1, moderately decreased glomerular filtration   rate (GFR) between 45-59 mL/min/1 73 square meter and albuminuria creatinine ratio   less than 30 mg/g (585 3) (N18 3)   6  Chronic ulcer of left ankle with fat layer exposed (707 13) (L97 322)   7  Congestive heart failure (428 0) (I50 9)   8  Dehiscence of incision (998 32) (T81 31XA)   9  Diabetes mellitus with neurological 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 60) (E11 49)   10  Diabetic Nephropathy (583 81)   11  DM type 2 (diabetes mellitus, type 2) (250 00) (E11 9)   12  Postoperative wound dehiscence, subsequent encounter (V58 89,998 32) (T81 31XD)   13  Status post skin graft (V42 3) (Z94 5)    Past Medical History    1  History of High cholesterol (272 0) (E78 0)   2  History of myocardial infarction (412) (I25 2)   3  History of stroke (V12 54) (Z86 73)   4  History of Irregular heartbeat (427 9) (I49 9)    The active problems and past medical history were reviewed and updated today  Surgical History    1  History of Appendectomy (47 0)   2   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    Family History    1  Family history of Diabetes Mellitus (250 00)    2  Family history of cardiac disorder (V17 49) (Z82 49)    Social History    · Never a smoker    Current Meds   1  Atorvastatin Calcium 80 MG Oral Tablet; TAKE 1 TABLET DAILY; Therapy: 83QAA0727 to Recorded   2  Carvedilol 12 5 MG Oral Tablet; Take 1 tablet twice daily; Therapy: 69HPN0466 to (Evaluate:30Jun2016) Recorded   3  Citalopram Hydrobromide 20 MG Oral Tablet; TAKE 1 TABLET DAILY; Therapy: 08JEL8526 to Recorded   4  Furosemide 40 MG Oral Tablet; as needed; Therapy: 76LUY6441 to Recorded   5  Lantus 100 UNIT/ML Subcutaneous Solution; 40 untis bid; Therapy: 63OSI5629 to Recorded   6  Lidocaine HCl (Local Anesth ) 4 % SOLN; Apply prn to wound prior to debridement for   pain control; Therapy: (Recorded:90Hps7261) to Recorded   7  Lisinopril 2 5 MG Oral Tablet; Therapy: (Enrico Rodriguez) to Recorded   8  NovoLOG SOLN;   Therapy: (Recorded:66Cgy0080) to Recorded   9  Pacerone 200 MG Oral Tablet; TAKE 1 TABLET DAILY; Therapy: 32ZBX1543 to Recorded   10  Spironolactone 25 MG Oral Tablet; 1/2 tablet daily; Therapy: 34UTB4810 to Recorded   11  Vitamin C TABS; Therapy: (Dago Yale) to Recorded   12  Warfarin Sodium 5 MG Oral Tablet; TAKE 1 TABLET DAILY; Therapy: 43SFA4824 to Recorded    Allergies    1  No Known Drug Allergies    Vitals  Vital Signs [Data Includes: Current Encounter]    Recorded: 62ZTG0215 12:34PM   Temperature 99 1 F, Tympanic   Heart Rate 64, L Radial   Respiration 18   Respiration Quality Normal   Systolic 663, RUE, Sitting   Diastolic 64, RUE, Sitting   Pain Scale 0     Physical Exam    Wound #1 Assessment wound #1 Location:, Right foot, started on 03/2015, Care for this wound started on 7/31/15  Wound Status: not healed  Diabetic Ulcer Grade/Lower Extremity: Damion Alisha 2     Length: 0 2cm x Width: 0 2cm x Depth: 0 1cm   Total: 0 04sq cm   Wound Volume: 0 004cm3           Tissue type: Granulation, Slough and Callus   Color of Wound: Red - 99% and Yellow - 1%   Exudate Amount: Scant   Exudate Type: Serosangiunous and brown   Odor: None   Exudate Color: Tan and brown   Wound Edges: Callous   Periwound Skin Condition: Intact, Scaly   Comments: 1 open area noted  Physician/Provider Wound #1 Exam   I agree with the nursing assessment and documentation  Constitutional - General appearance: No acute distress, well appearing and well nourished  Addi Damico 1: Wound Nursing Care Plan   Impaired Tissue Integrity related to: right foot and left lateral ankle wounds   Risk for Infection related to open wound:   Impaired Mobility related to: bulky dressing on right foot   Goals   Patient will achieve 100% epithelialization:  Patient will maintain skin integrity:  Wound Nursing Care Interventions:   Provide moist wound healing:  Implement offloading measures (turn & reposition schedule/method, ortho shoes/boots, floating heels, crutches, specialty surfaces:  Teach and evaluate effectiveness of offloading measures:  Plan of care initiated 7/31/15, reviewed 8/21/15, reviewed 9/25/15, reviewed 10/30/15 , reviewed 11/20/15, reviewed 12/29/15, reviewed 1/8/16, reviewed 2/12/2016, 3/11/16, 4/8/16, 5/6/16      Procedure      Wound #1: Right foot     Nurse Dressing Change:   Wound #1 The wound located on the Right foot  Wound care rendered as per Physician/Advanced Practitioner order/plan  Return to [address] [place] [address] 1 week for RN dressing change  Comments:    Excisional Debridement Subcutaneous Tissue:   Wound was excisionally debrided to subcutaneous tissue as follows  Prior to the procedure, the patient was identified using two identifiers, the general consent was signed, the proper site of procedure was identified, and a time out was taken     Anesthesia: Local anesthesia with 4% topical lidocaine was utilized prior to the procedure for pain control  #10 surgical blade was utilized to surgically excise devitalized tissue and/or slough through epidermis, dermis and into the subcutaneous tissue  The total sq cm excised was  01sq cm  See wound assessment for further details  There was moderate bleeding controlled with silver nitrate  the patient tolerated the procedure well without complication  CPT Code(s)   [id number] - Excisional DebridementTo Subcutaneous Tissue; first 20 sq cm        Future Appointments    Date/Time Provider Specialty Site   05/27/2016 11:15 AM Pascual Burnette DPM Wound Care [place] [address]   05/20/2016 10:30 AM Wound Care Da, Nurse Schedule   [address] CARE     Signatures   Electronically signed by : Kiran Hawley DPM; May 24 2016  4:22PM EST                       (Author)

## 2018-01-13 NOTE — MISCELLANEOUS
Physical Exam    Wound #1 Assessment wound #1 Location:, Right foot, started on 03/2015, Care for this wound started on 7/31/15  Wound Status: not healed  Diabetic Ulcer Grade/Lower Extremity: [place] 2  Length: 0 8cm x Width: 0 4cm x Depth: 0 1cm   Total: 0 32sq cm   Wound Volume: 0 032cm3           Tissue type: Subcutaneous, Granulation, Slough and skin bridge   Color of Wound: Red - 99% and Yellow - 1%   Exudate Amount: Moderate   Exudate Type: Serosangiunous   Odor: None   Wound Edges: Callous   Periwound Skin Condition: Callus, Scaly   Comments: skin bridge  Physician/Provider Wound #1 Exam   I agree with the nursing assessment and documentation  Constitutional - General appearance: No acute distress, well appearing and well nourished  Wound Drsg  Orders/Instructions  Wound Identification Dressing Orders--Instructions:   Wound Identification and Instructions   Wound #1: Right foot    Wound Care Instructions  Discussed with Patient/Caregiver  Dressing Type: Acticoat 7  Wash with mild soap and water, normal saline, wound cleanser or as specified  Apply specified dressing to wound base/bed  Secondary dressing apply: Gauze, ABD, cast padding  Secure with: Kerlix, and tape  Dressing change frequency: Weekly  Offloading: Surgical shoeto-- right foot with bulky dressing  Comments/Other:   Bulky dressing applied today; Acticoat flex 7 to wound base, x3 cast padding, Kerlix, Coban and TubiFast yellow  dressing to stay clean dry and intact until appointment next week  Apply compression using: Coban and Tubifast yellow  Wound Goals  Wound Goals:   Healing Goals:   Fair healing potential secondary to moderate comorbid conditions     Wound edges will appear with evidence of contraction and epithelialization   Patient will achieve full wound closure with ability to wear appropriate shoewear       Future Appointments    Date/Time Provider Specialty Site   02/19/2016 11:15 AM [facility]     [id number] [facility] Wound Care Plan  Wound Nursing Care Plan [address]: Wound Nursing Care Plan   Impaired Tissue Integrity related to: right foot and left lateral ankle wounds   Risk for Infection related to open wound:   Impaired Mobility related to: bulky dressing on right foot   Goals   Patient will achieve 100% epithelialization:  Patient will maintain skin integrity:  Wound Nursing Care Interventions:   Provide moist wound healing:  Implement offloading measures (turn & reposition schedule/method, ortho shoes/boots, floating heels, crutches, specialty surfaces:  Teach and evaluate effectiveness of offloading measures:   Plan of care initiated 7/31/15, reviewed 8/21/15, reviewed 9/25/15, reviewed 10/30/15 , reviewed 11/20/15, reviewed 12/29/15, reviewed 1/8/16, reviewed 2/12/2016      Signatures   Electronically signed by : Rain Acevedo DPM; Feb 16 2016  1:38PM EST                       (Author)

## 2018-01-13 NOTE — PROGRESS NOTES
Chief Complaint  Chief Complaint Free Text Note Form: Nurse dressing change for right foot wound  History of Present Illness  Wound Identification HPI:   Wound Identification HPI   Wound #1: Right foot        Visit Information:   The patient came to Wound Care via wheelchair  The patient is being seen for follow-up with RN at the [address] [place] [address]  The patient is accompanied by his fiance  The patient's identification was verified  A secondary verification process was completed  Orientation: oriented to person, oriented to place and oriented to time  Blood Glucose:  88 mg/dL as reported by patient  7/31/15: Patient arrived via wheelchair for right foot wound  Has had wound since March 2015  Pt was seen by Dr Rodrigo Demarco at [address] [place] [address] and was debrided at the bedside  Ace wrap with strike through drainage removed with 2 5x9s, adaptic and packing  Dressing was in place since Wednesday 7/29/15 per patient  Pt has [facility] Chr[facility] for wound care  Dr Rodrigo Demarco relates that patient had two open wounds that communicated and upon MRI abscess was noted as well; bedside debridement removed skin bridge between two open areas and I&D of abscess  Plan for [facility] then total contact cast once enough granular tissue is present to d/c VAC  8/7/15: The patient arrived with VAC intact to wound  The patient has multiple abrasions from knee to toes and is bleeding  The patient relates the he crawled from his house, across grass and concrete into the car so that he didn't put any weight on his right foot  DPM had conversation with patient and significant other to come up with a solution; a manual light weight wheelchair will be prescribed  The SDTI that was on right dorsal foot has now opened, it will be wound #2  New wounds to right foot today traumatic injury  Patient has a small abrasion to the right knee that will be dressed and continue to assess  Follow up in one week   Copy of orders faxed to [facility] LINK Adriano  8/21/15: Pt arrived with wound vac intact to right foot wound @ 125, with 2 pieces of black foam, no bridge  Dressings intact to right dorsal ffot and right 2nd toe wounds but pt had foam dressing on, not dermagran gauze as ordered  Pt's family states he was recently hospitalized for low hemoglobin and was seen by Dr Sushila Meyer while in hospital  8/28/15: Patient arrived with wound vac intact to right lower extremity at 125mmhg  2 pieces of black foam removed with some foam noted on periwound  Denies any changes since last visit  Patient arrived with foam dressings to right foot instead of ordered dry gauze  Patient has received wheelchair since last visit and states  Follow up in one week  Copy of orders faxed to [facility] RIZVI  9/4/15: Patient arrived with wound vac intact  1 piece of black foam removed  Denies changes since last visit  9/11/15: Arrived with wound vac intact to right foot  No changes since last visit  9/18/2015: arrived with dressings intact  For right 2nd toe amp 9/19/2015  Hold KCI wound VAC today  9/25/15: The patient arrived with NPWT and ordered dressings intact to wounds  The patient was seen by Dr Sushila Meyer 9/19/15 at which time the second toe was amputated  The patient arrived today with 98% intact suture line; the distal aspect is somewhat  (0 2cm)  10/9/2015: Arrived with NPWT intact to right foot, alginate intact to right dorsal foot and right 2nd toe  10/16/15: The patient arrived with dressings intact to wounds  NPWT functioning properly  10/23/2015: Arrived with dressing intact, KCI wound VAC  10/30/2015: Patient arrived with dressing intact to right foot and right thigh donor site  Patient states wound vac was discontinued at last MD visit  11/6/15: Arrived with dressing intact to right foot  Right leg donor site 4 x 1 5 cm  Purulent drainage assessed beneath right lateral foot eschar  11/13/2015: Arrived with dressings intact   New wound on left ankle; CROW was rubbing, has been corrected since new wound appeared  11/20/15: Patient arrived with dressings intact to all wounds  Denies changes since last visit  12/4/15: Patient arrived with dressing intact to right foot and no dressing ion left ankle but patient states it has been draining a few days  Denies changes to medications or recent falls  Left lateral ankle wound presets reopened today  12/11/15: Patient arrived with dressings intact to left and right foot  Denies changes since last visit  States he has been ambulating with bulky dressing on right foot  12/18/15: Patient arived with cast intact to RLE  Denies changes since last visit  12/29/2015: Patient arrived with TCC intact to RLE and CROW with dressing intact to LLE  Patient relates that the CROW was adjusted to relieve pressure from the lateral ankle area; feels that it is working well  Right third toe open area measuring 0 4 cm x 0 4 cm x 0 1 cm  Covered with Acticoat Flex 7 to stay intact under the TCC  1/8/16: The patient arrived with dressing intact to left lateral ankle wound and TCC intact to RLE  1/15/16: Patient arrived with crow intact to LLE, and dressings intact to right foot  1/22/16: Patient arrived with dressings intact to left and right foot  Denies changes since last visit  1/29/16: Patient arrived with bulky dressing with Acticoat intact to right foot wound  Patient arrived with Dermagran, 4x4 gauze and bhavna intact to left lateral ankle  2/5/16: Patient arrived with Acticoat and bulky dressing intact to right root  Patient arrived 4x4 gauze and tape to left lateral ankle  2/12/16: Arrived with bulky dressing intact to right foot  Strike through drainage to [employer]  Patient reported he is doing home Physical therapy currently, nursing has discharged from services  2/19/16: Patient arrived with Acticoat flex, 4x4 gauze, 5x9 ABD, Kerlix, coban intact to right foot wound  2/25/16: The patient arrived with dressing clean dry and intact   No issues since last visit  3/4/16: The patient arrived with dressing nicely intact to right foot  No issues since last visit  Doshi Fall Scale:     History of Falling: No = 0   Secondary Diagnosis: Yes = 15   Ambulatory Aid None, Bedrest, Nurse Assist = 0   No = 0   Gait: Impaired = 20 -- Short steps with shuffle, may have difficulty arising from chair, head down, significantly impaired balance, requiring furniture, support person, or walking aid to walk  Mental Status: Oriented to own ability = 0   Total Score: 35    25 - 45 = Moderate Risk        Fall, Nutrition, Mobility, Neuropsychological Assessment: Number of falls in the last year were 0  Nutrition Assessment Screening: Food intake over the last 3 months due to the loss of appetite, digestive problems, chewing or swallowing difficulties is graded as: 2 = no decrease in food intake   Weight loss during the last 3 months: 3 = no weight loss   Mobility scored as: 2 = goes out  Psychological Stress and Acute Disease Scored as: 0 = The patient has experienced psychological stress or acute disease in the last 3 months  Neuropsychological problems scored as: 2 = no psychological problems  Body Mass Index (BMI) scored as: 3 = BMI 23 or greater  Nutritional Assessment Screening Score: 12 - 14 points - Normal nutritional status  Hospital Based Practices Required Assessment:   Pain Assessment   the patient states they do not have pain  (on a scale of 0 to 10, the patient rates the pain at 0 )   Abuse And Domestic Violence Screen   Domestic violence screen not done today  Reason DV Screen not done: fiance present in exam room    Depression And Suicide Screen  Suicide screen not done today  Reason suicide screen not done: fiance present in exam room  Prefered Language is  [place]  Primary Language is  English  Readiness To Learn: Receptive  Barriers To Learning: affect     Preferred Learning: demonstration and verbal   Education Completed: further treatment/follow-up and treatment/procedure   Teaching Method: verbal and demonstration   Person Taught: patient   Evaluation Of Learning: verbalized/demonstrated understanding and needs reinforcement      Active Problems    1  Abnormal kidney function (593 9) (N28 9)   2  Benign essential hypertension (401 1) (I10)   3  Charcot's Joint Of The Foot (713 5)   4  Chronic foot ulcer (707 15) (L97 509)   5  Chronic kidney disease (CKD) stage G3a/A1, moderately decreased glomerular filtration   rate (GFR) between 45-59 mL/min/1 73 square meter and albuminuria creatinine ratio   less than 30 mg/g (585 3) (N18 3)   6  Chronic ulcer of left ankle with fat layer exposed (707 13) (L97 322)   7  Congestive heart failure (428 0) (I50 9)   8  Dehiscence of incision (998 32) (T81 31XA)   9  Diabetes mellitus with neurological 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 60) (E11 49)   10  Diabetic Nephropathy (583 81)   11  DM type 2 (diabetes mellitus, type 2) (250 00) (E11 9)   12  Postoperative wound dehiscence, subsequent encounter (V58 89,998 32) (T81 31XD)   13  Status post skin graft (V42 3) (Z94 5)    Past Medical History    1  History of High cholesterol (272 0) (E78 0)   2  History of myocardial infarction (412) (I25 2)   3  History of stroke (V12 54) (Z86 73)   4  History of Irregular heartbeat (427 9) (I49 9)    Surgical History    1  History of Appendectomy (47 0)   2  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    Family History    1  Family history of Diabetes Mellitus (250 00)    2  Family history of cardiac disorder (V17 49) (Z82 49)    Social History    · Never a smoker    Current Meds   1  Atorvastatin Calcium 80 MG Oral Tablet; TAKE 1 TABLET DAILY; Therapy: 04MCV3135 to Recorded   2  Carvedilol 12 5 MG Oral Tablet; Take 1 tablet twice daily; Therapy: 77USP5816 to (Evaluate:30Jun2016) Recorded   3  Citalopram Hydrobromide 20 MG Oral Tablet; TAKE 1 TABLET DAILY; Therapy: 85FBI3779 to Recorded   4   Furosemide 40 MG Oral Tablet; as needed; Therapy: 86IQO2816 to Recorded   5  HumaLOG 100 UNIT/ML Subcutaneous Solution; Therapy: (Recorded:46Emf9893) to Recorded   6  Lantus 100 UNIT/ML Subcutaneous Solution; 40 untis bid; Therapy: 70PSK2488 to Recorded   7  Lidocaine HCl (Local Anesth ) 4 % SOLN; Apply prn to wound prior to debridement for   pain control; Therapy: (Recorded:78Tol5437) to Recorded   8  Lisinopril 2 5 MG Oral Tablet; Therapy: (Ahmed Slider) to Recorded   9  Pacerone 200 MG Oral Tablet; TAKE 1 TABLET DAILY; Therapy: 53APO4363 to Recorded   10  Spironolactone 25 MG Oral Tablet; 1/2 tablet daily; Therapy: 70PEK5075 to Recorded   11  Vitamin C TABS; Therapy: (Claudene Rising) to Recorded   12  Warfarin Sodium 5 MG Oral Tablet; TAKE 1 TABLET DAILY; Therapy: 06WFF5803 to Recorded    Allergies    1  No Known Drug Allergies    Vitals  Signs [Data Includes: Current Encounter]   Recorded: 24HQC1269 11:40AM   Temperature: 98 3 F, Tympanic  Heart Rate: 68  Respiration: 18  Systolic: 96  Diastolic: 48  Height: 5 ft 11 in  Weight: 276 lb 2 oz  BMI Calculated: 38 51  BSA Calculated: 2 42  Pain Scale: 0    Physical Exam    Wound #1 Assessment wound #1 Location:, Right foot, started on 03/2015, Care for this wound started on 7/31/15  Wound Status: not healed  Diabetic Ulcer Grade/Lower Extremity: Carlita Donath 2  Length: 1 6cm x Width: 1cm x Depth: 0 2cm   Total: 1 6sq cm   Wound Volume: 0 32cm3           Tissue type: Subcutaneous, Granulation and Slough   Color of Wound: Yellow - 5% and Pink - 95%   Exudate Amount: Moderate   Exudate Type: Serosangiunous   Odor: None   Exudate Color: Tan   Wound Edges: Callous   Periwound Skin Condition: Macerated, Callus, slight maceration           Procedure      Wound #1: right foot     Nurse Dressing Change:   Wound #1 The wound located on the right foot  Wound care rendered as per Physician/Advanced Practitioner order/plan      Return to [address] [place] [address] 1 week  Comments:      Wound Drsg  Orders/Instructions  Wound Identification Dressing Orders--Instructions:   Wound Identification and Instructions   Wound #1: Right foot    Wound Care Instructions  Discussed with Patient/Caregiver  Dressing Type: Acticoat 7  Wash with mild soap and water, normal saline, wound cleanser or as specified  Apply specified dressing to wound base/bed  Secondary dressing apply: Gauze, ABD, cast padding  Secure with: Kerlix, and tape  Dressing change frequency: Weekly  Offloading: Surgical shoeto-- bulky dressing  Comments/Other:   Bulky dressing applied today; Acticoat flex 7 to wound base, Maxorb applied over acticoat flex 7, two ABDs, cast padding, Kerlix, Coban and TubiFast yellow  Dressing to stay clean dry and intact until appointment next week  Apply compression using: Coban and Tubifast yellow  Wound Goals  Wound Goals:   Healing Goals:   Fair healing potential secondary to moderate comorbid conditions  Wound edges will appear with evidence of contraction and epithelialization   Patient will achieve full wound closure with ability to wear appropriate shoewear       Impression  Wound [address]: Wound Nursing Care Plan   Impaired Tissue Integrity related to: right foot and left lateral ankle wounds   Risk for Infection related to open wound:   Impaired Mobility related to: bulky dressing on right foot   Goals   Patient will achieve 100% epithelialization:  Patient will maintain skin integrity:  Wound Nursing Care Interventions:   Provide moist wound healing:  Implement offloading measures (turn & reposition schedule/method, ortho shoes/boots, floating heels, crutches, specialty surfaces:  Teach and evaluate effectiveness of offloading measures:   Plan of care initiated 7/31/15, reviewed 8/21/15, reviewed 9/25/15, reviewed 10/30/15 , reviewed 11/20/15, reviewed 12/29/15, reviewed 1/8/16, reviewed 2/12/2016      Future Appointments    Date/Time Provider Specialty Site   03/11/2016 11:15 AM Mercedez Soriano DPM Wound Care [place] [address]     Signatures   Electronically signed by : Art Cai RN; Mar  4 2016 11:44AM EST                       (Author)

## 2018-01-13 NOTE — MISCELLANEOUS
Physical Exam    Wound #1 Assessment wound #1 Location:, Right foot, started on 03/2015, Care for this wound started on 7/31/15  Wound Status: not healed  Diabetic Ulcer Grade/Lower Extremity: Arlander [place] 2  Length: 0 1cm x Width: 0 1cm x Depth: 0 1cm   Total: 0 01sq cm   Wound Volume: 0 001cm3           Tissue type: Callus 100% callus   Exudate Amount: Scant   Exudate Type: Serosangiunous   Odor: None   Exudate Color: Yellow   Wound Edges: Callous   Periwound Skin Condition: Callus           Wound Drsg  Orders/Instructions  Wound Identification Dressing Orders--Instructions:   Wound Identification and Instructions   Wound #1: Right foot    Wound Care Instructions  Discussed with Patient/Caregiver  Dressing Type: Adaptic  Wash with mild soap and water, normal saline, wound cleanser  As specified, use: NSS, and wound cleanser  Gerhardt Sep Apply specified dressing to wound base/bed  Secondary dressing apply: Gauze, ABD  Secure with: Kerlix  Dressing change frequency: Weekly  Offloading: Felt pad to periwound  Comments/Other:   Bulky dressing of 4 cast padding, cast padding between toes of right foot, Kerlix, Coban and TubiFast yellow  Felt pad applied to periwound  Apply compression using: Coban  Future Appointments    Date/Time Provider Specialty Site   07/08/2016 11:00 AM Alin Ken DPM Wound Care [facility]  Wound Nursing Care Plan Myah Jensen: Wound Nursing Care Plan   Impaired Tissue Integrity related to: right foot and left lateral ankle wounds   Risk for Infection related to open wound:   Impaired Mobility related to: bulky dressing on right foot   Goals   Patient will achieve 100% epithelialization:  Patient will maintain skin integrity:  Wound Nursing Care Interventions:   Provide moist wound healing:  Implement offloading measures (turn & reposition schedule/method, ortho shoes/boots, floating heels, crutches, specialty surfaces:     Teach and evaluate effectiveness of offloading measures:  Plan of care initiated 7/31/15, reviewed 8/21/15, reviewed 9/25/15, reviewed 10/30/15, reviewed 11/20/15, reviewed 12/29/15, reviewed 1/8/16, reviewed 2/12/2016, 3/11/16, 4/8/16, 5/6/16, 6/3/16, Reviewed 6/30/2016      Signatures   Electronically signed by :  Susan Guillen RN; Jun 29 2016  3:43PM EST                       (Author)

## 2018-01-14 VITALS
TEMPERATURE: 98.1 F | HEIGHT: 71 IN | HEART RATE: 78 BPM | RESPIRATION RATE: 18 BRPM | SYSTOLIC BLOOD PRESSURE: 132 MMHG | DIASTOLIC BLOOD PRESSURE: 80 MMHG | BODY MASS INDEX: 37.67 KG/M2 | WEIGHT: 269.06 LBS | OXYGEN SATURATION: 98 %

## 2018-01-14 NOTE — PROGRESS NOTES
Assessment    1  Diabetes mellitus with neurological 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 60) (E11 49)   2  Chronic foot ulcer (707 15) (L97 509)    Plan  Chronic foot ulcer    · We have put a total contact cast on your foot  Do not walk on the cast or put weight on  it for 24 hours ; Status:Complete;   Done: [id number] 03:49PM   Ordered; For:Chronic foot ulcer; Ordered By:Julia Valenzuela; Wound Care Orders/Instructions    Wound Identification and Instructions   Wound #1: Right foot    Wound Care Instructions  Discussed with Patient/Caregiver  Dressing Type: [place] with mild soap and water, normal saline, wound cleanser  As specified, use: NSS, and wound cleanser  Lucinda Cruel Apply specified dressing to wound base/bed  Secondary dressing apply: Gauze  Secure with: Gill  Dressing change frequency: every 2 weeks  Comments/Other:   TCC applied; to remain intact for 2 weeks  Wound Goals  Wound Goals:   Healing Goals:   Fair healing potential secondary to moderate comorbid conditions  Wound edges will appear with evidence of contraction and epithelialization   Patient will achieve full wound closure with ability to wear appropriate shoewear       Discussion/Summary    TCC applied  The treatment plan was reviewed with the patient/guardian  The patient/guardian understands and agrees with the treatment plan      Chief Complaint  Follow up for right foot wound      History of Present Illness    Wound Identification HPI   Wound #1: Right foot          The patient came to Wound Care via wheelchair  The patient is being seen for a follow-up with MD at the [facility]  The patient is accompanied by his fiance  The patient's identification was verified  A secondary verification process was completed  Orientation: oriented to person, oriented to place and oriented to time  Blood Glucose:   98 mg/dL as reported by patient  7/31/15: Patient arrived via wheelchair for right foot wound   Has had wound since March 2015  Pt was seen by Dr Sushila Meyer at [facility] and was debrided at the bedside  Ace wrap with strike through drainage removed with 2 5x9s, adaptic and packing  Dressing was in place since Wednesday 7/29/15 per patient  Pt has [facility] for wound care  Dr Sushila Meyer relates that patient had two open wounds that communicated and upon MRI abscess was noted as well; bedside debridement removed skin bridge between two open areas and I&D of abscess  Plan for [facility] then total contact cast once enough granular tissue is present to d/c VAC  8/7/15: The patient arrived with VAC intact to wound  The patient has multiple abrasions from knee to toes and is bleeding  The patient relates the he crawled from his house, across grass and concrete into the car so that he didn't put any weight on his right foot  DPM had conversation with patient and significant other to come up with a solution; a manual light weight wheelchair will be prescribed  The SDTI that was on right dorsal foot has now opened, it will be wound #2  New wounds to right foot today traumatic injury  Patient has a small abrasion to the right knee that will be dressed and continue to assess  Follow up in one week  Copy of orders faxed to [facility]  8/21/15: Pt arrived with wound vac intact to right foot wound @ 125, with 2 pieces of black foam, no bridge  Dressings intact to right dorsal ffot and right 2nd toe wounds but pt had foam dressing on, not dermagran gauze as ordered  Pt's family states he was recently hospitalized for low hemoglobin and was seen by Dr Sushila Meyer while in hospital  8/28/15: Patient arrived with wound vac intact to right lower extremity at 125mmhg  2 pieces of black foam removed with some foam noted on periwound  Denies any changes since last visit  Patient arrived with foam dressings to right foot instead of ordered dry gauze  Patient has received wheelchair since last visit and states  Follow up in one week  Copy of orders faxed to [facility]  9/4/15: Patient arrived with wound vac intact  1 piece of black foam removed  Denies changes since last visit  9/11/15: Arrived with wound vac intact to right foot  No changes since last visit  9/18/2015: arrived with dressings intact  For right 2nd toe amp 9/19/2015  Hold KCI wound VAC today  9/25/15: The patient arrived with NPWT and ordered dressings intact to wounds  The patient was seen by Dr Marysol Angel 9/19/15 at which time the second toe was amputated  The patient arrived today with 98% intact suture line; the distal aspect is somewhat  (0 2cm)  10/9/2015: Arrived with NPWT intact to right foot, alginate intact to right dorsal foot and right 2nd toe  10/16/15: The patient arrived with dressings intact to wounds  NPWT functioning properly  10/23/2015: Arrived with dressing intact, KCI wound VAC  10/30/2015: Patient arrived with dressing intact to right foot and right thigh donor site  Patient states wound vac was discontinued at last MD visit  11/6/15: Arrived with dressing intact to right foot  Right leg donor site 4 x 1 5 cm  Purulent drainage assessed beneath right lateral foot eschar  11/13/2015: Arrived with dressings intact  New wound on left ankle; [place] was rubbing, has been corrected since new wound appeared  11/20/15: Patient arrived with dressings intact to all wounds  Denies changes since last visit  12/4/15: Patient arrived with dressing intact to right foot and no dressing ion left ankle but patient states it has been draining a few days  Denies changes to medications or recent falls  Left lateral ankle wound presets reopened today  12/11/15: Patient arrived with dressings intact to left and right foot  Denies changes since last visit  States he has been ambulating with bulky dressing on right foot  12/18/15: Patient arived with cast intact to [facility]  Denies changes since last visit   12/29/2015: Patient arrived with TCC intact to [facility] and [address] with dressing intact to LLE  Patient relates that the CROW was adjusted to relieve pressure from the lateral ankle area; feels that it is working well  Right third toe open area measuring 0 4 cm x 0 4 cm x 0 1 cm  Covered with Acticoat Flex 7 to stay intact under the TCC  1/8/16: The patient arrived with dressing intact to left lateral ankle wound and TCC intact to RLE  1/15/16: Patient arrived with crow intact to LLE, and dressings intact to right foot  1/22/16: Patient arrived with dressings intact to left and right foot  Denies changes since last visit  1/29/16: Patient arrived with bulky dressing with Acticoat intact to right foot wound  Patient arrived with Dermagran, 4x4 gauze and bhavna intact to left lateral ankle  2/5/16: Patient arrived with Acticoat and bulky dressing intact to right root  Patient arrived 4x4 gauze and tape to left lateral ankle  2/12/16: Arrived with bulky dressing intact to right foot  Strike through drainage to [employer]  Patient reported he is doing home Physical therapy currently, nursing has discharged from services  2/19/16: Patient arrived with Acticoat flex, 4x4 gauze, 5x9 ABD, Kerlix, coban intact to right foot wound  2/25/16: The patient arrived with dressing clean dry and intact  No issues since last visit  3/4/16: The patient arrived with dressing nicely intact to right foot  No issues since last visit  3/11/16: The patient arrived with dressing intact, strikethrough drainage to but not through coban  The patient relates that he is doing "a lot" with physical therapy  3/18/16: Patient arrived with cast to right foot  Denies any changes since last visit  3/25/16: Patient arrived with TCC intact to right foot  4/1/16: Patient arrived with cast to right foot  4/8/16: Patient arrived with cast intact to right lower extremity  Denies changes since last visit  4/15/2016 Patient arrived with TCC intact to RLE  Denies any changes since last visit   4/22/16:Patient arrived with TCC intact to RLE  4/29/16: Patient arrived with bulky dressing intact to right foot  Patient denies any changes since last visit  5/6/16: Arrived with Dermagran, 2x2, 4x4, cast padding, ace wrap and Coban intact to right foot  5/13/16: Patient arrived with bulky dressing intact  Denies changes since last visit  5/20/2016: Arrived with bulky dressing intact to right foot  Patient reports no problems with the dressing  5/27/2016: Patient arrived with bulky dressing intact to right foot wound  Patient denies any changes since last visit  6/3/16: Patient arrived with bulky dressing intact  Denies any changes since last visit  6/10/2016: Arrived with bulky dressing intact to right foot  History of Falling: No = 0   Secondary Diagnosis: Yes = 15   Ambulatory Aid None, Bedrest, Nurse Assist = 0   No = 0   Gait: Impaired = 20 -- Short steps with shuffle, may have difficulty arising from chair, head down, significantly impaired balance, requiring furniture, support person, or walking aid to walk  Mental Status: Oriented to own ability = 0   Total Score: 35    25 - 45 = Moderate Risk        The most recent fall occurred patient denies any falls since last visit  Number of falls in the last year were 0  Nutrition Assessment Screening: Food intake over the last 3 months due to the loss of appetite, digestive problems, chewing or swallowing difficulties is graded as: 2 = no decrease in food intake   Weight loss during the last 3 months: 3 = no weight loss   Mobility scored as: 2 = goes out  Psychological Stress and Acute Disease Scored as: 0 = The patient has experienced psychological stress or acute disease in the last 3 months  Neuropsychological problems scored as: 2 = no psychological problems  Body Mass Index (BMI) scored as: 3 = BMI 23 or greater  Nutritional Assessment Screening Score: 12 - 14 points - Normal nutritional status  Provider Wound Care HPI: Saji Trujillo is here for offloading dressing change  Pain Assessment   the patient states they do not have pain  (on a scale of 0 to 10, the patient rates the pain at 0 )   Abuse And Domestic Violence Screen   Domestic violence screen not done today  Reason DV Screen not done: fiance present in exam room    Depression And Suicide Screen  Suicide screen not done today  Reason suicide screen not done: fiance present in exam room  Prefered Language is  [place]  Primary Language is  English  Readiness To Learn: Receptive  Barriers To Learning: affect  Preferred Learning: demonstration and verbal   Education Completed: further treatment/follow-up and treatment/procedure   Teaching Method: verbal and demonstration   Person Taught: patient   Evaluation Of Learning: verbalized/demonstrated understanding and needs reinforcement      Review of Systems    Constitutional: no fever, not feeling poorly, no chills and not feeling tired  Active Problems    1  Abnormal kidney function (593 9) (N28 9)   2  Benign essential hypertension (401 1) (I10)   3  Charcot's Joint Of The Foot (713 5)   4  Chronic foot ulcer (707 15) (L97 509)   5  Chronic kidney disease (CKD) stage G3a/A1, moderately decreased glomerular filtration   rate (GFR) between 45-59 mL/min/1 73 square meter and albuminuria creatinine ratio   less than 30 mg/g (585 3) (N18 3)   6  Chronic ulcer of left ankle with fat layer exposed (707 13) (L97 322)   7  Congestive heart failure (428 0) (I50 9)   8  Dehiscence of incision (998 32) (T81 31XA)   9  Diabetes mellitus with neurological 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 60) (E11 49)   10  Diabetic Nephropathy (583 81)   11  DM type 2 (diabetes mellitus, type 2) (250 00) (E11 9)   12  Postoperative wound dehiscence, subsequent encounter (V58 89,998 32) (T81 31XD)   13  Status post skin graft (V42 3) (Z94 5)    Past Medical History    1  History of High cholesterol (272 0) (E78 0)   2  History of myocardial infarction (412) (I25 2)   3   History of stroke (V12 54) (Z86 73)   4  History of Irregular heartbeat (427 9) (I49 9)    The active problems and past medical history were reviewed and updated today  Surgical History    1  History of Appendectomy (47 0)   2  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    Family History    1  Family history of Diabetes Mellitus (250 00)    2  Family history of cardiac disorder (V17 49) (Z82 49)    Social History    · Never a smoker    Current Meds   1  Atorvastatin Calcium 80 MG Oral Tablet; TAKE 1 TABLET DAILY; Therapy: 64OKV2135 to Recorded   2  Carvedilol 12 5 MG Oral Tablet; Take 1 tablet twice daily; Therapy: 11JVZ1168 to (Evaluate:30Jun2016) Recorded   3  Citalopram Hydrobromide 20 MG Oral Tablet; TAKE 1 TABLET DAILY; Therapy: 11BYL8458 to Recorded   4  Furosemide 40 MG Oral Tablet; as needed; Therapy: 92OUY4340 to Recorded   5  Lantus 100 UNIT/ML Subcutaneous Solution; 40 untis bid; Therapy: 94FSV2658 to Recorded   6  Lidocaine HCl (Local Anesth ) 4 % SOLN; Apply prn to wound prior to debridement for   pain control; Therapy: (Recorded:07Fzv8985) to Recorded   7  Lisinopril 2 5 MG Oral Tablet; Therapy: (Norva Manual) to Recorded   8  NovoLOG SOLN;   Therapy: (Recorded:93Qtg6805) to Recorded   9  Pacerone 200 MG Oral Tablet; TAKE 1 TABLET DAILY; Therapy: 99DUR6450 to Recorded   10  Spironolactone 25 MG Oral Tablet; 1/2 tablet daily; Therapy: 60ZMY8294 to Recorded   11  Vitamin C TABS; Therapy: (Myles Octave) to Recorded   12  Warfarin Sodium 5 MG Oral Tablet; TAKE 1 TABLET DAILY; Therapy: 00BKT2086 to Recorded    Allergies    1   No Known Drug Allergies    Vitals  Vital Signs [Data Includes: Current Encounter]    Recorded: 56BIC3824 10:47AM   Temperature 98 5 F, Tympanic   Heart Rate 76, L Radial   Pulse Quality Regular, L Radial   Respiration 18   Respiration Quality Normal   Systolic 291, LUE, Sitting   Diastolic 76, LUE, Sitting   Height 5 ft 11 in   Weight 275 lb    BMI Calculated 38 35   BSA Calculated 2 41   Pain Scale 0     Physical Exam    Wound #1 Assessment wound #1 Location:, Right foot, started on 03/2015, Care for this wound started on 7/31/15  Wound Status: not healed  Diabetic Ulcer Grade/Lower Extremity: Dana Tiara 2  Length: 0 3cm x Width: 0 1cm x Depth: 0 1cm   Total: 0 03sq cm   Wound Volume: 0 003cm3           Tissue type: Callus callus   Exudate Amount: Minimal   Exudate Type: Serosangiunous   Odor: None   Exudate Color: Yellow and brown   Wound Edges: Callous   Periwound Skin Condition: Callus            Physician/Provider Wound #1 Exam   I agree with the nursing assessment and documentation  Constitutional - General appearance: No acute distress, well appearing and well nourished  Addi Damico 1: Wound Nursing Care Plan   Impaired Tissue Integrity related to: right foot and left lateral ankle wounds   Risk for Infection related to open wound:   Impaired Mobility related to: bulky dressing on right foot   Goals   Patient will achieve 100% epithelialization:  Patient will maintain skin integrity:  Wound Nursing Care Interventions:   Provide moist wound healing:  Implement offloading measures (turn & reposition schedule/method, ortho shoes/boots, floating heels, crutches, specialty surfaces:  Teach and evaluate effectiveness of offloading measures:  Plan of care initiated 7/31/15, reviewed 8/21/15, reviewed 9/25/15, reviewed 10/30/15 , reviewed 11/20/15, reviewed 12/29/15, reviewed 1/8/16, reviewed 2/12/2016, 3/11/16, 4/8/16, 5/6/16, 6/3/16      Procedure      Wound #1: Right foot     Nurse Dressing Change:   Wound #1 The wound located on the Right foot  Wound care rendered as per Physician/Advanced Practitioner order/plan  Return to [address] [place] [address] 2 week  Comments:     Total Contact Cast for Offloading DFU:   Application of total contact cast for offloading diabetic foot ulcer with walking heel (CPT 98147) To the right lower extremity-- All primary and secondary dressings were secured  A rigid total contact cast was applied in the Rodriguez zackery Method utilizing multiple rolls of fiberglass from the popliteal fossa to the distal foot over generous layers of cast padding, adhesive foam, and felt to protect the toes, malleoli, and tibial crest from all protruding edges of cast material  The toes were left completely encompassed  A rubber cast walking heel applied with cyanoacralate to the plantar surface of the cast for safe ambulation  Patient tolerated procedure without complications  Patient instructed to contact the [facility] if they experience any pain or discomfort associated with the cast      Excisional Debridement Subcutaneous Tissue:   Wound was excisionally debrided to subcutaneous tissue as follows  Prior to the procedure, the patient was identified using two identifiers, the general consent was signed, the proper site of procedure was identified, and a time out was taken  Anesthesia: Local anesthesia with 4% topical lidocaine was utilized prior to the procedure for pain control  #10 surgical blade was utilized to surgically excise devitalized tissue and/or slough through epidermis, dermis and into the subcutaneous tissue  The total sq cm excised was  03sq cm  See wound assessment for further details  There was minimal bleeding controlled with gentle pressure  the patient tolerated the procedure well without complication  CPT Code(s)   [id number] - Excisional DebridementTo Subcutaneous Tissue; first 20 sq cm        Future Appointments    Date/Time Provider Specialty Site   06/24/2016 11:15 AM [address]   Electronically signed by : James Liao DPM; Jun 14 2016  3:49PM EST                       (Author)

## 2018-01-14 NOTE — MISCELLANEOUS
Physical Exam    Wound #1 Assessment wound #1 Location:, Right foot, started on 03/2015, Care for this wound started on 7/31/15  Wound Status: not healed  Diabetic Ulcer Grade/Lower Extremity: Rolu[place] Parisian 2  Length: 1 6cm x Width: 1cm x Depth: 0 2cm   Total: 1 6sq cm   Wound Volume: 0 32cm3           Tissue type: Subcutaneous, Granulation and Slough   Color of Wound: Yellow - 5% and Pink - 95%   Exudate Amount: Moderate   Exudate Type: Serosangiunous   Odor: None   Exudate Color: Tan   Wound Edges: Callous   Periwound Skin Condition: Macerated, Callus, slight maceration           Wound Drsg  Orders/Instructions  Wound Identification Dressing Orders--Instructions:   Wound Identification and Instructions   Wound #1: Right foot    Wound Care Instructions  Discussed with Patient/Caregiver  Dressing Type: Acticoat 7  Wash with mild soap and water, normal saline, wound cleanser or as specified  Apply specified dressing to wound base/bed  Secondary dressing apply: Gauze, ABD, cast padding  Secure with: Kerlix, and tape  Dressing change frequency: Weekly  Offloading: Surgical shoeto-- bulky dressing  Comments/Other:   Bulky dressing applied today; Acticoat flex 7 to wound base, Maxorb applied over acticoat flex 7, two ABDs, cast padding, Kerlix, Coban and TubiFast yellow  Dressing to stay clean dry and intact until appointment next week  Apply compression using: Coban and Tubifast yellow  Future Appointments    Date/Time Provider Specialty Site   03/11/2016 11:15 AM Migel Fletcher DPM Wound Care [facility] [address] [facility] Plan  Wound Nursing Care Plan [facility]: Wound Nursing Care Plan   Impaired Tissue Integrity related to: right foot and left lateral ankle wounds   Risk for Infection related to open wound:   Impaired Mobility related to: bulky dressing on right foot   Goals   Patient will achieve 100% epithelialization:  Patient will maintain skin integrity:     Wound Nursing Care Interventions:   Provide moist wound healing:  Implement offloading measures (turn & reposition schedule/method, ortho shoes/boots, floating heels, crutches, specialty surfaces:  Teach and evaluate effectiveness of offloading measures:   Plan of care initiated 7/31/15, reviewed 8/21/15, reviewed 9/25/15, reviewed 10/30/15 , reviewed 11/20/15, reviewed 12/29/15, reviewed 1/8/16, reviewed 2/12/2016      Signatures   Electronically signed by : Dilcia Lux RN; Mar  4 2016 11:44AM EST                       (Author)

## 2018-01-14 NOTE — PROGRESS NOTES
Assessment    1  Chronic foot ulcer (707 15) (L97 509)   2  Diabetes mellitus with neurological 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 60) (E11 49)    Plan  Chronic foot ulcer    · Follow-up visit in 1 week Evaluation and Treatment  Follow-up  Status: Hold For -  Scheduling  Requested for: 05Apr2016   Ordered; For: Chronic foot ulcer; Ordered By: Yaakov Das Performed:  Due: [id number]   · We have put a total contact cast on your foot  Do not walk on the cast or put weight on  it for 24 hours ; Status:Complete;   Done: 42WBY3402 02:05PM   Ordered; For:Chronic foot ulcer; Ordered By:Gina Valenzuela; Wound Care Orders/Instructions    Wound Identification and Instructions   Wound #1: Right foot  use: NSS   Wound Care Instructions  Discussed with Patient/Caregiver  Dressing Type: Acticoat 7  Wash with mild soap and water, normal saline, wound cleanser or as specified  Apply specified dressing to wound base/bed  Secondary dressing apply: Gauze, 1/2 ABD  Secure with: Kerlix, and tape  Dressing change frequency: Weekly  Total contact cast -- Change every 7 days and as needed for increased drainage, discomfort or excessive swelling of toes  Comments/Other:   Apply Acticoat 7, Maxorb to wound base  Total Contact Cast was applied today  Wound Goals  Wound Goals:   Healing Goals:   Fair healing potential secondary to moderate comorbid conditions  Wound edges will appear with evidence of contraction and epithelialization   Patient will achieve full wound closure with ability to wear appropriate shoewear       Discussion/Summary    Continue TCC  Chief Complaint  Follow up for right foot wound      History of Present Illness    Wound Identification HPI   Wound #1: Right foot          The patient came to Wound Care via wheelchair  The patient is being seen for a follow-up with MD at the [address] [place] [address]  The patient is accompanied by his fiance  The patient's identification was verified   A secondary verification process was completed  Orientation: oriented to person, oriented to place and oriented to time  Blood Glucose:  192 mg/dL as reported by patient  7/31/15: Patient arrived via wheelchair for right foot wound  Has had wound since March 2015  Pt was seen by Dr Donnell Lopez at [facility] and was debrided at the bedside  Ace wrap with strike through drainage removed with 2 5x9s, adaptic and packing  Dressing was in place since Wednesday 7/29/15 per patient  [facility]  Dr Donnell Lopez relates that patient had two open wounds that communicated and upon MRI abscess was noted as well; bedside debridement removed skin bridge between two open areas and I&D of abscess  Plan for [facility] then total contact cast once enough granular tissue is present to d/c VAC  8/7/15: The patient arrived with VAC intact to wound  The patient has multiple abrasions from knee to toes and is bleeding  The patient relates the he crawled from his house, across grass and concrete into the car so that he didn't put any weight on his right foot  DPM had conversation with patient and significant other to come up with a solution; a manual light weight wheelchair will be prescribed  The SDTI that was on right dorsal foot has now opened, it will be wound #2  New wounds to right foot today traumatic injury  Patient has a small abrasion to the right knee that will be dressed and continue to assess  Follow up in one week  Copy of orders faxed to [facility]  8/21/15: Pt arrived with wound vac intact to right foot wound @ 125, with 2 pieces of black foam, no bridge  Dressings intact to right dorsal ffot and right 2nd toe wounds but pt had foam dressing on, not dermagran gauze as ordered  Pt's family states he was recently hospitalized for low hemoglobin and was seen by Dr Donnell Lopez while in hospital  8/28/15: Patient arrived with wound vac intact to right lower extremity at 125mmhg   2 pieces of black foam removed with some foam noted on periwound  Denies any changes since last visit  Patient arrived with foam dressings to right foot instead of ordered dry gauze  Patient has received wheelchair since last visit and states  Follow up in one week  Copy of orders faxed to [facility]  9/4/15: Patient arrived with wound vac intact  1 piece of black foam removed  Denies changes since last visit  9/11/15: Arrived with wound vac intact to right foot  No changes since last visit  9/18/2015: arrived with dressings intact  For right 2nd toe amp 9/19/2015  Hold KCI wound VAC today  9/25/15: The patient arrived with NPWT and ordered dressings intact to wounds  The patient was seen by Dr Almeida  9/19/15 at which time the second toe was amputated  The patient arrived today with 98% intact suture line; the distal aspect is somewhat  (0 2cm)  10/9/2015: Arrived with NPWT intact to right foot, alginate intact to right dorsal foot and right 2nd toe  10/16/15: The patient arrived with dressings intact to wounds  NPWT functioning properly  10/23/2015: Arrived with dressing intact, KCI wound VAC  10/30/2015: Patient arrived with dressing intact to right foot and right thigh donor site  Patient states wound vac was discontinued at last MD visit  11/6/15: Arrived with dressing intact to right foot  Right leg donor site 4 x 1 5 cm  Purulent drainage assessed beneath right lateral foot eschar  11/13/2015: Arrived with dressings intact  New wound on left ankle; [place] was rubbing, has been corrected since new wound appeared  11/20/15: Patient arrived with dressings intact to all wounds  Denies changes since last visit  12/4/15: Patient arrived with dressing intact to right foot and no dressing ion left ankle but patient states it has been draining a few days  Denies changes to medications or recent falls  Left lateral ankle wound presets reopened today  12/11/15: Patient arrived with dressings intact to left and right foot   Denies changes since last visit  States he has been ambulating with bulky dressing on right foot  12/18/15: Patient arived with cast intact to RLE  Denies changes since last visit  12/29/2015: Patient arrived with TCC intact to RLE and CROW with dressing intact to LLE  Patient relates that the CROW was adjusted to relieve pressure from the lateral ankle area; feels that it is working well  Right third toe open area measuring 0 4 cm x 0 4 cm x 0 1 cm  Covered with Acticoat Flex 7 to stay intact under the TCC  1/8/16: The patient arrived with dressing intact to left lateral ankle wound and TCC intact to RLE  1/15/16: Patient arrived with crow intact to LLE, and dressings intact to right foot  1/22/16: Patient arrived with dressings intact to left and right foot  Denies changes since last visit  1/29/16: Patient arrived with bulky dressing with Acticoat intact to right foot wound  Patient arrived with Dermagran, 4x4 gauze and bhavna intact to left lateral ankle  2/5/16: Patient arrived with Acticoat and bulky dressing intact to right root  Patient arrived 4x4 gauze and tape to left lateral ankle  2/12/16: Arrived with bulky dressing intact to right foot  Strike through drainage to [employer]  Patient reported he is doing home Physical therapy currently, nursing has discharged from services  2/19/16: Patient arrived with Acticoat flex, 4x4 gauze, 5x9 ABD, Kerlix, coban intact to right foot wound  2/25/16: The patient arrived with dressing clean dry and intact  No issues since last visit  3/4/16: The patient arrived with dressing nicely intact to right foot  No issues since last visit  3/11/16: The patient arrived with dressing intact, strikethrough drainage to but not through coban  The patient relates that he is doing "a lot" with physical therapy  3/18/16: Patient arrived with cast to right foot  Denies any changes since last visit  3/25/16: Patient arrived with cast intact to right foot   4/1/16: Patient arrived with cast to right foot  History of Falling: No = 0   Secondary Diagnosis: Yes = 15   Ambulatory Aid None, Bedrest, Nurse Assist = 0   No = 0   Gait: Impaired = 20 -- Short steps with shuffle, may have difficulty arising from chair, head down, significantly impaired balance, requiring furniture, support person, or walking aid to walk  Mental Status: Oriented to own ability = 0   Total Score: 35    25 - 45 = Moderate Risk        The most recent fall occurred patient denies any falls since last visit  Number of falls in the last year were 0  Nutrition Assessment Screening: Food intake over the last 3 months due to the loss of appetite, digestive problems, chewing or swallowing difficulties is graded as: 2 = no decrease in food intake   Weight loss during the last 3 months: 3 = no weight loss   Mobility scored as: 2 = goes out  Psychological Stress and Acute Disease Scored as: 0 = The patient has experienced psychological stress or acute disease in the last 3 months  Neuropsychological problems scored as: 2 = no psychological problems  Body Mass Index (BMI) scored as: 3 = BMI 23 or greater  Nutritional Assessment Screening Score: 12 - 14 points - Normal nutritional status  Provider Wound Care HPI: Corine Hylton is here for TCC change  Pain Assessment   the patient states they do not have pain  (on a scale of 0 to 10, the patient rates the pain at 0 )   Abuse And Domestic Violence Screen   Domestic violence screen not done today  Reason DV Screen not done: fiance present in exam room    Depression And Suicide Screen  Suicide screen not done today  Reason suicide screen not done: fiance present in exam room  Prefered Language is  [place]  Primary Language is  English  Readiness To Learn: Receptive  Barriers To Learning: affect     Preferred Learning: demonstration and verbal   Education Completed: further treatment/follow-up and treatment/procedure   Teaching Method: verbal and demonstration   Person Taught: patient   Evaluation Of Learning: verbalized/demonstrated understanding and needs reinforcement      Review of Systems    Constitutional: no fever, not feeling poorly, no chills and not feeling tired  Active Problems    1  Abnormal kidney function (593 9) (N28 9)   2  Benign essential hypertension (401 1) (I10)   3  Charcot's Joint Of The Foot (713 5)   4  Chronic foot ulcer (707 15) (L97 509)   5  Chronic kidney disease (CKD) stage G3a/A1, moderately decreased glomerular filtration   rate (GFR) between 45-59 mL/min/1 73 square meter and albuminuria creatinine ratio   less than 30 mg/g (585 3) (N18 3)   6  Chronic ulcer of left ankle with fat layer exposed (707 13) (L97 322)   7  Congestive heart failure (428 0) (I50 9)   8  Dehiscence of incision (998 32) (T81 31XA)   9  Diabetes mellitus with neurological 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 60) (E11 49)   10  Diabetic Nephropathy (583 81)   11  DM type 2 (diabetes mellitus, type 2) (250 00) (E11 9)   12  Postoperative wound dehiscence, subsequent encounter (V58 89,998 32) (T81 31XD)   13  Status post skin graft (V42 3) (Z94 5)    Past Medical History    1  History of High cholesterol (272 0) (E78 0)   2  History of myocardial infarction (412) (I25 2)   3  History of stroke (V12 54) (Z86 73)   4  History of Irregular heartbeat (427 9) (I49 9)    The active problems and past medical history were reviewed and updated today  Surgical History    1  History of Appendectomy (47 0)   2  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    Family History    1  Family history of Diabetes Mellitus (250 00)    2  Family history of cardiac disorder (V17 49) (Z82 49)    Social History    · Never a smoker    Current Meds   1  Atorvastatin Calcium 80 MG Oral Tablet; TAKE 1 TABLET DAILY; Therapy: 41KQB5273 to Recorded   2  Carvedilol 12 5 MG Oral Tablet; Take 1 tablet twice daily; Therapy: 81QYV1958 to (Evaluate:30Jun2016) Recorded   3   Citalopram Hydrobromide 20 MG Oral Tablet; TAKE 1 TABLET DAILY; Therapy: 15QSS9500 to Recorded   4  Furosemide 40 MG Oral Tablet; as needed; Therapy: 03BQV7306 to Recorded   5  HumaLOG 100 UNIT/ML Subcutaneous Solution; Therapy: (Recorded:92Jya9273) to Recorded   6  Lantus 100 UNIT/ML Subcutaneous Solution; 40 untis bid; Therapy: 62CAT2950 to Recorded   7  Lidocaine HCl (Local Anesth ) 4 % SOLN; Apply prn to wound prior to debridement for   pain control; Therapy: (Recorded:40Bsm7179) to Recorded   8  Lisinopril 2 5 MG Oral Tablet; Therapy: (Norrine Hacking) to Recorded   9  Pacerone 200 MG Oral Tablet; TAKE 1 TABLET DAILY; Therapy: 20MBG9365 to Recorded   10  Spironolactone 25 MG Oral Tablet; 1/2 tablet daily; Therapy: 56WRE5751 to Recorded   11  Vitamin C TABS; Therapy: (Sherre Pole) to Recorded   12  Warfarin Sodium 5 MG Oral Tablet; TAKE 1 TABLET DAILY; Therapy: 70JVC7896 to Recorded    Allergies    1  No Known Drug Allergies    Vitals  Vital Signs [Data Includes: Current Encounter]    Recorded: 01Apr2016 11:21AM   Temperature 98 5 F, Tympanic   Heart Rate 82, R Radial   Pulse Quality Regular, R Radial   Respiration 18   Respiration Quality Normal   Systolic 793, RUE, Sitting   Diastolic 70, RUE, Sitting   Height 5 ft 11 in   Weight 275 lb 7 oz   BMI Calculated 38 42   BSA Calculated 2 42   Pain Scale 0     Physical Exam    Wound #1 Assessment wound #1 Location:, Right foot, started on 03/2015, Care for this wound started on 7/31/15  Wound Status: not healed  Diabetic Ulcer Grade/Lower Extremity: [place] 2  Length: 1cm x Width: 0 5cm x Depth: 0 2cm   Total: 0 5sq cm   Wound Volume: 0 1cm3           Tissue type: Subcutaneous, Granulation and Slough   Color of Wound: Yellow - 90% and Pink - 10%   Exudate Amount:  Moderate   Exudate Type: Serosangiunous   Odor: None   Exudate Color: brown   Wound Edges: Callous   Periwound Skin Condition: Callus, Edema, slight maceration Physician/Provider Wound #1 Exam   I agree with the nursing assessment and documentation  Addi Damico 1: Wound Nursing Care Plan   Impaired Tissue Integrity related to: right foot and left lateral ankle wounds   Risk for Infection related to open wound:   Impaired Mobility related to: bulky dressing on right foot   Goals   Patient will achieve 100% epithelialization:  Patient will maintain skin integrity:  Wound Nursing Care Interventions:   Provide moist wound healing:  Implement offloading measures (turn & reposition schedule/method, ortho shoes/boots, floating heels, crutches, specialty surfaces:  Teach and evaluate effectiveness of offloading measures:  Plan of care initiated 7/31/15, reviewed 8/21/15, reviewed 9/25/15, reviewed 10/30/15 , reviewed 11/20/15, reviewed 12/29/15, reviewed 1/8/16, reviewed 2/12/2016, 3/11/16      Procedure      Wound #1: Right foot     Nurse Dressing Change:   Wound #1 The wound located on the Right foot  Wound care rendered as per Physician/Advanced Practitioner order/plan  Return to [address] [place] [address] In one week to f/u with Dr Geremias Hernadnez     Comments: Total Contact Cast for Offloading DFU:   Application of total contact cast for offloading diabetic foot ulcer with cast shoe (CPT 93542) To the right lower extremity-- All primary and secondary dressings were secured  A rigid total contact cast was applied in the Rodriguez zackery Method utilizing multiple rolls of fiberglass from the popliteal fossa to the distal foot over generous layers of cast padding, adhesive foam, and felt to protect the toes, malleoli, and tibial crest from all protruding edges of cast material  The toes were left completely encompassed  A cast shoe was applied for safe ambulation  Patient tolerated procedure without complications   Patient instructed to contact the [facility] if they experience any pain or discomfort associated with the cast      Excisional Debridement Subcutaneous Tissue:   Wound was excisionally debrided to subcutaneous tissue as follows  Prior to the procedure, the patient was identified using two identifiers, the general consent was signed, the proper site of procedure was identified, and a time out was taken  Anesthesia: Local anesthesia with 4% topical lidocaine was utilized prior to the procedure for pain control  #10 surgical blade was utilized to surgically excise devitalized tissue and/or slough through epidermis, dermis and into the subcutaneous tissue  The total sq cm excised was  5sq cm  See wound assessment for further details  There was minimal bleeding controlled with gentle pressure  the patient tolerated the procedure well without complication  CPT Code(s)   [id number] - Excisional DebridementTo Subcutaneous Tissue; first 20 sq cm        Future Appointments    Date/Time Provider Specialty Site   04/08/2016 11:00 AM Sophia Leonard DPM Wound Care [facility] 28     Signatures   Electronically signed by : Dheeraj Ricks DPM; Apr 5 2016  2:06PM EST                       (Author)

## 2018-01-14 NOTE — PROGRESS NOTES
Assessment    1  Chronic foot ulcer (707 15) (L97 509)   2  Diabetes mellitus with neurological 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 60) (E11 49)   3  Postoperative wound dehiscence, subsequent encounter (V58 89,998 32) (T81 31XD)   4  Chronic ulcer of left ankle with fat layer exposed (707 13) (L97 322)    Plan  Postoperative wound dehiscence, subsequent encounter    · Follow-up visit in 1 week Evaluation and Treatment  Follow-up  Status: Complete  Done:  [id number]   Ordered; For: Postoperative wound dehiscence, subsequent encounter; Ordered By: Pieter Jarquin Performed:  Due: 69GSL3826; Last Updated By: Dong Hoskins; 1/22/2016 1:45:52 PM    Wound Care Orders/Instructions    Wound Identification and Instructions   Wound #1: Right foot    Wound Care Instructions  Discussed with Patient/Caregiver  Dressing Type: Acticoat 7  Wash with mild soap and water, normal saline, wound cleanser or as specified  Apply specified dressing to wound base/bed  Secondary dressing apply: Gauze, ABD, cast padding  Secure with: Kerlix, and tape  Dressing change frequency: Weekly  Offloading: Surgical shoeto-- right foot with bulky dressing  Comments/Other:   Bulky dressing applied today; to stay clean dry and intact until appointment next week  Apply compression using: Coban  Wound #7: Left lateral ankle   Wound Care Instructions  Discussed with Patient/Caregiver  Dressing Type: Remus Masury with mild soap and water, normal saline, wound cleanser or as specified  Apply specified dressing to wound base/bed  Secondary dressing apply: Gauze  Secure with: tape, vac film or tegaderm  Dressing change frequency: Three times per week, M/W/F  Offloading: Crow braceto-- LLE      Wound Goals  Wound Goals:   Healing Goals:   Fair healing potential secondary to moderate comorbid conditions     Wound edges will appear with evidence of contraction and epithelialization   Patient will achieve full wound closure with ability to wear appropriate shoewear       Discussion/Summary    We will continue the same treatment plan  The treatment plan was reviewed with the patient/guardian  The patient/guardian understands and agrees with the treatment plan      Chief Complaint  Follow up for right foot wound and left lateral ankle wound  History of Present Illness    Wound Identification HPI   Wound #1: Right foot    Wound #7: left lateral ankle          The patient came to Wound Care via wheelchair  The patient is being seen for a follow-up with MD at the [facility]  The patient is accompanied by his fiance  The patient's identification was verified  A secondary verification process was completed  Orientation: oriented to person, oriented to place and oriented to time  Blood Glucose:   248 mg/dL as reported by patient  7/31/15: Patient arrived via wheelchair for right foot wound  Has had wound since March 2015  Pt was seen by Dr Demi Singh at [facility] and was debrided at the bedside  Ace wrap with strike through drainage removed with 2 5x9s, adaptic and packing  Dressing was in place since Wednesday 7/29/15 per patient  Pt has [facility] for wound care  Dr Demi Singh relates that patient had two open wounds that communicated and upon MRI abscess was noted as well; bedside debridement removed skin bridge between two open areas and I&D of abscess  Plan for [facility] then total contact cast once enough granular tissue is present to d/c VAC  8/7/15: The patient arrived with VAC intact to wound  The patient has multiple abrasions from knee to toes and is bleeding  The patient relates the he crawled from his house, across grass and concrete into the car so that he didn't put any weight on his right foot  DPM had conversation with patient and significant other to come up with a solution; a manual light weight wheelchair will be prescribed  The SDTI that was on right dorsal foot has now opened, it will be wound #2   New wounds to right foot today traumatic injury  Patient has a small abrasion to the right knee that will be dressed and continue to assess  Follow up in one week  Copy of orders faxed to [facility]  8/21/15: Pt arrived with wound vac intact to right foot wound @ 125, with 2 pieces of black foam, no bridge  Dressings intact to right dorsal ffot and right 2nd toe wounds but pt had foam dressing on, not dermagran gauze as ordered  Pt's family states he was recently hospitalized for low hemoglobin and was seen by Dr Areli Melgar while in hospital  8/28/15: Patient arrived with wound vac intact to right lower extremity at 125mmhg  2 pieces of black foam removed with some foam noted on periwound  Denies any changes since last visit  Patient arrived with foam dressings to right foot instead of ordered dry gauze  Patient has received wheelchair since last visit and states  Follow up in one week  Copy of orders faxed to [facility]  9/4/15: Patient arrived with wound vac intact  1 piece of black foam removed  Denies changes since last visit  9/11/15: Arrived with wound vac intact to right foot  No changes since last visit  9/18/2015: arrived with dressings intact  For right 2nd toe amp 9/19/2015  Hold KCI wound VAC today  9/25/15: The patient arrived with NPWT and ordered dressings intact to wounds  The patient was seen by Dr Areli Melgar 9/19/15 at which time the second toe was amputated  The patient arrived today with 98% intact suture line; the distal aspect is somewhat  (0 2cm)  10/9/2015: Arrived with NPWT intact to right foot, alginate intact to right dorsal foot and right 2nd toe  10/16/15: The patient arrived with dressings intact to wounds  NPWT functioning properly  10/23/2015: Arrived with dressing intact, KCI wound VAC  10/30/2015: Patient arrived with dressing intact to right foot and right thigh donor site  Patient states wound vac was discontinued at last MD visit   11/6/15: Arrived with dressing intact to right foot  Right leg donor site 4 x 1 5 cm  Purulent drainage assessed beneath right lateral foot eschar  11/13/2015: Arrived with dressings intact  New wound on left ankle; [place] was rubbing, has been corrected since new wound appeared  11/20/15: Patient arrived with dressings intact to all wounds  Denies changes since last visit  12/4/15: Patient arrived with dressing intact to right foot and no dressing ion left ankle but patient states it has been draining a few days  Denies changes to medications or recent falls  Left lateral ankle wound presets reopened today  12/11/15: Patient arrived with dressings intact to left and right foot  Denies changes since last visit  States he has been ambulating with bulky dressing on right foot  12/18/15: Patient arived with cast intact to RLE  Denies changes since last visit  12/29/2015: Patient arrived with TCC intact to RLE and CROW with dressing intact to LLE  Patient relates that the CROW was adjusted to relieve pressure from the lateral ankle area; feels that it is working well  Right third toe open area measuring 0 4 cm x 0 4 cm x 0 1 cm  Covered with Acticoat Flex 7 to stay intact under the TCC  1/8/16: The patient arrived with dressing intact to left lateral ankle wound and TCC intact to RLE  1/15/16: Patient arrived with crow intact to LLE, and dressings intact to right foot  1/22/16: Patient arrived with dressings intact to left and right foot  Denies changes since last visit  History of Falling: No = 0   Secondary Diagnosis: Yes = 15   Ambulatory Aid None, Bedrest, Nurse Assist = 0   No = 0   Gait: Impaired = 20 -- Short steps with shuffle, may have difficulty arising from chair, head down, significantly impaired balance, requiring furniture, support person, or walking aid to walk  Mental Status: Oriented to own ability = 0   Total Score: 35    25 - 45 = Moderate Risk        Number of falls in the last year were 0     Nutrition Assessment Screening: Food intake over the last 3 months due to the loss of appetite, digestive problems, chewing or swallowing difficulties is graded as: 2 = no decrease in food intake   Weight loss during the last 3 months: 3 = no weight loss   Mobility scored as: 2 = goes out  Psychological Stress and Acute Disease Scored as: 0 = The patient has experienced psychological stress or acute disease in the last 3 months  Neuropsychological problems scored as: 2 = no psychological problems  Body Mass Index (BMI) scored as: 3 = BMI 23 or greater  Nutritional Assessment Screening Score: 12 - 14 points - Normal nutritional status  Pain Assessment   the patient states they do not have pain  (on a scale of 0 to 10, the patient rates the pain at 0 )   Abuse And Domestic Violence Screen   Domestic violence screen not done today  Reason DV Screen not done: fiance present in exam room    Depression And Suicide Screen  Suicide screen not done today  Reason suicide screen not done: fiance present in exam room  Prefered Language is  [place]  Primary Language is  English  Readiness To Learn: Receptive  Barriers To Learning: affect  Preferred Learning: demonstration and verbal   Education Completed: further treatment/follow-up and treatment/procedure   Teaching Method: verbal and demonstration   Person Taught: patient   Evaluation Of Learning: verbalized/demonstrated understanding and needs reinforcement         Provider Wound Care HPI: Sybil De Los Santos is here for follow-up of his Right LE wounds  He continues to wear an offloading dressing Right and CROW Left  Review of Systems    Constitutional: no fever, not feeling poorly, no chills and not feeling tired  Active Problems    1  Abnormal kidney function (593 9) (N28 9)   2  Benign essential hypertension (401 1) (I10)   3  Charcot's Joint Of The Foot (713 5)   4  Chronic foot ulcer (707 15) (L91 509)   5   Chronic kidney disease (CKD) stage G3a/A1, moderately decreased glomerular filtration   rate (GFR) between 45-59 mL/min/1 73 square meter and albuminuria creatinine ratio   less than 30 mg/g (585 3) (N18 3)   6  Congestive heart failure (428 0) (I50 9)   7  Dehiscence of incision (998 32) (T81 31XA)   8  Diabetes mellitus with neurological 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 60) (E11 49)   9  Diabetic Nephropathy (583 81)   10  DM type 2 (diabetes mellitus, type 2) (250 00) (E11 9)   11  Postoperative wound dehiscence, subsequent encounter (V58 89,998 32) (T81 31XD)   12  Status post skin graft (V42 3) (Z94 5)    Past Medical History    1  History of High cholesterol (272 0) (E78 0)   2  History of myocardial infarction (412) (I25 2)   3  History of stroke (V12 54) (Z86 73)   4  History of Irregular heartbeat (427 9) (I49 9)    The active problems and past medical history were reviewed and updated today  Surgical History    1  History of Appendectomy (47 0)   2  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    Family History    1  Family history of Diabetes Mellitus (250 00)    2  Family history of cardiac disorder (V17 49) (Z82 49)    Social History    · Never a smoker    Current Meds   1  Atorvastatin Calcium 80 MG Oral Tablet; TAKE 1 TABLET DAILY; Therapy: 60GGO7213 to Recorded   2  Carvedilol 12 5 MG Oral Tablet; Take 1 tablet twice daily; Therapy: 97WHB6839 to (Evaluate:30Jun2016) Recorded   3  Citalopram Hydrobromide 20 MG Oral Tablet; TAKE 1 TABLET DAILY; Therapy: 52QVV0311 to Recorded   4  Furosemide 40 MG Oral Tablet; as needed; Therapy: 78XTR1629 to Recorded   5  HumaLOG 100 UNIT/ML Subcutaneous Solution; Therapy: (Recorded:70Crh1742) to Recorded   6  Lantus 100 UNIT/ML Subcutaneous Solution; 40 untis bid; Therapy: 87JJW3421 to Recorded   7  Lidocaine HCl (Local Anesth ) 4 % SOLN; Apply prn to wound prior to debridement for   pain control; Therapy: (Recorded:05Bkr3576) to Recorded   8  Lisinopril 2 5 MG Oral Tablet;    Therapy: (Mao Galicia) to Recorded   9  Pacerone 200 MG Oral Tablet; TAKE 1 TABLET DAILY; Therapy: 99VLK3611 to Recorded   10  Spironolactone 25 MG Oral Tablet; 1/2 tablet daily; Therapy: 43SHK5211 to Recorded   11  Vitamin C TABS; Therapy: (Isaiah Christianson) to Recorded   12  Warfarin Sodium 5 MG Oral Tablet; TAKE 1 TABLET DAILY; Therapy: 89AJC0170 to Recorded    Allergies    1  No Known Drug Allergies    Vitals  Vital Signs [Data Includes: Current Encounter]    Recorded: 64EPZ1778 10:54AM   Temperature 98 9 F, Tympanic   Heart Rate 68, L Radial   Respiration 20   Respiration Quality Normal   Systolic 470, RUE   Diastolic 64, RUE   Weight Unobtainable Yes   Pain Scale 0     Physical Exam    Wound #1 Assessment wound #1 Location:, Right foot, started on 03/2015, Care for this wound started on 7/31/15  Wound Status: not healed  Diabetic Ulcer Grade/Lower Extremity: Xin All 2  Length: 0 9cm x Width: 0 2cm x Depth: 0 1cm   Total: 0 18sq cm   Wound Volume: 0 018cm3           Tissue type: Subcutaneous and Granulation   Color of Wound: Red - 99% and Yellow - 1%   Exudate Amount: Moderate   Exudate Type: Serosangiunous   Odor: None   Exudate Color: Tan   Wound Edges: Intact and Callous   Periwound Skin Condition: Intact, Callus, Scaly, dry        Physician/Provider Wound #1 Exam   I agree with the nursing assessment and documentation  Wound #7 Assessment wound #7 Location:, left lateral ankle, started on 11/1/2015, Care for this wound started on 12/5/15, healed on  Wound Status: not healed  Diabetic Ulcer Grade/Lower Extremity: Xin All 2     Length: 0 5cm x Width: 0 4cm x Depth: 0 1cm   Total: 0 2sq cm   Wound Volume: 0 02cm3           Tissue type: Subcutaneous and Slough   Color of Wound: Yellow - 95% and Pink - 5%   Exudate Amount: Minimal   Exudate Type: Serosangiunous   Odor: None   Exudate Color: Yellow   Wound Edges: Intact   Periwound Skin Condition: Intact, Macerated, Callus   Comments: slightly macerated  Physician/Provider Wound #7 Exam   I agree with the nursing assessment and documentation  Constitutional - General appearance: No acute distress, well appearing and well nourished  Addi Wangtanisha 1: Wound Nursing Care Plan   Impaired Tissue Integrity related to: right foot and left lateral ankle wounds   Risk for Infection related to open wound:   Impaired Mobility related to: bulky dressing on right foot   Goals   Patient will achieve 100% epithelialization:  Patient will maintain skin integrity:  Wound Nursing Care Interventions:   Provide moist wound healing:  Implement offloading measures (turn & reposition schedule/method, ortho shoes/boots, floating heels, crutches, specialty surfaces:  Teach and evaluate effectiveness of offloading measures:  Plan of care initiated 7/31/15, reviewed 8/21/15, reviewed 9/25/15, reviewed 10/30/15 , reviewed 11/20/15, reviewed 12/29/15, reviewed 1/8/16      Procedure      Wound #1: Right foot     Nurse Dressing Change:   Wound #1 The wound located on the Right foot  Wound care rendered as per Physician/Advanced Practitioner order/plan  Order sent to Home Care  Return to [address] [place] [address] 1 week  Comments:    Excisional Debridement Subcutaneous Tissue:   Wound was excisionally debrided to subcutaneous tissue as follows  Prior to the procedure, the patient was identified using two identifiers, the general consent was signed, the proper site of procedure was identified, and a time out was taken  Anesthesia: Local anesthesia with 4% topical lidocaine was utilized prior to the procedure for pain control  #15 surgical blade was utilized to surgically excise devitalized tissue and/or slough through epidermis, dermis and into the subcutaneous tissue  The total sq cm excised was  18sq cm  See wound assessment for further details  There was minimal bleeding controlled with gentle pressure   the patient tolerated the procedure well without complication  CPT Code(s)   [id number] - Excisional DebridementTo Subcutaneous Tissue; first 20 sq cm  Wound #7: left lateral ankle     Nurse Dressing Change:   Wound #7 The wound located on the left lateral ankle  Applied 4% topical Lidocaine solution to wound/ulcer prior to debridement for pain control  Wound care rendered as per Physician/Advanced Practitioner order/plan  Order sent to Home Care  Return to [address] [place] [address] 1 week  Comments:    Excisional Debridement Subcutaneous Tissue:   Wound was excisionally debrided to subcutaneous tissue as follows  Prior to the procedure, the patient was identified using two identifiers, the general consent was signed, the proper site of procedure was identified, and a time out was taken  Anesthesia: Local anesthesia with 4% topical lidocaine was utilized prior to the procedure for pain control  #15 surgical blade was utilized to surgically excise devitalized tissue and/or slough through epidermis, dermis and into the subcutaneous tissue  The total sq cm excised was  2sq cm  See wound assessment for further details  There was minimal bleeding controlled with gentle pressure  the patient tolerated the procedure well without complication  CPT Code(s)   [id number] - Excisional DebridementTo Subcutaneous Tissue; first 20 sq cm        Future Appointments    Date/Time Provider Specialty Site   01/29/2016 11:00 AM Selma Deng DPM Wound Care [place] [address]     Signatures   Electronically signed by : Alma Fraire DPM; Jan 27 2016 12:15PM EST                       (Author)    Electronically signed by : Alma Fraire DPM; Feb 10 2016 11:29AM EST                       (Author)

## 2018-01-15 NOTE — PROGRESS NOTES
Assessment    1  Chronic foot ulcer (707 15) (L97 509)   2  Diabetes mellitus with neurological 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 60) (E11 49)    Plan  Diabetes mellitus with neurological manifestation    · Follow-up visit in 1 week Evaluation and Treatment  Follow-up  Status: Hold For -  Scheduling  Requested for: [id number]   Ordered; For: Diabetes mellitus with neurological manifestation; Ordered By: Peña Gamez Performed:  Due: [id number]    Wound Care Orders/Instructions    Wound Identification and Instructions   Wound #1: Right foot    Wound Care Instructions  Discussed with Patient/Caregiver  Dressing Type: Seamus Bevel with mild soap and water, normal saline, wound cleanser  As specified, use: NSS, and wound cleanser  [place] Evens Apply specified dressing to wound base/bed  To periwound apply: Skin prep barrier  Secondary dressing apply: Gauze, 1/2 ABD, 4x4  Secure with: Kerlix  Dressing change frequency: Weekly  Offloading: Felt pad to periwound  Comments/Other:   Bulky dressing applied 5/6/2016 with 3 cast padding, 1 Kerlix, 1 Coban  Apply compression using: Coban and Tubifast yellow  Wound Goals  Wound Goals:   Healing Goals:   Fair healing potential secondary to moderate comorbid conditions  Wound edges will appear with evidence of contraction and epithelialization   Patient will achieve full wound closure with ability to wear appropriate shoewear       Discussion/Summary      An offloading football-type dressing was applied  The treatment plan was reviewed with the patient/guardian  The patient/guardian understands and agrees with the treatment plan      Chief Complaint  Follow up for right foot wound      History of Present Illness    Wound Identification HPI   Wound #1: Right foot          The patient came to Wound Care via wheelchair  The patient is being seen for a follow-up with MD at the [address] [place] [address]  The patient is accompanied by his fiance  The patient's identification was verified   A secondary verification process was completed  Orientation: oriented to person, oriented to place and oriented to time  Blood Glucose:   280 mg/dL as reported by patient  7/31/15: Patient arrived via wheelchair for right foot wound  Has had wound since March 2015  Pt was seen by Dr Frank Valadez at [facility] and was debrided at the bedside  Ace wrap with strike through drainage removed with 2 5x9s, adaptic and packing  Dressing was in place since Wednesday 7/29/15 per patient  [facility]  Dr Frank Valadez relates that patient had two open wounds that communicated and upon MRI abscess was noted as well; bedside debridement removed skin bridge between two open areas and I&D of abscess  Plan for [facility] then total contact cast once enough granular tissue is present to d/c VAC  8/7/15: The patient arrived with VAC intact to wound  The patient has multiple abrasions from knee to toes and is bleeding  The patient relates the he crawled from his house, across grass and concrete into the car so that he didn't put any weight on his right foot  DPM had conversation with patient and significant other to come up with a solution; a manual light weight wheelchair will be prescribed  The SDTI that was on right dorsal foot has now opened, it will be wound #2  New wounds to right foot today traumatic injury  Patient has a small abrasion to the right knee that will be dressed and continue to assess  Follow up in one week  Copy of orders faxed to [facility]  8/21/15: Pt arrived with wound vac intact to right foot wound @ 125, with 2 pieces of black foam, no bridge  Dressings intact to right dorsal ffot and right 2nd toe wounds but pt had foam dressing on, not dermagran gauze as ordered  Pt's family states he was recently hospitalized for low hemoglobin and was seen by Dr Frank Valadez while in hospital  8/28/15: Patient arrived with wound vac intact to right lower extremity at 125mmhg   2 pieces of black foam removed with some foam noted on periwound  Denies any changes since last visit  Patient arrived with foam dressings to right foot instead of ordered dry gauze  Patient has received wheelchair since last visit and states  Follow up in one week  Copy of orders faxed to [facility]IN[facility] R[facility]VI  9/4/15: Patient arrived with wound vac intact  1 piece of black foam removed  Denies changes since last visit  9/11/15: Arrived with wound vac intact to right foot  No changes since last visit  9/18/2015: arrived with dressings intact  For right 2nd toe amp 9/19/2015  Hold KCI wound VAC today  9/25/15: The patient arrived with NPWT and ordered dressings intact to wounds  The patient was seen by Dr Narayan Monroy 9/19/15 at which time the second toe was amputated  The patient arrived today with 98% intact suture line; the distal aspect is somewhat  (0 2cm)  10/9/2015: Arrived with NPWT intact to right foot, alginate intact to right dorsal foot and right 2nd toe  10/16/15: The patient arrived with dressings intact to wounds  NPWT functioning properly  10/23/2015: Arrived with dressing intact, KCI wound VAC  10/30/2015: Patient arrived with dressing intact to right foot and right thigh donor site  Patient states wound vac was discontinued at last MD visit  11/6/15: Arrived with dressing intact to right foot  Right leg donor site 4 x 1 5 cm  Purulent drainage assessed beneath right lateral foot eschar  11/13/2015: Arrived with dressings intact  New wound on left ankle; [place] was rubbing, has been corrected since new wound appeared  11/20/15: Patient arrived with dressings intact to all wounds  Denies changes since last visit  12/4/15: Patient arrived with dressing intact to right foot and no dressing ion left ankle but patient states it has been draining a few days  Denies changes to medications or recent falls  Left lateral ankle wound presets reopened today   12/11/15: Patient arrived with dressings intact to left and right foot  Denies changes since last visit  States he has been ambulating with bulky dressing on right foot  12/18/15: Patient arived with cast intact to RLE  Denies changes since last visit  12/29/2015: Patient arrived with TCC intact to RLE and CROW with dressing intact to LLE  Patient relates that the CROW was adjusted to relieve pressure from the lateral ankle area; feels that it is working well  Right third toe open area measuring 0 4 cm x 0 4 cm x 0 1 cm  Covered with Acticoat Flex 7 to stay intact under the TCC  1/8/16: The patient arrived with dressing intact to left lateral ankle wound and TCC intact to RLE  1/15/16: Patient arrived with crow intact to LLE, and dressings intact to right foot  1/22/16: Patient arrived with dressings intact to left and right foot  Denies changes since last visit  1/29/16: Patient arrived with bulky dressing with Acticoat intact to right foot wound  Patient arrived with Dermagran, 4x4 gauze and bhavna intact to left lateral ankle  2/5/16: Patient arrived with Acticoat and bulky dressing intact to right root  Patient arrived 4x4 gauze and tape to left lateral ankle  2/12/16: Arrived with bulky dressing intact to right foot  Strike through drainage to [employer]  Patient reported he is doing home Physical therapy currently, nursing has discharged from services  2/19/16: Patient arrived with Acticoat flex, 4x4 gauze, 5x9 ABD, Kerlix, coban intact to right foot wound  2/25/16: The patient arrived with dressing clean dry and intact  No issues since last visit  3/4/16: The patient arrived with dressing nicely intact to right foot  No issues since last visit  3/11/16: The patient arrived with dressing intact, strikethrough drainage to but not through coban  The patient relates that he is doing "a lot" with physical therapy  3/18/16: Patient arrived with cast to right foot  Denies any changes since last visit  3/25/16: Patient arrived with TCC intact to right foot   4/1/16: Patient arrived with cast to right foot  4/8/16: Patient arrived with cast intact to right lower extremity  Denies changes since last visit  4/15/2016 Patient arrived with TCC intact to RLE  Denies any changes since last visit  4/22/16:Patient arrived with TCC intact to RLE  4/29/16: Patient arrived with bulky dressing intact to right foot  Patient denies any changes since last visit  5/6/16: Arrived with Dermagran, 2x2, 4x4, cast padding, ace wrap and Coban intact to right foot  History of Falling: No = 0   Secondary Diagnosis: Yes = 15   Ambulatory Aid None, Bedrest, Nurse Assist = 0   No = 0   Gait: Impaired = 20 -- Short steps with shuffle, may have difficulty arising from chair, head down, significantly impaired balance, requiring furniture, support person, or walking aid to walk  Mental Status: Oriented to own ability = 0   Total Score: 35    25 - 45 = Moderate Risk        The most recent fall occurred patient denies any falls since last visit  Number of falls in the last year were 0  Nutrition Assessment Screening: Food intake over the last 3 months due to the loss of appetite, digestive problems, chewing or swallowing difficulties is graded as: 2 = no decrease in food intake   Weight loss during the last 3 months: 3 = no weight loss   Mobility scored as: 2 = goes out  Psychological Stress and Acute Disease Scored as: 0 = The patient has experienced psychological stress or acute disease in the last 3 months  Neuropsychological problems scored as: 2 = no psychological problems  Body Mass Index (BMI) scored as: 3 = BMI 23 or greater  Nutritional Assessment Screening Score: 12 - 14 points - Normal nutritional status  Provider Wound Care HPI: Teri Looney is here for offloading dressing change  Pain Assessment   the patient states they do not have pain  (on a scale of 0 to 10, the patient rates the pain at 0 )   Abuse And Domestic Violence Screen   Domestic violence screen not done today  Reason DV Screen not done: fiance present in exam room    Depression And Suicide Screen  Suicide screen not done today  Reason suicide screen not done: fiance present in exam room  Prefered Language is  [place]  Primary Language is  English  Readiness To Learn: Receptive  Barriers To Learning: affect  Preferred Learning: demonstration and verbal   Education Completed: further treatment/follow-up and treatment/procedure   Teaching Method: verbal and demonstration   Person Taught: patient   Evaluation Of Learning: verbalized/demonstrated understanding and needs reinforcement      Review of Systems    Constitutional: no fever, not feeling poorly and no chills  Active Problems    1  Abnormal kidney function (593 9) (N28 9)   2  Benign essential hypertension (401 1) (I10)   3  Charcot's Joint Of The Foot (713 5)   4  Chronic foot ulcer (707 15) (L97 509)   5  Chronic kidney disease (CKD) stage G3a/A1, moderately decreased glomerular filtration   rate (GFR) between 45-59 mL/min/1 73 square meter and albuminuria creatinine ratio   less than 30 mg/g (585 3) (N18 3)   6  Chronic ulcer of left ankle with fat layer exposed (707 13) (L97 322)   7  Congestive heart failure (428 0) (I50 9)   8  Dehiscence of incision (998 32) (T81 31XA)   9  Diabetes mellitus with neurological 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 60) (E11 49)   10  Diabetic Nephropathy (583 81)   11  DM type 2 (diabetes mellitus, type 2) (250 00) (E11 9)   12  Postoperative wound dehiscence, subsequent encounter (V58 89,998 32) (T81 31XD)   13  Status post skin graft (V42 3) (Z94 5)    Past Medical History    1  History of High cholesterol (272 0) (E78 0)   2  History of myocardial infarction (412) (I25 2)   3  History of stroke (V12 54) (Z86 73)   4  History of Irregular heartbeat (427 9) (I49 9)    The active problems and past medical history were reviewed and updated today  Surgical History    1  History of Appendectomy (47 0)   2   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    Family History    1  Family history of Diabetes Mellitus (250 00)    2  Family history of cardiac disorder (V17 49) (Z82 49)    Social History    · Never a smoker    Current Meds   1  Atorvastatin Calcium 80 MG Oral Tablet; TAKE 1 TABLET DAILY; Therapy: 10UQG0040 to Recorded   2  Carvedilol 12 5 MG Oral Tablet; Take 1 tablet twice daily; Therapy: 63RMR2338 to (Evaluate:30Jun2016) Recorded   3  Citalopram Hydrobromide 20 MG Oral Tablet; TAKE 1 TABLET DAILY; Therapy: 11QFO0211 to Recorded   4  Furosemide 40 MG Oral Tablet; as needed; Therapy: 94YRS6137 to Recorded   5  Lantus 100 UNIT/ML Subcutaneous Solution; 40 untis bid; Therapy: 29TNM0268 to Recorded   6  Lidocaine HCl (Local Anesth ) 4 % SOLN; Apply prn to wound prior to debridement for   pain control; Therapy: (Recorded:07Qai9297) to Recorded   7  Lisinopril 2 5 MG Oral Tablet; Therapy: (Trenton Lee) to Recorded   8  NovoLOG SOLN;   Therapy: (Recorded:30Hdc4516) to Recorded   9  Pacerone 200 MG Oral Tablet; TAKE 1 TABLET DAILY; Therapy: 30UCP7035 to Recorded   10  Spironolactone 25 MG Oral Tablet; 1/2 tablet daily; Therapy: 97WMT4197 to Recorded   11  Vitamin C TABS; Therapy: (Jose Maria Machado) to Recorded   12  Warfarin Sodium 5 MG Oral Tablet; TAKE 1 TABLET DAILY; Therapy: 26QHY5604 to Recorded    Allergies    1  No Known Drug Allergies    Vitals  Vital Signs [Data Includes: Current Encounter]    Recorded: 52NHI0546 11:28AM   Temperature 98 F, Tympanic   Heart Rate 72, R Radial   Pulse Quality Regular, R Radial   Respiration 18   Respiration Quality Normal   Systolic 424, RUE, Sitting   Diastolic 80, RUE, Sitting   Height 5 ft 11 in   Weight 275 lb    BMI Calculated 38 35   BSA Calculated 2 41   Pain Scale 0     Physical Exam    Wound #1 Assessment wound #1 Location:, Right foot, started on 03/2015, Care for this wound started on 7/31/15  Wound Status: not healed     Diabetic Ulcer Grade/Lower Extremity: Koch 2  Length: 2cm x Width: 0 3cm x Depth: 0 1cm   Total: 0 6sq cm   Wound Volume: 0 06cm3           Tissue type: Granulation, Slough and Callus   Color of Wound: Yellow - 2% and Pink - 98%   Exudate Amount: Moderate   Exudate Type: Serosangiunous and brown   Odor: None   Exudate Color: Yellow, Tan and brown   Wound Edges: Callous   Periwound Skin Condition: Scaly   Comments: 3 areas with skin bridges between wound for a combined measurement  Physician/Provider Wound #1 Exam   I agree with the nursing assessment and documentation  Constitutional - General appearance: No acute distress, well appearing and well nourished  Tr Mookie 1: Wound Nursing Care Plan   Impaired Tissue Integrity related to: right foot and left lateral ankle wounds   Risk for Infection related to open wound:   Impaired Mobility related to: bulky dressing on right foot   Goals   Patient will achieve 100% epithelialization:  Patient will maintain skin integrity:  Wound Nursing Care Interventions:   Provide moist wound healing:  Implement offloading measures (turn & reposition schedule/method, ortho shoes/boots, floating heels, crutches, specialty surfaces:  Teach and evaluate effectiveness of offloading measures:  Plan of care initiated 7/31/15, reviewed 8/21/15, reviewed 9/25/15, reviewed 10/30/15 , reviewed 11/20/15, reviewed 12/29/15, reviewed 1/8/16, reviewed 2/12/2016, 3/11/16, 4/8/16, 5/6/16      Procedure      Wound #1: Right foot     Nurse Dressing Change:   Wound #1 The wound located on the Right foot  Wound care rendered as per Physician/Advanced Practitioner order/plan  Return to [address] [place] [address] 1 week  Comments:    Excisional Debridement Subcutaneous Tissue:   Wound was excisionally debrided to subcutaneous tissue as follows     Prior to the procedure, the patient was identified using two identifiers, the general consent was signed, the proper site of procedure was identified, and a time out was taken  Anesthesia: Local anesthesia with 4% topical lidocaine was utilized prior to the procedure for pain control  #10 surgical blade was utilized to surgically excise devitalized tissue and/or slough through epidermis, dermis and into the subcutaneous tissue  The total sq cm excised was  6sq cm  See wound assessment for further details  There was minimal bleeding controlled with gentle pressure  the patient tolerated the procedure well without complication  CPT Code(s)   [id number] - Excisional DebridementTo Subcutaneous Tissue; first 20 sq cm        Future Appointments    Date/Time Provider Specialty Site   05/13/2016 11:00 AM Yuliya Jason DPM Wound Care [place] [address]     Signatures   Electronically signed by : Aliya Herron DPM; May 10 2016  1:15PM EST                       (Author)

## 2018-01-15 NOTE — PROGRESS NOTES
Assessment    1  Chronic foot ulcer (707 15) (L97 509)   2  Diabetes mellitus with neurological 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 60) (E11 49)    Plan  Diabetes mellitus with neurological manifestation    · Silver Alginate (Maxsorb AG, Silvercell, Aquacel Silver) instructions given;  Status:Complete;   Done: 91GZS5201 01:57PM   Ordered; For:Diabetes mellitus with neurological manifestation; Ordered By:Lázaro Valenzuela;   · Follow-up visit in 1 week Evaluation and Treatment  Follow-up  Status: Hold For -  Scheduling  Requested for: 01ETH8300   Ordered; For: Diabetes mellitus with neurological manifestation; Ordered By: Neto Meek Performed:  Due: 16XPW8908    Wound Care Orders/Instructions    Wound Identification and Instructions   Wound #1: Right foot    Wound Care Instructions  Discussed with Patient/Caregiver  Dressing Type: Sherrine [place] with mild soap and water, normal saline, wound cleanser  As specified, use: NSS, and wound cleanser  Liat [place] Apply specified dressing to wound base/bed  To periwound apply: Skin prep barrier  Secondary dressing apply: Gauze, ABD, 4x4  Secure with: Kerlix  Dressing change frequency: Weekly  Offloading: Felt pad to periwound  Comments/Other:   Bulky dressing applied 5/27/2016 with 3 cast padding, 1 Kerlix, 1 Coban and tubifast yellow   Apply compression using: Coban and Tubifast yellow  Wound Goals  Wound Goals:   Healing Goals:   Fair healing potential secondary to moderate comorbid conditions  Wound edges will appear with evidence of contraction and epithelialization   Patient will achieve full wound closure with ability to wear appropriate shoewear       Discussion/Summary    An offloading football-type dressing was applied  The treatment plan was reviewed with the patient/guardian   The patient/guardian understands and agrees with the treatment plan      Chief Complaint  Follow up for right foot wound      History of Present Illness    Wound Identification HPI   Wound #1: Right foot          The patient came to Wound Care via wheelchair  The patient is being [facility] for [facility]  The patient is accompanied by his fiance  The patient's identification was verified  A secondary verification process was completed  Orientation: oriented to person, oriented to place and oriented to time  Blood Glucose:  135 mg/dL as reported by patient  7/31/15: Patient arrived via wheelchair for right foot wound  Has had wound since March 2015  Pt was seen by Dr Margret Aguilar at [address] [place] [address] and was debrided at the bedside  Ace wrap with strike through drainage removed with 2 5x9s, adaptic and packing  Dressing was in place since Wednesday 7/29/15 per patient  Pt has [facility] for wound care  Dr Margret Aguilar relates that patient had two open wounds that communicated and upon MRI abscess was noted as well; bedside debridement removed skin bridge between two open areas and I&D of abscess  Plan for [facility] then total contact cast once enough granular tissue is present to d/c VAC  8/7/15: The patient arrived with VAC intact to wound  The patient has multiple abrasions from knee to toes and is bleeding  The patient relates the he crawled from his house, across grass and concrete into the car so that he didn't put any weight on his right foot  DPM had conversation with patient and significant other to come up with a solution; a manual light weight wheelchair will be prescribed  The SDTI that was on right dorsal foot has now opened, it will be wound #2  New wounds to right foot today traumatic injury  Patient has a small abrasion to the right knee that will be dressed and continue to assess  Follow up in one week  Copy of orders faxed to [facility]  8/21/15: Pt arrived with wound vac intact to right foot wound @ 125, with 2 pieces of black foam, no bridge   Dressings intact to right dorsal ffot and right 2nd toe wounds but pt had foam dressing on, not dermagran gauze as ordered  Pt's family states he was recently hospitalized for low hemoglobin and was seen by Dr Fabricio Hickman while in hospital  8/28/15: Patient arrived with wound vac intact to right lower extremity at 125mmhg  2 pieces of black foam removed with some foam noted on periwound  Denies any changes since last visit  Patient arrived with foam dressings to right foot instead of ordered dry gauze  Patient has received wheelchair since last visit and states  Follow up in one week  [facility]  9/[facility]/15: Patient arrived with wound vac intact  1 piece of black foam removed  Denies changes since last visit  9/11/15: Arrived with wound vac intact to right foot  No changes since last visit  9/18/2015: arrived with dressings intact  For right 2nd toe amp 9/19/2015  Hold KCI wound VAC today  9/25/15: The patient arrived with NPWT and ordered dressings intact to wounds  The patient was seen by Dr Fabricio Hickman 9/19/15 at which time the second toe was amputated  The patient arrived today with 98% intact suture line; the distal aspect is somewhat  (0 2cm)  10/9/2015: Arrived with NPWT intact to right foot, alginate intact to right dorsal foot and right 2nd toe  10/16/15: The patient arrived with dressings intact to wounds  NPWT functioning properly  10/23/2015: Arrived with dressing intact, KCI wound VAC  10/30/2015: Patient arrived with dressing intact to right foot and right thigh donor site  Patient states wound vac was discontinued at last MD visit  11/6/15: Arrived with dressing intact to right foot  Right leg donor site 4 x 1 5 cm  Purulent drainage assessed beneath right lateral foot eschar  11/13/2015: Arrived with dressings intact  New wound on left ankle; Native was rubbing, has been corrected since new wound appeared  11/20/15: Patient arrived with dressings intact to all wounds  Denies changes since last visit   12/4/15: Patient arrived with dressing intact to right foot and no dressing ion left ankle but patient states it has been draining a few days  Denies changes to medications or recent falls  Left lateral ankle wound presets reopened today  12/11/15: Patient arrived with dressings intact to left and right foot  Denies changes since last visit  States he has been ambulating with bulky dressing on right foot  12/18/15: Patient arived with cast intact to RLE  Denies changes since last visit  12/29/2015: Patient arrived with TCC intact to RLE and CROW with dressing intact to LLE  Patient relates that the CROW was adjusted to relieve pressure from the lateral ankle area; feels that it is working well  Right third toe open area measuring 0 4 cm x 0 4 cm x 0 1 cm  Covered with Acticoat Flex 7 to stay intact under the TCC  1/8/16: The patient arrived with dressing intact to left lateral ankle wound and TCC intact to RLE  1/15/16: Patient arrived with crow intact to LLE, and dressings intact to right foot  1/22/16: Patient arrived with dressings intact to left and right foot  Denies changes since last visit  1/29/16: Patient arrived with bulky dressing with Acticoat intact to right foot wound  Patient arrived with Dermagran, 4x4 gauze and bhavna intact to left lateral ankle  2/5/16: Patient arrived with Acticoat and bulky dressing intact to right root  Patient arrived 4x4 gauze and tape to left lateral ankle  2/12/16: Arrived with bulky dressing intact to right foot  Strike through drainage to [employer]  Patient reported he is doing home Physical therapy currently, nursing has discharged from services  2/19/16: Patient arrived with Acticoat flex, 4x4 gauze, 5x9 ABD, Kerlix, coban intact to right foot wound  2/25/16: The patient arrived with dressing clean dry and intact  No issues since last visit  3/4/16: The patient arrived with dressing nicely intact to right foot  No issues since last visit   3/11/16: The patient arrived with dressing intact, strikethrough drainage to but not through coban  The patient relates that he is doing "a lot" with physical therapy  3/18/16: Patient arrived with cast to right foot  Denies any changes since last visit  3/25/16: Patient arrived with TCC intact to right foot  4/1/16: Patient arrived with cast to right foot  4/8/16: Patient arrived with cast intact to right lower extremity  Denies changes since last visit  4/15/2016 Patient arrived with TCC intact to RLE  Denies any changes since last visit  4/22/16:Patient arrived with TCC intact to RLE  4/29/16: Patient arrived with bulky dressing intact to right foot  Patient denies any changes since last visit  5/6/16: Arrived with Dermagran, 2x2, 4x4, cast padding, ace wrap and Coban intact to right foot  5/13/16: Patient arrived with bulky dressing intact  Denies changes since last visit  5/20/2016: Arrived with bulky dressing intact to right foot  Patient reports no problems with the dressing  5/27/2016: Patient arrived with bulky dressing intact to right foot wound  Patient denies any changes since last visit  History of Falling: No = 0   Secondary Diagnosis: Yes = 15   Ambulatory Aid None, Bedrest, Nurse Assist = 0   No = 0   Gait: Impaired = 20 -- Short steps with shuffle, may have difficulty arising from chair, head down, significantly impaired balance, requiring furniture, support person, or walking aid to walk  Mental Status: Oriented to own ability = 0   Total Score: 35    25 - 45 = Moderate Risk        The most recent fall occurred patient denies any falls since last visit  Number of falls in the last year were 0  Nutrition Assessment Screening: Food intake over the last 3 months due to the loss of appetite, digestive problems, chewing or swallowing difficulties is graded as: 2 = no decrease in food intake   Weight loss during the last 3 months: 3 = no weight loss   Mobility scored as: 2 = goes out     Psychological Stress and Acute Disease Scored as: 0 = The patient has experienced psychological stress or acute disease in the last 3 months  Neuropsychological problems scored as: 2 = no psychological problems  Body Mass Index (BMI) scored as: 3 = BMI 23 or greater  Nutritional Assessment Screening Score: 12 - 14 points - Normal nutritional status  Provider Wound Care HPI: Jory Montero is here for offloading dressing change  Pain Assessment   the patient states they do not have pain  (on a scale of 0 to 10, the patient rates the pain at 0 )   Abuse And Domestic Violence Screen   Domestic violence screen not done today  Reason DV Screen not done: fiance present in exam room    Depression And Suicide Screen  Suicide screen not done today  Reason suicide screen not done: fiance present in exam room  Prefered Language is  [place]  Primary Language is  English  Readiness To Learn: Receptive  Barriers To Learning: affect  Preferred Learning: demonstration and verbal   Education Completed: further treatment/follow-up and treatment/procedure   Teaching Method: verbal and demonstration   Person Taught: patient   Evaluation Of Learning: verbalized/demonstrated understanding and needs reinforcement      Review of Systems    Constitutional: no fever, not feeling poorly, no chills and not feeling tired  Active Problems    1  Abnormal kidney function (593 9) (N28 9)   2  Benign essential hypertension (401 1) (I10)   3  Charcot's Joint Of The Foot (713 5)   4  Chronic foot ulcer (707 15) (L97 509)   5  Chronic kidney disease (CKD) stage G3a/A1, moderately decreased glomerular filtration   rate (GFR) between 45-59 mL/min/1 73 square meter and albuminuria creatinine ratio   less than 30 mg/g (585 3) (N18 3)   6  Chronic ulcer of left ankle with fat layer exposed (707 13) (L97 322)   7  Congestive heart failure (428 0) (I50 9)   8  Dehiscence of incision (998 32) (T81 31XA)   9  Diabetes mellitus with neurological 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 60) (E11 49)   10  Diabetic Nephropathy (583 81)   11   DM type 2 (diabetes mellitus, type 2) (250 00) (E11 9)   12  Postoperative wound dehiscence, subsequent encounter (V58 89,998 32) (T81 31XD)   13  Status post skin graft (V42 3) (Z94 5)    Past Medical History    1  History of High cholesterol (272 0) (E78 0)   2  History of myocardial infarction (412) (I25 2)   3  History of stroke (V12 54) (Z86 73)   4  History of Irregular heartbeat (427 9) (I49 9)    The active problems and past medical history were reviewed and updated today  Surgical History    1  History of Appendectomy (47 0)   2  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    Family History    1  Family history of Diabetes Mellitus (250 00)    2  Family history of cardiac disorder (V17 49) (Z82 49)    Social History    · Never a smoker    Current Meds   1  Atorvastatin Calcium 80 MG Oral Tablet; TAKE 1 TABLET DAILY; Therapy: 42YFF7743 to Recorded   2  Carvedilol 12 5 MG Oral Tablet; Take 1 tablet twice daily; Therapy: 19GKP5722 to (Evaluate:30Jun2016) Recorded   3  Citalopram Hydrobromide 20 MG Oral Tablet; TAKE 1 TABLET DAILY; Therapy: 10TNL8317 to Recorded   4  Furosemide 40 MG Oral Tablet; as needed; Therapy: 71BFU2180 to Recorded   5  Lantus 100 UNIT/ML Subcutaneous Solution; 40 untis bid; Therapy: 15KWH8815 to Recorded   6  Lidocaine HCl (Local Anesth ) 4 % SOLN; Apply prn to wound prior to debridement for   pain control; Therapy: (Recorded:77Wrj1650) to Recorded   7  Lisinopril 2 5 MG Oral Tablet; Therapy: (Mika Blum) to Recorded   8  NovoLOG SOLN;   Therapy: (Recorded:97Ehu8376) to Recorded   9  Pacerone 200 MG Oral Tablet; TAKE 1 TABLET DAILY; Therapy: 44OMX5274 to Recorded   10  Spironolactone 25 MG Oral Tablet; 1/2 tablet daily; Therapy: 33NTE7526 to Recorded   11  Vitamin C TABS; Therapy: (Win Elizondo) to Recorded   12  Warfarin Sodium 5 MG Oral Tablet; TAKE 1 TABLET DAILY; Therapy: 03GIW7000 to Recorded    Allergies    1   No Known Drug Allergies    Vitals  Vital Signs [Data Includes: Current Encounter]    Recorded: [id number] 11:50AM   Temperature 98 5 F, Tympanic   Heart Rate 62, L Radial   Pulse Quality Regular, L Radial   Respiration 18   Respiration Quality Normal   Systolic 92, LUE, Sitting   Diastolic 60, LUE, Sitting   Height 5 ft 11 in   Weight 275 lb    BMI Calculated 38 35   BSA Calculated 2 41   Pain Scale 0     Physical Exam    Wound #1 Assessment wound #1 Location:, Right foot, started on 03/2015, Care for this wound started on 7/31/15  Wound Status: not healed  Diabetic Ulcer Grade/Lower Extremity: [place] 2  Length: 0 3cm x Width: 0 4cm x Depth: 0 1cm   Total: 0 12sq cm   Wound Volume: 0 012cm3           Tissue type: Granulation and Slough   Color of Wound: Red - 99% and Yellow - 1%   Exudate Amount: Minimal   Exudate Type: Serosangiunous   Odor: None   Exudate Color: brown   Wound Edges: Callous   Periwound Skin Condition: Callus        Physician/Provider Wound #1 Exam   I agree with the nursing assessment and documentation  Constitutional - General appearance: No acute distress, well appearing and well nourished  Tr Mookie 1: Wound Nursing Care Plan   Impaired Tissue Integrity related to: right foot and left lateral ankle wounds   Risk for Infection related to open wound:   Impaired Mobility related to: bulky dressing on right foot   Goals   Patient will achieve 100% epithelialization:  Patient will maintain skin integrity:  Wound Nursing Care Interventions:   Provide moist wound healing:  Implement offloading measures (turn & reposition schedule/method, ortho shoes/boots, floating heels, crutches, specialty surfaces:  Teach and evaluate effectiveness of offloading measures:   Plan of care initiated 7/31/15, reviewed 8/21/15, reviewed 9/25/15, reviewed 10/30/15 , reviewed 11/20/15, reviewed 12/29/15, reviewed 1/8/16, reviewed 2/12/2016, 3/11/16, 4/8/16, 5/6/16 Procedure      Wound #1: Right foot     Nurse Dressing Change:   Wound #1 The wound located on the right foot   Wound care rendered as per Physician/Advanced Practitioner order/plan  Return to [address] [place] [address] 1 week  Comments:    Excisional Debridement Subcutaneous Tissue:   Wound was excisionally debrided to subcutaneous tissue as follows  Prior to the procedure, the patient was identified using two identifiers, the general consent was signed, the proper site of procedure was identified, and a time out was taken  Anesthesia: Local anesthesia with 4% topical lidocaine was utilized prior to the procedure for pain control  #10 surgical blade was utilized to surgically excise devitalized tissue and/or slough through epidermis, dermis and into the subcutaneous tissue  The total sq cm excised was  12sq cm  See wound assessment for further details  There was minimal bleeding controlled with gentle pressure  the patient tolerated the procedure well without complication  CPT Code(s)   [id number] - Excisional DebridementTo Subcutaneous Tissue; first 20 sq cm        Future Appointments    Date/Time Provider Specialty Site   06/03/2016 11:00 AM Aristides Baeza DPM Wound Care [facility] 28     Signatures   Electronically signed by : Meli Mahoney DPM; May 31 2016  1:58PM EST                       (Author)    Electronically signed by : Meli Mahoney DPM; May 31 2016  2:07PM EST                       (Author)

## 2018-01-15 NOTE — PROGRESS NOTES
Assessment    1  Chronic foot ulcer (707 15) (L97 509)   2  Diabetes mellitus with neurological 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 60) (E11 49)    Plan  Diabetes mellitus with neurological manifestation    · Dermagran instructions given; Status:Complete;   Done: [id number]   Ordered; For:Diabetes mellitus with neurological manifestation; Ordered By:Gina Valenzuela; Wound Care Orders/Instructions    Wound Identification and Instructions   Wound #1: Right foot    Wound Care Instructions  Discussed with Patient/Caregiver  Dressing Type: Acticoat 7  Wash with mild soap and water, normal saline, wound cleanser or as specified  Apply specified dressing to wound base/bed  Secondary dressing apply: Gauze, ABD, cast padding  Secure with: Kerlix, and tape  Dressing change frequency: Weekly  Offloading: Surgical shoeto-- right foot with bulky dressing  Comments/Other:   Bulky dressing applied today; Acticoat flex 7 to wound base, x3 cast padding, Kerlix, Coban and TubiFast yellow  dressing to stay clean dry and intact until appointment next week  Apply compression using: Coban and Tubifast yellow  Wound #7: Left lateral ankle   Wound Care Instructions  Discussed with Patient/Caregiver  Secure with: Cosmopore (Combination dressing)  Dressing change frequency: Twice per week  Offloading: Crow braceto-- LLE  Comments/Other:   cosmopore for protection      Wound Goals  Wound Goals:   Healing Goals:   Fair healing potential secondary to moderate comorbid conditions  Wound edges will appear with evidence of contraction and epithelialization   Patient will achieve full wound closure with ability to wear appropriate shoewear       Discussion/Summary    We will continue the same treatment plan  The treatment plan was reviewed with the patient/guardian  The patient/guardian understands and agrees with the treatment plan      Chief Complaint  Follow up for right foot wound and left lateral ankle wound        History of Present Illness    Wound Identification HPI   Wound #1: Right foot    Wound #7: left lateral ankle healed 1/29/2016          The patient came to Wound Care via wheelchair  The patient is being seen for a follow-up with MD at the [address] [place] [address]  The patient is accompanied by his fiance  The patient's identification was verified  A secondary verification process was completed  Orientation: oriented to person, oriented to place and oriented to time  Blood Glucose:  144 mg/dL as reported by patient  7/31/15: Patient arrived via wheelchair for right foot wound  Has had wound since March 2015  Pt was seen by Dr Sushila Meyer at [address] [place] [address] and was debrided at the bedside  Ace wrap with strike through drainage removed with 2 5x9s, adaptic and packing  Dressing was in place since Wednesday 7/29/15 per patient  Pt has [facility] for wound care  Dr Sushila Meyer relates that patient had two open wounds that communicated and upon MRI abscess was noted as well; bedside debridement removed skin bridge between two open areas and I&D of abscess  Plan for [facility] then total contact cast once enough granular tissue is present to d/c VAC  8/7/15: The patient arrived with VAC intact to wound  The patient has multiple abrasions from knee to toes and is bleeding  The patient relates the he crawled from his house, across grass and concrete into the car so that he didn't put any weight on his right foot  DPM had conversation with patient and significant other to come up with a solution; a manual light weight wheelchair will be prescribed  The SDTI that was on right dorsal foot has now opened, it will be wound #2  New wounds to right foot today traumatic injury  Patient has a small abrasion to the right knee that will be dressed and continue to assess  Follow up in one week  Copy of orders faxed to [facility]  8/21/15: Pt arrived with wound vac intact to right foot wound @ 125, with 2 pieces of black foam, no bridge  Dressings intact to right dorsal ffot and right 2nd toe wounds but pt had foam dressing on, not dermagran gauze as ordered  Pt's family states he was recently hospitalized for low hemoglobin and was seen by Dr Rigo Gonzalez while in hospital  8/28/15: Patient arrived with wound vac intact to right lower extremity at 125mmhg  2 pieces of black foam removed with some foam noted on periwound  Denies any changes since last visit  Patient arrived with foam dressings to right foot instead of ordered dry gauze  Patient has received wheelchair since last visit and states  Follow up in one week  Copy of orders faxed to [facility]  9/4/15: Patient arrived with wound vac intact  1 piece of black foam removed  Denies changes since last visit  9/11/15: Arrived with wound vac intact to right foot  No changes since last visit  9/18/2015: arrived with dressings intact  For right 2nd toe amp 9/19/2015  Hold KCI wound VAC today  9/25/15: The patient arrived with NPWT and ordered dressings intact to wounds  The patient was seen by Dr Rigo Gonzalez 9/19/15 at which time the second toe was amputated  The patient arrived today with 98% intact suture line; the distal aspect is somewhat  (0 2cm)  10/9/2015: Arrived with NPWT intact to right foot, alginate intact to right dorsal foot and right 2nd toe  10/16/15: The patient arrived with dressings intact to wounds  NPWT functioning properly  10/23/2015: Arrived with dressing intact, KCI wound VAC  10/30/2015: Patient arrived with dressing intact to right foot and right thigh donor site  Patient states wound vac was discontinued at last MD visit  11/6/15: Arrived with dressing intact to right foot  Right leg donor site 4 x 1 5 cm  Purulent drainage assessed beneath right lateral foot eschar  11/13/2015: Arrived with dressings intact  New wound on left ankle; [place] was rubbing, has been corrected since new wound appeared  11/20/15: Patient arrived with dressings intact to all wounds  Denies changes since last visit  12/4/15: Patient arrived with dressing intact to right foot and no dressing ion left ankle but patient states it has been draining a few days  Denies changes to medications or recent falls  Left lateral ankle wound presets reopened today  12/11/15: Patient arrived with dressings intact to left and right foot  Denies changes since last visit  States he has been ambulating with bulky dressing on right foot  12/18/15: Patient arived with cast intact to RLE  Denies changes since last visit  12/29/2015: Patient arrived with TCC intact to RLE and CROW with dressing intact to LLE  Patient relates that the CROW was adjusted to relieve pressure from the lateral ankle area; feels that it is working well  Right third toe open area measuring 0 4 cm x 0 4 cm x 0 1 cm  Covered with Acticoat Flex 7 to stay intact under the TCC  1/8/16: The patient arrived with dressing intact to left lateral ankle wound and TCC intact to RLE  1/15/16: Patient arrived with crow intact to LLE, and dressings intact to right foot  1/22/16: Patient arrived with dressings intact to left and right foot  Denies changes since last visit  1/29/16: Patient arrived with bulky dressing with Acticoat intact to right foot wound  Patient arrived with Dermagran, 4x4 gauze and bhavna intact to left lateral ankle  History of Falling: No = 0   Secondary Diagnosis: Yes = 15   Ambulatory Aid None, Bedrest, Nurse Assist = 0   No = 0   Gait: Impaired = 20 -- Short steps with shuffle, may have difficulty arising from chair, head down, significantly impaired balance, requiring furniture, support person, or walking aid to walk  Mental Status: Oriented to own ability = 0   Total Score: 35    25 - 45 = Moderate Risk        The most recent fall occurred patient denies any falls since last visit  Number of falls in the last year were 0     Nutrition Assessment Screening: Food intake over the last 3 months due to the loss of appetite, digestive problems, chewing or swallowing difficulties is graded as: 2 = no decrease in food intake   Weight loss during the last 3 months: 3 = no weight loss   Mobility scored as: 2 = goes out  Psychological Stress and Acute Disease Scored as: 0 = The patient has experienced psychological stress or acute disease in the last 3 months  Neuropsychological problems scored as: 2 = no psychological problems  Body Mass Index (BMI) scored as: 3 = BMI 23 or greater  Nutritional Assessment Screening Score: 12 - 14 points - Normal nutritional status  Provider Wound Care HPI: Ada Allen is here for follow-up of his Right LE wounds  He continues to wear an offloading dressing Right and CROW Left  Pain Assessment   the patient states they do not have pain  (on a scale of 0 to 10, the patient rates the pain at 0 )   Abuse And Domestic Violence Screen   Domestic violence screen not done today  Reason DV Screen not done: fiance present in exam room    Depression And Suicide Screen  Suicide screen not done today  Reason suicide screen not done: fiance present in exam room  Prefered Language is  [place]  Primary Language is  English  Readiness To Learn: Receptive  Barriers To Learning: affect  Preferred Learning: demonstration and verbal   Education Completed: further treatment/follow-up and treatment/procedure   Teaching Method: verbal and demonstration   Person Taught: patient   Evaluation Of Learning: verbalized/demonstrated understanding and needs reinforcement      Review of Systems    Constitutional: no fever, not feeling poorly, no chills and not feeling tired  Active Problems    1  Abnormal kidney function (593 9) (N28 9)   2  Benign essential hypertension (401 1) (I10)   3  Charcot's Joint Of The Foot (713 5)   4  Chronic foot ulcer (707 15) (L95 509)   5   Chronic kidney disease (CKD) stage G3a/A1, moderately decreased glomerular filtration   rate (GFR) between 45-59 mL/min/1 73 square meter and albuminuria creatinine ratio   less than 30 mg/g (585 3) (N18 3)   6  Congestive heart failure (428 0) (I50 9)   7  Dehiscence of incision (998 32) (T81 31XA)   8  Diabetes mellitus with neurological 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 60) (E11 49)   9  Diabetic Nephropathy (583 81)   10  DM type 2 (diabetes mellitus, type 2) (250 00) (E11 9)   11  Postoperative wound dehiscence, subsequent encounter (V58 89,998 32) (T81 31XD)   12  Status post skin graft (V42 3) (Z94 5)    Past Medical History    1  History of High cholesterol (272 0) (E78 0)   2  History of myocardial infarction (412) (I25 2)   3  History of stroke (V12 54) (Z86 73)   4  History of Irregular heartbeat (427 9) (I49 9)    The active problems and past medical history were reviewed and updated today  Surgical History    1  History of Appendectomy (47 0)   2  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    Family History    1  Family history of Diabetes Mellitus (250 00)    2  Family history of cardiac disorder (V17 49) (Z82 49)    Social History    · Never a smoker    Current Meds   1  Atorvastatin Calcium 80 MG Oral Tablet; TAKE 1 TABLET DAILY; Therapy: 05KSY9079 to Recorded   2  Carvedilol 12 5 MG Oral Tablet; Take 1 tablet twice daily; Therapy: 93LDW2045 to (Evaluate:30Jun2016) Recorded   3  Citalopram Hydrobromide 20 MG Oral Tablet; TAKE 1 TABLET DAILY; Therapy: 29EYW7797 to Recorded   4  Furosemide 40 MG Oral Tablet; as needed; Therapy: 55HRY4773 to Recorded   5  HumaLOG 100 UNIT/ML Subcutaneous Solution; Therapy: (Recorded:82Axh7696) to Recorded   6  Lantus 100 UNIT/ML Subcutaneous Solution; 40 untis bid; Therapy: 18YMN8743 to Recorded   7  Lidocaine HCl (Local Anesth ) 4 % SOLN; Apply prn to wound prior to debridement for   pain control; Therapy: (Recorded:43Bzz3430) to Recorded   8  Lisinopril 2 5 MG Oral Tablet; Therapy: (Aliyah Wills) to Recorded   9   Pacerone 200 MG Oral Tablet; TAKE 1 TABLET DAILY; Therapy: 15SHY1796 to Recorded   10  Spironolactone 25 MG Oral Tablet; 1/2 tablet daily; Therapy: 91QLZ0143 to Recorded   11  Vitamin C TABS; Therapy: (Myles Octave) to Recorded   12  Warfarin Sodium 5 MG Oral Tablet; TAKE 1 TABLET DAILY; Therapy: 11CVY3917 to Recorded    Allergies    1  No Known Drug Allergies    Vitals  Vital Signs [Data Includes: Current Encounter]    Recorded: 70LBL3951 11:38AM   Temperature 98 3 F, Tympanic   Heart Rate 62, R Radial   Pulse Quality Normal, R Radial   Respiration 18   Respiration Quality Normal   Systolic 376, RUE, Sitting   Diastolic 82, RUE, Sitting   Height 5 ft 11 in   Weight 263 lb    BMI Calculated 36 68   BSA Calculated 2 37   Pain Scale 0     Physical Exam    Wound #1 Assessment wound #1 Location:, Right foot, started on 03/2015, Care for this wound started on 7/31/15  Wound Status: not healed  Diabetic Ulcer Grade/Lower Extremity: Ruthellen Gillham 2  Length: 0 7cm x Width: 0 2cm x Depth: 0 1cm   Total: 0 14sq cm   Wound Volume: 0 014 cm3           Tissue type: Subcutaneous and Granulation   Color of Wound: Red - 99% and Yellow - 1%   Exudate Type: Serosangiunous and small   Odor: None   Exudate Color: Tan   Wound Edges: Intact and Callous   Periwound Skin Condition: Intact, Callus, Scaly, dry        Physician/Provider Wound #1 Exam   I agree with the nursing assessment and documentation  Wound #7 Assessment wound #7 Location:, left lateral ankle, started on 11/1/2015, Care for this wound started on 12/5/15, healed on 1/29/2016  Wound Status: healed  Diabetic Ulcer Grade/Lower Extremity: Ruthellen Gillham 2  Periwound Skin Condition: Edema   Comments: healed 1/29/2016  Physician/Provider Wound #7 Exam   I agree with the nursing assessment and documentation  Constitutional - General appearance: No acute distress, well appearing and well nourished  Addi Damico 1:    Wound Nursing Care Plan   Impaired Tissue Integrity related to: right foot and left lateral ankle wounds   Risk for Infection related to open wound:   Impaired Mobility related to: bulky dressing on right foot   Goals   Patient will achieve 100% epithelialization:  Patient will maintain skin integrity:  Wound Nursing Care Interventions:   Provide moist wound healing:  Implement offloading measures (turn & reposition schedule/method, ortho shoes/boots, floating heels, crutches, specialty surfaces:  Teach and evaluate effectiveness of offloading measures:  Plan of care initiated 7/31/15, reviewed 8/21/15, reviewed 9/25/15, reviewed 10/30/15 , reviewed 11/20/15, reviewed 12/29/15, reviewed 1/8/16      Procedure      Wound #1: Right foot     Nurse Dressing Change:   Wound #1 The wound located on the Right foot  Wound care rendered as per Physician/Advanced Practitioner order/plan  Order sent to Home Care  Return to [address] [place] [address] 1 week  Comments:    Excisional Debridement Subcutaneous Tissue:   Wound was excisionally debrided to subcutaneous tissue as follows  Prior to the procedure, the patient was identified using two identifiers, the general consent was signed, the proper site of procedure was identified, and a time out was taken  Anesthesia: Local anesthesia with 4% topical lidocaine was utilized prior to the procedure for pain control  #10 surgical blade was utilized to surgically excise devitalized tissue and/or slough through epidermis, dermis and into the subcutaneous tissue  The total sq cm excised was  1sq cm  See wound assessment for further details  There was minimal bleeding controlled with gentle pressure  the patient tolerated the procedure well without complication  CPT Code(s)   [id number] - Excisional DebridementTo Subcutaneous Tissue; first 20 sq cm  Wound #7: Left lateral ankle     Nurse Dressing Change:   Wound #7 The wound located on the Left lateral ankle     Wound care rendered as per Physician/Advanced Practitioner order/plan  Order sent to Home Care  Return to [address] 1 week    Comments:      Future Appointments    Date/Time Provider Specialty Site   02/05/2016 11:00 AM Pascual Burnette DPM Wound Care [place] [id number]     Signatures   Electronically signed by : Kiran Hawley DPM; Feb 1 2016 12:45PM EST                       (Author)

## 2018-01-15 NOTE — MISCELLANEOUS
Physical Exam    Wound #1 Assessment wound #1 Location:, Right foot, started on 03/2015, Care for this wound started on 7/31/15  Wound Status: not healed  Diabetic Ulcer Grade/Lower Extremity: Ifeoma Panda 2  Length: 0 2cm x Width: 0 2cm x Depth: 0 1cm   Total: 0 04sq cm   Wound Volume: 0 004cm3           Tissue type: Subcutaneous, Granulation and Slough   Color of Wound: Yellow - 2% and Pink - 98%   Exudate Amount: Scant   Odor: None   Exudate Color: Yellow and Tan   Wound Edges: Intact and Callous   Periwound Skin Condition: Scaly        Physician/Provider Wound #1 Exam   I agree with the nursing assessment and documentation  Constitutional - General appearance: No acute distress, well appearing and well nourished  Wound Drsg  Orders/Instructions  Wound Identification Dressing Orders--Instructions:   Wound Identification and Instructions   Wound #1: Right foot    Wound Care Instructions  Discussed with Patient/Caregiver  Dressing Type: Kathern Shipman with mild soap and water, normal saline, wound cleanser  As specified, use: NSS, and wound cleanser  [facility] Apply specified dressing to wound base/bed  To periwound apply: Skin prep barrier  Secondary dressing apply: Gauze, 1/2 ABD  Secure with: Kerlix, and tape  Dressing change frequency: Weekly  Offloading: Foam pad to periwound  Comments/Other:   Bulky dressing applied 4/22/16 with 3 cast pading, 1 kerlix, 1 coban   Apply compression using: Coban  Wound Goals  Wound Goals:   Healing Goals:   Fair healing potential secondary to moderate comorbid conditions     Wound edges will appear with evidence of contraction and epithelialization   Patient will achieve full wound closure with ability to wear appropriate shoewear       Future Appointments    Date/Time Provider Specialty Site   04/29/2016 09:45 AM Aristides Baeza DPM Wound Care [place] [id number]   05/06/2016 11:00 AM Diana Mireles [address]: Wound Nursing Care Plan   Impaired Tissue Integrity related to: right foot and left lateral ankle wounds   Risk for Infection related to open wound:   Impaired Mobility related to: bulky dressing on right foot   Goals   Patient will achieve 100% epithelialization:  Patient will maintain skin integrity:  Wound Nursing Care Interventions:   Provide moist wound healing:  Implement offloading measures (turn & reposition schedule/method, ortho shoes/boots, floating heels, crutches, specialty surfaces:  Teach and evaluate effectiveness of offloading measures:   Plan of care initiated 7/31/15, reviewed 8/21/15, reviewed 9/25/15, reviewed 10/30/15 , reviewed 11/20/15, reviewed 12/29/15, reviewed 1/8/16, reviewed 2/12/2016, 3/11/16, 4/8/16      Signatures   Electronically signed by : Coby Rodriguez DPM; Apr 26 2016  1:23PM EST                       (Author)

## 2018-01-15 NOTE — RESULT NOTES
Verified Results  (1) HEMOGLOBIN A1C [id number] 12:00AM Sasha Kimball     Test Name Result Flag Reference   HEMOGLOBIN A1C 8 5        Summary / No summary entered :      No summary entered   Documents attached :      [address] , 2001 [facility]; Enc: [id number] - Image Encounter - Michelle Thapa - (Internal Medicine) (Additional Information Document)

## 2018-01-16 NOTE — PROGRESS NOTES
Assessment    1  Diabetes mellitus with neurological 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 60) (E11 49)   2  Chronic foot ulcer (707 15) (L97 509)    Plan  Chronic foot ulcer    · We have put a total contact cast on your foot  Do not walk on the cast or put weight on  it for 24 hours ; Status:Complete;   Done: 06VBL5480   Ordered; For:Chronic foot ulcer; Ordered By:Ruma Valenzuela ;   · Follow-up visit in 1 week Evaluation and Treatment  Follow-up  Status: Complete  Done:  71GKN5507   Ordered; For: Chronic foot ulcer; Ordered By: Neto Meek Performed:  Due: 61GHF3217; Last Updated By: Kevin Warner; 3/25/2016 12:02:17 PM    Wound Care Orders/Instructions    Wound Identification and Instructions   Wound #1: Right foot    Wound Care Instructions  Discussed with Patient/Caregiver  Dressing Type: Acticoat 7  Wash with mild soap and water, normal saline, wound cleanser or as specified  Apply specified dressing to wound base/bed  Secondary dressing apply: Gauze, 1/2 ABD  Secure with: Kerlix, and tape  Dressing change frequency: Weekly  Total contact cast -- Change every 7 days and as needed for increased drainage, discomfort or excessive swelling of toes  Comments/Other:   with alginate      Wound Goals  Wound Goals:   Healing Goals:   Fair healing potential secondary to moderate comorbid conditions  Wound edges will appear with evidence of contraction and epithelialization   Patient will achieve full wound closure with ability to wear appropriate shoewear       Discussion/Summary    Continue TCC  The treatment plan was reviewed with the patient/guardian  The patient/guardian understands and agrees with the treatment plan      Chief Complaint  Follow up for right foot wound      History of Present Illness    Wound Identification HPI   Wound #1: Right foot          The patient came to Wound Care via wheelchair  The patient is being seen for a follow-up with MD at the [address] [place] [address]   The patient is accompanied by his fiance  The patient's identification was verified  A secondary verification process was completed  Orientation: oriented to person, oriented to place and oriented to time  Blood Glucose:  160 mg/dL as reported by patient  7/31/15: Patient arrived via wheelchair for right foot wound  Has had wound since March 2015  Pt was seen by Dr Darby Colin at [facility] and was debrided at the bedside  Ace wrap with strike through drainage removed with 2 5x9s, adaptic and packing  Dressing was in place since Wednesday 7/29/15 per patient  Pt has [facility] for wound care  Dr Darby Colin relates that patient had two open wounds that communicated and upon MRI abscess was noted as well; bedside debridement removed skin bridge between two open areas and I&D of abscess  Plan for [facility] then total contact cast once enough granular tissue is present to d/c VAC  8/7/15: The patient arrived with VAC intact to wound  The patient has multiple abrasions from knee to toes and is bleeding  The patient relates the he crawled from his house, across grass and concrete into the car so that he didn't put any weight on his right foot  DPM had conversation with patient and significant other to come up with a solution; a manual light weight wheelchair will be prescribed  The SDTI that was on right dorsal foot has now opened, it will be wound #2  New wounds to right foot today traumatic injury  Patient has a small abrasion to the right knee that will be dressed and continue to assess  Follow up in one week  Copy of orders faxed to [facility]  8/21/15: Pt arrived with wound vac intact to right foot wound @ 125, with 2 pieces of black foam, no bridge  Dressings intact to right dorsal ffot and right 2nd toe wounds but pt had foam dressing on, not dermagran gauze as ordered   Pt's family states he was recently hospitalized for low hemoglobin and was seen by Dr Darby Colin while in hospital  8/28/15: Patient arrived with wound vac intact to right lower extremity at 125mmhg  2 pieces of black foam removed with some foam noted on periwound  Denies any changes since last visit  Patient arrived with foam dressings to right foot instead of ordered dry gauze  Patient has received wheelchair since last visit and states  Follow up in one week  Copy of orders faxed to [facility]  9/4/15: Patient arrived with wound vac intact  1 piece of black foam removed  Denies changes since last visit  9/11/15: Arrived with wound vac intact to right foot  No changes since last visit  9/18/2015: arrived with dressings intact  For right 2nd toe amp 9/19/2015  Hold KCI wound VAC today  9/25/15: The patient arrived with NPWT and ordered dressings intact to wounds  The patient was seen by Dr Mel Jensen 9/19/15 at which time the second toe was amputated  The patient arrived today with 98% intact suture line; the distal aspect is somewhat  (0 2cm)  10/9/2015: Arrived with NPWT intact to right foot, alginate intact to right dorsal foot and right 2nd toe  10/16/15: The patient arrived with dressings intact to wounds  NPWT functioning properly  10/23/2015: Arrived with dressing intact, KCI wound VAC  10/30/2015: Patient arrived with dressing intact to right foot and right thigh donor site  Patient states wound vac was discontinued at last MD visit  11/6/15: Arrived with dressing intact to right foot  Right leg donor site 4 x 1 5 cm  Purulent drainage assessed beneath right lateral foot eschar  11/13/2015: Arrived with dressings intact  New wound on left ankle; [place] was rubbing, has been corrected since new wound appeared  11/20/15: Patient arrived with dressings intact to all wounds  Denies changes since last visit  12/4/15: Patient arrived with dressing intact to right foot and no dressing ion left ankle but patient states it has been draining a few days  Denies changes to medications or recent falls  Left lateral ankle wound presets reopened today  12/11/15: Patient arrived with dressings intact to left and right foot  Denies changes since last visit  States he has been ambulating with bulky dressing on right foot  12/18/15: Patient arived with cast intact to RLE  Denies changes since last visit  12/29/2015: Patient arrived with TCC intact to RLE and CROW with dressing intact to LLE  Patient relates that the CROW was adjusted to relieve pressure from the lateral ankle area; feels that it is working well  Right third toe open area measuring 0 4 cm x 0 4 cm x 0 1 cm  Covered with Acticoat Flex 7 to stay intact under the TCC  1/8/16: The patient arrived with dressing intact to left lateral ankle wound and TCC intact to RLE  1/15/16: Patient arrived with crow intact to LLE, and dressings intact to right foot  1/22/16: Patient arrived with dressings intact to left and right foot  Denies changes since last visit  1/29/16: Patient arrived with bulky dressing with Acticoat intact to right foot wound  Patient arrived with Dermagran, 4x4 gauze and bhavna intact to left lateral ankle  2/5/16: Patient arrived with Acticoat and bulky dressing intact to right root  Patient arrived 4x4 gauze and tape to left lateral ankle  2/12/16: Arrived with bulky dressing intact to right foot  Strike through drainage to [employer]  Patient reported he is doing home Physical therapy currently, nursing has discharged from services  2/19/16: Patient arrived with Acticoat flex, 4x4 gauze, 5x9 ABD, Kerlix, coban intact to right foot wound  2/25/16: The patient arrived with dressing clean dry and intact  No issues since last visit  3/4/16: The patient arrived with dressing nicely intact to right foot  No issues since last visit  3/11/16: The patient arrived with dressing intact, strikethrough drainage to but not through coban  The patient relates that he is doing "a lot" with physical therapy  3/18/16: Patient arrived with cast to right foot  Denies any changes since last visit   3/25/16: Patient arrived with cast intact to right foot  History of Falling: No = 0   Secondary Diagnosis: Yes = 15   Ambulatory Aid None, Bedrest, Nurse Assist = 0   No = 0   Gait: Impaired = 20 -- Short steps with shuffle, may have difficulty arising from chair, head down, significantly impaired balance, requiring furniture, support person, or walking aid to walk  Mental Status: Oriented to own ability = 0   Total Score: 35    25 - 45 = Moderate Risk        The most recent fall occurred patient denies any falls since last visit  Number of falls in the last year were 0  Nutrition Assessment Screening: Food intake over the last 3 months due to the loss of appetite, digestive problems, chewing or swallowing difficulties is graded as: 2 = no decrease in food intake   Weight loss during the last 3 months: 3 = no weight loss   Mobility scored as: 2 = goes out  Psychological Stress and Acute Disease Scored as: 0 = The patient has experienced psychological stress or acute disease in the last 3 months  Neuropsychological problems scored as: 2 = no psychological problems  Body Mass Index (BMI) scored as: 3 = BMI 23 or greater  Nutritional Assessment Screening Score: 12 - 14 points - Normal nutritional status  Provider Wound Care HPI: Ada Allen is here for TCC change  Pain Assessment   the patient states they do not have pain  (on a scale of 0 to 10, the patient rates the pain at 0 )   Abuse And Domestic Violence Screen   Domestic violence screen not done today  Reason DV Screen not done: fiance present in exam room    Depression And Suicide Screen  Suicide screen not done today  Reason suicide screen not done: fiance present in exam room  Prefered Language is  [place]  Primary Language is  English  Readiness To Learn: Receptive  Barriers To Learning: affect     Preferred Learning: demonstration and verbal   Education Completed: further treatment/follow-up and treatment/procedure   Teaching Method: verbal and demonstration   Person Taught: patient   Evaluation Of Learning: verbalized/demonstrated understanding and needs reinforcement      Review of Systems    Constitutional: no fever, not feeling poorly, no chills and not feeling tired  Active Problems    1  Abnormal kidney function (593 9) (N28 9)   2  Benign essential hypertension (401 1) (I10)   3  Ch[facility]'s [facility] The Foot (713 5)   4  Chronic foot ulcer (707 15) (L97 509)   5  Chronic kidney disease (CKD) stage G3a/A1, moderately decreased glomerular filtration   rate (GFR) between 45-59 mL/min/1 73 square meter and albuminuria creatinine ratio   less than 30 mg/g (585 3) (N18 3)   6  Chronic ulcer of left ankle with fat layer exposed (707 13) (L97 322)   7  Congestive heart failure (428 0) (I50 9)   8  Dehiscence of incision (998 32) (T81 31XA)   9  Diabetes mellitus with neurological 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 60) (E11 49)   10  Diabetic Nephropathy (583 81)   11  DM type 2 (diabetes mellitus, type 2) (250 00) (E11 9)   12  Postoperative wound dehiscence, subsequent encounter (V58 89,998 32) (T81 31XD)   13  Status post skin graft (V42 3) (Z94 5)    Past Medical History    1  History of High cholesterol (272 0) (E78 0)   2  History of myocardial infarction (412) (I25 2)   3  History of stroke (V12 54) (Z86 73)   4  History of Irregular heartbeat (427 9) (I49 9)    The active problems and past medical history were reviewed and updated today  Surgical History    1  History of Appendectomy (47 0)   2  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    Family History    1  Family history of Diabetes Mellitus (250 00)    2  Family history of cardiac disorder (V17 49) (Z82 49)    Social History    · Never a smoker    Current Meds   1  Atorvastatin Calcium 80 MG Oral Tablet; TAKE 1 TABLET DAILY; Therapy: 04LWR7707 to Recorded   2  Carvedilol 12 5 MG Oral Tablet; Take 1 tablet twice daily;    Therapy: 10XQH4052 to (Evaluate:30Jun2016) Recorded   3  Citalopram Hydrobromide 20 MG Oral Tablet; TAKE 1 TABLET DAILY; Therapy: 75VDC7382 to Recorded   4  Furosemide 40 MG Oral Tablet; as needed; Therapy: 74MEK1052 to Recorded   5  HumaLOG 100 UNIT/ML Subcutaneous Solution; Therapy: (Recorded:33Vle4705) to Recorded   6  Lantus 100 UNIT/ML Subcutaneous Solution; 40 untis bid; Therapy: 11EMT8346 to Recorded   7  Lidocaine HCl (Local Anesth ) 4 % SOLN; Apply prn to wound prior to debridement for   pain control; Therapy: (Recorded:90Mxv4428) to Recorded   8  Lisinopril 2 5 MG Oral Tablet; Therapy: ([place]) to Recorded   9  Pacerone 200 MG Oral Tablet; TAKE 1 TABLET DAILY; Therapy: 84GOH2907 to Recorded   10  Spironolactone 25 MG Oral Tablet; 1/2 tablet daily; Therapy: 06ZOD4113 to Recorded   11  Vitamin C TABS; Therapy: ([place]) to Recorded   12  Warfarin Sodium 5 MG Oral Tablet; TAKE 1 TABLET DAILY; Therapy: 24OWR5401 to Recorded    Allergies    1  No Known Drug Allergies    Vitals  Vital Signs [Data Includes: Current Encounter]    Recorded: 82MYP3000 10:57AM   Temperature 98 6 F, Tympanic   Heart Rate 76, L Radial   Pulse Quality Regular, L Radial   Respiration 16   Respiration Quality Normal   Systolic 548, LUE, Sitting   Diastolic 78, LUE, Sitting   Height 5 ft 11 in   Weight 270 lb 8 oz   BMI Calculated 37 73   BSA Calculated 2 4   Pain Scale 0     Physical Exam    Wound #1 Assessment wound #1 Location:, Right foot, started on 03/2015, Care for this wound started on 7/31/15  Wound Status: not healed  Diabetic Ulcer Grade/Lower Extremity: [place] 2  Length: 1 7cm x Width: 0 8cm x Depth: 0 2cm   Total: 1 36sq cm   Wound Volume: 0 272cm3           Tissue type: Subcutaneous, Granulation and Slough   Color of Wound: Yellow - 20% and Pink - 80%   Exudate Amount:  Moderate   Exudate Type: Serosangiunous   Odor: None   Exudate Color: brown   Wound Edges: Callous   Periwound Skin Condition: Macerated, Callus, slight maceration        Physician/Provider Wound #1 Exam   I agree with the nursing assessment and documentation  Constitutional - General appearance: No acute distress, well appearing and well nourished  Addi [place] 1: Wound Nursing Care Plan   Impaired Tissue Integrity related to: right foot and left lateral ankle wounds   Risk for Infection related to open wound:   Impaired Mobility related to: bulky dressing on right foot   Goals   Patient will achieve 100% epithelialization:  Patient will maintain skin integrity:  Wound Nursing Care Interventions:   Provide moist wound healing:  Implement offloading measures (turn & reposition schedule/method, ortho shoes/boots, floating heels, crutches, specialty surfaces:  Teach and evaluate effectiveness of offloading measures:  Plan of care initiated 7/31/15, reviewed 8/21/15, reviewed 9/25/15, reviewed 10/30/15 , reviewed 11/20/15, reviewed 12/29/15, reviewed 1/8/16, reviewed 2/12/2016, 3/11/16      Procedure      Wound #1: Right foot     Nurse Dressing Change:   Wound #1 The wound located on the Right foot  Wound care rendered as per Physician/Advanced Practitioner order/plan  Return to [address] [place] [address] 1 week  Comments: Total Contact Cast for Offloading DFU:   Application of total contact cast for offloading diabetic foot ulcer with cast shoe (CPT 08839) To the right lower extremity-- All primary and secondary dressings were secured  A rigid total contact cast was applied in the Rodriguez zackery Method utilizing multiple rolls of fiberglass from the popliteal fossa to the distal foot over generous layers of cast padding, adhesive foam, and felt to protect the toes, malleoli, and tibial crest from all protruding edges of cast material  The toes were left completely encompassed  A cast shoe was applied for safe ambulation  Patient tolerated procedure without complications   Patient instructed to contact the [facility] if they experience any pain or discomfort associated with the cast      Excisional Debridement Subcutaneous Tissue:   Wound was excisionally debrided to subcutaneous tissue as follows  Prior to the procedure, the patient was identified using two identifiers, the general consent was signed, the proper site of procedure was identified, and a time out was taken  Anesthesia: Local anesthesia with 4% topical lidocaine was utilized prior to the procedure for pain control  #10 surgical blade was utilized to surgically excise devitalized tissue and/or slough through epidermis, dermis and into the subcutaneous tissue  The total sq cm excised was 1 3sq cm  See wound assessment for further details  There was minimal bleeding controlled with gentle pressure  the patient tolerated the procedure well without complication  CPT Code(s)   [id number] - Excisional DebridementTo Subcutaneous Tissue; first 20 sq cm        Future Appointments    Date/Time Provider Specialty Site   04/01/2016 11:15 AM Bhumi Thompson DPM [address]     Signatures   Electronically signed by : James Liao DPM; Mar 29 2016 12:37PM EST                       (Author)

## 2018-01-16 NOTE — MISCELLANEOUS
Physical Exam    Wound #1 Assessment wound #1 Location:, Right foot, started on 03/2015, Care for this wound started on 7/31/15  Wound Status: not healed  Diabetic Ulcer Grade/Lower Extremity: [place] Check 2  Length: 0 3cm x Width: 0 4cm x Depth: 0 1cm   Total: 0 12sq cm   Wound Volume: 0 012cm3           Tissue type: Granulation and Slough   Color of Wound: Red - 99% and Yellow - 1%   Exudate Amount: Minimal   Exudate Type: Serosangiunous   Odor: None   Exudate Color: brown   Wound Edges: Callous   Periwound Skin Condition: Callus        Physician/Provider Wound #1 Exam   I agree with the nursing assessment and documentation  Constitutional - General appearance: No acute distress, well appearing and well nourished  Wound Drsg  Orders/Instructions  Wound Identification Dressing Orders--Instructions:   Wound Identification and Instructions   Wound #1: Right foot    Wound Care Instructions  Discussed with Patient/Caregiver  Dressing Type: [place] Busing with mild soap and water, normal saline, wound cleanser  As specified, use: NSS, and wound cleanser  [place] Organ Apply specified dressing to wound base/bed  To periwound apply: Skin prep barrier  Secondary dressing apply: Gauze, ABD, 4x4  Secure with: Kerlix  Dressing change frequency: Weekly  Offloading: Felt pad to periwound  Comments/Other:   Bulky dressing applied 5/27/2016 with 3 cast padding, 1 Kerlix, 1 Coban and tubifast yellow   Apply compression using: Coban and Tubifast yellow  Wound Goals  Wound Goals:   Healing Goals:   Fair healing potential secondary to moderate comorbid conditions     Wound edges will appear with evidence of contraction and epithelialization   Patient will achieve full wound closure with ability to wear appropriate shoewear       Future Appointments    Date/Time Provider Specialty Site   06/03/2016 11:00 AM Yuliya Jason  N Damaso Das Wound Care Plan  Wound Nursing Care Plan [facility]: Wound Nursing Care Plan   Impaired Tissue Integrity related to: right foot and left lateral ankle wounds   Risk for Infection related to open wound:   Impaired Mobility related to: bulky dressing on right foot   Goals   Patient will achieve 100% epithelialization:  Patient will maintain skin integrity:  Wound Nursing Care Interventions:   Provide moist wound healing:  Implement offloading measures (turn & reposition schedule/method, ortho shoes/boots, floating heels, crutches, specialty surfaces:  Teach and evaluate effectiveness of offloading measures:   Plan of care initiated 7/31/15, reviewed 8/21/15, reviewed 9/25/15, reviewed 10/30/15 , reviewed 11/20/15, reviewed 12/29/15, reviewed 1/8/16, reviewed 2/12/2016, 3/11/16, 4/8/16, 5/6/16      Signatures   Electronically signed by : Alma Fraire DPM; May 31 2016  1:58PM EST                       (Author)    Electronically signed by : Alma Fraire DPM; May 31 2016  2:07PM EST                       (Author)

## 2018-01-16 NOTE — MISCELLANEOUS
Physical Exam    Wound #1 Assessment wound #1 Location:, Right foot, started on 03/2015, Care for this wound started on 7/31/15  Wound Status: not healed  Diabetic Ulcer Grade/Lower Extremity: [place] Check 2  Length: 0 7cm x Width: 0 2cm x Depth: 0 1cm   Total: 0 14sq cm   Wound Volume: 0 014 cm3           Tissue type: Subcutaneous and Granulation   Color of Wound: Red - 99% and Yellow - 1%   Exudate Type: Serosangiunous and small   Odor: None   Exudate Color: Tan   Wound Edges: Intact and Callous   Periwound Skin Condition: Intact, Callus, Scaly, dry        Physician/Provider Wound #1 Exam   I agree with the nursing assessment and documentation  Wound #7 Assessment wound #7 Location:, left lateral ankle, started on 11/1/2015, Care for this wound started on 12/5/15, healed on 1/29/2016  Wound Status: healed  Diabetic Ulcer Grade/Lower Extremity: [place] Check 2  Periwound Skin Condition: Edema   Comments: healed 1/29/2016  Physician/Provider Wound #7 Exam   I agree with the nursing assessment and documentation  Constitutional - General appearance: No acute distress, well appearing and well nourished  Wound Drsg  Orders/Instructions  Wound Identification Dressing Orders--Instructions:   Wound Identification and Instructions   Wound #1: Right foot    Wound Care Instructions  Discussed with Patient/Caregiver  Dressing Type: Acticoat 7  Wash with mild soap and water, normal saline, wound cleanser or as specified  Apply specified dressing to wound base/bed  Secondary dressing apply: Gauze, ABD, cast padding  Secure with: Kerlix, and tape  Dressing change frequency: Weekly  Offloading: Surgical shoeto-- right foot with bulky dressing  Comments/Other:   Bulky dressing applied today; Acticoat flex 7 to wound base, x3 cast padding, Kerlix, Coban and TubiFast yellow  dressing to stay clean dry and intact until appointment next week  Apply compression using: Coban and Tubifast yellow  Wound #7: Left lateral ankle   Wound Care Instructions  Discussed with Patient/Caregiver  Secure with: Cosm[employer]ore (Combination dressing)  Dressing change frequency: Twice per week  Offloading: Crow braceto-- RAFI  Comments/Other:   cosmopore for protection      Wound Goals  Wound Goals:   Healing Goals:   Fair healing potential secondary to moderate comorbid conditions  Wound edges will appear with evidence of contraction and epithelialization   Patient will achieve full wound closure with ability to wear appropriate shoewear       Future Appointments    Date/Time Provider Specialty Site   02/05/2016 11:00 AM Sophia Leonard DPM Wound Care [address] [place]: Wound Nursing Care Plan   Impaired Tissue Integrity related to: right foot and left lateral ankle wounds   Risk for Infection related to open wound:   Impaired Mobility related to: bulky dressing on right foot   Goals   Patient will achieve 100% epithelialization:  Patient will maintain skin integrity:  Wound Nursing Care Interventions:   Provide moist wound healing:  Implement offloading measures (turn & reposition schedule/method, ortho shoes/boots, floating heels, crutches, specialty surfaces:  Teach and evaluate effectiveness of offloading measures:   Plan of care initiated 7/31/15, reviewed 8/21/15, reviewed 9/25/15, reviewed 10/30/15 , reviewed 11/20/15, reviewed 12/29/15, reviewed 1/8/16      Signatures   Electronically signed by : Dheeraj Ricks DPM; Feb 1 2016 12:45PM EST                       (Author)

## 2018-01-16 NOTE — MISCELLANEOUS
Physical Exam    Wound #1 Assessment wound #1 Location:, Right foot, started on 03/2015, Care for this wound started on 7/31/15  Wound Status: not healed  Diabetic Ulcer Grade/Lower Extremity: [place] 2  Length: 2 1cm x Width: 1cm x Depth: 0 3cm   Total: 2 1sq cm   Wound Volume: 0 63cm3           Tissue type: Subcutaneous, Granulation and Slough   Color of Wound: Yellow - 20% and Pink - 80%   Exudate Amount: Moderate   Exudate Type: Serosangiunous   Odor: None   Exudate Color: brown   Wound Edges: Callous   Periwound Skin Condition: Macerated, Callus, slight maceration            Physician/Provider Wound #1 Exam   I agree with the nursing assessment and documentation  Constitutional - General appearance: No acute distress, well appearing and well nourished  Wound Drsg  Orders/Instructions  Wound Identification Dressing Orders--Instructions:   Wound Identification and Instructions   Wound #1: Right foot    Wound Care Instructions  Discussed with Patient/Caregiver  Dressing Type: Acticoat 7  Wash with mild soap and water, normal saline, wound cleanser or as specified  Apply specified dressing to wound base/bed  Secondary dressing apply: Gauze, 1/2 ABD  Secure with: Kerlix, and tape  Dressing change frequency: Weekly  Total contact cast -- Change every 7 days and as needed for increased drainage, discomfort or excessive swelling of toes  Comments/Other:   with alginate      Wound Goals  Wound Goals:   Healing Goals:   Fair healing potential secondary to moderate comorbid conditions  Wound edges will appear with evidence of contraction and epithelialization   Patient will achieve full wound closure with ability to wear appropriate shoewear       Future Appointments    Date/Time Provider Specialty Site   03/25/2016 11:00 AM Syed Catherine DPM Wound Care [place] [address]     Addi Damico 1:    Wound Nursing Care Plan   Impaired Tissue Integrity related to: right foot and left lateral ankle wounds   Risk for Infection related to open wound:   Impaired Mobility related to: bulky dressing on right foot   Goals   Patient will achieve 100% epithelialization:  Patient will maintain skin integrity:  Wound Nursing Care Interventions:   Provide moist wound healing:  Implement offloading measures (turn & reposition schedule/method, ortho shoes/boots, floating heels, crutches, specialty surfaces:  Teach and evaluate effectiveness of offloading measures:   Plan of care initiated 7/31/15, reviewed 8/21/15, reviewed 9/25/15, reviewed 10/30/15 , reviewed 11/20/15, reviewed 12/29/15, reviewed 1/8/16, reviewed 2/12/2016, 3/11/16      Signatures   Electronically signed by : Oralia Kennedy DPM; Mar 22 2016  1:37PM EST                       (Author)

## 2018-01-16 NOTE — MISCELLANEOUS
Message  Return call to Yaritza with [facility]  Home care will hold patient until next appointment on 2/5/2016 for any new orders to right foot  If no new orders to right foot will discharge from Home care  Active Problems    1  Abnormal kidney function (593 9) (N28 9)   2  Benign essential hypertension (401 1) (I10)   3  Charcot's Joint Of The Foot (713 5)   4  Chronic foot ulcer (707 15) (L97 509)   5  Chronic kidney disease (CKD) stage G3a/A1, moderately decreased glomerular filtration   rate (GFR) between 45-59 mL/min/1 73 square meter and albuminuria creatinine ratio   less than 30 mg/g (585 3) (N18 3)   6  Chronic ulcer of left ankle with fat layer exposed (707 13) (L97 322)   7  Congestive heart failure (428 0) (I50 9)   8  Dehiscence of incision (998 32) (T81 31XA)   9  Diabetes mellitus with neurological 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 60) (E11 49)   10  Diabetic Nephropathy (583 81)   11  DM type 2 (diabetes mellitus, type 2) (250 00) (E11 9)   12  Postoperative wound dehiscence, subsequent encounter (V58 89,998 32) (T81 31XD)   13  Status post skin graft (V42 3) (Z94 5)    Current Meds   1  Atorvastatin Calcium 80 MG Oral Tablet; TAKE 1 TABLET DAILY; Therapy: [id number] to Recorded   2  Carvedilol 12 5 MG Oral Tablet; Take 1 tablet twice daily; Therapy: 46ZEH3521 to (Evaluate:30Jun2016) Recorded   3  Citalopram Hydrobromide 20 MG Oral Tablet; TAKE 1 TABLET DAILY; Therapy: 41SNN6830 to Recorded   4  Furosemide 40 MG Oral Tablet; as needed; Therapy: 87YRZ1692 to Recorded   5  HumaLOG 100 UNIT/ML Subcutaneous Solution; Therapy: (Recorded:47Sbt6434) to Recorded   6  Lantus 100 UNIT/ML Subcutaneous Solution; 40 untis bid; Therapy: 39GTF1910 to Recorded   7  Lidocaine HCl (Local Anesth ) 4 % SOLN; Apply prn to wound prior to debridement for   pain control; Therapy: (Recorded:69Bue2168) to Recorded   8  Lisinopril 2 5 MG Oral Tablet; Therapy: (Bob Meier) to Recorded   9   Pacerone 200 MG Oral Tablet; TAKE 1 TABLET DAILY; Therapy: 03GVE7106 to Recorded   10  Spironolactone 25 MG Oral Tablet; 1/2 tablet daily; Therapy: 59HVI2157 to Recorded   11  Vitamin C TABS; Therapy: (Stewart Badillo) to Recorded   12  Warfarin Sodium 5 MG Oral Tablet; TAKE 1 TABLET DAILY; Therapy: [id number] to Recorded    Allergies    1  No Known Drug Allergies    Signatures   Electronically signed by :  Ike So RN; Feb  3 2016 11:13AM EST                       (Author)

## 2018-01-16 NOTE — MISCELLANEOUS
Physical Exam    Wound #1 Assessment wound #1 Location:, Right foot, started on 03/2015, Care for this wound started on 7/31/15  Wound Status: not healed  Diabetic Ulcer Grade/Lower Extremity: [place] 2  Length: 1cm x Width: 0 5cm x Depth: 0 2cm   Total: 0 5sq cm   Wound Volume: 0 1cm3           Tissue type: Subcutaneous, Granulation and Slough   Color of Wound: Yellow - 90% and Pink - 10%   Exudate Amount: Moderate   Exudate Type: Serosangiunous   Odor: None   Exudate Color: brown   Wound Edges: Callous   Periwound Skin Condition: Callus, Edema, slight maceration        Physician/Provider Wound #1 Exam   I agree with the nursing assessment and documentation  Wound Drsg  Orders/Instructions  Wound Identification Dressing Orders--Instructions:   Wound Identification and Instructions   Wound #1: Right foot  use: NSS   Wound Care Instructions  Discussed with Patient/Caregiver  Dressing Type: Acticoat 7  Wash with mild soap and water, normal saline, wound cleanser or as specified  Apply specified dressing to wound base/bed  Secondary dressing apply: Gauze, 1/2 ABD  Secure with: Kerlix, and tape  Dressing change frequency: Weekly  Total contact cast -- Change every 7 days and as needed for increased drainage, discomfort or excessive swelling of toes  Comments/Other:   Apply Acticoat 7, Maxorb to wound base  Total Contact Cast was applied today  Wound Goals  Wound Goals:   Healing Goals:   Fair healing potential secondary to moderate comorbid conditions  Wound edges will appear with evidence of contraction and epithelialization   Patient will achieve full wound closure with ability to wear appropriate shoewear       Future Appointments    Date/Time Provider Specialty Site   04/08/2016 11:00 AM Nani Hernandez DPM Wound Care [place] [id number]     Addi Damico 1:    Wound Nursing Care Plan   Impaired Tissue Integrity related to: right foot and left lateral ankle wounds   Risk for Infection related to open wound:   Impaired Mobility related to: bulky dressing on right foot   Goals   Patient will achieve 100% epithelialization:  Patient will maintain skin integrity:  Wound Nursing Care Interventions:   Provide moist wound healing:  Implement offloading measures (turn & reposition schedule/method, ortho shoes/boots, floating heels, crutches, specialty surfaces:  Teach and evaluate effectiveness of offloading measures:   Plan of care initiated 7/31/15, reviewed 8/21/15, reviewed 9/25/15, reviewed 10/30/15 , reviewed 11/20/15, reviewed 12/29/15, reviewed 1/8/16, reviewed 2/12/2016, 3/11/16      Signatures   Electronically signed by : Virgil Elaine DPM; Apr 5 2016  2:06PM EST                       (Author)

## 2018-01-16 NOTE — PROGRESS NOTES
Assessment    1  Chronic foot ulcer (707 15) (L97 509)   2  Diabetes mellitus with neurological 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 60) (E11 49)    Plan  Diabetes mellitus with neurological manifestation    · Follow-up visit in 1 week Evaluation and Treatment  Follow-up  Status: Hold For -  Scheduling  Requested for: 12Apr2016   Ordered; For: Diabetes mellitus with neurological manifestation; Ordered By: Pascual Burnette Performed:  Due: 49UWZ0691   · We have put a total contact cast on your foot  Do not walk on the cast or put weight on  it for 24 hours ; Status:Complete;   Done: 52VPV3613 01:05PM   Ordered; For:Diabetes mellitus with neurological manifestation; Ordered By:Nereida Valenzuela; Wound Care Orders/Instructions    Wound Identification and Instructions   Wound #1: Right foot  use: NSS   Wound Care Instructions  Discussed with Patient/Caregiver  Dressing Type: Acticoat 7  Wash with mild soap and water, normal saline, wound cleanser or as specified  Apply specified dressing to wound base/bed  Secondary dressing apply: Gauze, 1/2 ABD  Secure with: Kerlix, and tape  Dressing change frequency: Weekly  Total contact cast -- Change every 7 days and as needed for increased drainage, discomfort or excessive swelling of toes  Comments/Other:   Apply Acticoat 7, Maxorb to wound base  Total Contact Cast was applied today  Wound Goals  Wound Goals:   Healing Goals:   Fair healing potential secondary to moderate comorbid conditions  Wound edges will appear with evidence of contraction and epithelialization   Patient will achieve full wound closure with ability to wear appropriate shoewear       Discussion/Summary    Continue TCC  The treatment plan was reviewed with the patient/guardian   The patient/guardian understands and agrees with the treatment plan      Chief Complaint  Follow up for right foot wound      History of Present Illness    Wound Identification HPI   Wound #1: Right foot          The patient came to Wound Care via wheelchair  The patient is being seen for a follow-up with MD at the [address] [place] [address]  The patient is accompanied by his fiance  The patient's identification was verified  A secondary verification process was completed  Orientation: oriented to person, oriented to place and oriented to time  Blood Glucose:  145 mg/dL as reported by patient  7/31/15: Patient arrived via wheelchair for right foot wound  Has had wound since March 2015  Pt was seen by Dr Stephanie Camilo at [address] [place] [address] and was debrided at the bedside  Ace wrap with strike through drainage removed with 2 5x9s, adaptic and packing  Dressing was in place since Wednesday 7/29/15 per patient  Pt has [facility] for [facility] care  Dr Stephanie Camilo relates that patient had two open wounds that communicated and upon MRI abscess was noted as well; bedside debridement removed skin bridge between two open areas and I&D of abscess  Plan for [facility] then total contact cast once enough granular tissue is present to d/c VAC  8/7/15: The patient arrived with VAC intact to wound  The patient has multiple abrasions from knee to toes and is bleeding  The patient relates the he crawled from his house, across grass and concrete into the car so that he didn't put any weight on his right foot  DPM had conversation with patient and significant other to come up with a solution; a manual light weight wheelchair will be prescribed  The SDTI that was on right dorsal foot has now opened, it will be wound #2  New wounds to right foot today traumatic injury  Patient has a small abrasion to the right knee that will be dressed and continue to assess  Follow up in one week  Copy of orders faxed to ARIANNA RIZVI  8/21/15: Pt arrived with wound vac intact to right foot wound @ 125, with 2 pieces of black foam, no bridge  Dressings intact to right dorsal ffot and right 2nd toe wounds but pt had foam dressing on, not dermagran gauze as ordered   Pt's family states he was recently hospitalized for low hemoglobin and was seen by Dr Sushila Meyer while in hospital  8/28/15: Patient arrived with wound vac intact to right lower extremity at 125mmhg  2 pieces of black foam removed with some foam noted on periwound  Denies any changes since last visit  Patient arrived with foam dressings to right foot instead of ordered dry gauze  Patient has received wheelchair since last visit and states  Follow up in one week  Copy of orders faxed to ARIANNA RIZVI  9/4/15: Patient arrived with wound vac intact  1 piece of black foam removed  Denies changes since last visit  9/11/15: Arrived with wound vac intact to right foot  No changes since last visit  9/18/2015: arrived with dressings intact  For right 2nd toe amp 9/19/2015  Hold KCI wound VAC today  9/25/15: The patient arrived with NPWT and ordered dressings intact to wounds  The patient was seen by Dr Sushila Meyer 9/19/15 at which time the second toe was amputated  The patient arrived today with 98% intact suture line; the distal aspect is somewhat  (0 2cm)  10/9/2015: Arrived with NPWT intact to right foot, alginate intact to right dorsal foot and right 2nd toe  10/16/15: The patient arrived with dressings intact to wounds  NPWT functioning properly  10/23/2015: Arrived with dressing intact, KCI wound VAC  10/30/2015: Patient arrived with dressing intact to right foot and right thigh donor site  Patient states wound vac was discontinued at last MD visit  11/6/15: Arrived with dressing intact to right foot  Right leg donor site 4 x 1 5 cm  Purulent drainage assessed beneath right lateral foot eschar  11/13/2015: Arrived with dressings intact  New wound on left ankle; [place] was rubbing, has been corrected since new wound appeared  11/20/15: Patient arrived with dressings intact to all wounds  Denies changes since last visit   12/4/15: Patient arrived with dressing intact to right foot and no dressing ion left ankle but patient states it has been draining a few days  Denies changes to medications or recent falls  Left lateral ankle wound presets reopened today  12/11/15: Patient arrived with dressings intact to left and right foot  Denies changes since last visit  States he has been ambulating with bulky dressing on right foot  12/18/15: Patient arived with cast intact to RLE  Denies changes since last visit  12/29/2015: Patient arrived with TCC intact to RLE and CROW with dressing intact to LLE  Patient relates that the CROW was adjusted to relieve pressure from the lateral ankle area; feels that it is working well  Right third toe open area measuring 0 4 cm x 0 4 cm x 0 1 cm  Covered with Acticoat Flex 7 to stay intact under the TCC  1/8/16: The patient arrived with dressing intact to left lateral ankle wound and TCC intact to RLE  1/15/16: Patient arrived with crow intact to LLE, and dressings intact to right foot  1/22/16: Patient arrived with dressings intact to left and right foot  Denies changes since last visit  1/29/16: Patient arrived with bulky dressing with Acticoat intact to right foot wound  Patient arrived with Dermagran, 4x4 gauze and bhavna intact to left lateral ankle  2/5/16: Patient arrived with Acticoat and bulky dressing intact to right root  Patient arrived 4x4 gauze and tape to left lateral ankle  2/12/16: Arrived with bulky dressing intact to right foot  Strike through drainage to [employer]  Patient reported he is doing home Physical therapy currently, nursing has discharged from services  2/19/16: Patient arrived with Acticoat flex, 4x4 gauze, 5x9 ABD, Kerlix, coban intact to right foot wound  2/25/16: The patient arrived with dressing clean dry and intact  No issues since last visit  3/4/16: The patient arrived with dressing nicely intact to right foot  No issues since last visit  3/11/16: The patient arrived with dressing intact, strikethrough drainage to but not through coban   The patient relates that he is doing "a lot" with physical therapy  3/18/16: Patient arrived with cast to right foot  Denies any changes since last visit  3/25/16: Patient arrived with cast intact to right foot  4/1/16: Patient arrived with cast to right foot  4/8/16: Patient arrived with cast intact to right lower extremity  Denies changes since last visit  History of Falling: No = 0   Secondary Diagnosis: Yes = 15   Ambulatory Aid None, Bedrest, Nurse Assist = 0   No = 0   Gait: Impaired = 20 -- Short steps with shuffle, may have difficulty arising from chair, head down, significantly impaired balance, requiring furniture, support person, or walking aid to walk  Mental Status: Oriented to own ability = 0   Total Score: 35    25 - 45 = Moderate Risk        The most recent fall occurred patient denies any falls since last visit  Number of falls in the last year were 0  Nutrition Assessment Screening: Food intake over the last 3 months due to the loss of appetite, digestive problems, chewing or swallowing difficulties is graded as: 2 = no decrease in food intake   Weight loss during the last 3 months: 3 = no weight loss   Mobility scored as: 2 = goes out  Psychological Stress and Acute Disease Scored as: 0 = The patient has experienced psychological stress or acute disease in the last 3 months  Neuropsychological problems scored as: 2 = no psychological problems  Body Mass Index (BMI) scored as: 3 = BMI 23 or greater  Nutritional Assessment Screening Score: 12 - 14 points - Normal nutritional status  Provider Wound Care HPI: Isatu Rodriguez is here for TCC change  Pain Assessment   the patient states they do not have pain  (on a scale of 0 to 10, the patient rates the pain at 0 )   Abuse And Domestic Violence Screen   Domestic violence screen not done today  Reason DV Screen not done: melinda present in exam room    Depression And Suicide Screen  Suicide screen not done today   Reason suicide screen not done: melinda present in exam room  Prefered Language is  [place]  Primary Language is  English  Readiness To Learn: Receptive  Barriers To Learning: affect  Preferred Learning: demonstration and verbal   Education Completed: further treatment/follow-up and treatment/procedure   Teaching Method: verbal and demonstration   Person Taught: patient   Evaluation Of Learning: verbalized/demonstrated understanding and needs reinforcement      Review of Systems    Constitutional: no fever, not feeling poorly, no chills and not feeling tired  Active Problems    1  Abnormal kidney function (593 9) (N28 9)   2  Benign essential hypertension (401 1) (I10)   3  Ch[facility]'s [facility] Of The Foot (713 5)   4  Chronic foot ulcer (707 15) (L97 509)   5  Chronic kidney disease (CKD) stage G3a/A1, moderately decreased glomerular filtration   rate (GFR) between 45-59 mL/min/1 73 square meter and albuminuria creatinine ratio   less than 30 mg/g (585 3) (N18 3)   6  Chronic ulcer of left ankle with fat layer exposed (707 13) (L97 322)   7  Congestive heart failure (428 0) (I50 9)   8  Dehiscence of incision (998 32) (T81 31XA)   9  Diabetes mellitus with neurological 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 60) (E11 49)   10  Diabetic Nephropathy (583 81)   11  DM type 2 (diabetes mellitus, type 2) (250 00) (E11 9)   12  Postoperative wound dehiscence, subsequent encounter (V58 89,998 32) (T81 31XD)   13  Status post skin graft (V42 3) (Z94 5)    Past Medical History    1  History of High cholesterol (272 0) (E78 0)   2  History of myocardial infarction (412) (I25 2)   3  History of stroke (V12 54) (Z86 73)   4  History of Irregular heartbeat (427 9) (I49 9)    The active problems and past medical history were reviewed and updated today  Surgical History    1  History of Appendectomy (47 0)   2  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    Family History    1  Family history of Diabetes Mellitus (250 00)    2   Family history of cardiac disorder (V17 49) (Z82 49)    Social History    · Never a smoker    Current Meds   1  Atorvastatin Calcium 80 MG Oral Tablet; TAKE 1 TABLET DAILY; Therapy: 34JKS1330 to Recorded   2  Carvedilol 12 5 MG Oral Tablet; Take 1 tablet twice daily; Therapy: 91IBT3061 to (Evaluate:30Jun2016) Recorded   3  Citalopram Hydrobromide 20 MG Oral Tablet; TAKE 1 TABLET DAILY; Therapy: 33DBO0494 to Recorded   4  Furosemide 40 MG Oral Tablet; as needed; Therapy: 63UAV8514 to Recorded   5  HumaLOG 100 UNIT/ML Subcutaneous Solution; Therapy: (Recorded:42Oha1095) to Recorded   6  Lantus 100 UNIT/ML Subcutaneous Solution; 40 untis bid; Therapy: 19NWK1103 to Recorded   7  Lidocaine HCl (Local Anesth ) 4 % SOLN; Apply prn to wound prior to debridement for   pain control; Therapy: (Recorded:27Isd4922) to Recorded   8  Lisinopril 2 5 MG Oral Tablet; Therapy: [phone]) to Recorded   9  Pacerone 200 MG Oral Tablet; TAKE 1 TABLET DAILY; Therapy: 34BQP0339 to Recorded   10  Spironolactone 25 MG Oral Tablet; 1/2 tablet daily; Therapy: 01LEZ2185 to Recorded   11  Vitamin C TABS; Therapy: (Tony Dailey) to Recorded   12  Warfarin Sodium 5 MG Oral Tablet; TAKE 1 TABLET DAILY; Therapy: 33WSY1358 to Recorded    Allergies    1  No Known Drug Allergies    Vitals  Vital Signs [Data Includes: Current Encounter]    Recorded: 08Apr2016 11:12AM   Temperature 98 5 F, Tympanic   Heart Rate 72, R Radial   Respiration 18   Respiration Quality Normal   Systolic 226, RUE   Diastolic 70, RUE   Weight 275 lb    BMI Calculated 38 35   BSA Calculated 2 41   Pain Scale 0     Physical Exam    Wound #1 Assessment wound #1 Location:, Right foot, started on 03/2015, Care for this wound started on 7/31/15  Wound Status: not healed  Diabetic Ulcer Grade/Lower Extremity: Ana Mao 2     Length: 1 8cm x Width: 0 5cm x Depth: 0 2cm   Total: 0 9sq cm   Wound Volume: 0 18cm3           Tissue type: Subcutaneous, Granulation and Slough   Color of Wound: Red - 60% and Yellow - 40%   Exudate Amount: Moderate   Exudate Type: Serosangiunous   Odor: None   Exudate Color: brown/green   Wound Edges: Callous   Periwound Skin Condition: Callus, Edema, slight maceration        Physician/Provider Wound #1 Exam   I agree with the nursing assessment and documentation  Constitutional - General appearance: No acute distress, well appearing and well nourished  Addi Damico 1: Wound Nursing Care Plan   Impaired Tissue Integrity related to: right foot and left lateral ankle wounds   Risk for Infection related to open wound:   Impaired Mobility related to: bulky dressing on right foot   Goals   Patient will achieve 100% epithelialization:  Patient will maintain skin integrity:  Wound Nursing Care Interventions:   Provide moist wound healing:  Implement offloading measures (turn & reposition schedule/method, ortho shoes/boots, floating heels, crutches, specialty surfaces:  Teach and evaluate effectiveness of offloading measures:  Plan of care initiated 7/31/15, reviewed 8/21/15, reviewed 9/25/15, reviewed 10/30/15 , reviewed 11/20/15, reviewed 12/29/15, reviewed 1/8/16, reviewed 2/12/2016, 3/11/16, 4/8/16      Procedure      Wound #1: Right foot     Nurse Dressing Change:   Wound #1 The wound located on the Right foot  Wound care rendered as per Physician/Advanced Practitioner order/plan  Return to [address] [place] [address] 1 week  Comments: Total Contact Cast for Offloading DFU:   Application of total contact cast for offloading diabetic foot ulcer with cast shoe (CPT 96019) To the right lower extremity-- All primary and secondary dressings were secured   A rigid total contact cast was applied in the Rodriguez zackery Method utilizing multiple rolls of fiberglass from the popliteal fossa to the distal foot over generous layers of cast padding, adhesive foam, and felt to protect the toes, malleoli, and tibial crest from all protruding edges of cast material  The toes were left completely encompassed  A cast shoe was applied for safe ambulation  Patient tolerated procedure without complications  Patient instructed to contact the [facility] if they experience any pain or discomfort associated with the cast      Excisional Debridement Subcutaneous Tissue:   Wound was excisionally debrided to subcutaneous tissue as follows  Prior to the procedure, the patient was identified using two identifiers, the general consent was signed, the proper site of procedure was identified, and a time out was taken  Anesthesia: Local anesthesia with 4% topical lidocaine was utilized prior to the procedure for pain control  #10 surgical blade was utilized to surgically excise devitalized tissue and/or slough through epidermis, dermis and into the subcutaneous tissue  The total sq cm excised was 1sq cm  See wound assessment for further details  There was minimal bleeding controlled with gentle pressure  the patient tolerated the procedure well without complication  CPT Code(s)   [id number] - Excisional DebridementTo Subcutaneous Tissue; first 20 sq cm        Future Appointments    Date/Time Provider Specialty Site   04/15/2016 11:15 AM Malcom Ferrell DPM [address]     Signatures   Electronically signed by : Santiago Barrera DPM; Apr 12 2016  1:07PM EST                       (Author)

## 2018-01-17 NOTE — MISCELLANEOUS
Physical Exam    Wound #1 Assessment wound #1 Location:, Right foot, started on 03/2015, Care for this wound started on 7/31/15  Wound Status: not healed  Diabetic Ulcer Grade/Lower Extremity: Ifeoma Panda 2  Length: 0 9cm x Width: 0 2cm x Depth: 0 1cm   Total: 0 18sq cm   Wound Volume: 0 018cm3           Tissue type: Subcutaneous and Granulation   Color of Wound: Red - 99% and Yellow - 1%   Exudate Amount: Moderate   Exudate Type: Serosangiunous   Odor: None   Exudate Color: Tan   Wound Edges: Intact and Callous   Periwound Skin Condition: Intact, Callus, Scaly, dry        Physician/Provider Wound #1 Exam   I agree with the nursing assessment and documentation  Wound #7 Assessment wound #7 Location:, left lateral ankle, started on 11/1/2015, Care for this wound started on 12/5/15, healed on  Wound Status: not healed  Diabetic Ulcer Grade/Lower Extremity: Ifeoma Panda 2  Length: 0 5cm x Width: 0 4cm x Depth: 0 1cm   Total: 0 2sq cm   Wound Volume: 0 02cm3           Tissue type: Subcutaneous and Slough   Color of Wound: Yellow - 95% and Pink - 5%   Exudate Amount: Minimal   Exudate Type: Serosangiunous   Odor: None   Exudate Color: Yellow   Wound Edges: Intact   Periwound Skin Condition: Intact, Macerated, Callus   Comments: slightly macerated  Physician/Provider Wound #7 Exam   I agree with the nursing assessment and documentation  Constitutional - General appearance: No acute distress, well appearing and well nourished  Wound Drsg  Orders/Instructions  Wound Identification Dressing Orders--Instructions:   Wound Identification and Instructions   Wound #1: Right foot    Wound Care Instructions  Discussed with Patient/Caregiver  Dressing Type: Acticoat 7  Wash with mild soap and water, normal saline, wound cleanser or as specified  Apply specified dressing to wound base/bed     Secondary dressing apply: Gauze, ABD, cast padding  Secure with: Kerlix, and tape  Dressing change frequency: Weekly  Offloading: Surgical shoeto-- right foot with bulky dressing  Comments/Other:   Bulky dressing applied today; to stay clean dry and intact until appointment next week  Apply compression using: Coban  Wound #7: Left lateral ankle   Wound Care Instructions  Discussed with Patient/Caregiver  Dressing Type: Oswaldo Mk with mild soap and water, normal saline, wound cleanser or as specified  Apply specified dressing to wound base/bed  Secondary dressing apply: Gauze  Secure with: tape, vac film or tegaderm  Dressing change frequency: Three times per week, M/W/F  Offloading: Crow braceto-- LLE      Wound Goals  Wound Goals:   Healing Goals:   Fair healing potential secondary to moderate comorbid conditions  Wound edges will appear with evidence of contraction and epithelialization   Patient will achieve full wound closure with ability to wear appropriate shoewear       Future Appointments    Date/Time Provider Specialty Site   01/29/2016 11:00 AM Nneka Marie DPM Wound Care [address] [place]: Wound Nursing Care Plan   Impaired Tissue Integrity related to: right foot and left lateral ankle wounds   Risk for Infection related to open wound:   Impaired Mobility related to: bulky dressing on right foot   Goals   Patient will achieve 100% epithelialization:  Patient will maintain skin integrity:  Wound Nursing Care Interventions:   Provide moist wound healing:  Implement offloading measures (turn & reposition schedule/method, ortho shoes/boots, floating heels, crutches, specialty surfaces:  Teach and evaluate effectiveness of offloading measures:   Plan of care initiated 7/31/15, reviewed 8/21/15, reviewed 9/25/15, reviewed 10/30/15 , reviewed 11/20/15, reviewed 12/29/15, reviewed 1/8/16      Signatures   Electronically signed by : Taco Isidro DPM; Jan 27 2016 12:15PM EST                       (Author) Electronically signed by : Rain Acevedo DPM; Feb 10 2016 11:29AM EST                       (Author)

## 2018-01-17 NOTE — MISCELLANEOUS
Physical Exam    Wound #1 Assessment wound #1 Location:, Right foot, started on 03/2015, Care for this wound started on 7/31/15  Wound Status: not healed  Diabetic Ulcer Grade/Lower Extremity: Roselind Drier 2  Length: 1 6cm x Width: 0 4cm x Depth: 0 2cm   Total: 0 64sq cm   Wound Volume: 0 128cm3           Tissue type: Subcutaneous, Granulation and Slough   Color of Wound: Red - 99% and Yellow - 1%   Exudate Amount: Moderate   Exudate Type: Serosangiunous   Odor: None   Exudate Color: Tan   Wound Edges: Callous   Periwound Skin Condition: Macerated, slight maceration         Wound Drsg  Orders/Instructions  Wound Identification Dressing Orders--Instructions:   Wound Identification and Instructions   Wound #1: Right foot    Wound Care Instructions  Discussed with Patient/Caregiver  Dressing Type: Acticoat 7  Wash with mild soap and water, normal saline, wound cleanser or as specified  Apply specified dressing to wound base/bed  Secondary dressing apply: Gauze, ABD, cast padding  Secure with: Kerlix, and tape  Dressing change frequency: Weekly  Offloading: Surgical shoeto-- right foot with bulky dressing  Comments/Other:   Bulky dressing applied today; Acticoat flex 7 to wound base, Maxorb applied over acticoat flex, x3 cast padding, Kerlix, Coban and TubiFast yellow  Dressing to stay clean dry and intact until appointment next week  Apply compression using: Coban and Tubifast yellow  Future Appointments    Date/Time Provider Specialty Site   03/11/2016 11:15 AM Curtis Gonzalez DPM Wound Care [place] [address]   03/04/2016 10:30 AM Wound Care Da, Nurse Schedule   [address] [place] [address] Plan  Wound Nursing Care Plan [facility]:    Wound Nursing Care Plan   Impaired Tissue Integrity related to: right foot and left lateral ankle wounds   Risk for Infection related to open wound:   Impaired Mobility related to: bulky dressing on right foot   Goals   Patient will achieve 100% epithelialization:  Patient will maintain skin integrity:  Wound Nursing Care Interventions:   Provide moist wound healing:  Implement offloading measures (turn & reposition schedule/method, ortho shoes/boots, floating heels, crutches, specialty surfaces:  Teach and evaluate effectiveness of offloading measures:   Plan of care initiated 7/31/15, reviewed 8/21/15, reviewed 9/25/15, reviewed 10/30/15 , reviewed 11/20/15, reviewed 12/29/15, reviewed 1/8/16, reviewed 2/12/2016      Signatures   Electronically signed by : Black Doherty RN; Feb 25 2016  2:35PM EST                       (Author)

## 2018-01-17 NOTE — PROGRESS NOTES
Chief Complaint  Chief Complaint Free Text Note Form: Follow up for nurse visit for right foot wound      History of Present Illness  Wound Identification HPI:   Wound Identification HPI   Wound #1: Right foot        Visit Information:   The patient came to Wound Care via wheelchair  The patient is being seen for follow-up with RN at the [address] [place] [address]  The patient is accompanied by his significant other  The patient's identification was verified  A secondary verification process was completed  Orientation: oriented to person, oriented to place and oriented to time  Blood Glucose:  131 mg/dL as reported by patient  7/31/15: Patient arrived via wheelchair for right foot wound  Has had wound since March 2015  Pt was seen by Dr Sushila Meyer at [facility] and was debrided at the bedside  Ace wrap with strike through drainage removed with 2 5x9s, Adaptic and packing  Dressing was in place since Wednesday 7/29/15 per patient  Pt has [facility] for wound care  Dr Sushila Meyer relates that patient had two open wounds that communicated and upon MRI abscess was noted as well; bedside debridement removed skin bridge between two open areas and I&D of abscess  Plan for [facility] then total contact cast once enough granular tissue is present to d/c VAC  8/7/15: The patient arrived with VAC intact to wound  The patient has multiple abrasions from knee to toes and is bleeding  The patient relates the he crawled from his house, across grass and concrete into the car so that he didn't put any weight on his right foot  DPM had conversation with patient and significant other to come up with a solution; a manual light weight wheelchair will be prescribed  The SDTI that was on right dorsal foot has now opened, it will be wound #2  New wounds to right foot today traumatic injury  Patient has a small abrasion to the right knee that will be dressed and continue to assess  Follow up in one week   Copy of orders faxed to [facility]  8/21/15: Pt arrived with wound vac intact to right foot wound @ 125, with 2 pieces of black foam, no bridge  Dressings intact to right dorsal ffot and right 2nd toe wounds but pt had foam dressing on, not dermagran gauze as ordered  Pt's family states he was recently hospitalized for low hemoglobin and was seen by Dr Chelsie Wilburn while in hospital  8/28/15: Patient arrived with wound vac intact to right lower extremity at 125mmhg  2 pieces of black foam removed with some foam noted on periwound  Denies any changes since last visit  Patient arrived with foam dressings to right foot instead of ordered dry gauze  Patient has received wheelchair since last visit and states  Follow up in one week  Copy of orders faxed to [facility]  9/4/15: Patient arrived with wound vac intact  1 piece of black foam removed  Denies changes since last visit  9/11/15: Arrived with wound vac intact to right foot  No changes since last visit  9/18/2015: arrived with dressings intact  For right 2nd toe amp 9/19/2015  Hold KCI wound VAC today  9/25/15: The patient arrived with NPWT and ordered dressings intact to wounds  The patient was seen by Dr Chelsie Wilburn 9/19/15 at which time the second toe was amputated  The patient arrived today with 98% intact suture line; the distal aspect is somewhat  (0 2cm)  10/9/2015: Arrived with NPWT intact to right foot, alginate intact to right dorsal foot and right 2nd toe  10/16/15: The patient arrived with dressings intact to wounds  NPWT functioning properly  10/23/2015: Arrived with dressing intact, KCI wound VAC  10/30/2015: Patient arrived with dressing intact to right foot and right thigh donor site  Patient states wound vac was discontinued at last MD visit  11/6/15: Arrived with dressing intact to right foot  Right leg donor site 4 x 1 5 cm  Purulent drainage assessed beneath right lateral foot eschar  11/13/2015: Arrived with dressings intact   New wound on left ankle; CROW was rubbing, has been corrected since new wound appeared  11/20/15: Patient arrived with dressings intact to all wounds  Denies changes since last visit  12/4/15: Patient arrived with dressing intact to right foot and no dressing ion left ankle but patient states it has been draining a few days  Denies changes to medications or recent falls  Left lateral ankle wound presets reopened today  12/11/15: Patient arrived with dressings intact to left and right foot  Denies changes since last visit  States he has been ambulating with bulky dressing on right foot  12/18/15: Patient arived with cast intact to RLE  Denies changes since last visit  12/29/2015: Patient arrived with TCC intact to RLE and CROW with dressing intact to LLE  Patient relates that the CROW was adjusted to relieve pressure from the lateral ankle area; feels that it is working well  Right third toe open area measuring 0 4 cm x 0 4 cm x 0 1 cm  Covered with Acticoat Flex 7 to stay intact under the TCC  1/8/16: The patient arrived with dressing intact to left lateral ankle wound and TCC intact to RLE  1/15/16: Patient arrived with crow intact to LLE, and dressings intact to right foot  1/22/16: Patient arrived with dressings intact to left and right foot  Denies changes since last visit  1/29/16: Patient arrived with bulky dressing with Acticoat intact to right foot wound  Patient arrived with Dermagran, 4x4 gauze and bhavna intact to left lateral ankle  2/5/16: Patient arrived with Acticoat and bulky dressing intact to right root  Patient arrived 4x4 gauze and tape to left lateral ankle  2/12/16: Arrived with bulky dressing intact to right foot  Strike through drainage to [employer]  Patient reported he is doing home Physical therapy currently, nursing has discharged from services  2/19/16: Patient arrived with Acticoat flex, 4x4 gauze, 5x9 ABD, Kerlix, coban intact to right foot wound   2/25/16: The patient arrived with dressing clean dry and intact  No issues since last visit  3/4/16: The patient arrived with dressing nicely intact to right foot  No issues since last visit  3/11/16: The patient arrived with dressing intact, strikethrough drainage to but not through coban  The patient relates that he is doing "a lot" with physical therapy  3/18/16: Patient arrived with cast to right foot  Denies any changes since last visit  3/25/16: Patient arrived with TCC intact to right foot  4/1/16: Patient arrived with cast to right foot  4/8/16: Patient arrived with cast intact to right lower extremity  Denies changes since last visit  4/15/2016 Patient arrived with TCC intact to RLE  Denies any changes since last visit  4/22/16:Patient arrived with TCC intact to RLE  4/29/16: Patient arrived with bulky dressing intact to right foot  Patient denies any changes since last visit  5/6/16: Arrived with Dermagran, 2x2, 4x4, cast padding, ace wrap and Coban intact to right foot  5/13/16: Patient arrived with bulky dressing intact  Denies changes since last visit  5/20/2016: Arrived with bulky dressing intact to right foot  Patient reports no problems with the dressing  5/27/2016: Patient arrived with bulky dressing intact to right foot wound  Patient denies any changes since last visit  6/3/16: Patient arrived with bulky dressing intact  Denies any changes since last visit  6/10/2016: Arrived with bulky dressing intact to right foot  6/24/2016: Patient arrived with TCC intact to Right foot  Denies changes since last visit  6/29/2016: Patient called to report while he was showering he got the bulky dressing wet  Significant other removed bulky dressing and applied 5x9, Kerlix to protect wound bed  Arrived with 5x9, Kerlix multiple layers intact to right foot         Doshi Fall Scale:     History of Falling: No = 0   Secondary Diagnosis: Yes = 15   Ambulatory Aid None, Bedrest, Nurse Assist = 0   No = 0   Gait: Impaired = 20 -- Short steps with shuffle, may have difficulty arising from chair, head down, significantly impaired balance, requiring furniture, support person, or walking aid to walk  Mental Status: Oriented to own ability = 0   Total Score: 35    25 - 45 = Moderate Risk        Fall, Nutrition, Mobility, Neuropsychological Assessment: The most recent fall occurred patient denies any falls since last visit  Number of falls in the last year were 0  Nutrition Assessment Screening: Food intake over the last 3 months due to the loss of appetite, digestive problems, chewing or swallowing difficulties is graded as: 2 = no decrease in food intake   Weight loss during the last 3 months: 3 = no weight loss   Mobility scored as: 2 = goes out  Psychological Stress and Acute Disease Scored as: 0 = The patient has experienced psychological stress or acute disease in the last 3 months  Neuropsychological problems scored as: 2 = no psychological problems  Body Mass Index (BMI) scored as: 3 = BMI 23 or greater  Nutritional Assessment Screening Score: 12 - 14 points - Normal nutritional status  Hospital Based Practices Required Assessment:   Pain Assessment   the patient states they do not have pain  (on a scale of 0 to 10, the patient rates the pain at 0 )   Abuse And Domestic Violence Screen   Domestic violence screen not done today  Reason DV Screen not done: fiance present in exam room    Depression And Suicide Screen  Suicide screen not done today  Reason suicide screen not done: fiance present in exam room  Prefered Language is  [place]  Primary Language is  English  Readiness To Learn: Receptive  Barriers To Learning: affect  Preferred Learning: demonstration and verbal   Education Completed: further treatment/follow-up and treatment/procedure   Teaching Method: verbal and demonstration   Person Taught: patient   Evaluation Of Learning: verbalized/demonstrated understanding and needs reinforcement      Active Problems    1   Abnormal kidney function (593  9) (N28 9)   2  Benign essential hypertension (401 1) (I10)   3  Charcot's Joint Of The Foot (713 5)   4  Chronic foot ulcer (707 15) (L97 509)   5  Chronic kidney disease (CKD) stage G3a/A1, moderately decreased glomerular filtration   rate (GFR) between 45-59 mL/min/1 73 square meter and albuminuria creatinine ratio   less than 30 mg/g (585 3) (N18 3)   6  Chronic ulcer of left ankle with fat layer exposed (707 13) (L97 322)   7  Congestive heart failure (428 0) (I50 9)   8  Dehiscence of incision (998 32) (T81 31XA)   9  Diabetes mellitus with neurological 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 60) (E11 49)   10  Diabetic Nephropathy (583 81)   11  DM type 2 (diabetes mellitus, type 2) (250 00) (E11 9)   12  Postoperative wound dehiscence, subsequent encounter (V58 89,998 32) (T81 31XD)   13  Status post skin graft (V42 3) (Z94 5)    Past Medical History    1  History of High cholesterol (272 0) (E78 0)   2  History of myocardial infarction (412) (I25 2)   3  History of stroke (V12 54) (Z86 73)   4  History of Irregular heartbeat (427 9) (I49 9)    Surgical History    1  History of Appendectomy (47 0)   2  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    Family History    1  Family history of Diabetes Mellitus (250 00)    2  Family history of cardiac disorder (V17 49) (Z82 49)    Social History    · Never a smoker    Current Meds   1  Atorvastatin Calcium 80 MG Oral Tablet; TAKE 1 TABLET DAILY; Therapy: 51ZTD5455 to Recorded   2  Carvedilol 12 5 MG Oral Tablet; Take 1 tablet twice daily; Therapy: 50KTM9613 to (Evaluate:30Jun2016) Recorded   3  Citalopram Hydrobromide 20 MG Oral Tablet; TAKE 1 TABLET DAILY; Therapy: 89CBP1642 to Recorded   4  Furosemide 40 MG Oral Tablet; as needed; Therapy: 19XPL1528 to Recorded   5  Lantus 100 UNIT/ML Subcutaneous Solution; 40 untis bid; Therapy: 54VEE0570 to Recorded   6   Lidocaine HCl (Local Anesth ) 4 % SOLN; Apply prn to wound prior to debridement for   pain control; Therapy: (Recorded:90Osf7709) to Recorded   7  Lisinopril 2 5 MG Oral Tablet; Therapy: (Hubert Closs) to Recorded   8  NovoLOG SOLN;   Therapy: (Recorded:19Tno0534) to Recorded   9  Pacerone 200 MG Oral Tablet; TAKE 1 TABLET DAILY; Therapy: 12LUZ1836 to Recorded   10  Spironolactone 25 MG Oral Tablet; 1/2 tablet daily; Therapy: 54BME5079 to Recorded   11  Vitamin C TABS; Therapy: (Thicket Roller) to Recorded   12  Warfarin Sodium 5 MG Oral Tablet; TAKE 1 TABLET DAILY; Therapy: 02WXA8303 to Recorded    Allergies    1  No Known Drug Allergies    Vitals  Signs [Data Includes: Current Encounter]   Recorded: 75QPN0441 03:39PM   Temperature: 98 5 F, Tympanic  Heart Rate: 60, R Radial  Pulse Quality: Regular, R Radial  Respiration: 16  Respiration Quality: Normal  Systolic: 921, RUE, Sitting  Diastolic: 64, RUE, Sitting  Height: 5 ft 11 in  Weight: 271 lb 0 3 oz  BMI Calculated: 37 8  BSA Calculated: 2 4  Pain Scale: 0    Physical Exam    Wound #1 Assessment wound #1 Location:, Right foot, started on 03/2015, Care for this wound started on 7/31/15  Wound Status: not healed  Diabetic Ulcer Grade/Lower Extremity: Gina Dies 2  Length: 0 1cm x Width: 0 1cm x Depth: 0 1cm   Total: 0 01sq cm   Wound Volume: 0 001cm3           Tissue type: Callus 100% callus   Exudate Amount: Scant   Exudate Type: Serosangiunous   Odor: None   Exudate Color: Yellow   Wound Edges: Callous   Periwound Skin Condition: Callus           Procedure      Wound #1: Right foot     Nurse Dressing Change:   Wound #1 The wound located on the Right foot  Wound care rendered as per Physician/Advanced Practitioner order/plan  [facility] 1 week with Dr Fab Herrera  Comments:      Wound Drsg  Orders/Instructions  Wound Identification Dressing Orders--Instructions:   Wound Identification and Instructions   Wound #1: Right foot    Wound Care Instructions  Discussed with Patient/Caregiver     Dressing Type: Adaptic  Wash with mild soap and water, normal saline, wound cleanser  As specified, use: NSS, and wound cleanser  Pete Horner Apply specified dressing to wound base/bed  Secondary dressing apply: Gauze, ABD  Secure with: Kerlix  Dressing change frequency: Weekly  Offloading: Felt pad to periwound  Comments/Other:   Bulky dressing of 4 cast padding, cast padding between toes of right foot, Kerlix, Coban and TubiFast yellow  Felt pad applied to periwound  Apply compression using: Coban  Wound Goals  Wound Goals:   Healing Goals:   Fair healing potential secondary to moderate comorbid conditions  Wound edges will appear with evidence of contraction and epithelialization   Patient will achieve full wound closure with ability to wear appropriate shoewear       Impression  Wound [address]: Wound Nursing Care Plan   Impaired Tissue Integrity related to: right foot and left lateral ankle wounds   Risk for Infection related to open wound:   Impaired Mobility related to: bulky dressing on right foot   Goals   Patient will achieve 100% epithelialization:  Patient will maintain skin integrity:  Wound Nursing Care Interventions:   Provide moist wound healing:  Implement offloading measures (turn & reposition schedule/method, ortho shoes/boots, floating heels, crutches, specialty surfaces:  Teach and evaluate effectiveness of offloading measures:  Plan of care initiated 7/31/15, reviewed 8/21/15, reviewed 9/25/15, reviewed 10/30/15, reviewed 11/20/15, reviewed 12/29/15, reviewed 1/8/16, reviewed 2/12/2016, 3/11/16, 4/8/16, 5/6/16, 6/3/16, Reviewed 6/30/2016      Future Appointments    Date/Time Provider Specialty Site   07/08/2016 11:00 AM [address]   Electronically signed by :  Wood Hylton RN; Jun 29 2016  3:43PM EST                       (Author)

## 2018-01-18 NOTE — MISCELLANEOUS
Physical Exam    Wound #1 Assessment wound #1 Location:, Right foot, started on 03/2015, Care for this wound started on 7/31/15  Wound Status: not healed  Diabetic Ulcer Grade/Lower Extremity: Ifeoma Panda 2  Length: 1 8cm x Width: 0 5cm x Depth: 0 2cm   Total: 0 9sq cm   Wound Volume: 0 18cm3           Tissue type: Subcutaneous, Granulation and Slough   Color of Wound: Red - 60% and Yellow - 40%   Exudate Amount: Moderate   Exudate Type: Serosangiunous   Odor: None   Exudate Color: brown/green   Wound Edges: Callous   Periwound Skin Condition: Callus, Edema, slight maceration        Physician/Provider Wound #1 Exam   I agree with the nursing assessment and documentation  Constitutional - General appearance: No acute distress, well appearing and well nourished  Wound Drsg  Orders/Instructions  Wound Identification Dressing Orders--Instructions:   Wound Identification and Instructions   Wound #1: Right foot  use: NSS   Wound Care Instructions  Discussed with Patient/Caregiver  Dressing Type: Acticoat 7  Wash with mild soap and water, normal saline, wound cleanser or as specified  Apply specified dressing to wound base/bed  Secondary dressing apply: Gauze, 1/2 ABD  Secure with: Kerlix, and tape  Dressing change frequency: Weekly  Total contact cast -- Change every 7 days and as needed for increased drainage, discomfort or excessive swelling of toes  Comments/Other:   Apply Acticoat 7, Maxorb to wound base  Total Contact Cast was applied today  Wound Goals  Wound Goals:   Healing Goals:   Fair healing potential secondary to moderate comorbid conditions     Wound edges will appear with evidence of contraction and epithelialization   Patient will achieve full wound closure with ability to wear appropriate shoewear       Future Appointments    Date/Time Provider Specialty Site   04/15/2016 11:15 AM Aristides Baeza DPM [facility]  Wound [address]: Wound Nursing Care Plan   Impaired Tissue Integrity related to: right foot and left lateral ankle wounds   Risk for Infection related to open wound:   Impaired Mobility related to: bulky dressing on right foot   Goals   Patient will achieve 100% epithelialization:  Patient will maintain skin integrity:  Wound Nursing Care Interventions:   Provide moist wound healing:  Implement offloading measures (turn & reposition schedule/method, ortho shoes/boots, floating heels, crutches, specialty surfaces:  Teach and evaluate effectiveness of offloading measures:   Plan of care initiated 7/31/15, reviewed 8/21/15, reviewed 9/25/15, reviewed 10/30/15 , reviewed 11/20/15, reviewed 12/29/15, reviewed 1/8/16, reviewed 2/12/2016, 3/11/16, 4/8/16      Signatures   Electronically signed by : Taco Isidro DPM; Apr 12 2016  1:07PM EST                       (Author)

## 2018-01-18 NOTE — PROGRESS NOTES
Assessment    1  Diabetes mellitus with neurological 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 60) (E11 49)   2  Chronic foot ulcer (707 15) (L97 509)    Plan  Chronic foot ulcer    · You can prevent pressure sores or keep them from getting worse ; Status:Complete;    Done: [id number]   Ordered; For:Chronic foot ulcer; Ordered By:Uriah Valenzuela;   · Follow-up visit in 1 week Evaluation and Treatment  Follow-up  Status: Complete  Done:  [id number]   Ordered; For: Chronic foot ulcer; Ordered By: Elva Human Performed:  Due: 11LIL8178; Last Updated By: Damion Sweet; 4/29/2016 11:03:51 AM    Wound Care Orders/Instructions    Wound Identification and Instructions   Wound #1: Right foot    Wound Care Instructions  Discussed with Patient/Caregiver  Dressing Type: Aileen [place] with mild soap and water, normal saline, wound cleanser  As specified, use: NSS, and wound cleanser  Lajean Cassette Apply specified dressing to wound base/bed  To periwound apply: Skin prep barrier  Secondary dressing apply: Gauze, 1/2 ABD  Secure with: Kerlix, and tape  Dressing change frequency: Weekly  Offloading: Foam pad to periwound  Comments/Other:   Bulky dressing applied 4/29/16 with 3 cast pading, 1 kerlix, 1 coban  Apply compression using: Coban  Wound Goals  Wound Goals:   Healing Goals:   Fair healing potential secondary to moderate comorbid conditions  Wound edges will appear with evidence of contraction and epithelialization   Patient will achieve full wound closure with ability to wear appropriate shoewear       Discussion/Summary      An offloading football-type dressing was applied  The treatment plan was reviewed with the patient/guardian  The patient/guardian understands and agrees with the treatment plan      Chief Complaint  Follow up for right foot wound      History of Present Illness    Wound Identification HPI   Wound #1: Right foot          The patient came to Wound Care via wheelchair   The patient is being seen for a follow-up with MD at the [address] [place] [address]  The patient is accompanied by his fiance  The patient's identification was verified  A secondary verification process was completed  Orientation: oriented to person, oriented to place and oriented to time  Blood Glucose:  131 mg/dL as reported by patient  7/31/15: Patient arrived via wheelchair for right foot wound  Has had wound since March 2015  Pt was seen by Dr Deandra Simms at [address] [place] [address] and was debrided at the bedside  Ace wrap with strike through drainage removed with 2 5x9s, adaptic and packing  Dressing was in place since Wednesday 7/29/15 per patient  Pt has [facility] for [facility] care  Dr Liriano Son relates that patient had two open wounds that communicated and upon MRI abscess was noted as well; bedside debridement removed skin bridge between two open areas and I&D of abscess  Plan for [facility] then total contact cast once enough granular tissue is present to d/c VAC  8/7/15: The patient arrived with VAC intact to wound  The patient has multiple abrasions from knee to toes and is bleeding  The patient relates the he crawled from his house, across grass and concrete into the car so that he didn't put any weight on his right foot  DPM had conversation with patient and significant other to come up with a solution; a manual light weight wheelchair will be prescribed  The SDTI that was on right dorsal foot has now opened, it will be wound #2  New wounds to right foot today traumatic injury  Patient has a small abrasion to the right knee that will be dressed and continue to assess  Follow up in one week  [facility]  8/[facility]/15: Pt arrived with wound vac intact to right foot wound @ 125, with 2 pieces of black foam, no bridge  Dressings intact to right dorsal ffot and right 2nd toe wounds but pt had foam dressing on, not dermagran gauze as ordered   Pt's family states he was recently hospitalized for low hemoglobin and was seen by Dr Michelle Solorio while in hospital  8/28/15: Patient arrived with wound vac intact to right lower extremity at 125mmhg  2 pieces of black foam removed with some foam noted on periwound  Denies any changes since last visit  Patient arrived with foam dressings to right foot instead of ordered dry gauze  Patient has received wheelchair since last visit and states  Follow up in one week  Copy of orders faxed to [facility]  9/4/15: Patient arrived with wound vac intact  1 piece of black foam removed  Denies changes since last visit  9/11/15: Arrived with wound vac intact to right foot  No changes since last visit  9/18/2015: arrived with dressings intact  For right 2nd toe amp 9/19/2015  Hold KCI wound VAC today  9/25/15: The patient arrived with NPWT and ordered dressings intact to wounds  The patient was seen by Dr Michelle Solorio 9/19/15 at which time the second toe was amputated  The patient arrived today with 98% intact suture line; the distal aspect is somewhat  (0 2cm)  10/9/2015: Arrived with NPWT intact to right foot, alginate intact to right dorsal foot and right 2nd toe  10/16/15: The patient arrived with dressings intact to wounds  NPWT functioning properly  10/23/2015: Arrived with dressing intact, KCI wound VAC  10/30/2015: Patient arrived with dressing intact to right foot and right thigh donor site  Patient states wound vac was discontinued at last MD visit  11/6/15: Arrived with dressing intact to right foot  Right leg donor site 4 x 1 5 cm  Purulent drainage assessed beneath right lateral foot eschar  11/13/2015: Arrived with dressings intact  New wound on left ankle; [place] was rubbing, has been corrected since new wound appeared  11/20/15: Patient arrived with dressings intact to all wounds  Denies changes since last visit  12/4/15: Patient arrived with dressing intact to right foot and no dressing ion left ankle but patient states it has been draining a few days   Denies changes to medications or recent falls  Left lateral ankle wound presets reopened today  12/11/15: Patient arrived with dressings intact to left and right foot  Denies changes since last visit  States he has been ambulating with bulky dressing on right foot  12/18/15: Patient arived with cast intact to RLE  Denies changes since last visit  12/29/2015: Patient arrived with TCC intact to RLE and CROW with dressing intact to LLE  Patient relates that the CROW was adjusted to relieve pressure from the lateral ankle area; feels that it is working well  Right third toe open area measuring 0 4 cm x 0 4 cm x 0 1 cm  Covered with Acticoat Flex 7 to stay intact under the TCC  1/8/16: The patient arrived with dressing intact to left lateral ankle wound and TCC intact to RLE  1/15/16: Patient arrived with crow intact to LLE, and dressings intact to right foot  1/22/16: Patient arrived with dressings intact to left and right foot  Denies changes since last visit  1/29/16: Patient arrived with bulky dressing with Acticoat intact to right foot wound  Patient arrived with Dermagran, 4x4 gauze and bhavna intact to left lateral ankle  2/5/16: Patient arrived with Acticoat and bulky dressing intact to right root  Patient arrived 4x4 gauze and tape to left lateral ankle  2/12/16: Arrived with bulky dressing intact to right foot  Strike through drainage to [employer]  Patient reported he is doing home Physical therapy currently, nursing has discharged from services  2/19/16: Patient arrived with Acticoat flex, 4x4 gauze, 5x9 ABD, Kerlix, coban intact to right foot wound  2/25/16: The patient arrived with dressing clean dry and intact  No issues since last visit  3/4/16: The patient arrived with dressing nicely intact to right foot  No issues since last visit  3/11/16: The patient arrived with dressing intact, strik[place]rough drainage to but not through coban  The patient relates that he is doing "a lot" with physical therapy   3/18/16: Patient arrived with cast to right foot  Denies any changes since last visit  3/25/16: Patient arrived with TCC intact to right foot  4/1/16: Patient arrived with cast to right foot  4/8/16: Patient arrived with cast intact to right lower extremity  Denies changes since last visit  4/15/2016 Patient arrived with TCC intact to RLE  Denies any changes since last visit  4/22/16:Patient arrived with TCC intact to RLE  4/29/16: Patient arrived with bulky dressing intact to right foot  Patient denies any changes since last visit  History of Falling: No = 0   Secondary Diagnosis: Yes = 15   Ambulatory Aid None, Bedrest, Nurse Assist = 0   No = 0   Gait: Impaired = 20 -- Short steps with shuffle, may have difficulty arising from chair, head down, significantly impaired balance, requiring furniture, support person, or walking aid to walk  Mental Status: Oriented to own ability = 0   Total Score: 35    25 - 45 = Moderate Risk        The most recent fall occurred patient denies any falls since last visit  Number of falls in the last year were 0  Nutrition Assessment Screening: Food intake over the last 3 months due to the loss of appetite, digestive problems, chewing or swallowing difficulties is graded as: 2 = no decrease in food intake   Weight loss during the last 3 months: 3 = no weight loss   Mobility scored as: 2 = goes out  Psychological Stress and Acute Disease Scored as: 0 = The patient has experienced psychological stress or acute disease in the last 3 months  Neuropsychological problems scored as: 2 = no psychological problems  Body Mass Index (BMI) scored as: 3 = BMI 23 or greater  Nutritional Assessment Screening Score: 12 - 14 points - Normal nutritional status  Provider Wound Care HPI: Nimesh Guevara is here for offloading dressing change  Pain Assessment   the patient states they do not have pain  (on a scale of 0 to 10, the patient rates the pain at 0 )   Abuse And Domestic Violence Screen      Domestic violence screen not done today  Reason DV Screen not done: fiance present in exam room    Depression And Suicide Screen  Suicide screen not done today  Reason suicide screen not done: fiance present in exam room  Prefered Language is  [place]  Primary Language is  English  Readiness To Learn: Receptive  Barriers To Learning: affect  Preferred Learning: demonstration and verbal   Education Completed: further treatment/follow-up and treatment/procedure   Teaching Method: verbal and demonstration   Person Taught: patient   Evaluation Of Learning: verbalized/demonstrated understanding and needs reinforcement      Review of Systems    Constitutional: no fever, not feeling poorly, no chills and not feeling tired  Active Problems    1  Abnormal kidney function (593 9) (N28 9)   2  Benign essential hypertension (401 1) (I10)   3  [place]'s [facility] Of The Foot (713 5)   4  Chronic foot ulcer (707 15) (L97 509)   5  Chronic kidney disease (CKD) stage G3a/A1, moderately decreased glomerular filtration   rate (GFR) between 45-59 mL/min/1 73 square meter and albuminuria creatinine ratio   less than 30 mg/g (585 3) (N18 3)   6  Chronic ulcer of left ankle with fat layer exposed (707 13) (L97 322)   7  Congestive heart failure (428 0) (I50 9)   8  Dehiscence of incision (998 32) (T81 31XA)   9  Diabetes mellitus with neurological 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 60) (E11 49)   10  Diabetic Nephropathy (583 81)   11  DM type 2 (diabetes mellitus, type 2) (250 00) (E11 9)   12  Postoperative wound dehiscence, subsequent encounter (V58 89,998 32) (T81 31XD)   13  Status post skin graft (V42 3) (Z94 5)    Past Medical History    1  History of High cholesterol (272 0) (E78 0)   2  History of myocardial infarction (412) (I25 2)   3  History of stroke (V12 54) (Z86 73)   4  History of Irregular heartbeat (427 9) (I49 9)    The active problems and past medical history were reviewed and updated today  Surgical History    1   History of Appendectomy (47 0)   2  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    Family History    1  Family history of Diabetes Mellitus (250 00)    2  Family history of cardiac disorder (V17 49) (Z82 49)    Social History    · Never a smoker    Current Meds   1  Atorvastatin Calcium 80 MG Oral Tablet; TAKE 1 TABLET DAILY; Therapy: 26DWM2866 to Recorded   2  Carvedilol 12 5 MG Oral Tablet; Take 1 tablet twice daily; Therapy: 08ECP2502 to (Evaluate:30Jun2016) Recorded   3  Citalopram Hydrobromide 20 MG Oral Tablet; TAKE 1 TABLET DAILY; Therapy: 70QXT0712 to Recorded   4  Furosemide 40 MG Oral Tablet; as needed; Therapy: 34JRC3396 to Recorded   5  Lantus 100 UNIT/ML Subcutaneous Solution; 40 untis bid; Therapy: 66ONA1554 to Recorded   6  Lidocaine HCl (Local Anesth ) 4 % SOLN; Apply prn to wound prior to debridement for   pain control; Therapy: (Recorded:96Gkf0489) to Recorded   7  Lisinopril 2 5 MG Oral Tablet; Therapy: (Yamileth Guerrero) to Recorded   8  NovoLOG SOLN;   Therapy: (Recorded:69Ujj1703) to Recorded   9  Pacerone 200 MG Oral Tablet; TAKE 1 TABLET DAILY; Therapy: 27UGQ6951 to Recorded   10  Spironolactone 25 MG Oral Tablet; 1/2 tablet daily; Therapy: 25UQI4337 to Recorded   11  Vitamin C TABS; Therapy: (Marie Gonzalez) to Recorded   12  Warfarin Sodium 5 MG Oral Tablet; TAKE 1 TABLET DAILY; Therapy: 98KTZ1724 to Recorded    Allergies    1  No Known Drug Allergies    Vitals  Vital Signs [Data Includes: Current Encounter]    Recorded: 65NLG7418 10:15AM   Temperature 98 6 F, Tympanic   Heart Rate 72   Respiration 18   Systolic 767   Diastolic 68   Height 5 ft 11 in   Weight Unobtainable Yes   Pain Scale 0     Physical Exam    Wound #1 Assessment wound #1 Location:, Right foot, started on 03/2015, Care for this wound started on 7/31/15  Wound Status: not healed  Diabetic Ulcer Grade/Lower Extremity: Candance Jaylon 2     Length: 1 7cm x Width: 0 1cm x Depth: 0 1cm   Total: 0 17sq cm   Wound Volume: 0 017cm3           Tissue type: Subcutaneous, Granulation and Slough   Color of Wound: Yellow - 2% and Pink - 98%   Exudate Amount: Scant   Odor: None   Exudate Color: Yellow and Tan   Wound Edges: Intact and Callous   Periwound Skin Condition: Scaly   Comments: Post debridement there is a skin bridge medially with two small open areas within stated measurement  Physician/Provider Wound #1 Exam   I agree with the nursing assessment and documentation  Constitutional - General appearance: No acute distress, well appearing and well nourished  Addi Damico 1: Wound Nursing Care Plan   Impaired Tissue Integrity related to: right foot and left lateral ankle wounds   Risk for Infection related to open wound:   Impaired Mobility related to: bulky dressing on right foot   Goals   Patient will achieve 100% epithelialization:  Patient will maintain skin integrity:  Wound Nursing Care Interventions:   Provide moist wound healing:  Implement offloading measures (turn & reposition schedule/method, ortho shoes/boots, floating heels, crutches, specialty surfaces:  Teach and evaluate effectiveness of offloading measures:  Plan of care initiated 7/31/15, reviewed 8/21/15, reviewed 9/25/15, reviewed 10/30/15 , reviewed 11/20/15, reviewed 12/29/15, reviewed 1/8/16, reviewed 2/12/2016, 3/11/16, 4/8/16      Procedure      Wound #1: Right foot     Nurse Dressing Change:   Wound #1 The wound located on the right foot   Wound care rendered as per Physician/Advanced Practitioner order/plan  Return to [address] [place] [address] 1 week  Comments:    Excisional Debridement Subcutaneous Tissue:   Wound was excisionally debrided to subcutaneous tissue as follows  Prior to the procedure, the patient was identified using two identifiers, the general consent was signed, the proper site of procedure was identified, and a time out was taken     Anesthesia: Local anesthesia with 4% topical lidocaine was utilized prior to the procedure for pain control  #10 surgical blade was utilized to surgically excise devitalized tissue and/or slough through epidermis, dermis and into the subcutaneous tissue  The total sq cm excised was  17sq cm  See wound assessment for further details  There was minimal bleeding controlled with gentle pressure  the patient tolerated the procedure well without complication  CPT Code(s)   [id number] - Excisional DebridementTo Subcutaneous Tissue; first 20 sq cm        Future Appointments    Date/Time Provider Specialty Site   05/06/2016 11:00 AM Kelsey Prado DPM Wound Care [place] [address]     Signatures   Electronically signed by : Rosaura Marcial DPM; May  3 2016  1:31PM EST                       (Author)

## 2018-01-18 NOTE — RESULT NOTES
Verified Results  (1) HEMOGLOBIN A1C 11BMW2074 12:00AM Lor Humphrey     Test Name Result Flag Reference   HEMOGLOBIN A1C 8 2

## 2018-01-18 NOTE — PROGRESS NOTES
Assessment    1  Chronic foot ulcer (344 59) ([id number])    Wound Care Orders/Instructions    Wound Identification and Instructions   Wound #1: Right foot    Wound Care Instructions  Discussed with Patient/Caregiver  Dressing Type: Adaptic  Wash with mild soap and water, normal saline, wound cleanser  As specified, use: NSS, and wound cleanser  Keshia Frisk Apply specified dressing to wound base/bed  Secondary dressing apply: Gauze, ABD  Secure with: Kerlix  Dressing change frequency: Weekly  Comments/Other:   Bulky dressing of 3cast padding, kerlix and coban  Felt pad applied to periwound  Apply compression using: Coban  Wound Goals  Wound Goals:   Healing Goals:   Fair healing potential secondary to moderate comorbid conditions  Wound edges will appear with evidence of contraction and epithelialization   Patient will achieve full wound closure with ability to wear appropriate shoewear       Discussion/Summary    Will use a bulky dressing for a week  He will return next week for RN dressing change and follow up with Dr Gil Mackay as ordered  The treatment plan was reviewed with the patient/guardian  The patient/guardian understands and agrees with the treatment plan      Chief Complaint  Follow up for right foot wound      History of Present Illness    Wound Identification HPI   Wound #1: Right foot          The patient came to Wound Care via wheelchair  The patient is being seen for Follow up with PAOLA Howe at the [address] [place] [address]  The patient is accompanied by his fiance  The patient's identification was verified  A secondary verification process was completed  Orientation: oriented to person, oriented to place and oriented to time  Blood Glucose:  122 mg/dL as reported by patient  7/31/15: Patient arrived via wheelchair for right foot wound  Has had wound since March 2015  Pt was seen by Dr Gil Mackay at [address] [place] [address] and was debrided at the bedside   Ace wrap with strike through drainage removed with 2 5x9s, adaptic and packing  Dressing was in place since Wednesday 7/29/15 per patient  Pt has [facility] for wound care  Dr Darby Colin relates that patient had two open wounds that communicated and upon MRI abscess was noted as well; bedside debridement removed skin bridge between two open areas and I&D of abscess  Plan for [facility] then total contact cast once enough granular tissue is present to d/c VAC  8/7/15: The patient arrived with VAC intact to wound  The patient has multiple abrasions from knee to toes and is bleeding  The patient relates the he crawled from his house, across grass and concrete into the car so that he didn't put any weight on his right foot  DPM had conversation with patient and significant other to come up with a solution; a manual light weight wheelchair will be prescribed  The SDTI that was on right dorsal foot has now opened, it will be wound #2  New wounds to right foot today traumatic injury  Patient has a small abrasion to the right knee that will be dressed and continue to assess  Follow up in one week  Copy of orders faxed to [facility]  8/21/15: Pt arrived with wound vac intact to right foot wound @ 125, with 2 pieces of black foam, no bridge  Dressings intact to right dorsal ffot and right 2nd toe wounds but pt had foam dressing on, not dermagran gauze as ordered  Pt's family states he was recently hospitalized for low hemoglobin and was seen by Dr Darby Colin while in hospital  8/28/15: Patient arrived with wound vac intact to right lower extremity at 125mmhg  2 pieces of black foam removed with some foam noted on periwound  Denies any changes since last visit  Patient arrived with foam dressings to right foot instead of ordered dry gauze  Patient has received wheelchair since last visit and states  Follow up in one week  Copy of orders faxed to [facility]  9/4/15: Patient arrived with wound vac intact  1 piece of black foam removed  Denies changes since last visit  9/11/15: Arrived with wound vac intact to right foot  No changes since last visit  9/18/2015: arrived with dressings intact  For right 2nd toe amp 9/19/2015  Hold KCI wound VAC today  9/25/15: The patient arrived with NPWT and ordered dressings intact to wounds  The patient was seen by Dr Casey Mahajan 9/19/15 at which time the second toe was amputated  The patient arrived today with 98% intact suture line; the distal aspect is somewhat  (0 2cm)  10/9/2015: Arrived with NPWT intact to right foot, alginate intact to right dorsal foot and right 2nd toe  10/16/15: The patient arrived with dressings intact to wounds  NPWT functioning properly  10/23/2015: Arrived with dressing intact, KCI wound VAC  10/30/2015: Patient arrived with dressing intact to right foot and right thigh donor site  Patient states wound vac was discontinued at last MD visit  11/6/15: Arrived with dressing intact to right foot  Right leg donor site 4 x 1 5 cm  Purulent drainage assessed beneath right lateral foot eschar  11/13/2015: Arrived with dressings intact  New wound on left ankle; BJORN was rubbing, has been corrected since new wound appeared  11/20/15: Patient arrived with dressings intact to all wounds  Denies changes since last visit  12/4/15: Patient arrived with dressing intact to right foot and no dressing ion left ankle but patient states it has been draining a few days  Denies changes to medications or recent falls  Left lateral ankle wound presets reopened today  12/11/15: Patient arrived with dressings intact to left and right foot  Denies changes since last visit  States he has been ambulating with bulky dressing on right foot  12/18/15: Patient arived with cast intact to RLE  Denies changes since last visit  12/29/2015: Patient arrived with TCC intact to RLE and CROW with dressing intact to LLE   Patient relates that the CROW was adjusted to relieve pressure from the lateral ankle area; feels that it is working well  Right third toe open area measuring 0 4 cm x 0 4 cm x 0 1 cm  Covered with Acticoat Flex 7 to stay intact under the TCC  1/8/16: The patient arrived with dressing intact to left lateral ankle wound and TCC intact to RLE  1/15/16: Patient arrived with crow intact to LLE, and dressings intact to right foot  1/22/16: Patient arrived with dressings intact to left and right foot  Denies changes since last visit  1/29/16: Patient arrived with bulky dressing with Acticoat intact to right foot wound  Patient arrived with Dermagran, 4x4 gauze and bhavna intact to left lateral ankle  2/5/16: Patient arrived with Acticoat and bulky dressing intact to right root  Patient arrived 4x4 gauze and tape to left lateral ankle  2/12/16: Arrived with bulky dressing intact to right foot  Strike through drainage to [employer]  Patient reported he is doing home Physical therapy currently, nursing has discharged from services  2/19/16: Patient arrived with Acticoat flex, 4x4 gauze, 5x9 ABD, Kerlix, coban intact to right foot wound  2/25/16: The patient arrived with dressing clean dry and intact  No issues since last visit  3/4/16: The patient arrived with dressing nicely intact to right foot  No issues since last visit  3/11/16: The patient arrived with dressing intact, strikethrough drainage to but not through coban  The patient relates that he is doing "a lot" with physical therapy  3/18/16: Patient arrived with cast to right foot  Denies any changes since last visit  3/25/16: Patient arrived with TCC intact to right foot  4/1/16: Patient arrived with cast to right foot  4/8/16: Patient arrived with cast intact to right lower extremity  Denies changes since last visit  4/15/2016 Patient arrived with TCC intact to RLE  Denies any changes since last visit  4/22/16:Patient arrived with TCC intact to RLE  4/29/16: Patient arrived with bulky dressing intact to right foot  Patient denies any changes since last visit   5/6/16: Arrived with Dermagran, 2x2, 4x4, cast padding, ace wrap and Coban intact to right foot  5/13/16: Patient arrived with bulky dressing intact  Denies changes since last visit  5/20/2016: Arrived with bulky dressing intact to right foot  Patient reports no problems with the dressing  5/27/2016: Patient arrived with bulky dressing intact to right foot wound  Patient denies any changes since last visit  6/3/16: Patient arrived with bulky dressing intact  Denies any changes since last visit  6/10/2016: Arrived with bulky dressing intact to right foot  6/24/2016: Patient arrived with TCC intact to Right foot  Denies changes since last visit  History of Falling: No = 0   Secondary Diagnosis: Yes = 15   Ambulatory Aid None, Bedrest, Nurse Assist = 0   No = 0   Gait: Impaired = 20 -- Short steps with shuffle, may have difficulty arising from chair, head down, significantly impaired balance, requiring furniture, support person, or walking aid to walk  Mental Status: Oriented to own ability = 0   Total Score: 35    25 - 45 = Moderate Risk        The most recent fall occurred patient denies any falls since last visit  Number of falls in the last year were 0  Nutrition Assessment Screening: Food intake over the last 3 months due to the loss of appetite, digestive problems, chewing or swallowing difficulties is graded as: 2 = no decrease in food intake   Weight loss during the last 3 months: 3 = no weight loss   Mobility scored as: 2 = goes out  Psychological Stress and Acute Disease Scored as: 0 = The patient has experienced psychological stress or acute disease in the last 3 months  Neuropsychological problems scored as: 2 = no psychological problems  Body Mass Index (BMI) scored as: 3 = BMI 23 or greater  Nutritional Assessment Screening Score: 12 - 14 points - Normal nutritional status  Provider Wound Care HPI: Follow up for right foot ulcer   He has had a cast on for 1 week and has a callous with small opening noted  Minimal drainage noted  Pain Assessment   the patient states they do not have pain  (on a scale of 0 to 10, the patient rates the pain at 0 )   Abuse And Domestic Violence Screen   Domestic violence screen not done today  Reason DV Screen not done: fiance present in exam room    Depression And Suicide Screen  Suicide screen not done today  Reason suicide screen not done: fiance present in exam room  Prefered Language is  [place]  Primary Language is  English  Readiness To Learn: Receptive  Barriers To Learning: affect  Preferred Learning: demonstration and verbal   Education Completed: further treatment/follow-up and treatment/procedure   Teaching Method: verbal and demonstration   Person Taught: patient   Evaluation Of Learning: verbalized/demonstrated understanding and needs reinforcement      Review of Systems    Constitutional: no fever or chills, feels well, no tiredness, no recent weight loss or weight gain  Integumentary: as noted in HPI  ROS reviewed  Active Problems    1  Abnormal kidney function (593 9) (N28 9)   2  Benign essential hypertension (401 1) (I10)   3  Charcot's Joint Of The Foot (713 5)   4  Chronic foot ulcer (707 15) (L97 509)   5  Chronic kidney disease (CKD) stage G3a/A1, moderately decreased glomerular filtration   rate (GFR) between 45-59 mL/min/1 73 square meter and albuminuria creatinine ratio   less than 30 mg/g (585 3) (N18 3)   6  Chronic ulcer of left ankle with fat layer exposed (707 13) (L97 322)   7  Congestive heart failure (428 0) (I50 9)   8  Dehiscence of incision (998 32) (T81 31XA)   9  Diabetes mellitus with neurological 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 60) (E11 49)   10  Diabetic Nephropathy (583 81)   11  DM type 2 (diabetes mellitus, type 2) (250 00) (E11 9)   12  Postoperative wound dehiscence, subsequent encounter (V58 89,998 32) (T81 31XD)   13  Status post skin graft (V42 3) (Z94 5)    Past Medical History    1   History of High cholesterol (272 0) (E78 0)   2  History of myocardial infarction (412) (I25 2)   3  History of stroke (V12 54) (Z86 73)   4  History of Irregular heartbeat (427 9) (I49 9)    The active problems and past medical history were reviewed and updated today  Surgical History    1  History of Appendectomy (47 0)   2  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    The surgical history was reviewed and updated today  Family History    1  Family history of Diabetes Mellitus (250 00)    2  Family history of cardiac disorder (V17 49) (Z82 49)    The family history was reviewed and updated today  Social History    · Never a smoker  The social history was reviewed and updated today  The social history was reviewed and is unchanged  Current Meds   1  Atorvastatin Calcium 80 MG Oral Tablet; TAKE 1 TABLET DAILY; Therapy: 17XZK9173 to Recorded   2  Carvedilol 12 5 MG Oral Tablet; Take 1 tablet twice daily; Therapy: 11PZJ2704 to (Evaluate:30Jun2016) Recorded   3  Citalopram Hydrobromide 20 MG Oral Tablet; TAKE 1 TABLET DAILY; Therapy: 20XAR8519 to Recorded   4  Furosemide 40 MG Oral Tablet; as needed; Therapy: 54DLL4943 to Recorded   5  Lantus 100 UNIT/ML Subcutaneous Solution; 40 untis bid; Therapy: 73IIM7249 to Recorded   6  Lidocaine HCl (Local Anesth ) 4 % SOLN; Apply prn to wound prior to debridement for   pain control; Therapy: (Recorded:16Dqc7318) to Recorded   7  Lisinopril 2 5 MG Oral Tablet; Therapy: (Judit Factor) to Recorded   8  NovoLOG SOLN;   Therapy: (Recorded:15Hai7177) to Recorded   9  Pacerone 200 MG Oral Tablet; TAKE 1 TABLET DAILY; Therapy: 23JUF4000 to Recorded   10  Spironolactone 25 MG Oral Tablet; 1/2 tablet daily; Therapy: 47IJJ9149 to Recorded   11  Vitamin C TABS; Therapy: (Mack Skates) to Recorded   12  Warfarin Sodium 5 MG Oral Tablet; TAKE 1 TABLET DAILY;     Therapy: 56WJP9216 to Recorded    The medication list was reviewed and updated today  Allergies    1  No Known Drug Allergies    Vitals  Vital Signs [Data Includes: Current Encounter]    Recorded: 57UBQ7610 11:03AM   Temperature 98 6 F, Temporal   Heart Rate 72   Respiration 16   Systolic 244   Diastolic 78   Height 5 ft 11 in   Weight 274 lb 4 oz   BMI Calculated 38 25   BSA Calculated 2 41   Pain Scale 0     Physical Exam    Wound #1 Assessment wound #1 Location:, Right foot, started on 03/2015, Care for this wound started on 7/31/15  Wound Status: not healed  Diabetic Ulcer Grade/Lower Extremity: [place] Check 2  Length: 0 1cm x Width: 0 1cm x Depth: 0 1cm   Total: 0 01sq cm   Wound Volume: 0 001cm3           Tissue type: Callus   Color of Wound: Yellow - 1% and Pink - 99% callus   Exudate Amount: Minimal   Exudate Type: Serosangiunous   Odor: None   Exudate Color: Yellow, Tan and brown   Wound Edges: Callous   Periwound Skin Condition: Callus          Nrsg Wound Care Plan  Wound Nursing Care Plan Agustin Faustin: Wound Nursing Care Plan   Impaired Tissue Integrity related to: right foot and left lateral ankle wounds   Risk for Infection related to open wound:   Impaired Mobility related to: bulky dressing on right foot   Goals   Patient will achieve 100% epithelialization:  Patient will maintain skin integrity:  Wound Nursing Care Interventions:   Provide moist wound healing:  Implement offloading measures (turn & reposition schedule/method, ortho shoes/boots, floating heels, crutches, specialty surfaces:  Teach and evaluate effectiveness of offloading measures:  Plan of care initiated 7/31/15, reviewed 8/21/15, reviewed 9/25/15, reviewed 10/30/15 , reviewed 11/20/15, reviewed 12/29/15, reviewed 1/8/16, reviewed 2/12/2016, 3/11/16, 4/8/16, 5/6/16, 6/3/16      Procedure      Wound #1: Right foot     Nurse Dressing Change:   Wound #1 The wound located on the Right foot  Wound care rendered as per Physician/Advanced Practitioner order/plan      Return to [address] [place] [address] 1 week for RN dressing change, 2 weeks f/u with Dr Almeida   Comments:    Removal Devitalized Tissue: Prior to the procedure, the patient was identified using two identifiers, the general consent was signed, the proper site of procedure was identified, and a time out was taken  Due to the presence of senescent cells and/or biofilm in the wound, a medical decision was made to perform a selective debridement  Anesthesia: Local anesthesia with 4% topical lidocaine was utilized prior to the procedure for pain control  #15 surgical blade was utilized to debride non-viable tissue  The non-viable tissue was removed  There was minimal bleeding controlled with gentle pressure  The total surface area that was selectively debrided for the above site was 1sq cm  The patient tolerated the procedure well without complication  Codes:   84929 Removal Of Devitalized Tissue First 20 sq cm or less  Attending Note  Collaborating Note: I agree with the Advanced Practitioner note   I discussed the case with the Advanced Practitioner and reviewed the AP note      Future Appointments    Date/Time Provider Specialty Site   07/08/2016 11:00 AM Migel Fletcher DPM Wound Care [place] [address]   07/01/2016 10:00 AM Wound Care [address], Nurse Schedule  [place] [address]     Signatures   Electronically signed by : LINDA Copeland,PAOLA; Jun 24 2016  4:25PM EST                       (Author)    Electronically signed by : Maida Real MD; Jun 27 2016  7:27AM EST                       (Author)

## 2018-01-19 ENCOUNTER — HOSPITAL ENCOUNTER (INPATIENT)
Facility: HOSPITAL | Age: 53
LOS: 2 days | Discharge: HOME | DRG: 280 | End: 2018-01-22
Attending: EMERGENCY MEDICINE | Admitting: INTERNAL MEDICINE
Payer: MEDICARE

## 2018-01-19 DIAGNOSIS — R06.03 ACUTE RESPIRATORY DISTRESS: ICD-10-CM

## 2018-01-19 DIAGNOSIS — I50.43 ACUTE ON CHRONIC COMBINED SYSTOLIC AND DIASTOLIC CHF (CONGESTIVE HEART FAILURE) (HCC): ICD-10-CM

## 2018-01-19 DIAGNOSIS — I50.9 CONGESTIVE HEART FAILURE (CHF) (HCC): Primary | ICD-10-CM

## 2018-01-19 DIAGNOSIS — N17.9 AKI (ACUTE KIDNEY INJURY) (HCC): ICD-10-CM

## 2018-01-19 DIAGNOSIS — N18.9 CKD (CHRONIC KIDNEY DISEASE): ICD-10-CM

## 2018-01-19 DIAGNOSIS — L97.509 FOOT ULCER (HCC): ICD-10-CM

## 2018-01-20 ENCOUNTER — APPOINTMENT (INPATIENT)
Dept: NON INVASIVE DIAGNOSTICS | Facility: HOSPITAL | Age: 53
DRG: 280 | End: 2018-01-20
Payer: MEDICARE

## 2018-01-20 ENCOUNTER — APPOINTMENT (INPATIENT)
Dept: RADIOLOGY | Facility: HOSPITAL | Age: 53
DRG: 280 | End: 2018-01-20
Payer: MEDICARE

## 2018-01-20 LAB
ANION GAP SERPL CALCULATED.3IONS-SCNC: 12 MMOL/L (ref 4–13)
ANION GAP SERPL CALCULATED.3IONS-SCNC: 13 MMOL/L (ref 4–13)
ATRIAL RATE: 72 BPM
BASOPHILS # BLD AUTO: 0.03 THOUSANDS/ΜL (ref 0–0.1)
BASOPHILS NFR BLD AUTO: 0 % (ref 0–1)
BUN SERPL-MCNC: 61 MG/DL (ref 5–25)
BUN SERPL-MCNC: 66 MG/DL (ref 5–25)
CALCIUM SERPL-MCNC: 9 MG/DL (ref 8.3–10.1)
CALCIUM SERPL-MCNC: 9.2 MG/DL (ref 8.3–10.1)
CHLORIDE SERPL-SCNC: 100 MMOL/L (ref 100–108)
CHLORIDE SERPL-SCNC: 99 MMOL/L (ref 100–108)
CO2 SERPL-SCNC: 21 MMOL/L (ref 21–32)
CO2 SERPL-SCNC: 22 MMOL/L (ref 21–32)
CREAT SERPL-MCNC: 2.25 MG/DL (ref 0.6–1.3)
CREAT SERPL-MCNC: 2.31 MG/DL (ref 0.6–1.3)
EOSINOPHIL # BLD AUTO: 0.05 THOUSAND/ΜL (ref 0–0.61)
EOSINOPHIL NFR BLD AUTO: 1 % (ref 0–6)
ERYTHROCYTE [DISTWIDTH] IN BLOOD BY AUTOMATED COUNT: 16.2 % (ref 11.6–15.1)
ERYTHROCYTE [DISTWIDTH] IN BLOOD BY AUTOMATED COUNT: 16.4 % (ref 11.6–15.1)
EST. AVERAGE GLUCOSE BLD GHB EST-MCNC: 214 MG/DL
GFR SERPL CREATININE-BSD FRML MDRD: 32 ML/MIN/1.73SQ M
GFR SERPL CREATININE-BSD FRML MDRD: 33 ML/MIN/1.73SQ M
GLUCOSE SERPL-MCNC: 176 MG/DL (ref 65–140)
GLUCOSE SERPL-MCNC: 364 MG/DL (ref 65–140)
GLUCOSE SERPL-MCNC: 392 MG/DL (ref 65–140)
GLUCOSE SERPL-MCNC: 418 MG/DL (ref 65–140)
GLUCOSE SERPL-MCNC: 420 MG/DL (ref 65–140)
GLUCOSE SERPL-MCNC: 433 MG/DL (ref 65–140)
GLUCOSE SERPL-MCNC: 472 MG/DL (ref 65–140)
GLUCOSE SERPL-MCNC: 479 MG/DL (ref 65–140)
HBA1C MFR BLD: 9.1 % (ref 4.2–6.3)
HCT VFR BLD AUTO: 31.9 % (ref 36.5–49.3)
HCT VFR BLD AUTO: 32.7 % (ref 36.5–49.3)
HGB BLD-MCNC: 10.2 G/DL (ref 12–17)
HGB BLD-MCNC: 10.6 G/DL (ref 12–17)
INR PPP: 1.76 (ref 0.86–1.16)
LYMPHOCYTES # BLD AUTO: 0.49 THOUSANDS/ΜL (ref 0.6–4.47)
LYMPHOCYTES NFR BLD AUTO: 5 % (ref 14–44)
MCH RBC QN AUTO: 27.3 PG (ref 26.8–34.3)
MCH RBC QN AUTO: 27.7 PG (ref 26.8–34.3)
MCHC RBC AUTO-ENTMCNC: 32 G/DL (ref 31.4–37.4)
MCHC RBC AUTO-ENTMCNC: 32.4 G/DL (ref 31.4–37.4)
MCV RBC AUTO: 85 FL (ref 82–98)
MCV RBC AUTO: 85 FL (ref 82–98)
MONOCYTES # BLD AUTO: 0.54 THOUSAND/ΜL (ref 0.17–1.22)
MONOCYTES NFR BLD AUTO: 6 % (ref 4–12)
NEUTROPHILS # BLD AUTO: 8.56 THOUSANDS/ΜL (ref 1.85–7.62)
NEUTS SEG NFR BLD AUTO: 88 % (ref 43–75)
NT-PROBNP SERPL-MCNC: 4618 PG/ML
NT-PROBNP SERPL-MCNC: ABNORMAL PG/ML
P AXIS: 70 DEGREES
PLATELET # BLD AUTO: 183 THOUSANDS/UL (ref 149–390)
PLATELET # BLD AUTO: 198 THOUSANDS/UL (ref 149–390)
PMV BLD AUTO: 12.9 FL (ref 8.9–12.7)
PMV BLD AUTO: 13.2 FL (ref 8.9–12.7)
POTASSIUM SERPL-SCNC: 4.3 MMOL/L (ref 3.5–5.3)
POTASSIUM SERPL-SCNC: 5.3 MMOL/L (ref 3.5–5.3)
PR INTERVAL: 198 MS
PROTHROMBIN TIME: 21.1 SECONDS (ref 12.1–14.4)
QRS AXIS: -34 DEGREES
QRSD INTERVAL: 162 MS
QT INTERVAL: 524 MS
QTC INTERVAL: 573 MS
RBC # BLD AUTO: 3.74 MILLION/UL (ref 3.88–5.62)
RBC # BLD AUTO: 3.83 MILLION/UL (ref 3.88–5.62)
SODIUM SERPL-SCNC: 133 MMOL/L (ref 136–145)
SODIUM SERPL-SCNC: 134 MMOL/L (ref 136–145)
T WAVE AXIS: 123 DEGREES
TROPONIN I SERPL-MCNC: 0.22 NG/ML
TROPONIN I SERPL-MCNC: 2.76 NG/ML
TROPONIN I SERPL-MCNC: <0.02 NG/ML
VENTRICULAR RATE: 72 BPM
WBC # BLD AUTO: 9.67 THOUSAND/UL (ref 4.31–10.16)
WBC # BLD AUTO: 9.82 THOUSAND/UL (ref 4.31–10.16)

## 2018-01-20 PROCEDURE — 93005 ELECTROCARDIOGRAM TRACING: CPT

## 2018-01-20 PROCEDURE — 83036 HEMOGLOBIN GLYCOSYLATED A1C: CPT | Performed by: PHYSICIAN ASSISTANT

## 2018-01-20 PROCEDURE — 93306 TTE W/DOPPLER COMPLETE: CPT

## 2018-01-20 PROCEDURE — 84484 ASSAY OF TROPONIN QUANT: CPT | Performed by: PHYSICIAN ASSISTANT

## 2018-01-20 PROCEDURE — 82948 REAGENT STRIP/BLOOD GLUCOSE: CPT

## 2018-01-20 PROCEDURE — 80048 BASIC METABOLIC PNL TOTAL CA: CPT | Performed by: EMERGENCY MEDICINE

## 2018-01-20 PROCEDURE — 99285 EMERGENCY DEPT VISIT HI MDM: CPT

## 2018-01-20 PROCEDURE — 84484 ASSAY OF TROPONIN QUANT: CPT | Performed by: EMERGENCY MEDICINE

## 2018-01-20 PROCEDURE — 94660 CPAP INITIATION&MGMT: CPT

## 2018-01-20 PROCEDURE — 80048 BASIC METABOLIC PNL TOTAL CA: CPT | Performed by: INTERNAL MEDICINE

## 2018-01-20 PROCEDURE — 71046 X-RAY EXAM CHEST 2 VIEWS: CPT

## 2018-01-20 PROCEDURE — 96374 THER/PROPH/DIAG INJ IV PUSH: CPT

## 2018-01-20 PROCEDURE — 85027 COMPLETE CBC AUTOMATED: CPT | Performed by: PHYSICIAN ASSISTANT

## 2018-01-20 PROCEDURE — 85610 PROTHROMBIN TIME: CPT | Performed by: PHYSICIAN ASSISTANT

## 2018-01-20 PROCEDURE — 93005 ELECTROCARDIOGRAM TRACING: CPT | Performed by: EMERGENCY MEDICINE

## 2018-01-20 PROCEDURE — 83880 ASSAY OF NATRIURETIC PEPTIDE: CPT | Performed by: EMERGENCY MEDICINE

## 2018-01-20 PROCEDURE — 36415 COLL VENOUS BLD VENIPUNCTURE: CPT | Performed by: EMERGENCY MEDICINE

## 2018-01-20 PROCEDURE — 85025 COMPLETE CBC W/AUTO DIFF WBC: CPT | Performed by: EMERGENCY MEDICINE

## 2018-01-20 PROCEDURE — 83880 ASSAY OF NATRIURETIC PEPTIDE: CPT | Performed by: INTERNAL MEDICINE

## 2018-01-20 PROCEDURE — 94760 N-INVAS EAR/PLS OXIMETRY 1: CPT

## 2018-01-20 RX ORDER — LISINOPRIL 2.5 MG/1
2.5 TABLET ORAL DAILY
Status: DISCONTINUED | OUTPATIENT
Start: 2018-01-20 | End: 2018-01-20

## 2018-01-20 RX ORDER — WARFARIN SODIUM 5 MG/1
5 TABLET ORAL
Status: DISCONTINUED | OUTPATIENT
Start: 2018-01-21 | End: 2018-01-22 | Stop reason: HOSPADM

## 2018-01-20 RX ORDER — NITROGLYCERIN 2.5 MG/D
1 PATCH TRANSDERMAL
Status: DISCONTINUED | OUTPATIENT
Start: 2018-01-20 | End: 2018-01-20 | Stop reason: ALTCHOICE

## 2018-01-20 RX ORDER — LISINOPRIL 2.5 MG/1
2.5 TABLET ORAL DAILY
COMMUNITY
End: 2018-01-22 | Stop reason: HOSPADM

## 2018-01-20 RX ORDER — WARFARIN SODIUM 2.5 MG/1
2.5 TABLET ORAL
Status: COMPLETED | OUTPATIENT
Start: 2018-01-20 | End: 2018-01-20

## 2018-01-20 RX ORDER — SENNOSIDES 8.6 MG
1 TABLET ORAL
Status: DISCONTINUED | OUTPATIENT
Start: 2018-01-20 | End: 2018-01-22 | Stop reason: HOSPADM

## 2018-01-20 RX ORDER — MELATONIN
1000 DAILY
Status: DISCONTINUED | OUTPATIENT
Start: 2018-01-20 | End: 2018-01-22 | Stop reason: HOSPADM

## 2018-01-20 RX ORDER — WARFARIN SODIUM 2.5 MG/1
2.5 TABLET ORAL
Status: DISCONTINUED | OUTPATIENT
Start: 2018-01-22 | End: 2018-01-22 | Stop reason: HOSPADM

## 2018-01-20 RX ORDER — SPIRONOLACTONE 25 MG/1
12.5 TABLET ORAL DAILY
Status: DISCONTINUED | OUTPATIENT
Start: 2018-01-20 | End: 2018-01-22 | Stop reason: HOSPADM

## 2018-01-20 RX ORDER — ONDANSETRON 2 MG/ML
4 INJECTION INTRAMUSCULAR; INTRAVENOUS EVERY 6 HOURS PRN
Status: DISCONTINUED | OUTPATIENT
Start: 2018-01-20 | End: 2018-01-22 | Stop reason: HOSPADM

## 2018-01-20 RX ORDER — AMLODIPINE BESYLATE 5 MG/1
5 TABLET ORAL DAILY
Status: DISCONTINUED | OUTPATIENT
Start: 2018-01-20 | End: 2018-01-22 | Stop reason: HOSPADM

## 2018-01-20 RX ORDER — WARFARIN SODIUM 2.5 MG/1
2.5 TABLET ORAL
Status: DISCONTINUED | OUTPATIENT
Start: 2018-01-20 | End: 2018-01-20

## 2018-01-20 RX ORDER — BIOTIN 1 MG
TABLET ORAL
COMMUNITY

## 2018-01-20 RX ORDER — AMIODARONE HYDROCHLORIDE 200 MG/1
200 TABLET ORAL
Status: DISCONTINUED | OUTPATIENT
Start: 2018-01-20 | End: 2018-01-22 | Stop reason: HOSPADM

## 2018-01-20 RX ORDER — ACETAMINOPHEN 325 MG/1
650 TABLET ORAL EVERY 6 HOURS PRN
Status: DISCONTINUED | OUTPATIENT
Start: 2018-01-20 | End: 2018-01-22 | Stop reason: HOSPADM

## 2018-01-20 RX ORDER — ASPIRIN 81 MG/1
81 TABLET, CHEWABLE ORAL DAILY
Status: DISCONTINUED | OUTPATIENT
Start: 2018-01-20 | End: 2018-01-22 | Stop reason: HOSPADM

## 2018-01-20 RX ORDER — NITROGLYCERIN 0.4 MG/1
0.4 TABLET SUBLINGUAL
Status: DISCONTINUED | OUTPATIENT
Start: 2018-01-20 | End: 2018-01-22 | Stop reason: HOSPADM

## 2018-01-20 RX ORDER — NITROGLYCERIN 0.4 MG/1
0.4 TABLET SUBLINGUAL
COMMUNITY

## 2018-01-20 RX ORDER — DOCUSATE SODIUM 100 MG/1
100 CAPSULE, LIQUID FILLED ORAL 2 TIMES DAILY
Status: DISCONTINUED | OUTPATIENT
Start: 2018-01-20 | End: 2018-01-22 | Stop reason: HOSPADM

## 2018-01-20 RX ORDER — CARVEDILOL 6.25 MG/1
6.25 TABLET ORAL 2 TIMES DAILY WITH MEALS
Status: DISCONTINUED | OUTPATIENT
Start: 2018-01-20 | End: 2018-01-22 | Stop reason: HOSPADM

## 2018-01-20 RX ORDER — ATORVASTATIN CALCIUM 40 MG/1
80 TABLET, FILM COATED ORAL
Status: DISCONTINUED | OUTPATIENT
Start: 2018-01-20 | End: 2018-01-22 | Stop reason: HOSPADM

## 2018-01-20 RX ORDER — FUROSEMIDE 10 MG/ML
40 INJECTION INTRAMUSCULAR; INTRAVENOUS ONCE
Status: COMPLETED | OUTPATIENT
Start: 2018-01-20 | End: 2018-01-20

## 2018-01-20 RX ORDER — FUROSEMIDE 10 MG/ML
40 INJECTION INTRAMUSCULAR; INTRAVENOUS
Status: DISCONTINUED | OUTPATIENT
Start: 2018-01-20 | End: 2018-01-22 | Stop reason: HOSPADM

## 2018-01-20 RX ORDER — WARFARIN SODIUM 5 MG/1
5 TABLET ORAL
Status: DISCONTINUED | OUTPATIENT
Start: 2018-01-20 | End: 2018-01-20

## 2018-01-20 RX ADMIN — FUROSEMIDE 40 MG: 10 INJECTION, SOLUTION INTRAMUSCULAR; INTRAVENOUS at 00:48

## 2018-01-20 RX ADMIN — DOCUSATE SODIUM 100 MG: 100 CAPSULE, LIQUID FILLED ORAL at 09:14

## 2018-01-20 RX ADMIN — FUROSEMIDE 40 MG: 10 INJECTION, SOLUTION INTRAMUSCULAR; INTRAVENOUS at 16:31

## 2018-01-20 RX ADMIN — INSULIN HUMAN 8 UNITS: 100 INJECTION, SOLUTION PARENTERAL at 04:37

## 2018-01-20 RX ADMIN — FUROSEMIDE 40 MG: 10 INJECTION, SOLUTION INTRAMUSCULAR; INTRAVENOUS at 09:14

## 2018-01-20 RX ADMIN — INSULIN LISPRO 5 UNITS: 100 INJECTION, SOLUTION INTRAVENOUS; SUBCUTANEOUS at 09:19

## 2018-01-20 RX ADMIN — LISINOPRIL 2.5 MG: 2.5 TABLET ORAL at 09:12

## 2018-01-20 RX ADMIN — INSULIN DETEMIR 20 UNITS: 100 INJECTION, SOLUTION SUBCUTANEOUS at 21:17

## 2018-01-20 RX ADMIN — CARVEDILOL 6.25 MG: 6.25 TABLET, FILM COATED ORAL at 09:14

## 2018-01-20 RX ADMIN — INSULIN LISPRO 10 UNITS: 100 INJECTION, SOLUTION INTRAVENOUS; SUBCUTANEOUS at 09:19

## 2018-01-20 RX ADMIN — CARVEDILOL 6.25 MG: 6.25 TABLET, FILM COATED ORAL at 16:30

## 2018-01-20 RX ADMIN — INSULIN LISPRO 12 UNITS: 100 INJECTION, SOLUTION INTRAVENOUS; SUBCUTANEOUS at 11:30

## 2018-01-20 RX ADMIN — SPIRONOLACTONE 12.5 MG: 25 TABLET, FILM COATED ORAL at 09:13

## 2018-01-20 RX ADMIN — AMIODARONE HYDROCHLORIDE 200 MG: 200 TABLET ORAL at 09:14

## 2018-01-20 RX ADMIN — CHOLECALCIFEROL TAB 25 MCG (1000 UNIT) 1000 UNITS: 25 TAB at 09:13

## 2018-01-20 RX ADMIN — INSULIN LISPRO 20 UNITS: 100 INJECTION, SOLUTION INTRAVENOUS; SUBCUTANEOUS at 17:24

## 2018-01-20 RX ADMIN — ASPIRIN 81 MG 81 MG: 81 TABLET ORAL at 09:14

## 2018-01-20 RX ADMIN — INSULIN DETEMIR 30 UNITS: 100 INJECTION, SOLUTION SUBCUTANEOUS at 09:24

## 2018-01-20 RX ADMIN — WARFARIN SODIUM 2.5 MG: 2.5 TABLET ORAL at 17:46

## 2018-01-20 RX ADMIN — ATORVASTATIN CALCIUM 80 MG: 40 TABLET, FILM COATED ORAL at 16:30

## 2018-01-20 RX ADMIN — INSULIN LISPRO 5 UNITS: 100 INJECTION, SOLUTION INTRAVENOUS; SUBCUTANEOUS at 11:30

## 2018-01-20 RX ADMIN — WARFARIN SODIUM 2.5 MG: 2.5 TABLET ORAL at 17:26

## 2018-01-20 RX ADMIN — AMLODIPINE BESYLATE 5 MG: 5 TABLET ORAL at 09:12

## 2018-01-20 NOTE — ASSESSMENT & PLAN NOTE
· Glucose 479  · Humulin IV now  · Continue home insulin with SSI coverage  · Consider insulin drip

## 2018-01-20 NOTE — CONSULTS
Consultation - Cardiology   Gerardo Lee  50 y o  male MRN: [id number]  Unit/Bed#: -01 Encounter: [id number]  Physician Requesting Consult: Zoraida Clark MD  Reason for Consult / Principal Problem: SOB    Assessment:  Principal Problem:    Acute on chronic combined systolic and diastolic CHF (congestive heart failure) ([place] [address] )  Active Problems:    CKD (chronic kidney disease)    Hypertension    Diabetes 1 5, managed as type 2 ([place] [address] )    PAF (paroxysmal atrial fibrillation) ([place] [address] )    Hyperlipidemia      Plan:  Continue IV lasix as ordered  Follow renal function closely  Troponin is elevated secondary to Type 2 MI from heart failure  ECHO ordered  History of Present Illness     HPI: Gerardo Lee  is a 50y o  year old male who presents with acute on chronic SOB  He has severe CAD not amendable to revascularization  He follows with cardiology at [facility]  He admits to using too much salt recently and developed SOB and orthopnea similar to previous admissions for heart failure  His EF is 43 % by lasrt ECHO  He denies CP  He does feel better after IV Lasix given in the ER      Review of Systems:    Alert awake oriented, comfortable, denies any complaints  No fevers chills nausea vomiting  No weakness, dizziness, seizures  positive for - shortness of breath  Denies any palpitations, chest pain, diaphoresis  Denies leg edema, pain or paresthesias  Denies any skin rashes  Denies abdominal pain, bloody stools, masses  Denies any depression or suicidal ideations      Historical Information   Past Medical History:   Diagnosis Date    A-fib ([place] [address] )     Carotid artery disorder (HCC)     L carotid completely blocked    Hyperlipidemia     Hypertension     Insulin dependent diabetes mellitus ([place] [address] )     Stroke [facility])      Past Surgical History:   Procedure Laterality Date    APPENDECTOMY      TOE AMPUTATION       History   Alcohol Use    1 2 oz/week    1 Cans of beer, 1 Shots of liquor per week History   Drug Use No     History   Smoking Status    Never Smoker   Smokeless Tobacco    Never Used     Family History: non-contributory    Meds/Allergies   all current active meds have been reviewed  No Known Allergies    Objective   Vitals: Blood pressure 140/58, pulse 71, temperature 98 1 °F (36 7 °C), resp  rate 18, height 6' (1 829 m), weight 109 kg (239 lb 3 2 oz), SpO2 93 %  , Body mass index is 32 44 kg/m² , Orthostatic Blood Pressures    Flowsheet Row Most Recent Value   Blood Pressure  140/58 filed at 01/20/2018 0700   Patient Position - Orthostatic VS  Lying filed at 01/20/2018 0700            Intake/Output Summary (Last 24 hours) at 01/20/18 1133  Last data filed at 01/20/18 1000   Gross per 24 hour   Intake              720 ml   Output              500 ml   Net              220 ml               Physical Exam:  GEN: [place]   appears well, alert and oriented x 3, pleasant and cooperative   HEENT: pupils equal, round, and reactive to light; extraocular muscles intact  NECK: supple, no carotid bruits   HEART: regular rhythm, normal S1 and S2, no murmurs, clicks, gallops or rubs   LUNGS: clear to auscultation bilaterally; no wheezes, rales, or rhonchi   ABDOMEN: normal bowel sounds, soft, no tenderness, no distention  EXTREMITIES: peripheral pulses normal; no clubbing, cyanosis, or edema  NEURO: no focal findings   SKIN: normal without suspicious lesions on exposed skin    Lab Results:   Admission on 01/19/2018   Component Date Value Ref Range Status    WBC 01/20/2018 9 67  4 31 - 10 16 Thousand/uL Final    RBC 01/20/2018 3 83* 3 88 - 5 62 Million/uL Final    Hemoglobin 01/20/2018 10 6* 12 0 - 17 0 g/dL Final    Hematocrit 01/20/2018 32 7* 36 5 - 49 3 % Final    MCV 01/20/2018 85  82 - 98 fL Final    MCH 01/20/2018 27 7  26 8 - 34 3 pg Final    MCHC 01/20/2018 32 4  31 4 - 37 4 g/dL Final    RDW 01/20/2018 16 2* 11 6 - 15 1 % Final    MPV 01/20/2018 13 2* 8 9 - 12 7 fL Final    Platelets 86/36/9538 198  149 - 390 Thousands/uL Final    Neutrophils Relative 01/20/2018 88* 43 - 75 % Final    Lymphocytes Relative 01/20/2018 5* 14 - 44 % Final    Monocytes Relative 01/20/2018 6  4 - 12 % Final    Eosinophils Relative 01/20/2018 1  0 - 6 % Final    Basophils Relative 01/20/2018 0  0 - 1 % Final    Neutrophils Absolute 01/20/2018 8 56* 1 85 - 7 62 Thousands/µL Final    Lymphocytes Absolute 01/20/2018 0 49* 0 60 - 4 47 Thousands/µL Final    Monocytes Absolute 01/20/2018 0 54  0 17 - 1 22 Thousand/µL Final    Eosinophils Absolute 01/20/2018 0 05  0 00 - 0 61 Thousand/µL Final    Basophils Absolute 01/20/2018 0 03  0 00 - 0 10 Thousands/µL Final    Sodium 01/20/2018 134* 136 - 145 mmol/L Final    Potassium 01/20/2018 5 3  3 5 - 5 3 mmol/L Final    Chloride 01/20/2018 100  100 - 108 mmol/L Final    CO2 01/20/2018 22  21 - 32 mmol/L Final    Anion Gap 01/20/2018 12  4 - 13 mmol/L Final    BUN 01/20/2018 61* 5 - 25 mg/dL Final    Creatinine 01/20/2018 2 25* 0 60 - 1 30 mg/dL Final    Glucose 01/20/2018 479* 65 - 140 mg/dL Final    Calcium 01/20/2018 9 0  8 3 - 10 1 mg/dL Final    eGFR 01/20/2018 33  ml/min/1 73sq m Final    NT-proBNP 01/20/2018 4618* <125 pg/mL Final    Troponin I 01/20/2018 <0 02  <=0 04 ng/mL Final    Ventricular Rate 01/20/2018 72  BPM Final    Atrial Rate 01/20/2018 72  BPM Final    AK Interval 01/20/2018 198  ms Final    QRSD Interval 01/20/2018 162  ms Final    QT Interval 01/20/2018 524  ms Final    QTC Interval 01/20/2018 573  ms Final    P Axis 01/20/2018 70  degrees Final    QRS Axis 01/20/2018 -34  degrees Final    [facility] 01/20/2018 123  degrees Final    POC Glucose 01/20/2018 433* 65 - 140 mg/dl Final    Troponin I 01/20/2018 0 22* <=0 04 ng/mL Final    WBC 01/20/2018 9 82  4 31 - 10 16 Thousand/uL Final    RBC 01/20/2018 3 74* 3 88 - 5 62 Million/uL Final    Hemoglobin 01/20/2018 10 2* 12 0 - 17 0 g/dL Final    Hematocrit 01/20/2018 31 9* 36 5 - 49 3 % Final    MCV 01/20/2018 85  82 - 98 fL Final    MCH 01/20/2018 27 3  26 8 - 34 3 pg Final    MCHC 01/20/2018 32 0  31 4 - 37 4 g/dL Final    RDW 01/20/2018 16 4* 11 6 - 15 1 % Final    Platelets 36/52/3799 183  149 - 390 Thousands/uL Final    MPV 01/20/2018 12 9* 8 9 - 12 7 fL Final    Protime 01/20/2018 21 1* 12 1 - 14 4 seconds Final    INR 01/20/2018 1 76* 0 86 - 1 16 Final    POC Glucose 01/20/2018 472* 65 - 140 mg/dl Final    Troponin I 01/20/2018 2 76* <=0 04 ng/mL Final    POC Glucose 01/20/2018 364* 65 - 140 mg/dl Final    POC Glucose 01/20/2018 418* 65 - 140 mg/dl Final         Results from last 7 days  Lab Units 01/20/18  0840 01/20/18  0452 01/20/18  0030   TROPONIN I ng/mL 2 76* 0 22* <0 02     Results from last 7 days  Lab Units 01/20/18  0452 01/20/18  0030   WBC Thousand/uL 9 82 9 67   HEMOGLOBIN g/dL 10 2* 10 6*   HEMATOCRIT % 31 9* 32 7*   PLATELETS Thousands/uL 183 198           Results from last 7 days  Lab Units 01/20/18  0030   SODIUM mmol/L 134*   POTASSIUM mmol/L 5 3   CHLORIDE mmol/L 100   CO2 mmol/L 22   BUN mg/dL 61*   CREATININE mg/dL 2 25*   CALCIUM mg/dL 9 0   GLUCOSE RANDOM mg/dL 479*     Results from last 7 days  Lab Units 01/20/18  0840   INR  1 76*           Imaging: I have personally reviewed pertinent reports  Echo: pending                Counseling / Coordination of Care  Total floor / unit time spent today 55 minutes  Greater than 50% of total time was spent with the patient and / or family counseling and / or coordination of care  A description of the counseling / coordination of care: 55         History   Drug Use No     History   Smoking Status    Never Smoker   Smokeless Tobacco    Never Used     Family History: non-contributory    Meds/Allergies   all current active meds have been reviewed  No Known Allergies    Objective   Vitals: Blood pressure 140/58, pulse 71, temperature 98 1 °F (36 7 °C), resp   rate 18, height 6' (1 829 m), weight 109 kg (239 lb 3 2 oz), SpO2 93 %  , Body mass index is 32 44 kg/m² , Orthostatic Blood Pressures    Flowsheet Row Most Recent Value   Blood Pressure  140/58 filed at 01/20/2018 0700   Patient Position - Orthostatic VS  Lying filed at 01/20/2018 0700            Intake/Output Summary (Last 24 hours) at 01/20/18 1133  Last data filed at 01/20/18 1000   Gross per 24 hour   Intake              720 ml   Output              500 ml   Net              220 ml               Physical Exam:  GEN: [place]   appears well, alert and oriented x 3, pleasant and cooperative   HEENT: pupils equal, round, and reactive to light; extraocular muscles intact  NECK: supple, no carotid bruits   HEART: regular rhythm, normal S1 and S2, no murmurs, clicks, gallops or rubs   LUNGS: clear to auscultation bilaterally; no wheezes, rales, or rhonchi   ABDOMEN: normal bowel sounds, soft, no tenderness, no distention  EXTREMITIES: peripheral pulses normal; no clubbing, cyanosis, or edema  NEURO: no focal findings   SKIN: normal without suspicious lesions on exposed skin    Lab Results:   Admission on 01/19/2018   Component Date Value Ref Range Status    WBC 01/20/2018 9 67  4 31 - 10 16 Thousand/uL Final    RBC 01/20/2018 3 83* 3 88 - 5 62 Million/uL Final    Hemoglobin 01/20/2018 10 6* 12 0 - 17 0 g/dL Final    Hematocrit 01/20/2018 32 7* 36 5 - 49 3 % Final    MCV 01/20/2018 85  82 - 98 fL Final    MCH 01/20/2018 27 7  26 8 - 34 3 pg Final    MCHC 01/20/2018 32 4  31 4 - 37 4 g/dL Final    RDW 01/20/2018 16 2* 11 6 - 15 1 % Final    MPV 01/20/2018 13 2* 8 9 - 12 7 fL Final    Platelets 63/66/8980 198  149 - 390 Thousands/uL Final    Neutrophils Relative 01/20/2018 88* 43 - 75 % Final    Lymphocytes Relative 01/20/2018 5* 14 - 44 % Final    Monocytes Relative 01/20/2018 6  4 - 12 % Final    Eosinophils Relative 01/20/2018 1  0 - 6 % Final    Basophils Relative 01/20/2018 0  0 - 1 % Final    Neutrophils Absolute 01/20/2018 8 56* 1 85 - 7 62 Thousands/µL Final    Lymphocytes Absolute 01/20/2018 0 49* 0 60 - 4 47 Thousands/µL Final    Monocytes Absolute 01/20/2018 0 54  0 17 - 1 22 Thousand/µL Final    Eosinophils Absolute 01/20/2018 0 05  0 00 - 0 61 Thousand/µL Final    Basophils Absolute 01/20/2018 0 03  0 00 - 0 10 Thousands/µL Final    Sodium 01/20/2018 134* 136 - 145 mmol/L Final    Potassium 01/20/2018 5 3  3 5 - 5 3 mmol/L Final    Chloride 01/20/2018 100  100 - 108 mmol/L Final    CO2 01/20/2018 22  21 - 32 mmol/L Final    Anion Gap 01/20/2018 12  4 - 13 mmol/L Final    BUN 01/20/2018 61* 5 - 25 mg/dL Final    Creatinine 01/20/2018 2 25* 0 60 - 1 30 mg/dL Final    Glucose 01/20/2018 479* 65 - 140 mg/dL Final    Calcium 01/20/2018 9 0  8 3 - 10 1 mg/dL Final    eGFR 01/20/2018 33  ml/min/1 73sq m Final    NT-proBNP 01/20/2018 4618* <125 pg/mL Final    Troponin I 01/20/2018 <0 02  <=0 04 ng/mL Final    Ventricular Rate 01/20/2018 72  BPM Final    Atrial Rate 01/20/2018 72  BPM Final    NC Interval 01/20/2018 198  ms Final    QRSD Interval 01/20/2018 162  ms Final    QT Interval 01/20/2018 524  ms Final    QTC Interval 01/20/2018 573  ms Final    P Axis 01/20/2018 70  degrees Final    QRS Axis 01/20/2018 -34  degrees Final    [facility] [place] 01/20/2018 123  degrees Final    POC Glucose 01/20/2018 433* 65 - 140 mg/dl Final    Troponin I 01/20/2018 0 22* <=0 04 ng/mL Final    WBC 01/20/2018 9 82  4 31 - 10 16 Thousand/uL Final    RBC 01/20/2018 3 74* 3 88 - 5 62 Million/uL Final    Hemoglobin 01/20/2018 10 2* 12 0 - 17 0 g/dL Final    Hematocrit 01/20/2018 31 9* 36 5 - 49 3 % Final    MCV 01/20/2018 85  82 - 98 fL Final    MCH 01/20/2018 27 3  26 8 - 34 3 pg Final    MCHC 01/20/2018 32 0  31 4 - 37 4 g/dL Final    RDW 01/20/2018 16 4* 11 6 - 15 1 % Final    Platelets 34/82/5527 183  149 - 390 Thousands/uL Final    MPV 01/20/2018 12 9* 8 9 - 12 7 fL Final    Protime 01/20/2018 21 1* 12 1 - 14 4 seconds Final    INR 01/20/2018 1 76* 0 86 - 1 16 Final    POC Glucose 01/20/2018 472* 65 - 140 mg/dl Final    Troponin I 01/20/2018 2 76* <=0 04 ng/mL Final    POC Glucose 01/20/2018 364* 65 - 140 mg/dl Final    POC Glucose 01/20/2018 418* 65 - 140 mg/dl Final         Results from last 7 days  Lab Units 01/20/18  0840 01/20/18  0452 01/20/18  0030   TROPONIN I ng/mL 2 76* 0 22* <0 02     Results from last 7 days  Lab Units 01/20/18  0452 01/20/18  0030   WBC Thousand/uL 9 82 9 67   HEMOGLOBIN g/dL 10 2* 10 6*   HEMATOCRIT % 31 9* 32 7*   PLATELETS Thousands/uL 183 198           Results from last 7 days  Lab Units 01/20/18  0030   SODIUM mmol/L 134*   POTASSIUM mmol/L 5 3   CHLORIDE mmol/L 100   CO2 mmol/L 22   BUN mg/dL 61*   CREATININE mg/dL 2 25*   CALCIUM mg/dL 9 0   GLUCOSE RANDOM mg/dL 479*     Results from last 7 days  Lab Units 01/20/18  0840   INR  1 76*           Imaging: I have personally reviewed pertinent reports  Echo: pending                Counseling / Coordination of Care  Total floor / unit time spent today 55 minutes  Greater than 50% of total time was spent with the patient and / or family counseling and / or coordination of care

## 2018-01-20 NOTE — ASSESSMENT & PLAN NOTE
· Presents with increasing SOB  · CXR- Pulmonary vascular congestion  · EKG- NSR  Complete LBBB  · BNP 4618  · Troponin <0 02   · Echo 12/26/16- LVEF 43%  Grade 3 Diastolic Dysfunction  Mild Mitral Regurgitation  · IV Lasix  · Daily weights, I&Os  · Fluid restriction  · Echo  · Consult cardiology

## 2018-01-20 NOTE — H&P
H&P- Drea Mckeon  1965, 50 y o  male MRN: [id number]    Unit/Bed#: -01 Encounter: [id number]    Primary Care Provider: Tequila Curry DO   Date and time admitted to hospital: 1/19/2018 11:55 PM    * Acute on chronic combined systolic and diastolic CHF (congestive heart failure) ([facility] [address] )   Assessment & Plan    · Presents with increasing SOB  · CXR- Pulmonary vascular congestion  · EKG- NSR  Complete LBBB  · BNP 4618  · Troponin <0 02   · Echo 12/26/16- LVEF 43%  Grade 3 Diastolic Dysfunction  Mild Mitral Regurgitation  · IV Lasix  · Daily weights, I&Os  · Fluid restriction  · Echo  · Consult cardiology  PAF (paroxysmal atrial fibrillation) ([facility])   Assessment & Plan    · Rate controlled at 75  · Continue home medications  · Anticoagulated on Coumadin- monitor daily INR  Diabetes 1 5, managed as type 2 ([facility])   Assessment & Plan    · Glucose 479  · Humulin IV now  · Continue home insulin with SSI coverage  · Consider insulin drip  CKD (chronic kidney disease)   Assessment & Plan    · Cr 2 25  · Baseline 1 6-1 9  · Likely secondary to hypervolemia  · Monitor with diuresis  Hypertension   Assessment & Plan    · /62  · Continue home medications  Hyperlipidemia   Assessment & Plan    · Continue statin  VTE Prophylaxis: Warfarin (Coumadin)  / sequential compression device   Code Status: Full Code  POLST: POLST form is not discussed and not completed at this time  Discussion with family: Discussed with patient at bedside  Anticipated Length of Stay:  Patient will be admitted on an Inpatient basis with an anticipated length of stay of  Greater than 2 midnights  Justification for Hospital Stay: Acute on Chronic CHF  Total Time for Visit, including Counseling / Coordination of Care: 45 minutes  Greater than 50% of this total time spent on direct patient counseling and coordination of care  Chief Complaint:   SOB      History of Present Illness:    Pierre Barrera  is a 50 y o  male who presents with shortness of breath beginning at 10:00 p m  patient states that he had too much salt recently, and when he started having shortness of breath he knew that it was his CHF  He states that after becoming SOB, he started having a cough, bringing up clear mucus, and tightness in his chest, but denies chest pain  He states that he took a dose of his Lasix at home and put on his BiPAP  However, he was still feeling very short of breath, therefore he came to the emergency department  He states that for the last 2 weeks, his weight has been consistently 240 lb  He follows with Cardiology at [facility]  He denies fever, chills, wheezing, chest pain, palpitations, lower extremity edema, abdominal pain, nausea, vomiting, change in bowel movements, urinary symptoms, dizziness, headache  Review of Systems:    Review of Systems   Constitutional: Negative for chills, diaphoresis and fever  Respiratory: Positive for cough, chest tightness and shortness of breath  Negative for wheezing  Cardiovascular: Negative for chest pain, palpitations and leg swelling  Gastrointestinal: Negative for abdominal pain, constipation, diarrhea, nausea and vomiting  Genitourinary: Negative for dysuria and hematuria  Neurological: Negative for dizziness, light-headedness and headaches  All other systems reviewed and are negative  Past Medical and Surgical History:     Past Medical History:   Diagnosis Date    A-fib [facility])     Carotid artery disorder (HCC)     L carotid completely blocked    Hyperlipidemia     Hypertension     Insulin dependent diabetes mellitus ([facility] [address] )     Stroke [facility])        Past Surgical History:   Procedure Laterality Date    APPENDECTOMY      TOE AMPUTATION         Meds/Allergies:    Prior to Admission medications    Medication Sig Start Date End Date Taking?  Authorizing Provider   amiodarone 200 mg tablet Take 1 tablet by mouth daily with breakfast for 30 days 2/17/17 1/20/18 Yes Eleuterio Reyes DO   amLODIPine (NORVASC) 5 mg tablet Take 1 tablet by mouth daily for 30 days 2/16/17 1/20/18 Yes Eleuterio Reyes DO   atorvastatin (LIPITOR) 80 mg tablet Take 1 tablet by mouth daily with dinner for 30 days 2/17/17 1/20/18 Yes lEeuterio Reyes DO   carvedilol (COREG) 12 5 mg tablet Pt taking 1/2 tab in the morning and 1/2 tab at night  7/6/15  Yes Historical Provider, MD   Cholecalciferol (VITAMIN D3) 1000 units CAPS Take by mouth   Yes Historical Provider, MD   Insulin Aspart (NOVOLOG SC) Inject under the skin   Yes Historical Provider, MD   Insulin Detemir (LEVEMIR SC) Inject under the skin 30 units in the morning and 40 units at night  Yes Historical Provider, MD   lisinopril (ZESTRIL) 2 5 mg tablet Take 2 5 mg by mouth daily   Yes Historical Provider, MD   nitroglycerin (NITRODUR) 0 1 mg/hr Place 1 patch on the skin 2 (two) times a day     Yes Historical Provider, MD   nitroglycerin (NITROSTAT) 0 4 mg SL tablet Place 0 4 mg under the tongue every 5 (five) minutes as needed for chest pain   Yes Historical Provider, MD   SPIRONOLACTONE PO Take 12 5 mg by mouth daily   Yes Historical Provider, MD   warfarin (COUMADIN) 7 5 mg tablet Take 1 tablet by mouth daily for 30 days  Patient taking differently: Take 2 5 mg by mouth daily Take 2 5mg M,W,F and 5mg Sat,Sun, [place], Thursday 2/16/17 1/20/18 Yes Eleuterio Raymond,    aspirin 81 mg chewable tablet Chew 1 tablet daily for 30 days 2/16/17 3/18/17  Eleuterio Reyes DO   docusate sodium (COLACE) 100 mg capsule Take 1 capsule by mouth 2 (two) times a day for 30 days 2/16/17 3/18/17  Eleuterio Reyes DO   furosemide (LASIX) 40 mg tablet Take 1 tablet by mouth daily for 30 days  Patient taking differently: Take 40 mg by mouth 2 (two) times a day   2/16/17 3/18/17  Eleuterio Reyes DO   insulin lispro (HumaLOG) 100 units/mL injection Inject 5 Units under the skin 3 (three) times a day with meals for 30 days 2/16/17 3/18/17  Eleuterio Reyes DO   senna (SENOKOT) 8 6 mg Take 1 tablet by mouth daily at bedtime for 30 days 2/16/17 3/18/17  Eleuterio Reyes DO   clopidogrel (PLAVIX) 75 mg tablet Take 1 tablet by mouth daily for 30 days 2/16/17 1/20/18  Eleuterio Reyes DO   insulin glargine (LANTUS) 100 units/mL subcutaneous injection 60 Units 2 (two) times a day   7/6/15 1/20/18  Historical Provider, MD   pneumococcal 23-valent polysaccharide vaccine (PNEUMOVAX-23) Inject 0 5 mL into the shoulder, thigh, or buttocks prior to discharge (pna) for up to 1 dose 2/17/17 1/20/18  Eleuterio Fabian DO     I have reviewed home medications with patient personally  Allergies: No Known Allergies    Social History:     Marital Status: Single   Occupation: Unknown  Patient Pre-hospital Living Situation: Home  Patient Pre-hospital Level of Mobility: Independent  Patient Pre-hospital Diet Restrictions: None  Substance Use History:   History   Alcohol Use    1 2 oz/week    1 Cans of beer, 1 Shots of liquor per week     History   Smoking Status    Never Smoker   Smokeless Tobacco    Never Used     History   Drug Use No       Family History:    non-contributory    Physical Exam:     Vitals:   Blood Pressure: 135/62 (01/20/18 0238)  Pulse: 75 (01/20/18 0238)  Temperature: 98 5 °F (36 9 °C) (01/20/18 0238)  Temp Source: Oral (01/20/18 0238)  Respirations: 20 (01/20/18 0238)  Height: 6' (182 9 cm) (01/20/18 0238)  Weight - Scale: 113 kg (248 lb 3 8 oz) (01/20/18 0238)  SpO2: 93 % (01/20/18 0238)    Physical Exam   Constitutional: He is oriented to person, place, and time  He appears well-developed and well-nourished  He is cooperative  He does not appear ill  No distress  HENT:   Head: Normocephalic and atraumatic  Eyes: Conjunctivae, EOM and lids are normal  Pupils are equal, round, and reactive to light  Cardiovascular: Normal rate, regular rhythm, normal heart sounds and normal pulses  No murmur heard  No LE edema bilaterally  Pulmonary/Chest: Effort normal  He has no wheezes  He has no rhonchi  He has rales  Abdominal: Soft  Normal appearance  There is no tenderness  Musculoskeletal: Normal range of motion  Neurological: He is alert and oriented to person, place, and time  He has normal strength  He is not disoriented  No sensory deficit  Skin: Skin is warm, dry and intact  He is not diaphoretic  Psychiatric: He has a normal mood and affect  His speech is normal and behavior is normal  Cognition and memory are normal    Vitals reviewed  Additional Data:     Lab Results: I have personally reviewed pertinent reports  Results from last 7 days  Lab Units 01/20/18  0030   WBC Thousand/uL 9 67   HEMOGLOBIN g/dL 10 6*   HEMATOCRIT % 32 7*   PLATELETS Thousands/uL 198   NEUTROS PCT % 88*   LYMPHS PCT % 5*   MONOS PCT % 6   EOS PCT % 1       Results from last 7 days  Lab Units 01/20/18  0030   SODIUM mmol/L 134*   POTASSIUM mmol/L 5 3   CHLORIDE mmol/L 100   CO2 mmol/L 22   BUN mg/dL 61*   CREATININE mg/dL 2 25*   CALCIUM mg/dL 9 0   GLUCOSE RANDOM mg/dL 479*           Imaging: I have personally reviewed pertinent reports  XR chest 2 views    (Results Pending)       EKG, Pathology, and Other Studies Reviewed on Admission:   · EKG: NSR  Complete LBBB  Allscripts / Epic Records Reviewed: Yes     ** Please Note: This note has been constructed using a voice recognition system   **

## 2018-01-20 NOTE — ED PROVIDER NOTES
History  Chief Complaint   Patient presents with    Shortness of Breath     Pt was on the computer at 2130 and he started to feel short of breath  Pt stated "that he put on his BIPAP and he did not feel any better"  Pt stated that he felt tightness in his chest but no pain  Pt stated "as soon as we put the oxygen on him he instantly felt better  57-year-old gentleman with past medical history significant congestive heart failure presents with chief complaint of shortness of breath that started around 9:30 p m  Karrie Nissen Patient states he was on his computer essentially at rest with onset of symptoms  Patient states that he reviewed the food he had eaten earlier in the day and believes he may have ingested too much sodium  Patient believes his symptoms are attributable to congestive heart failure  Patient's past medical history significant for hypertension, dyslipidemia, coronary artery disease, congestive heart failure, atrial fibrillation and insulin-dependent diabetes  Patient took an extra furosemide tablet (40 milligrams) as well as applied a nitroglycerin patch  These did not provided any significant relief  History provided by:  Patient   used: No    Shortness of Breath   Severity:  Moderate  Onset quality:  Sudden  Duration:  3 hours  Timing:  Constant  Progression:  Unchanged  Chronicity:  New  Relieved by:  Oxygen  Worsened by:  Nothing  Ineffective treatments:  Diuretics (Nitroglycerin patch)  Associated symptoms: cough    Associated symptoms: no chest pain, no diaphoresis, no fever, no rash, no vomiting and no wheezing        Prior to Admission Medications   Prescriptions Last Dose Informant Patient Reported? Taking?    amLODIPine (NORVASC) 5 mg tablet   No No   Sig: Take 1 tablet by mouth daily for 30 days   amiodarone 200 mg tablet   No No   Sig: Take 1 tablet by mouth daily with breakfast for 30 days   aspirin 81 mg chewable tablet   No No   Sig: Chew 1 tablet daily for 30 days   atorvastatin (LIPITOR) 80 mg tablet   No No   Sig: Take 1 tablet by mouth daily with dinner for 30 days   carvedilol (COREG) 12 5 mg tablet   Yes No   Sig: Carvedilol 12 5 MG Oral Tablet  Take 1 tablet twice daily   Quantity: 180;  Refills: 3     Started   Active   clopidogrel (PLAVIX) 75 mg tablet   No No   Sig: Take 1 tablet by mouth daily for 30 days   docusate sodium (COLACE) 100 mg capsule   No No   Sig: Take 1 capsule by mouth 2 (two) times a day for 30 days   furosemide (LASIX) 40 mg tablet   No No   Sig: Take 1 tablet by mouth daily for 30 days   insulin glargine (LANTUS) 100 units/mL subcutaneous injection   Yes No   Si Units 2 (two) times a day     insulin lispro (HumaLOG) 100 units/mL injection   No No   Sig: Inject 5 Units under the skin 3 (three) times a day with meals for 30 days   nitroglycerin (NITRODUR) 0 1 mg/hr   Yes No   Sig: Place 1 patch on the skin daily   pneumococcal 23-valent polysaccharide vaccine (PNEUMOVAX-23)   No No   Sig: Inject 0 5 mL into the shoulder, thigh, or buttocks prior to discharge (pna) for up to 1 dose   senna (SENOKOT) 8 6 mg   No No   Sig: Take 1 tablet by mouth daily at bedtime for 30 days   warfarin (COUMADIN) 7 5 mg tablet   No No   Sig: Take 1 tablet by mouth daily for 30 days      Facility-Administered Medications: None       Past Medical History:   Diagnosis Date    A-fib ([facility] )     Carotid artery disorder (HCC)     L carotid completely blocked    Hyperlipidemia     Hypertension     Insulin dependent diabetes mellitus ([facility] )     Stroke [facility])        Past Surgical History:   Procedure Laterality Date    APPENDECTOMY      TOE AMPUTATION         Family History   Problem Relation Age of Onset    Diabetes Mother     Heart disease Father      I have reviewed and agree with the history as documented      Social History   Substance Use Topics    Smoking status: Never Smoker    Smokeless tobacco: Never Used    Alcohol use 1 2 oz/week     1 Cans of beer, 1 Shots of liquor per week        Review of Systems   Constitutional: Negative for chills, diaphoresis and fever  Respiratory: Positive for cough and shortness of breath  Negative for wheezing  Cardiovascular: Negative for chest pain, palpitations and leg swelling  Gastrointestinal: Negative for diarrhea, nausea and vomiting  Genitourinary: Negative for dysuria and frequency  Skin: Negative for rash  All other systems reviewed and are negative  Physical Exam  ED Triage Vitals   Temp Pulse Respirations Blood Pressure SpO2   -- 01/19/18 2359 01/19/18 2359 01/19/18 2359 01/19/18 2359    75 20 149/65 92 %      Temp src Heart Rate Source Patient Position - Orthostatic VS BP Location FiO2 (%)   -- 01/19/18 2359 01/19/18 2359 01/19/18 2359 --    Monitor Sitting Right arm       Pain Score       01/20/18 0058       No Pain           Orthostatic Vital Signs  Vitals:    01/19/18 2359 01/20/18 0015 01/20/18 0058   BP: 149/65  121/60   Pulse: 75 74 72   Patient Position - Orthostatic VS: Sitting  Sitting       Physical Exam   Constitutional: He is oriented to person, place, and time  He appears well-developed and well-nourished  He appears distressed (Mild)  HENT:   Head: Normocephalic and atraumatic  Eyes: EOM are normal  Pupils are equal, round, and reactive to light  Neck: Normal range of motion  No JVD present  Cardiovascular: Normal rate, regular rhythm, normal heart sounds and intact distal pulses  Exam reveals no gallop and no friction rub  No murmur heard  Pulmonary/Chest: Effort normal and breath sounds normal  No respiratory distress  He has no wheezes  He has no rales  He exhibits no tenderness  Musculoskeletal: Normal range of motion  He exhibits no tenderness  Chronic Charcot deformity of left foot, currently in Charcot boot  Neurological: He is alert and oriented to person, place, and time  Skin: Skin is warm and dry     Psychiatric: He has a normal mood and affect  His behavior is normal  Judgment and thought content normal    Nursing note and vitals reviewed  ED Medications  Medications   furosemide (LASIX) injection 40 mg (40 mg Intravenous Given 1/20/18 0048)       Diagnostic Studies  Results Reviewed     Procedure Component Value Units Date/Time    Basic metabolic panel [[id number]]  (Abnormal) Collected:  01/20/18 0030    Lab Status:  Final result Specimen:  Blood from Arm, Right Updated:  01/20/18 0110     Sodium 134 (L) mmol/L      Potassium 5 3 mmol/L      Chloride 100 mmol/L      CO2 22 mmol/L      Anion Gap 12 mmol/L      BUN 61 (H) mg/dL      Creatinine 2 25 (H) mg/dL      Glucose 479 (H) mg/dL      Calcium 9 0 mg/dL      eGFR 33 ml/min/1 73sq m     Narrative:         National Kidney Disease Education Program recommendations are as follows:  GFR calculation is accurate only with a steady state creatinine  Chronic Kidney disease less than 60 ml/min/1 73 sq  meters  Kidney failure less than 15 ml/min/1 73 sq  meters  B-type natriuretic peptide [[id number]]  (Abnormal) Collected:  01/20/18 0030    Lab Status:  Final result Specimen:  Blood from Arm, Right Updated:  01/20/18 0110     NT-proBNP 4,618 (H) pg/mL     Troponin I [[id number]]  (Normal) Collected:  01/20/18 0030    Lab Status:  Final result Specimen:  Blood from Arm, Right Updated:  01/20/18 0109     Troponin I <0 02 ng/mL     Narrative:         [employer] Chemistry analyzer 99% cutoff is > 0 04 ng/mL in network labs    o cTnI 99% cutoff is useful only when applied to patients in the clinical setting of myocardial ischemia  o cTnI 99% cutoff should be interpreted in the context of clinical history, ECG findings and possibly cardiac imaging to establish correct diagnosis  o cTnI 99% cutoff may be suggestive but clearly not indicative of a coronary event without the clinical setting of myocardial ischemia      CBC and differential [[id number]]  (Abnormal) Collected:  01/20/18 0030    Lab Status:  Final result Specimen:  Blood from Arm, Right Updated:  01/20/18 0046     WBC 9 67 Thousand/uL      RBC 3 83 (L) Million/uL      Hemoglobin 10 6 (L) g/dL      Hematocrit 32 7 (L) %      MCV 85 fL      MCH 27 7 pg      MCHC 32 4 g/dL      RDW 16 2 (H) %      MPV 13 2 (H) fL      Platelets 308 Thousands/uL      Neutrophils Relative 88 (H) %      Lymphocytes Relative 5 (L) %      Monocytes Relative 6 %      Eosinophils Relative 1 %      Basophils Relative 0 %      Neutrophils Absolute 8 56 (H) Thousands/µL      Lymphocytes Absolute 0 49 (L) Thousands/µL      Monocytes Absolute 0 54 Thousand/µL      Eosinophils Absolute 0 05 Thousand/µL      Basophils Absolute 0 03 Thousands/µL                  XR chest 2 views    (Results Pending)              Procedures  ECG 12 Lead Documentation  Date/Time: 1/20/2018 12:45 AM  Performed by: Gato Paul by: Zara RAMIREZ     Indications / Diagnosis:    Shortness of breath  ECG reviewed by me, the ED Provider: yes    Patient location:  ED  Previous ECG:     Previous ECG:  Compared to current    Comparison ECG info:  2/14/17    Similarity:  Changes noted (Now in a complete left bundle branch block)  Interpretation:     Interpretation: abnormal    Rate:     ECG rate:  72    ECG rate assessment: normal    Rhythm:     Rhythm: sinus rhythm    Ectopy:     Ectopy: none    QRS:     QRS axis:  Left    QRS intervals: Wide  Conduction:     Conduction: abnormal      Abnormal conduction: complete LBBB    ST segments:     ST segments:  Normal  T waves:     T waves: normal             Phone Contacts  ED Phone Contact    ED Course  ED Course                                MDM  Number of Diagnoses or Management Options  Acute respiratory distress: new and requires workup  Congestive heart failure (CHF) [facility]): new and requires workup  Diagnosis management comments:   Patient presents with chief complaint of shortness of breath    Differential includes but is not limited to:  Congestive heart failure, pulmonary edema, atypical ACS, pneumonia, viral upper respiratory infection  Plan:  Cardiac workup, will give empiric diuretic  Amount and/or Complexity of Data Reviewed  Clinical lab tests: ordered and reviewed  Tests in the radiology section of CPT®: ordered    Risk of Complications, Morbidity, and/or Mortality  Presenting problems: high  Diagnostic procedures: high  Management options: high    Patient Progress  Patient progress: stable    CritCare Time    Disposition  Final diagnoses:   Congestive heart failure (CHF) ([facility] )   Acute respiratory distress     Time reflects when diagnosis was documented in both MDM as applicable and the Disposition within this note     Time User Action Codes Description Comment    1/20/2018  1:47 AM Harinder Repress B Add [I50 9] Congestive heart failure (CHF) ([facility] )     1/20/2018  1:47 AM Harinder Repress B Add [R06 03] Acute respiratory distress       ED Disposition     ED Disposition Condition Comment    Admit  Case was discussed with Candace (ESTELA AP) and the patient's admission status was agreed to be Admission Status: inpatient status to the service of Dr Katalina Contreras   Follow-up Information    None       Patient's Medications   Discharge Prescriptions    No medications on file     No discharge procedures on file      ED Provider  Electronically Signed by           Radha Henry MD  01/20/18 5612

## 2018-01-20 NOTE — ASSESSMENT & PLAN NOTE
· Rate controlled at 75  · Continue home medications  · Anticoagulated on Coumadin- monitor daily INR

## 2018-01-20 NOTE — ED NOTES
Pt vomited  Pt stated "that he feels much better and does not want any medication for nausea"       Myles Mark, RN  01/20/18 4273

## 2018-01-20 NOTE — ED CARE HANDOFF
Emergency Department Sign Out Note        Sign out and transfer of care from Dr Larisa Infante  See Separate Emergency Department note  The patient, Maria Del Carmen Alvarenga , was evaluated by the previous provider for sob, CHF  Workup Completed:  Yes except CXR  ED Course / Workup Pending (followup): Patient admitted  CXR read by me: CHF and fluid in fissure read by me  ED Course      Procedures  MDM  CritCare Time      Disposition  Final diagnoses:   Congestive heart failure (CHF) ([facility] )   Acute respiratory distress     Time reflects when diagnosis was documented in both MDM as applicable and the Disposition within this note     Time User Action Codes Description Comment    1/20/2018  1:47 AM Axel RAMIREZ Add [I50 9] Congestive heart failure (CHF) ([facility] )     1/20/2018  1:47 AM Axel RAMIREZ Add [R06 03] Acute respiratory distress       ED Disposition     ED Disposition Condition Comment    Admit  Case was discussed with Candace (ESTELA LOPEZ) and the patient's admission status was agreed to be Admission Status: inpatient status to the service of Dr Carlton Mederos   Follow-up Information    None       Current Discharge Medication List      CONTINUE these medications which have NOT CHANGED    Details   amiodarone 200 mg tablet Take 1 tablet by mouth daily with breakfast for 30 days  Qty: 30 tablet, Refills: 0      amLODIPine (NORVASC) 5 mg tablet Take 1 tablet by mouth daily for 30 days  Qty: 30 tablet, Refills: 0      atorvastatin (LIPITOR) 80 mg tablet Take 1 tablet by mouth daily with dinner for 30 days  Qty: 30 tablet, Refills: 0      carvedilol (COREG) 12 5 mg tablet Pt taking 1/2 tab in the morning and 1/2 tab at night  Cholecalciferol (VITAMIN D3) 1000 units CAPS Take by mouth      Insulin Aspart (NOVOLOG SC) Inject under the skin      Insulin Detemir (LEVEMIR SC) Inject under the skin 30 units in the morning and 40 units at night        lisinopril (ZESTRIL) 2 5 mg tablet Take 2 5 mg by mouth daily      nitroglycerin (NITRODUR) 0 1 mg/hr Place 1 patch on the skin 2 (two) times a day        nitroglycerin (NITROSTAT) 0 4 mg SL tablet Place 0 4 mg under the tongue every 5 (five) minutes as needed for chest pain      SPIRONOLACTONE PO Take 12 5 mg by mouth daily      warfarin (COUMADIN) 7 5 mg tablet Take 1 tablet by mouth daily for 30 days  Qty: 30 tablet, Refills: 0      aspirin 81 mg chewable tablet Chew 1 tablet daily for 30 days  Qty: 30 tablet, Refills: 0      docusate sodium (COLACE) 100 mg capsule Take 1 capsule by mouth 2 (two) times a day for 30 days  Qty: 60 capsule, Refills: 0      furosemide (LASIX) 40 mg tablet Take 1 tablet by mouth daily for 30 days  Qty: 30 tablet, Refills: 0      insulin lispro (HumaLOG) 100 units/mL injection Inject 5 Units under the skin 3 (three) times a day with meals for 30 days  Qty: 4 5 mL, Refills: 0      senna (SENOKOT) 8 6 mg Take 1 tablet by mouth daily at bedtime for 30 days  Qty: 30 tablet, Refills: 0           No discharge procedures on file         ED Provider  Electronically Signed by     Paulo Pastor MD  01/20/18 8972

## 2018-01-20 NOTE — CASE MANAGEMENT
Initial Clinical Review    Admission: Date/Time/Statement: 1/20/18 @ 0146     Orders Placed This Encounter   Procedures    Inpatient Admission (expected length of stay for this patient is greater than two midnights)     Standing Status:   Standing     Number of Occurrences:   1     Order Specific Question:   Admitting Physician     Answer:   Lizzy Rolon [[id number]]     Order Specific Question:   Level of Care     Answer:   Med Surg [[id number]]     Order Specific Question:   Estimated length of stay     Answer:   More than 2 Midnights     Order Specific Question:   Certification     Answer:   I certify that inpatient services are medically necessary for this patient for a duration of greater than two midnights  See H&P and MD Progress Notes for additional information about the patient's course of treatment  ED: Date/Time/Mode of Arrival:   ED Arrival Information     Expected Arrival Acuity Means of Arrival Escorted By Service Admission Type    - 1/19/2018 23:46 Emergent Walk-In Spouse General Medicine Emergency    Arrival Complaint    Shortness of breath          Chief Complaint:   Chief Complaint   Patient presents with    Shortness of Breath     Pt was on the computer at 2130 and he started to feel short of breath  Pt stated "that he put on his BIPAP and he did not feel any better"  Pt stated that he felt tightness in his chest but no pain  Pt stated "as soon as we put the oxygen on him he instantly felt better  History of Illness:   51 yo male w/ h/o severe CAD not amendable to revascularization  He follows with cardiology at [facility]  He admits to using too much salt recently and developed SOB and orthopnea similar to previous admissions for heart failure  His EF is 43 % by lasrt ECHO  presents with shortness of breath beginning at 10:00 p m  He states that after becoming SOB, he started having a cough, bringing up clear mucus, and tightness in his chest, but denies chest pain   He states that he took a dose of his Lasix at home and put on his BiPAP  However, he was still feeling very short of breath, therefore he came to the emergency department  He states that for the last 2 weeks, his weight has been consistently 240 lb       Respiratory: Positive for cough, chest tightness and shortness of breath  Pulmonary/Chest: Effort normal  He has no wheezes  He has no rhonchi  He has rales  ED Vital Signs:   wgt  109 kg (239 lb 3 2 oz)    01/20/18 0238 98 5 °F (36 9 °C) 75 20 135/62 93 % 113 kg (248 lb 3 8 oz) VH   01/20/18 0215 -- 73  24 128/67 94 % -- MMW   01/20/18 0215 98 9 °F (37 2 °C) -- -- -- -- -- MV   01/20/18 0058 -- 72  25 121/60 92 % -- MMW   01/20/18 0015 -- 74 -- -- 94 % -- MMW   01/19/18 2359 -- 75 20 149/65         Oxygen @ 2-3 L NC continuous   wsats  92 - 94%    Abnormal Labs/Diagnostic Test Results:   Na  134  >  133  Bun  61  >  66  Crt  2 25  >  2 31  Glucose  479  >  420  trops  <0 02   0 22   2 76     bnp  14,407  hgb  10 6    hgb A1C =  9 1    ED Treatment:   Oxygen @ 2-3 L NC continuous   Medication Administration from 01/19/2018 2346 to 01/20/2018 0238       Date/Time Order Dose Route Action Action by Comments     01/20/2018 0048 furosemide (LASIX) injection 40 mg 40 mg Intravenous Given Jay De La Cruz RN           Past Medical/Surgical History:    Active Ambulatory Problems     Diagnosis Date Noted    KYLER (acute kidney injury) ([place] [id number] ) 07/26/2016    CKD (chronic kidney disease) 07/26/2016    Acute on chronic combined systolic and diastolic CHF (congestive heart failure) ([place] [address] ) 07/26/2016    Stroke ([place] [address] ) 07/26/2016    On warfarin therapy 07/26/2016    Diabetes ([place] [address] ) 07/26/2016    Hypertension 07/26/2016    Diabetic foot ([place] [address] ) 07/26/2016    Elevated troponin 12/25/2016    Acute-on-chronic kidney injury ([place] [address] ) 12/25/2016    Hyperglycemia 12/25/2016    [facility] 1 5, [facility] as type 2 ([facility] ) 12/25/2016    Obesity due to excess calories 12/25/2016    PAF (paroxysmal atrial fibrillation) ([place] [address] ) 12/27/2016    Acute respiratory failure with hypoxia ([place] [address] ) 02/11/2017    Insulin dependent diabetes mellitus ([place] [address] )     Hyperlipidemia     Ischemic cardiomyopathy 02/11/2017    Non-ST elevation myocardial infarction (NSTEMI), type 2 ([place] [address] ) 02/11/2017    Warfarin-induced coagulopathy ([place] [address] ) 02/12/2017    Pneumonia 02/13/2017    A-fib ([place] [address] )      Resolved Ambulatory Problems     Diagnosis Date Noted    Diarrhea 07/26/2016    Acute gastroenteritis 07/26/2016    Positive blood culture 12/28/2016    Leukocytosis 02/11/2017    Hyperkalemia 02/11/2017     Past Medical History:   Diagnosis Date    A-fib [facility])     Carotid artery disorder ([facility])     Hyperlipidemia     Hypertension     Insulin dependent diabetes mellitus ([place] [address] )     Stroke [facility])        Admitting Diagnosis: Shortness of breath [R06 02]  Acute respiratory distress [R06 03]  Congestive heart failure (CHF) ([facility]) [I50 9]    ADMISSION ORDERS  Admit telemetry  accucks w/sliding scale insulin  Cardiac diet -  Fluid and Na restriction  Echo  bipap at hs  Echo  trops  Series  Oxygen prn    Assessment/Plan:   Acute on chronic combined systolic and diastolic CHF (congestive heart failure) ([facility])   Assessment & Plan     · Presents with increasing SOB  · CXR- Pulmonary vascular congestion  · EKG- NSR  Complete LBBB  · BNP 4618  · Troponin <0 02   · Echo 12/26/16- LVEF 43%  Grade 3 Diastolic Dysfunction  Mild Mitral Regurgitation  · IV Lasix  · Daily weights, I&Os  · Fluid restriction  · Echo  · Consult cardiology        PAF (paroxysmal atrial fibrillation) ([facility])   Assessment & Plan     · Rate controlled at 75  · Continue home medications  · Anticoagulated on Coumadin- monitor daily INR        Diabetes 1 5, managed as type 2 ([facility])   Assessment & Plan     · Glucose 479  · Humulin IV now  · Continue home insulin with SSI coverage    · Consider insulin drip        CKD (chronic kidney disease)   Assessment & Plan     · Cr 2 25  · Baseline 1 6-1 9  · Likely secondary to hypervolemia  · Monitor with diuresis        Hypertension   Assessment & Plan     · /62  · Continue home medications        Hyperlipidemia   Assessment & Plan     · Continue statin              VTE Prophylaxis: Warfarin (Coumadin)  / sequential compression device   Patient will be admitted on an Inpatient basis with an anticipated length of stay of Greater than 2 midnights  Justification for Hospital Stay: Acute on Chronic CHF      ATTENDING NOTE:  Acute on chronic HF  Continue lasix 40 mg BID  Appreciate cardiology input  Will follow up on Echo, but likely 2/2 non-compliance given history       [id number]  2/2 above troponin is 2 76  Will continue to trend until peaked       KYLER on CKD  Baseline is 1 6 -1 9  Will continue to follow today is 2 31  Likely 2/2 above, but if further trend up will consult nephrology  Will also DC ACE-I       T2DM with hyperglycemia  Will increase mealtime coverage to 8 units and lantus to 35  Will also go up to Alg 5  Continue to check before meals and at bedtime       Foot ulcer     Will consult podiatry          Admission Orders:  Admit tele  Oxygen prn  Podiatry consult  accucks w/slding scale insulin  Fluid restriction 1500 ml  Cardiac diet/ lo na /  Lo carb   Daily wgt     I and O  Echo    Scheduled Meds:   amiodarone 200 mg Oral Daily With Breakfast   amLODIPine 5 mg Oral Daily   aspirin 81 mg Oral Daily   atorvastatin 80 mg Oral Daily With Dinner   carvedilol 6 25 mg Oral BID With Meals   cholecalciferol 1,000 Units Oral Daily   docusate sodium 100 mg Oral BID   furosemide 40 mg Intravenous BID (diuretic)   insulin detemir 35 Units Subcutaneous Q12H ARJUN   insulin lispro 4-20 Units Subcutaneous TID AC   insulin lispro 8 Units Subcutaneous TID With Meals   senna 1 tablet Oral HS   spironolactone 12 5 mg Oral Daily   warfarin 2 5 mg Oral Daily (warfarin)     1/20/2018  98 1   71   18    140/58    93% on 2L CARDIOLOGY  He does feel better after IV Lasix given in the Er  Continue IV lasix as ordered  Follow renal function closely  Troponin is elevated secondary to Type 2 MI from heart failure   ECHO ordered

## 2018-01-21 LAB
ALBUMIN SERPL BCP-MCNC: 3 G/DL (ref 3.5–5)
ALP SERPL-CCNC: 121 U/L (ref 46–116)
ALT SERPL W P-5'-P-CCNC: 26 U/L (ref 12–78)
ANION GAP SERPL CALCULATED.3IONS-SCNC: 10 MMOL/L (ref 4–13)
AST SERPL W P-5'-P-CCNC: 25 U/L (ref 5–45)
BASOPHILS # BLD AUTO: 0.02 THOUSANDS/ΜL (ref 0–0.1)
BASOPHILS NFR BLD AUTO: 0 % (ref 0–1)
BILIRUB SERPL-MCNC: 0.8 MG/DL (ref 0.2–1)
BUN SERPL-MCNC: 64 MG/DL (ref 5–25)
CALCIUM SERPL-MCNC: 9 MG/DL (ref 8.3–10.1)
CHLORIDE SERPL-SCNC: 104 MMOL/L (ref 100–108)
CO2 SERPL-SCNC: 26 MMOL/L (ref 21–32)
CREAT SERPL-MCNC: 2.14 MG/DL (ref 0.6–1.3)
EOSINOPHIL # BLD AUTO: 0.12 THOUSAND/ΜL (ref 0–0.61)
EOSINOPHIL NFR BLD AUTO: 2 % (ref 0–6)
ERYTHROCYTE [DISTWIDTH] IN BLOOD BY AUTOMATED COUNT: 16.5 % (ref 11.6–15.1)
GFR SERPL CREATININE-BSD FRML MDRD: 35 ML/MIN/1.73SQ M
GLUCOSE SERPL-MCNC: 105 MG/DL (ref 65–140)
GLUCOSE SERPL-MCNC: 112 MG/DL (ref 65–140)
GLUCOSE SERPL-MCNC: 147 MG/DL (ref 65–140)
GLUCOSE SERPL-MCNC: 185 MG/DL (ref 65–140)
GLUCOSE SERPL-MCNC: 195 MG/DL (ref 65–140)
HCT VFR BLD AUTO: 30.1 % (ref 36.5–49.3)
HGB BLD-MCNC: 9.6 G/DL (ref 12–17)
LYMPHOCYTES # BLD AUTO: 1.06 THOUSANDS/ΜL (ref 0.6–4.47)
LYMPHOCYTES NFR BLD AUTO: 15 % (ref 14–44)
MCH RBC QN AUTO: 27.4 PG (ref 26.8–34.3)
MCHC RBC AUTO-ENTMCNC: 31.9 G/DL (ref 31.4–37.4)
MCV RBC AUTO: 86 FL (ref 82–98)
MONOCYTES # BLD AUTO: 0.65 THOUSAND/ΜL (ref 0.17–1.22)
MONOCYTES NFR BLD AUTO: 9 % (ref 4–12)
NEUTROPHILS # BLD AUTO: 5.12 THOUSANDS/ΜL (ref 1.85–7.62)
NEUTS SEG NFR BLD AUTO: 74 % (ref 43–75)
PLATELET # BLD AUTO: 151 THOUSANDS/UL (ref 149–390)
PMV BLD AUTO: 13.2 FL (ref 8.9–12.7)
POTASSIUM SERPL-SCNC: 4.1 MMOL/L (ref 3.5–5.3)
PROT SERPL-MCNC: 6.9 G/DL (ref 6.4–8.2)
RBC # BLD AUTO: 3.51 MILLION/UL (ref 3.88–5.62)
SODIUM SERPL-SCNC: 140 MMOL/L (ref 136–145)
WBC # BLD AUTO: 6.97 THOUSAND/UL (ref 4.31–10.16)

## 2018-01-21 PROCEDURE — 82948 REAGENT STRIP/BLOOD GLUCOSE: CPT

## 2018-01-21 PROCEDURE — 85025 COMPLETE CBC W/AUTO DIFF WBC: CPT | Performed by: INTERNAL MEDICINE

## 2018-01-21 PROCEDURE — 94760 N-INVAS EAR/PLS OXIMETRY 1: CPT

## 2018-01-21 PROCEDURE — 94660 CPAP INITIATION&MGMT: CPT

## 2018-01-21 PROCEDURE — 80053 COMPREHEN METABOLIC PANEL: CPT | Performed by: INTERNAL MEDICINE

## 2018-01-21 RX ADMIN — WARFARIN SODIUM 5 MG: 5 TABLET ORAL at 17:14

## 2018-01-21 RX ADMIN — DOCUSATE SODIUM 100 MG: 100 CAPSULE, LIQUID FILLED ORAL at 08:59

## 2018-01-21 RX ADMIN — INSULIN LISPRO 4 UNITS: 100 INJECTION, SOLUTION INTRAVENOUS; SUBCUTANEOUS at 17:02

## 2018-01-21 RX ADMIN — INSULIN DETEMIR 35 UNITS: 100 INJECTION, SOLUTION SUBCUTANEOUS at 08:57

## 2018-01-21 RX ADMIN — CARVEDILOL 6.25 MG: 6.25 TABLET, FILM COATED ORAL at 17:00

## 2018-01-21 RX ADMIN — AMLODIPINE BESYLATE 5 MG: 5 TABLET ORAL at 08:59

## 2018-01-21 RX ADMIN — ATORVASTATIN CALCIUM 80 MG: 40 TABLET, FILM COATED ORAL at 17:00

## 2018-01-21 RX ADMIN — AMIODARONE HYDROCHLORIDE 200 MG: 200 TABLET ORAL at 08:58

## 2018-01-21 RX ADMIN — CHOLECALCIFEROL TAB 25 MCG (1000 UNIT) 1000 UNITS: 25 TAB at 09:05

## 2018-01-21 RX ADMIN — FUROSEMIDE 40 MG: 10 INJECTION, SOLUTION INTRAMUSCULAR; INTRAVENOUS at 17:01

## 2018-01-21 RX ADMIN — SPIRONOLACTONE 12.5 MG: 25 TABLET, FILM COATED ORAL at 08:58

## 2018-01-21 RX ADMIN — ASPIRIN 81 MG 81 MG: 81 TABLET ORAL at 08:59

## 2018-01-21 RX ADMIN — INSULIN LISPRO 4 UNITS: 100 INJECTION, SOLUTION INTRAVENOUS; SUBCUTANEOUS at 13:11

## 2018-01-21 RX ADMIN — FUROSEMIDE 40 MG: 10 INJECTION, SOLUTION INTRAMUSCULAR; INTRAVENOUS at 08:58

## 2018-01-21 RX ADMIN — CARVEDILOL 6.25 MG: 6.25 TABLET, FILM COATED ORAL at 08:59

## 2018-01-21 NOTE — PHYSICIAN ADVISOR
Current patient class: Inpatient  The patient is currently on Hospital Day: 2      The patient was admitted to the hospital at 34 [phone] on 1/20/18 for the following diagnosis:  Shortness of breath [R06 02]  Acute respiratory distress [R06 03]  Congestive heart failure (CHF) (HCC) [I50 9]       There is documentation in the medical record of an expected length of stay of at least 2 midnights  The patient is therefore expected to satisfy the 2 midnight benchmark and given the 2 midnight presumption is appropriate for INPATIENT ADMISSION  Given this expectation of a satisfying stay, CMS instructs us that the patient is most often appropriate for inpatient admission under part A provided medical necessity is documented in the chart  After review of the relevant documentation, labs, vital signs and test results, the patient is appropriate for INPATIENT ADMISSION  Admission to the hospital as an inpatient is a complex decision making process which requires the practitioner to consider the patients presenting complaint, history and physical examination and all relevant testing  With this in mind, in this case, the patient was deemed appropriate for INPATIENT ADMISSION  After review of the documentation and testing available at the time of the admission I concur with this clinical determination of medical necessity  Rationale is as follows: The patient is a 50 yrs old Male who presented to the ED at 1/19/2018 11:55 PM with a chief complaint of Shortness of Breath (Pt was on the computer at 2130 and he started to feel short of breath  Pt stated "that he put on his BIPAP and he did not feel any better"  Pt stated that he felt tightness in his chest but no pain   Pt stated "as soon as we put the oxygen on him he instantly felt better   )    The patients vitals on arrival were ED Triage Vitals   Temperature Pulse Respirations Blood Pressure SpO2   01/20/18 0215 01/19/18 2359 01/19/18 2359 01/19/18 2359 01/19/18 2359   98 9 °F (37 2 °C) 75 20 149/65 92 %      Temp Source Heart Rate Source Patient Position - Orthostatic VS BP Location FiO2 (%)   01/20/18 0238 01/19/18 2359 01/19/18 2359 01/19/18 2359 --   Oral Monitor Sitting Right arm       Pain Score       01/20/18 0058       No Pain           Past Medical History:   Diagnosis Date    A-fib [facility])     Carotid artery disorder (HCC)     L carotid completely blocked    Hyperlipidemia     Hypertension     Insulin dependent diabetes mellitus ([address] )     Stroke [facility])      Past Surgical History:   Procedure Laterality Date    APPENDECTOMY      TOE AMPUTATION             Consults have been placed to:   IP CONSULT TO CARDIOLOGY  IP CONSULT TO PODIATRY    Vitals:    01/20/18 0313 01/20/18 0352 01/20/18 0700 01/20/18 1503   BP:   140/58 135/60   BP Location:   Right arm Right arm   Pulse:   71 63   Resp:   18 18   Temp:   98 1 °F (36 7 °C) 98 7 °F (37 1 °C)   TempSrc:    Oral   SpO2:  90% 93% 94%   Weight: 107 kg (236 lb 5 3 oz)  109 kg (239 lb 3 2 oz)    Height:           Most recent labs:    Recent Labs      01/20/18   0452  01/20/18   0840  01/20/18   1209   WBC  9 82   --    --    HGB  10 2*   --    --    HCT  31 9*   --    --    PLT  183   --    --    K   --    --   4 3   NA   --    --   133*   CALCIUM   --    --   9 2   BUN   --    --   66*   CREATININE   --    --   2 31*   INR   --   1 76*   --    TROPONINI  0 22*  2 76*   --        Scheduled Meds:  amiodarone 200 mg Oral Daily With Breakfast   amLODIPine 5 mg Oral Daily   aspirin 81 mg Oral Daily   atorvastatin 80 mg Oral Daily With Dinner   carvedilol 6 25 mg Oral BID With Meals   cholecalciferol 1,000 Units Oral Daily   docusate sodium 100 mg Oral BID   furosemide 40 mg Intravenous BID (diuretic)   insulin detemir 35 Units Subcutaneous Q12H ARJUN   insulin lispro 4-20 Units Subcutaneous TID AC   insulin lispro 8 Units Subcutaneous TID With Meals   senna 1 tablet Oral HS   spironolactone 12 5 mg Oral Daily   [START ON 1/22/2018] warfarin 2 5 mg Oral Once per day on Mon Wed Fri   [START ON 1/21/2018] warfarin 5 mg Oral Once per day on Sun Tue Thu Sat     Continuous Infusions:   PRN Meds:   acetaminophen    influenza vaccine    nitroglycerin    ondansetron    pneumococcal 13-valent conjugate vaccine    Surgical procedures (if appropriate):

## 2018-01-21 NOTE — PLAN OF CARE
Problem: PAIN - ADULT  Goal: Verbalizes/displays adequate comfort level or baseline comfort level  Interventions:  - Encourage patient to monitor pain and request assistance  - Assess pain using appropriate pain scale  - Administer analgesics based on type and severity of pain and evaluate response  - Implement non-pharmacological measures as appropriate and evaluate response  - Consider cultural and social influences on pain and pain management  - Notify physician/advanced practitioner if interventions unsuccessful or patient reports new pain   Outcome: Progressing      Problem: INFECTION - ADULT  Goal: Absence or prevention of progression during hospitalization  INTERVENTIONS:  - Assess and monitor for signs and symptoms of infection  - Monitor lab/diagnostic results  - Monitor all insertion sites  - Administer medications as ordered  - Instruct and encourage patient and family to use good hand hygiene technique  - Identify and instruct in appropriate isolation precautions for identified infection/condition   Outcome: Progressing    Goal: Absence of fever/infection during neutropenic period  INTERVENTIONS:  - Monitor WBC     Outcome: Progressing      Problem: SAFETY ADULT  Goal: Patient will remain free of falls  INTERVENTIONS:  - Assess patient frequently for physical needs  -  Identify cognitive and physical deficits and behaviors that affect risk of falls    -  [place] fall precautions as indicated by assessment   - Educate patient/family on patient safety including physical limitations  - Instruct patient to call for assistance with activity based on assessment  - Modify environment to reduce risk of injury  - Consider OT/PT consult to assist with strengthening/mobility    Outcome: Progressing    Goal: Maintain or return to baseline ADL function  INTERVENTIONS:  -  Assess patient's ability to carry out ADLs; assess patient's baseline for ADL function and identify physical deficits which impact ability to perform ADLs (bathing, care of mouth/teeth, toileting, grooming, dressing, etc )  - Assess/evaluate cause of self-care deficits   - Assess range of motion  - Assess patient's mobility; develop plan if impaired  - Assess patient's need for assistive devices and provide as appropriate  - Encourage maximum independence but intervene and supervise when necessary  ¯ Involve family in performance of ADLs  ¯ Assess for home care needs following discharge      Outcome: Progressing    Goal: Maintain or return mobility status to optimal level  INTERVENTIONS:  - Assess patient's baseline mobility status (ambulation, transfers, stairs, etc )    - Identify cognitive and physical deficits and behaviors that affect mobility  - Identify mobility aids required to assist with transfers and/or ambulation (gait belt, sit-to-stand, lift, walker, cane, etc )  - [place] fall precautions as indicated by assessment  - Record patient progress and toleration of activity level on Mobility SBAR; progress patient to next Phase/Stage  - Instruct patient to call for assistance with activity based on assessment  - Request Rehabilitation consult to assist with strengthening/weightbearing, etc    Outcome: Progressing      Problem: DISCHARGE PLANNING  Goal: Discharge to home or other facility with appropriate resources  INTERVENTIONS:  - Identify barriers to discharge w/patient and caregiver  - Arrange for needed discharge resources and transportation as appropriate  - Identify discharge learning needs (meds, wound care, etc )  - Arrange for interpretive services to assist at discharge as needed  - Refer to Case Management Department for coordinating discharge planning if the patient needs post-hospital services based on physician/advanced practitioner order or complex needs related to functional status, cognitive ability, or social support system   Outcome: Progressing      Problem: Knowledge Deficit  Goal: Patient/family/caregiver demonstrates understanding of disease process, treatment plan, medications, and discharge instructions  Complete learning assessment and assess knowledge base  Interventions:  - Provide teaching at level of understanding  - Provide teaching via preferred learning methods   Outcome: Progressing      Problem: Potential for Falls  Goal: Patient will remain free of falls  INTERVENTIONS:  - Assess patient frequently for physical needs  -  Identify cognitive and physical deficits and behaviors that affect risk of falls    -  [place] fall precautions as indicated by assessment   - Educate patient/family on patient safety including physical limitations  - Instruct patient to call for assistance with activity based on assessment  - Modify environment to reduce risk of injury  - Consider OT/PT consult to assist with strengthening/mobility    Outcome: Progressing

## 2018-01-21 NOTE — PROGRESS NOTES
[facility] Internal Medicine Progress Note  Patient: Tip Kim 50 y o  male   MRN: [id number]  PCP: Yisel Unger DO  Unit/Bed#: -01 Encounter: [id number]  Date Of Visit: 01/21/18    Assessment:    Principal Problem:    Acute on chronic combined systolic and diastolic CHF (congestive heart failure) ([facility] )  Active Problems:    CKD (chronic kidney disease)    Hypertension    Diabetes 1 5, managed as type 2 (HCC)    PAF (paroxysmal atrial fibrillation) ([facility] )    Hyperlipidemia      Plan:  Acute on chronic combined systolic and diastolic CHF  Will continue with IV lasix  Negative 900mL since yesterday  Will await cardiology recs, but likely continue IV lasix and transition to PO tomorrow  KYLER on CKD   Creatinine is 2 14  Baseline is 1 6-1 9  It is trending down from yesterday  Will continue to monitor  [id number]  2/2 above troponin is 2 76  Will continue to trend until peaked       KYLER on CKD  Baseline is 1 6 -1 9  Will continue to follow today is 2 31  Likely 2/2 above, but if further trend up will consult nephrology  Will also DC ACE-I       T2DM with hyperglycemia  Blood sugars are better today after increasing insulin  Will also go up to Alg 5  Continue to check before meals and at bedtime       Foot callus with charcot deformity  Appreciate Podiatry input  No further intervention on their part  VTE Pharmacologic Prophylaxis:   Pharmacologic: Heparin  Mechanical VTE Prophylaxis in Place: Yes    Patient Centered Rounds: I have performed bedside rounds with nursing staff today  Discussions with Specialists or Other Care Team Provider: Not discussed with specialist      Education and Discussions with Family / Patient: Discussed with patient  He will update family himself  Time Spent for Care: 30 minutes  More than 50% of total time spent on counseling and coordination of care as described above      Current Length of Stay: 1 day(s)    Current Patient Status: Inpatient   Certification Statement: The patient will continue to require additional inpatient hospital stay due to IV lasix and KYLER  Discharge Plan / Estimated Discharge Date: Not medically stable for discharge  Code Status: Level 1 - Full Code      Subjective:   Patient seen and examined  No new symptoms  Feeling "overall better"  No complaints  Objective:     Vitals:   Temp (24hrs), Av 4 °F (36 9 °C), Min:97 8 °F (36 6 °C), Max:98 8 °F (37 1 °C)    HR:  [61-63] 61  Resp:  [16-18] 16  BP: (134-146)/(60-65) 146/65  SpO2:  [94 %-98 %] 96 %  Body mass index is 32 65 kg/m²  Input and Output Summary (last 24 hours): Intake/Output Summary (Last 24 hours) at 18 1138  Last data filed at 18 0600   Gross per 24 hour   Intake              480 ml   Output             1600 ml   Net            -1120 ml       Physical Exam:     Physical Exam   Constitutional: He appears well-developed and well-nourished  HENT:   Head: Normocephalic  Mouth/Throat: No oropharyngeal exudate  Eyes: Right eye exhibits no discharge  Left eye exhibits no discharge  No scleral icterus  Neck: No JVD present  No tracheal deviation present  No thyromegaly present  Cardiovascular: Exam reveals no gallop and no friction rub  No murmur heard  Pulmonary/Chest: Effort normal  No respiratory distress  He has no wheezes  He has no rales  He exhibits no tenderness  Abdominal: He exhibits no distension and no mass  There is no tenderness  There is no rebound and no guarding  Musculoskeletal: He exhibits edema and deformity  He exhibits no tenderness  Lymphadenopathy:     He has no cervical adenopathy  Neurological: No cranial nerve deficit  Coordination normal    Skin: No rash noted  No erythema  No pallor  Psychiatric: He has a normal mood and affect             Additional Data:     Labs:      Results from last 7 days  Lab Units 18  0602   WBC Thousand/uL 6 97   HEMOGLOBIN g/dL 9 6* HEMATOCRIT % 30 1*   PLATELETS Thousands/uL 151   NEUTROS PCT % 74   LYMPHS PCT % 15   MONOS PCT % 9   EOS PCT % 2       Results from last 7 days  Lab Units 01/21/18  0602   SODIUM mmol/L 140   POTASSIUM mmol/L 4 1   CHLORIDE mmol/L 104   CO2 mmol/L 26   BUN mg/dL 64*   CREATININE mg/dL 2 14*   CALCIUM mg/dL 9 0   TOTAL PROTEIN g/dL 6 9   BILIRUBIN TOTAL mg/dL 0 80   ALK PHOS U/L 121*   ALT U/L 26   AST U/L 25   GLUCOSE RANDOM mg/dL 105       Results from last 7 days  Lab Units 01/20/18  0840   INR  1 76*       * I Have Reviewed All Lab Data Listed Above  * Additional Pertinent Lab Tests Reviewed: All [place] Admission Reviewed    Imaging:    Imaging Reports Reviewed Today Include:   CXR -   "Cardiomegaly and moderate pulmonary vascular congestion "  Imaging Personally Reviewed by Myself Includes:    As above  Recent Cultures (last 7 days):           Last 24 Hours Medication List:     amiodarone 200 mg Oral Daily With Breakfast   amLODIPine 5 mg Oral Daily   aspirin 81 mg Oral Daily   atorvastatin 80 mg Oral Daily With Dinner   carvedilol 6 25 mg Oral BID With Meals   cholecalciferol 1,000 Units Oral Daily   docusate sodium 100 mg Oral BID   furosemide 40 mg Intravenous BID (diuretic)   insulin detemir 35 Units Subcutaneous Q12H ARJUN   insulin lispro 4-20 Units Subcutaneous TID AC   insulin lispro 8 Units Subcutaneous TID With Meals   senna 1 tablet Oral HS   spironolactone 12 5 mg Oral Daily   [START ON 1/22/2018] warfarin 2 5 mg Oral Once per day on Mon Wed Fri   warfarin 5 mg Oral Once per day on Sun Tue Thu Sat        Today, Patient Was Seen By: Hyacinth Patel MD    ** Please Note: This note has been constructed using a voice recognition system   **

## 2018-01-22 VITALS
HEIGHT: 72 IN | HEART RATE: 64 BPM | WEIGHT: 239.2 LBS | DIASTOLIC BLOOD PRESSURE: 68 MMHG | OXYGEN SATURATION: 97 % | BODY MASS INDEX: 32.4 KG/M2 | RESPIRATION RATE: 16 BRPM | TEMPERATURE: 98.3 F | SYSTOLIC BLOOD PRESSURE: 150 MMHG

## 2018-01-22 LAB
ALBUMIN SERPL BCP-MCNC: 3.1 G/DL (ref 3.5–5)
ALP SERPL-CCNC: 119 U/L (ref 46–116)
ALT SERPL W P-5'-P-CCNC: 25 U/L (ref 12–78)
ANION GAP SERPL CALCULATED.3IONS-SCNC: 9 MMOL/L (ref 4–13)
AST SERPL W P-5'-P-CCNC: 25 U/L (ref 5–45)
BASOPHILS # BLD AUTO: 0.02 THOUSANDS/ΜL (ref 0–0.1)
BASOPHILS NFR BLD AUTO: 0 % (ref 0–1)
BILIRUB SERPL-MCNC: 0.8 MG/DL (ref 0.2–1)
BUN SERPL-MCNC: 61 MG/DL (ref 5–25)
CALCIUM SERPL-MCNC: 9.2 MG/DL (ref 8.3–10.1)
CHLORIDE SERPL-SCNC: 103 MMOL/L (ref 100–108)
CO2 SERPL-SCNC: 26 MMOL/L (ref 21–32)
CREAT SERPL-MCNC: 2.05 MG/DL (ref 0.6–1.3)
EOSINOPHIL # BLD AUTO: 0.12 THOUSAND/ΜL (ref 0–0.61)
EOSINOPHIL NFR BLD AUTO: 2 % (ref 0–6)
ERYTHROCYTE [DISTWIDTH] IN BLOOD BY AUTOMATED COUNT: 16.5 % (ref 11.6–15.1)
GFR SERPL CREATININE-BSD FRML MDRD: 37 ML/MIN/1.73SQ M
GLUCOSE SERPL-MCNC: 124 MG/DL (ref 65–140)
GLUCOSE SERPL-MCNC: 159 MG/DL (ref 65–140)
GLUCOSE SERPL-MCNC: 215 MG/DL (ref 65–140)
GLUCOSE SERPL-MCNC: 76 MG/DL (ref 65–140)
HCT VFR BLD AUTO: 30.3 % (ref 36.5–49.3)
HGB BLD-MCNC: 9.8 G/DL (ref 12–17)
LYMPHOCYTES # BLD AUTO: 1.17 THOUSANDS/ΜL (ref 0.6–4.47)
LYMPHOCYTES NFR BLD AUTO: 17 % (ref 14–44)
MAGNESIUM SERPL-MCNC: 2.4 MG/DL (ref 1.6–2.6)
MCH RBC QN AUTO: 27.6 PG (ref 26.8–34.3)
MCHC RBC AUTO-ENTMCNC: 32.3 G/DL (ref 31.4–37.4)
MCV RBC AUTO: 85 FL (ref 82–98)
MONOCYTES # BLD AUTO: 0.96 THOUSAND/ΜL (ref 0.17–1.22)
MONOCYTES NFR BLD AUTO: 14 % (ref 4–12)
NEUTROPHILS # BLD AUTO: 4.73 THOUSANDS/ΜL (ref 1.85–7.62)
NEUTS SEG NFR BLD AUTO: 67 % (ref 43–75)
PLATELET # BLD AUTO: 163 THOUSANDS/UL (ref 149–390)
PMV BLD AUTO: 13.3 FL (ref 8.9–12.7)
POTASSIUM SERPL-SCNC: 3.8 MMOL/L (ref 3.5–5.3)
PROT SERPL-MCNC: 7.3 G/DL (ref 6.4–8.2)
RBC # BLD AUTO: 3.55 MILLION/UL (ref 3.88–5.62)
SODIUM SERPL-SCNC: 138 MMOL/L (ref 136–145)
WBC # BLD AUTO: 7 THOUSAND/UL (ref 4.31–10.16)

## 2018-01-22 PROCEDURE — 94760 N-INVAS EAR/PLS OXIMETRY 1: CPT

## 2018-01-22 PROCEDURE — 85025 COMPLETE CBC W/AUTO DIFF WBC: CPT | Performed by: INTERNAL MEDICINE

## 2018-01-22 PROCEDURE — 83735 ASSAY OF MAGNESIUM: CPT | Performed by: INTERNAL MEDICINE

## 2018-01-22 PROCEDURE — 80053 COMPREHEN METABOLIC PANEL: CPT | Performed by: INTERNAL MEDICINE

## 2018-01-22 PROCEDURE — 94660 CPAP INITIATION&MGMT: CPT

## 2018-01-22 PROCEDURE — 82948 REAGENT STRIP/BLOOD GLUCOSE: CPT

## 2018-01-22 RX ORDER — FUROSEMIDE 40 MG/1
40 TABLET ORAL 2 TIMES DAILY
Qty: 60 TABLET | Refills: 0 | Status: ON HOLD | OUTPATIENT
Start: 2018-01-22 | End: 2018-09-21

## 2018-01-22 RX ADMIN — ATORVASTATIN CALCIUM 80 MG: 40 TABLET, FILM COATED ORAL at 16:48

## 2018-01-22 RX ADMIN — DOCUSATE SODIUM 100 MG: 100 CAPSULE, LIQUID FILLED ORAL at 09:04

## 2018-01-22 RX ADMIN — INSULIN DETEMIR 35 UNITS: 100 INJECTION, SOLUTION SUBCUTANEOUS at 09:05

## 2018-01-22 RX ADMIN — ASPIRIN 81 MG 81 MG: 81 TABLET ORAL at 09:04

## 2018-01-22 RX ADMIN — CARVEDILOL 6.25 MG: 6.25 TABLET, FILM COATED ORAL at 07:23

## 2018-01-22 RX ADMIN — FUROSEMIDE 40 MG: 10 INJECTION, SOLUTION INTRAMUSCULAR; INTRAVENOUS at 07:23

## 2018-01-22 RX ADMIN — INSULIN LISPRO 8 UNITS: 100 INJECTION, SOLUTION INTRAVENOUS; SUBCUTANEOUS at 12:08

## 2018-01-22 RX ADMIN — AMIODARONE HYDROCHLORIDE 200 MG: 200 TABLET ORAL at 07:23

## 2018-01-22 RX ADMIN — CHOLECALCIFEROL TAB 25 MCG (1000 UNIT) 1000 UNITS: 25 TAB at 09:04

## 2018-01-22 RX ADMIN — SPIRONOLACTONE 12.5 MG: 25 TABLET, FILM COATED ORAL at 09:04

## 2018-01-22 RX ADMIN — CARVEDILOL 6.25 MG: 6.25 TABLET, FILM COATED ORAL at 16:48

## 2018-01-22 RX ADMIN — FUROSEMIDE 40 MG: 10 INJECTION, SOLUTION INTRAMUSCULAR; INTRAVENOUS at 16:49

## 2018-01-22 RX ADMIN — WARFARIN SODIUM 2.5 MG: 5 TABLET ORAL at 16:49

## 2018-01-22 RX ADMIN — AMLODIPINE BESYLATE 5 MG: 5 TABLET ORAL at 09:04

## 2018-01-22 NOTE — DISCHARGE INSTRUCTIONS
Please check your BMP bloodwork in 2 days time and follow up results with your PCP and your cardiologist    Please continue to take your coumadin as you were prior to admission and follow up on your INR and coumadin dosing as you were prior to admission

## 2018-01-22 NOTE — PROGRESS NOTES
Progress Note - Cardiology   Jeronimo Simmonds  50 y o  male MRN: [id number]  Unit/Bed#: -01 Encounter: [id number]        Principal Problem:    Acute on chronic combined systolic and diastolic CHF (congestive heart failure) ([address] )  Active Problems:    CKD (chronic kidney disease)    Hypertension    Diabetes 1 5, managed as type 2 (HCC)    PAF (paroxysmal atrial fibrillation) ([facility])    Hyperlipidemia      Assessment:  1  Acute on chronic combined heart failure  I/O negative 4 liters  On lasix 40mg IV BID  Takes lasix 40mg BID at home  He requests to stay on this dose  Reports dietary indiscretions and feels if he abides by restrictions he will not require higher dose  Symptomatically improved  Not quit at baseline but pt wants discharge  Leg edema resolved  BS decreased bases/ no rales/ mild elevated JVP  On BB/ ace inhibitor  Pt believes he is at baseline weight  Follows with [facility]    2  Severe CAD not amenable to revascularization  Chest discomfort on presentation, resolved with volume removal    On asa, statin, BB    3  ICMP EF 40-45%  EF appears unchanged by echo report  On BB/ ace inhibitor, lasix and aldactone  2  NSTEMI II  Severe CAD not amenable to revascularization  Troponin to 2 76  Echocardiogram- LVEF 40% , study inadequate for eval of RWM    3  CKD/KYLER- improving with diuresis but not at baseline  Monitor    4  HTN- stable on current meds    5  PAF- NSR, on BB/ amiodarone/warfarin    6   HLD - on statin        Subjective/Objective   Chief Complaint/Subjective  SOB/CP not at baseline  Left leg edema- resolved        Vitals: /57 (BP Location: Left arm)   Pulse 59   Temp 98 7 °F (37 1 °C) (Oral)   Resp 18   Ht 6' (1 829 m)   Wt 109 kg (239 lb 3 2 oz) Comment: pt refused  to take off shoes/boot  SpO2 97%   BMI 32 44 kg/m²      Vitals:    01/21/18 0600 01/22/18 0455   Weight: 109 kg (240 lb 11 9 oz) 109 kg (239 lb 3 2 oz)     Orthostatic Blood Pressures    Flowsheet Row Most Recent Value   Blood Pressure  127/57 filed at 01/22/2018 1014   Patient Position - Orthostatic VS  Sitting filed at 01/22/2018 8581            Intake/Output Summary (Last 24 hours) at 01/22/18 1033  Last data filed at 01/22/18 2445   Gross per 24 hour   Intake              890 ml   Output             3925 ml   Net            -3035 ml       Invasive Devices     Peripheral Intravenous Line            Peripheral IV 01/20/18 Right Antecubital 2 days                Current Facility-Administered Medications   Medication Dose Route Frequency    acetaminophen (TYLENOL) tablet 650 mg  650 mg Oral Q6H PRN    amiodarone tablet 200 mg  200 mg Oral Daily With Breakfast    amLODIPine (NORVASC) tablet 5 mg  5 mg Oral Daily    aspirin chewable tablet 81 mg  81 mg Oral Daily    atorvastatin (LIPITOR) tablet 80 mg  80 mg Oral Daily With Dinner    carvedilol (COREG) tablet 6 25 mg  6 25 mg Oral BID With Meals    cholecalciferol (VITAMIN D3) tablet 1,000 Units  1,000 Units Oral Daily    docusate sodium (COLACE) capsule 100 mg  100 mg Oral BID    furosemide (LASIX) injection 40 mg  40 mg Intravenous BID (diuretic)    influenza inactivated quadrivalent vaccine (FLULAVAL) IM injection 0 5 mL  0 5 mL Intramuscular Prior to discharge    insulin detemir (LEVEMIR) subcutaneous injection 35 Units  35 Units Subcutaneous Q12H [facility]    insulin lispro (HumaLOG) 100 units/mL subcutaneous injection 4-20 Units  4-20 Units Subcutaneous TID AC    insulin lispro (HumaLOG) 100 units/mL subcutaneous injection 8 Units  8 Units Subcutaneous TID With Meals    nitroglycerin (NITROSTAT) SL tablet 0 4 mg  0 4 mg Sublingual Q5 Min PRN    ondansetron (ZOFRAN) injection 4 mg  4 mg Intravenous Q6H PRN    pneumococcal 13-valent conjugate vaccine (PREVNAR-13) IM injection 0 5 mL  0 5 mL Intramuscular Prior to discharge    senna (SENOKOT) tablet 8 6 mg  1 tablet Oral HS    spironolactone (ALDACTONE) tablet 12 5 mg  12 5 mg Oral Daily    warfarin (COUMADIN) tablet 2 5 mg  2 5 mg Oral Once per day on Mon Wed Fri    warfarin (COUMADIN) tablet 5 mg  5 mg Oral Once per day on Sun Tue Thu Sat         Physical Exam: /57 (BP Location: Left arm)   Pulse 59   Temp 98 7 °F (37 1 °C) (Oral)   Resp 18   Ht 6' (1 829 m)   Wt 109 kg (239 lb 3 2 oz) Comment: pt refused  to take off shoes/boot  SpO2 97%   BMI 32 44 kg/m²      General Appearance:    Alert, cooperative, no distress, appears stated age   Head:    Normocephalic, no scleral icterus   Eyes:    PERRL   Nose:   Nares normal, septum midline, no drainage    Throat:   Lips, mucosa, and tongue normal   Neck:   Supple, symmetrical, trachea midline,      Mild elevated JVP   Back:     Symmetric, no CVA tenderness   Lungs:     Decreased bases to auscultation bilaterally, respirations unlabored   Chest Wall:    No tenderness or deformity    Heart:    Regular rate and rhythm, S1 and S2 normal, no murmur, rub   or gallop   Abdomen:     Soft, non-tender, bowel sounds active all four quadrants,               Skin:   Skin warm   Neurologic:   Alert and oriented to person place and time                 Lab Results:   Recent Results (from the past 72 hour(s))   CBC and differential    Collection Time: 01/20/18 12:30 AM   Result Value Ref Range    WBC 9 67 4 31 - 10 16 Thousand/uL    RBC 3 83 (L) 3 88 - 5 62 Million/uL    Hemoglobin 10 6 (L) 12 0 - 17 0 g/dL    Hematocrit 32 7 (L) 36 5 - 49 3 %    MCV 85 82 - 98 fL    MCH 27 7 26 8 - 34 3 pg    MCHC 32 4 31 4 - 37 4 g/dL    RDW 16 2 (H) 11 6 - 15 1 %    MPV 13 2 (H) 8 9 - 12 7 fL    Platelets 704 099 - 523 Thousands/uL    Neutrophils Relative 88 (H) 43 - 75 %    Lymphocytes Relative 5 (L) 14 - 44 %    Monocytes Relative 6 4 - 12 %    Eosinophils Relative 1 0 - 6 %    Basophils Relative 0 0 - 1 %    Neutrophils Absolute 8 56 (H) 1 85 - 7 62 Thousands/µL    Lymphocytes Absolute 0 49 (L) 0 60 - 4 47 Thousands/µL    Monocytes Absolute 0 54 0 17 - 1 22 Thousand/µL Eosinophils Absolute 0 05 0 00 - 0 61 Thousand/µL    Basophils Absolute 0 03 0 00 - 0 10 Thousands/µL   Basic metabolic panel    Collection Time: 01/20/18 12:30 AM   Result Value Ref Range    Sodium 134 (L) 136 - 145 mmol/L    Potassium 5 3 3 5 - 5 3 mmol/L    Chloride 100 100 - 108 mmol/L    CO2 22 21 - 32 mmol/L    Anion Gap 12 4 - 13 mmol/L    BUN 61 (H) 5 - 25 mg/dL    Creatinine 2 25 (H) 0 60 - 1 30 mg/dL    Glucose 479 (H) 65 - 140 mg/dL    Calcium 9 0 8 3 - 10 1 mg/dL    eGFR 33 ml/min/1 73sq m   B-type natriuretic peptide    Collection Time: 01/20/18 12:30 AM   Result Value Ref Range    NT-proBNP 4,618 (H) <125 pg/mL   Troponin I    Collection Time: 01/20/18 12:30 AM   Result Value Ref Range    Troponin I <0 02 <=0 04 ng/mL   ECG 12 lead    Collection Time: 01/20/18 12:44 AM   Result Value Ref Range    Ventricular Rate 72 BPM    Atrial Rate 72 BPM    LA Interval 198 ms    QRSD Interval 162 ms    QT Interval 524 ms    QTC Interval 573 ms    P Axis 70 degrees    QRS Axis -34 degrees    T Wave Axis 123 degrees   Fingerstick Glucose (POCT)    Collection Time: 01/20/18  3:03 AM   Result Value Ref Range    POC Glucose 433 (H) 65 - 140 mg/dl   Hemoglobin A1c (Orders if not completed within the last 90 days)    Collection Time: 01/20/18  4:52 AM   Result Value Ref Range    Hemoglobin A1C 9 1 (H) 4 2 - 6 3 %     mg/dl   Troponin I    Collection Time: 01/20/18  4:52 AM   Result Value Ref Range    Troponin I 0 22 (H) <=0 04 ng/mL   CBC (With Platelets)    Collection Time: 01/20/18  4:52 AM   Result Value Ref Range    WBC 9 82 4 31 - 10 16 Thousand/uL    RBC 3 74 (L) 3 88 - 5 62 Million/uL    Hemoglobin 10 2 (L) 12 0 - 17 0 g/dL    Hematocrit 31 9 (L) 36 5 - 49 3 %    MCV 85 82 - 98 fL    MCH 27 3 26 8 - 34 3 pg    MCHC 32 0 31 4 - 37 4 g/dL    RDW 16 4 (H) 11 6 - 15 1 %    Platelets 409 809 - 568 Thousands/uL    MPV 12 9 (H) 8 9 - 12 7 fL   Fingerstick Glucose (POCT)    Collection Time: 01/20/18  4:56 AM Result Value Ref Range    POC Glucose 472 (H) 65 - 140 mg/dl   Fingerstick Glucose (POCT)    Collection Time: 01/20/18  8:37 AM   Result Value Ref Range    POC Glucose 364 (H) 65 - 140 mg/dl   Protime-INR    Collection Time: 01/20/18  8:40 AM   Result Value Ref Range    Protime 21 1 (H) 12 1 - 14 4 seconds    INR 1 76 (H) 0 86 - 1 16   Troponin I    Collection Time: 01/20/18  8:40 AM   Result Value Ref Range    Troponin I 2 76 (H) <=0 04 ng/mL   Fingerstick Glucose (POCT)    Collection Time: 01/20/18 10:58 AM   Result Value Ref Range    POC Glucose 418 (H) 65 - 140 mg/dl   Basic metabolic panel    Collection Time: 01/20/18 12:09 PM   Result Value Ref Range    Sodium 133 (L) 136 - 145 mmol/L    Potassium 4 3 3 5 - 5 3 mmol/L    Chloride 99 (L) 100 - 108 mmol/L    CO2 21 21 - 32 mmol/L    Anion Gap 13 4 - 13 mmol/L    BUN 66 (H) 5 - 25 mg/dL    Creatinine 2 31 (H) 0 60 - 1 30 mg/dL    Glucose 420 (H) 65 - 140 mg/dL    Calcium 9 2 8 3 - 10 1 mg/dL    eGFR 32 ml/min/1 73sq m   NT-BNP PRO    Collection Time: 01/20/18 12:09 PM   Result Value Ref Range    NT-proBNP 14,407 (H) <125 pg/mL   Fingerstick Glucose (POCT)    Collection Time: 01/20/18  3:41 PM   Result Value Ref Range    POC Glucose 392 (H) 65 - 140 mg/dl   Fingerstick Glucose (POCT)    Collection Time: 01/20/18  8:57 PM   Result Value Ref Range    POC Glucose 176 (H) 65 - 140 mg/dl   CBC and differential    Collection Time: 01/21/18  6:02 AM   Result Value Ref Range    WBC 6 97 4 31 - 10 16 Thousand/uL    RBC 3 51 (L) 3 88 - 5 62 Million/uL    Hemoglobin 9 6 (L) 12 0 - 17 0 g/dL    Hematocrit 30 1 (L) 36 5 - 49 3 %    MCV 86 82 - 98 fL    MCH 27 4 26 8 - 34 3 pg    MCHC 31 9 31 4 - 37 4 g/dL    RDW 16 5 (H) 11 6 - 15 1 %    MPV 13 2 (H) 8 9 - 12 7 fL    Platelets 109 968 - 702 Thousands/uL    Neutrophils Relative 74 43 - 75 %    Lymphocytes Relative 15 14 - 44 %    Monocytes Relative 9 4 - 12 %    Eosinophils Relative 2 0 - 6 %    Basophils Relative 0 0 - 1 % Neutrophils Absolute 5 12 1 85 - 7 62 Thousands/µL    Lymphocytes Absolute 1 06 0 60 - 4 47 Thousands/µL    Monocytes Absolute 0 65 0 17 - 1 22 Thousand/µL    Eosinophils Absolute 0 12 0 00 - 0 61 Thousand/µL    Basophils Absolute 0 02 0 00 - 0 10 Thousands/µL   Comprehensive metabolic panel    Collection Time: 01/21/18  6:02 AM   Result Value Ref Range    Sodium 140 136 - 145 mmol/L    Potassium 4 1 3 5 - 5 3 mmol/L    Chloride 104 100 - 108 mmol/L    CO2 26 21 - 32 mmol/L    Anion Gap 10 4 - 13 mmol/L    BUN 64 (H) 5 - 25 mg/dL    Creatinine 2 14 (H) 0 60 - 1 30 mg/dL    Glucose 105 65 - 140 mg/dL    Calcium 9 0 8 3 - 10 1 mg/dL    AST 25 5 - 45 U/L    ALT 26 12 - 78 U/L    Alkaline Phosphatase 121 (H) 46 - 116 U/L    Total Protein 6 9 6 4 - 8 2 g/dL    Albumin 3 0 (L) 3 5 - 5 0 g/dL    Total Bilirubin 0 80 0 20 - 1 00 mg/dL    eGFR 35 ml/min/1 73sq m   Fingerstick Glucose (POCT)    Collection Time: 01/21/18  7:38 AM   Result Value Ref Range    POC Glucose 147 (H) 65 - 140 mg/dl   Fingerstick Glucose (POCT)    Collection Time: 01/21/18 11:34 AM   Result Value Ref Range    POC Glucose 195 (H) 65 - 140 mg/dl   Fingerstick Glucose (POCT)    Collection Time: 01/21/18  3:32 PM   Result Value Ref Range    POC Glucose 185 (H) 65 - 140 mg/dl   Fingerstick Glucose (POCT)    Collection Time: 01/21/18  8:58 PM   Result Value Ref Range    POC Glucose 112 65 - 140 mg/dl   Comprehensive metabolic panel    Collection Time: 01/22/18  4:55 AM   Result Value Ref Range    Sodium 138 136 - 145 mmol/L    Potassium 3 8 3 5 - 5 3 mmol/L    Chloride 103 100 - 108 mmol/L    CO2 26 21 - 32 mmol/L    Anion Gap 9 4 - 13 mmol/L    BUN 61 (H) 5 - 25 mg/dL    Creatinine 2 05 (H) 0 60 - 1 30 mg/dL    Glucose 76 65 - 140 mg/dL    Calcium 9 2 8 3 - 10 1 mg/dL    AST 25 5 - 45 U/L    ALT 25 12 - 78 U/L    Alkaline Phosphatase 119 (H) 46 - 116 U/L    Total Protein 7 3 6 4 - 8 2 g/dL    Albumin 3 1 (L) 3 5 - 5 0 g/dL    Total Bilirubin 0 80 0 20 - 1 00 mg/dL    eGFR 37 ml/min/1 73sq m   Magnesium    Collection Time: 18  4:55 AM   Result Value Ref Range    Magnesium 2 4 1 6 - 2 6 mg/dL   CBC and differential    Collection Time: 18  4:55 AM   Result Value Ref Range    WBC 7 00 4 31 - 10 16 Thousand/uL    RBC 3 55 (L) 3 88 - 5 62 Million/uL    Hemoglobin 9 8 (L) 12 0 - 17 0 g/dL    Hematocrit 30 3 (L) 36 5 - 49 3 %    MCV 85 82 - 98 fL    MCH 27 6 26 8 - 34 3 pg    MCHC 32 3 31 4 - 37 4 g/dL    RDW 16 5 (H) 11 6 - 15 1 %    MPV 13 3 (H) 8 9 - 12 7 fL    Platelets 928 399 - 166 Thousands/uL    Neutrophils Relative 67 43 - 75 %    Lymphocytes Relative 17 14 - 44 %    Monocytes Relative 14 (H) 4 - 12 %    Eosinophils Relative 2 0 - 6 %    Basophils Relative 0 0 - 1 %    Neutrophils Absolute 4 73 1 85 - 7 62 Thousands/µL    Lymphocytes Absolute 1 17 0 60 - 4 47 Thousands/µL    Monocytes Absolute 0 96 0 17 - 1 22 Thousand/µL    Eosinophils Absolute 0 12 0 00 - 0 61 Thousand/µL    Basophils Absolute 0 02 0 00 - 0 10 Thousands/µL   Fingerstick Glucose (POCT)    Collection Time: 18  7:26 AM   Result Value Ref Range    POC Glucose 124 65 - 140 mg/dl     Patient: Raymundo Novak  MR number: [id number]  Account number: [de-identified]  : 1965  Age: 50 years  Gender: Male  Status: Inpatient  Location: Bedside  Height: 72 in  Weight: 238 5 lb  BP: 140/ 58 mmHg     Indications: Heart Failure     Diagnoses: I50 9 - Heart failure, unspecified     Sonographer:  DON Tobias, RDCS  Primary Physician:  Nahomy Naqvi DO  Referring Physician:  Elana Maki DO  Group:  [facility]  Interpreting Physician:  John Sweet MD     SUMMARY     LEFT VENTRICLE:  The ventricle was mildly to moderately dilated  Systolic function was moderately reduced  Ejection fraction was estimated to be 40 %  This study was inadequate for the evaluation of regional wall motion    Wall thickness was normal      RIGHT VENTRICLE:  The ventricle was dilated      LEFT ATRIUM:  The atrium was dilated      RIGHT ATRIUM:  The atrium was dilated      MITRAL VALVE:  There was mild regurgitation      AORTIC VALVE:  The valve was trileaflet  Leaflets exhibited sclerosis  There was mild regurgitation      TRICUSPID VALVE:  There was mild regurgitation      HISTORY: PRIOR HISTORY: CHF, elevated Troponins, Afib, Stroke, Hypertension, Hyperlipidemia     PROCEDURE: The procedure was performed at the bedside  This was a routine study  The transthoracic approach was used  The study included complete 2D imaging, M-mode, complete spectral Doppler, and color Doppler  The heart rate was 64 bpm,  at the start of the study  Images were obtained from the parasternal, apical, subcostal, and suprasternal notch acoustic windows  Echocardiographic views were limited due to poor acoustic window availability and decreased penetration  Image quality was adequate      LEFT VENTRICLE: The ventricle was mildly to moderately dilated  Systolic function was moderately reduced  Ejection fraction was estimated to be 40 %  This study was inadequate for the evaluation of regional wall motion  Wall thickness was  normal      RIGHT VENTRICLE: The ventricle was dilated      LEFT ATRIUM: The atrium was dilated      RIGHT ATRIUM: The atrium was dilated      MITRAL VALVE: Valve structure was normal  There was normal leaflet separation  DOPPLER: The transmitral velocity was within the normal range  There was no evidence for stenosis  There was mild regurgitation      AORTIC VALVE: The valve was trileaflet  Leaflets exhibited sclerosis  DOPPLER: There was mild regurgitation      TRICUSPID VALVE: The valve structure was normal  There was normal leaflet separation  DOPPLER: The transtricuspid velocity was within the normal range  There was no evidence for stenosis  There was mild regurgitation      PULMONIC VALVE: Leaflets exhibited normal thickness, no calcification, and normal cuspal separation   DOPPLER: The transpulmonic velocity was within the normal range  There was no regurgitation      PERICARDIUM: There was no pericardial effusion  The pericardium was normal in appearance      AORTA: The root exhibited normal size      SYSTEM MEASUREMENT TABLES    Imaging: I have personally reviewed pertinent reports  Tele- NSR  VTE Pharmacologic Prophylaxis: Warfarin (Coumadin)      Counseling / Coordination of Care  Total time spent today 30 minutes  Greater than 50% of total time was spent with the patient and / or family counseling and / or coordination of care

## 2018-01-22 NOTE — DISCHARGE SUMMARY
Discharge Summary - [facility]    Patient Information: Georgia Stinson 50 y o  male MRN: [id number]  Unit/Bed#: -01 Encounter: [id number]    Discharging Physician / Practitioner: Georgette Walker MD  PCP: Ella Cornell DO  Admission Date: 1/19/2018  Discharge Date: 01/22/18    Reason for Admission:   Shortness of breath and leg swelling  Discharge Diagnoses:     Principal Problem:    Acute on chronic combined systolic and diastolic CHF (congestive heart failure) ([facility])  Active Problems:    CKD (chronic kidney disease)    Hypertension    Diabetes 1 5, managed as type 2 (HCC)    PAF (paroxysmal atrial fibrillation) ([facility] [address] )    Hyperlipidemia  Resolved Problems:    * No resolved hospital problems  *  Acute on chronic combined systolic and diastolic CHF  DC on lasix 40 mg BID  Follow up with his cardiologist in 1 week  Seen by [facility] and they are okay with him going home today          KYLER on CKD   Creatinine is 2 05  Baseline is 1 6-1 9  Given script for BMP in 2 days and told to follow up with PCP          [id number]  2/2 above troponin is 2 76  No intervention       KYLER on CKD  Baseline is 1 6 -1 9  Will continue to follow today is 2 31  Likely 2/2 above, but if further trend up will consult nephrology  Will also DC ACE-I       T2DM with hyperglycemia  Continue on home regimen and outpatient follow up with PCP       Foot callus with charcot deformity  Appreciate Podiatry input  No further intervention on their part  PAF  Warfarin was subtherapeutic on admission  Patient told to check INR tomorrow and increase dose to 5 mg QD (usually only 5 mg 4 times a week)  Consultations During Hospital Stay:  · Cardiology - Dr Josue Rosales  Procedures Performed:     · CXR -  "Cardiomegaly and moderate pulmonary vascular congestion "    Significant Findings / Test Results:     · None  Incidental Findings:   · None        Test Results Pending at Discharge (will require follow up): · None  Outpatient Tests Requested:  · BMP and INR  Complications:  None  Hospital Course: Ruba Flores  is a 50 y o  male patient who originally presented to the hospital on 1/19/2018 due to shortness of breath and leg swelling  He was found to be in acute heart failure and diuresed with lasix 40 mg IV BID  He responded well with weight loss from 113 kg to 109 kg  He was followed by Cardiology who were okay with him being discharged on PO lasix 40 mg BID and follow up with his cardiologist is 1 week with repeat BMP in 2 days time  Condition at Discharge: stable     Discharge Day Visit / Exam:     Subjective:    Patient seen and examined  Feels "great"    "I want to go home"     No fevers or chills  Vitals: Blood Pressure: 127/57 (01/22/18 0727)  Pulse: 59 (01/22/18 0727)  Temperature: 98 7 °F (37 1 °C) (01/22/18 0727)  Temp Source: Oral (01/22/18 0727)  Respirations: 18 (01/22/18 0727)  Height: 6' (182 9 cm) (01/22/18 1042)  Weight - Scale: 109 kg (239 lb 3 2 oz) (pt refused  to take off shoes/boot) (01/22/18 0455)  SpO2: 97 % (01/22/18 0727)  Exam:   Physical Exam   Constitutional: He is oriented to person, place, and time  He appears well-developed and well-nourished  No distress  Neck: No JVD present  No tracheal deviation present  No thyromegaly present  Cardiovascular: Exam reveals no gallop and no friction rub  No murmur heard  Pulmonary/Chest: No respiratory distress  He has no wheezes  He has no rales  He exhibits no tenderness  Abdominal: He exhibits no distension and no mass  There is no tenderness  There is no rebound and no guarding  Musculoskeletal: He exhibits no edema or tenderness  Lymphadenopathy:     He has no cervical adenopathy  Neurological: He is alert and oriented to person, place, and time  No cranial nerve deficit  Coordination normal    Skin: No rash noted  He is not diaphoretic  No erythema  No pallor  Psychiatric: He has a normal mood and affect  Discussion with Family: Discussed with patient  Discharge instructions/Information to patient and family:   See after visit summary for information provided to patient and family  Provisions for Follow-Up Care:  See after visit summary for information related to follow-up care and any pertinent home health orders  Disposition:     Home    For Discharges to [facility] SNF:   · Not Applicable to this Patient - Not Applicable to this Patient    Planned Readmission: none  Discharge Statement:  I spent 45 minutes discharging the patient  This time was spent on the day of discharge  I had direct contact with the patient on the day of discharge  Greater than 50% of the total time was spent examining patient, answering all patient questions, arranging and discussing plan of care with patient as well as directly providing post-discharge instructions  Additional time then spent on discharge activities  Discharge Medications:  See after visit summary for reconciled discharge medications provided to patient and family        ** Please Note: This note has been constructed using a voice recognition system **

## 2018-01-22 NOTE — PLAN OF CARE
Problem: DISCHARGE PLANNING - CARE MANAGEMENT  Goal: Discharge to post-acute care or home with appropriate resources  INTERVENTIONS:  - Conduct assessment to determine patient/family and health care team treatment goals, and need for post-acute services based on payer coverage, community resources, and patient preferences, and barriers to discharge  - Address psychosocial, clinical, and financial barriers to discharge as identified in assessment in conjunction with the patient/family and health care team  - Arrange appropriate level of post-acute services according to patients   needs and preference and payer coverage in collaboration with the physician and health care team  - Communicate with and update the patient/family, physician, and health care team regarding progress on the discharge plan  - Arrange appropriate transportation to post-acute venues  Outcome: Adequate for Discharge  CM met with Pt at bedside to discuss BPCI program  CM discussed VNA with Pt, Pt reports he has had them in the past and declines VNA as he feels he does not need them  CM dept will follow Pt until discharge

## 2018-01-22 NOTE — SOCIAL WORK
CM met with Pt at bedside to discuss BPCI program  CM discussed VNA with Pt, Pt reports he has had them in the past and declines VNA as he feels he does not need them  CM dept will follow Pt until discharge

## 2018-01-23 NOTE — PROGRESS NOTES
Chief Complaint  Chief Complaint Free Text Note Form: Follow up for nursing dress change to right foot wound      History of Present Illness  Wound Identification HPI:   Wound Identification HPI   Wound #1: Right foot        Visit Information:   The patient came to Wound Care via wheelchair  The patient is being seen for a follow-up with MD at the [address] [place] [address]  The patient is accompanied by his fiance  The patient's identification was verified  A secondary verification process was completed  Orientation: oriented to person, oriented to place and oriented to time  Blood Glucose:  180 mg/dL as reported by patient  7/31/15: Patient arrived via wheelchair for right foot wound  Has had wound since March 2015  Pt was seen by Dr Demi Singh at [address] [place] [address] and was debrided at the bedside  Ace wrap with strike through drainage removed with 2 5x9s, adaptic and packing  Dressing was in place since Wednesday 7/29/15 per patient  Pt has [facility] for [facility] care  Dr Demi Singh relates that patient had two open wounds that communicated and upon MRI abscess was noted as well; bedside debridement removed skin bridge between two open areas and I&D of abscess  Plan for [facility] then total contact cast once enough granular tissue is present to d/c VAC  8/7/15: The patient arrived with VAC intact to wound  The patient has multiple abrasions from knee to toes and is bleeding  The patient relates the he crawled from his house, across grass and concrete into the car so that he didn't put any weight on his right foot  DPM had conversation with patient and significant other to come up with a solution; a manual light weight wheelchair will be prescribed  The SDTI that was on right dorsal foot has now opened, it will be wound #2  New wounds to right foot today traumatic injury  Patient has a small abrasion to the right knee that will be dressed and continue to assess  Follow up in one week   Copy of orders faxed to [facility]  8/21/15: Pt arrived with wound vac intact to right foot wound @ 125, with 2 pieces of black foam, no bridge  Dressings intact to right dorsal ffot and right 2nd toe wounds but pt had foam dressing on, not dermagran gauze as ordered  Pt's family states he was recently hospitalized for low hemoglobin and was seen by Dr Ayah Edge while in hospital  8/28/15: Patient arrived with wound vac intact to right lower extremity at 125mmhg  2 pieces of black foam removed with some foam noted on periwound  Denies any changes since last visit  Patient arrived with foam dressings to right foot instead of ordered dry gauze  Patient has received wheelchair since last visit and states  Follow up in one week  Copy of orders faxed to [facility]  9/4/15: Patient arrived with wound vac intact  1 piece of black foam removed  Denies changes since last visit  9/11/15: Arrived with wound vac intact to right foot  No changes since last visit  9/18/2015: arrived with dressings intact  For right 2nd toe amp 9/19/2015  Hold KCI wound VAC today  9/25/15: The patient arrived with NPWT and ordered dressings intact to wounds  The patient was seen by Dr Ayah Edge 9/19/15 at which time the second toe was amputated  The patient arrived today with 98% intact suture line; the distal aspect is somewhat  (0 2cm)  10/9/2015: Arrived with NPWT intact to right foot, alginate intact to right dorsal foot and right 2nd toe  10/16/15: The patient arrived with dressings intact to wounds  NPWT functioning properly  10/23/2015: Arrived with dressing intact, KCI wound VAC  10/30/2015: Patient arrived with dressing intact to right foot and right thigh donor site  Patient states wound vac was discontinued at last MD visit  11/6/15: Arrived with dressing intact to right foot  Right leg donor site 4 x 1 5 cm  Purulent drainage assessed beneath right lateral foot eschar  11/13/2015: Arrived with dressings intact   New wound on left ankle; CROW was rubbing, has been corrected since new wound appeared  11/20/15: Patient arrived with dressings intact to all wounds  Denies changes since last visit  12/4/15: Patient arrived with dressing intact to right foot and no dressing ion left ankle but patient states it has been draining a few days  Denies changes to medications or recent falls  Left lateral ankle wound presets reopened today  12/11/15: Patient arrived with dressings intact to left and right foot  Denies changes since last visit  States he has been ambulating with bulky dressing on right foot  12/18/15: Patient arived with cast intact to RLE  Denies changes since last visit  12/29/2015: Patient arrived with TCC intact to RLE and CROW with dressing intact to LLE  Patient relates that the CROW was adjusted to relieve pressure from the lateral ankle area; feels that it is working well  Right third toe open area measuring 0 4 cm x 0 4 cm x 0 1 cm  Covered with Acticoat Flex 7 to stay intact under the TCC  1/8/16: The patient arrived with dressing intact to left lateral ankle wound and TCC intact to RLE  1/15/16: Patient arrived with crow intact to LLE, and dressings intact to right foot  1/22/16: Patient arrived with dressings intact to left and right foot  Denies changes since last visit  1/29/16: Patient arrived with bulky dressing with Acticoat intact to right foot wound  Patient arrived with Dermagran, 4x4 gauze and bhavna intact to left lateral ankle  2/5/16: Patient arrived with Acticoat and bulky dressing intact to right root  Patient arrived 4x4 gauze and tape to left lateral ankle  2/12/16: Arrived with bulky dressing intact to right foot  Strike through drainage to [employer]  Patient reported he is doing home Physical therapy currently, nursing has discharged from services  2/19/16: Patient arrived with Acticoat flex, 4x4 gauze, 5x9 ABD, Kerlix, coban intact to right foot wound   2/25/16: The patient arrived with dressing clean dry and intact  No issues since last visit  3/4/16: The patient arrived with dressing nicely intact to right foot  No issues since last visit  3/11/16: The patient arrived with dressing intact, strikethrough drainage to but not through coban  The patient relates that he is doing "a lot" with physical therapy  3/18/16: Patient arrived with cast to right foot  Denies any changes since last visit  3/25/16: Patient arrived with TCC intact to right foot  4/1/16: Patient arrived with cast to right foot  4/8/16: Patient arrived with cast intact to right lower extremity  Denies changes since last visit  4/15/2016 Patient arrived with TCC intact to RLE  Denies any changes since last visit  4/22/16:Patient arrived with TCC intact to RLE  4/29/16: Patient arrived with bulky dressing intact to right foot  Patient denies any changes since last visit  5/6/16: Arrived with Dermagran, 2x2, 4x4, cast padding, ace wrap and Coban intact to right foot  5/13/16: Patient arrived with bulky dressing intact  Denies changes since last visit  5/20/2016: Arrived with bulky dressing intact to right foot  Patient reports no problems with the dressing  Doshi Fall Scale:     History of Falling: No = 0   Secondary Diagnosis: Yes = 15   Ambulatory Aid None, Bedrest, Nurse Assist = 0   No = 0   Gait: Impaired = 20 -- Short steps with shuffle, may have difficulty arising from chair, head down, significantly impaired balance, requiring furniture, support person, or walking aid to walk  Mental Status: Oriented to own ability = 0   Total Score: 35    25 - 45 = Moderate Risk        Fall, Nutrition, Mobility, Neuropsychological Assessment: The most recent fall occurred patient denies any falls since last visit  Number of falls in the last year were 0     Nutrition Assessment Screening: Food intake over the last 3 months due to the loss of appetite, digestive problems, chewing or swallowing difficulties is graded as: 2 = no decrease in food intake   Weight loss during the last 3 months: 3 = no weight loss   Mobility scored as: 2 = goes out  Psychological Stress and Acute Disease Scored as: 0 = The patient has experienced psychological stress or acute disease in the last 3 months  Neuropsychological problems scored as: 2 = no psychological problems  Body Mass Index (BMI) scored as: 3 = BMI 23 or greater  Nutritional Assessment Screening Score: 12 - 14 points - Normal nutritional status  Hospital Based Practices Required Assessment:   Pain Assessment   the patient states they do not have pain  (on a scale of 0 to 10, the patient rates the pain at 0 )   Abuse And Domestic Violence Screen   Domestic violence screen not done today  Reason DV Screen not done: fiance present in exam room    Depression And Suicide Screen  Suicide screen not done today  Reason suicide screen not done: fiance present in exam room  Prefered Language is  [place]  Primary Language is  English  Readiness To Learn: Receptive  Barriers To Learning: affect  Preferred Learning: demonstration and verbal   Education Completed: further treatment/follow-up and treatment/procedure   Teaching Method: verbal and demonstration   Person Taught: patient   Evaluation Of Learning: verbalized/demonstrated understanding and needs reinforcement      Active Problems    1  Abnormal kidney function (593 9) (N28 9)   2  Benign essential hypertension (401 1) (I10)   3  Charcot's Joint Of The Foot (713 5)   4  Chronic foot ulcer (707 15) (L97 509)   5  Chronic kidney disease (CKD) stage G3a/A1, moderately decreased glomerular filtration   rate (GFR) between 45-59 mL/min/1 73 square meter and albuminuria creatinine ratio   less than 30 mg/g (585 3) (N18 3)   6  Chronic ulcer of left ankle with fat layer exposed (707 13) (L97 322)   7  Congestive heart failure (428 0) (I50 9)   8  Dehiscence of incision (998 32) (T81 31XA)   9   Diabetes mellitus with neurological 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 60) (E11 49)   10  Diabetic Nephropathy (583 81)   11  DM type 2 (diabetes mellitus, type 2) (250 00) (E11 9)   12  Postoperative wound dehiscence, subsequent encounter (V58 89,998 32) (T81 31XD)   13  Status post skin graft (V42 3) (Z94 5)    Past Medical History    1  History of High cholesterol (272 0) (E78 0)   2  History of myocardial infarction (412) (I25 2)   3  History of stroke (V12 54) (Z86 73)   4  History of Irregular heartbeat (427 9) (I49 9)    Surgical History    1  History of Appendectomy (47 0)   2  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    Family History    1  Family history of Diabetes Mellitus (250 00)    2  Family history of cardiac disorder (V17 49) (Z82 49)    Social History    · Never a smoker    Current Meds   1  Atorvastatin Calcium 80 MG Oral Tablet; TAKE 1 TABLET DAILY; Therapy: [id number] to Recorded   2  Carvedilol 12 5 MG Oral Tablet; Take 1 tablet twice daily; Therapy: 61CXS7417 to (Evaluate:30Jun2016) Recorded   3  Citalopram Hydrobromide 20 MG Oral Tablet; TAKE 1 TABLET DAILY; Therapy: [id number] to Recorded   4  Furosemide 40 MG Oral Tablet; as needed; Therapy: [id number] to Recorded   5  Lantus 100 UNIT/ML Subcutaneous Solution; 40 untis bid; Therapy: 39HVT6825 to Recorded   6  Lidocaine HCl (Local Anesth ) 4 % SOLN; Apply prn to wound prior to debridement for   pain control; Therapy: (Recorded:14Dwk2015) to Recorded   7  Lisinopril 2 5 MG Oral Tablet; Therapy: (Genny Baldwinite) to Recorded   8  NovoLOG SOLN;   Therapy: (Recorded:75Wus8794) to Recorded   9  Pacerone 200 MG Oral Tablet; TAKE 1 TABLET DAILY; Therapy: 73HVZ4003 to Recorded   10  Spironolactone 25 MG Oral Tablet; 1/2 tablet daily; Therapy: 36YOH1491 to Recorded   11  Vitamin C TABS; Therapy: (Alo Doyle) to Recorded   12  Warfarin Sodium 5 MG Oral Tablet; TAKE 1 TABLET DAILY; Therapy: 09AHU1146 to Recorded    Allergies    1   No Known Drug Allergies    Vitals  Signs Meg Stout Includes: Current Encounter]   Recorded: [id number] 10:34AM   Temperature: 98 2 F, Tympanic  Heart Rate: 68, L Radial  Pulse Quality: Regular, L Radial  Respiration: 18  Respiration Quality: Normal  Systolic: 172, LUE, Sitting  Diastolic: 64, LUE, Sitting  Height: 5 ft 11 in  Pain Scale: 0    Physical Exam    Wound #1 Assessment wound #1 Location:, Right foot, started on 03/2015, Care for this wound started on 7/31/15  Wound Status: not healed  Diabetic Ulcer Grade/Lower Extremity: Dana Tiara 2  Length: x Width: x Depth: Total: cm   Wound Volume:           Tissue type: Granulation, Callus and unable to assess wound bed due to callus unable to visualize wound for description   Exudate Amount: Scant   Exudate Type: Serosangiunous and brown   Odor: None   Exudate Color: Tan and brown   Wound Edges: Callous   Periwound Skin Condition: Callus, Scaly    Procedure      Wound #1: Right foot     Nurse Dressing Change:   Wound #1 The wound located on the Right foot  Wound care rendered as per Physician/Advanced Practitioner order/plan  [facility] 1 week with Dr Claude Melvin  Comments:      Wound Drsg  Orders/Instructions  Wound Identification Dressing Orders--Instructions:   Wound Identification and Instructions   Wound #1: Right foot    Wound Care Instructions  Discussed with Patient/Caregiver  Dressing Type: Alyssa Quick with mild soap and water, normal saline, wound cleanser  As specified, use: NSS, and wound cleanser  Mayelin Paul Apply specified dressing to wound base/bed  To periwound apply: Skin prep barrier  Secondary dressing apply: Gauze, ABD, 4x4  Secure with: Kerlix  Dressing change frequency: Weekly  Offloading: Felt pad to periwound  Comments/Other:   Bulky dressing applied 5/20/2016 with 3 cast padding, 1 Kerlix, 1 Coban and tubifast yellow   Apply compression using: Coban and Tubifast yellow        Wound Goals  Wound Goals:   Healing Goals:   Fair healing potential secondary to moderate comorbid conditions  Wound edges will appear with evidence of contraction and epithelialization   Patient will achieve full wound closure with ability to wear appropriate shoewear       Impression  Wound [address]: Wound Nursing Care Plan   Impaired Tissue Integrity related to: right foot   Risk for Infection related to open wound:   Impaired Mobility related to: bulky dressing on right foot   Goals   Patient will achieve 100% epithelialization:  Patient will maintain skin integrity:  Wound Nursing Care Interventions:   Provide moist wound healing:  Implement offloading measures (turn & reposition schedule/method, ortho shoes/boots, floating heels, crutches, specialty surfaces:  Teach and evaluate effectiveness of offloading measures:  Teach patient on edema reducing measures:    Elevate legs above heart 30 minutes 3 times a day, walk 30 minutes daily, reduced sodium diet, diuretics as ordered, wear compression hose or wrap as ordered:  Evaluate effectiveness of all above measures every 4 weeks with Patient Specific CQI:    Other:  Plan of care initiated 7/31/15, reviewed 8/21/15, reviewed 9/25/15, reviewed 10/30/15 , reviewed 11/20/15, reviewed 12/29/15, reviewed 1/8/16, reviewed 2/12/2016, 3/11/16, 4/8/16, 5/6/16, reviewed and updated 5/20/2016      Future Appointments    Date/Time Provider Specialty Site   05/27/2016 11:15 AM [address]   Electronically signed by :  Emelia Gonsales RN; May 20 2016 10:40AM EST                       (Author)

## 2018-01-24 NOTE — PROGRESS NOTES
Assessment    1  Chronic foot ulcer (707 15) (L97 509)   2  Diabetes mellitus with neurological 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 60) (E11 49)   3  Dermatitis (692 9) (L30 9)    Wound Care Orders/Instructions    Wound Identification and Instructions   Wound #1: Right foot    Wound Care Instructions  Discussed with Patient/Caregiver  Dressing Type: Alginate  Wash with mild soap and water, normal saline, wound cleanser  Apply specified dressing to wound base/bed  Secondary dressing apply: Gauze, ABD  Secure with: Kerlix  Dressing change frequency: Weekly  Offloading: Felt pad to periwound  Comments/Other:   maxorb ag, 5x9 , felt pad around wound bed  Bulky dressing of ABD, 3 cast padding, Kerlix and Coban  Apply compression using: Coban and Tubifast yellow  Wound #8: left posterior lower extremity multi   Wound Care Instructions  Discussed with Patient/Caregiver  Secondary dressing apply: 5 x 9  Secure with: Mendes Olive, and tape  Comments/Other:   patient is instructed to leave the dressing in place for 2 days then remove  If still draining recover with gauze and Gill otherwise leave dressing off  Wound Goals  Wound Goals:   Healing Goals:   Fair healing potential secondary to moderate comorbid conditions  Wound edges will appear with evidence of contraction and epithelialization   Patient will achieve full wound closure with ability to wear appropriate shoewear       Discussion/Summary    Continue regular sharp debridements and offloading dressing changes  Schedule for TCC next week  The treatment plan was reviewed with the patient/guardian  The patient/guardian understands and agrees with the treatment plan      Chief Complaint  Follow up for right foot wound  New wound to left posterior lower extremity multi      History of Present Illness    Wound Identification HPI   Wound #1: Right foot    Wound #8: left posterior lower extremity multi          The patient came to Wound Care via wheelchair   The patient is being seen for a follow-up with MD at the [address] [place] [address]  The patient is accompanied by his significant other  The patient's identification was verified  A secondary verification process was completed  Orientation: oriented to person, oriented to place and oriented to time  Blood Glucose:  173 mg/dL as reported by patient  7/31/15: Patient arrived via wheelchair for right foot wound  Has had wound since March 2015  Pt was seen by Dr Stephanie Camilo at [address] [place] [address] and was debrided at the bedside  Ace wrap with strike through drainage removed with 2 5x9s, Adaptic and packing  Dressing was in place since Wednesday 7/29/15 per patient  Pt has [facility] for [facility] care  Dr Stephanie Camilo relates that patient had two open wounds that communicated and upon MRI abscess was noted as well; bedside debridement removed skin bridge between two open areas and I&D of abscess  Plan for [facility] then total contact cast once enough granular tissue is present to d/c VAC  8/7/15: The patient arrived with VAC intact to wound  The patient has multiple abrasions from knee to toes and is bleeding  The patient relates the he crawled from his house, across grass and concrete into the car so that he didn't put any weight on his right foot  DPM had conversation with patient and significant other to come up with a solution; a manual light weight wheelchair will be prescribed  The SDTI that was on right dorsal foot has now opened, it will be wound #2  New wounds to right foot today traumatic injury  Patient has a small abrasion to the right knee that will be dressed and continue to assess  Follow up in one week  Copy of orders faxed to [facility]  8/21/15: Pt arrived with wound vac intact to right foot wound @ 125, with 2 pieces of black foam, no bridge  Dressings intact to right dorsal ffot and right 2nd toe wounds but pt had foam dressing on, not dermagran gauze as ordered   Pt's family states he was recently hospitalized for low hemoglobin and was seen by Dr Frank Pinzon while in hospital  8/28/15: Patient arrived with wound vac intact to right lower extremity at 125mmhg  2 pieces of black foam removed with some foam noted on periwound  Denies any changes since last visit  Patient arrived with foam dressings to right foot instead of ordered dry gauze  Patient has received wheelchair since last visit and states  Follow up in one week  [facility]  9/[facility]/15: Patient arrived with wound vac intact  1 piece of black foam removed  Denies changes since last visit  9/11/15: Arrived with wound vac intact to right foot  No changes since last visit  9/18/2015: arrived with dressings intact  For right 2nd toe amp 9/19/2015  Hold KCI wound VAC today  9/25/15: The patient arrived with NPWT and ordered dressings intact to wounds  The patient was seen by Dr Frank Pinzon 9/19/15 at which time the second toe was amputated  The patient arrived today with 98% intact suture line; the distal aspect is somewhat  (0 2cm)  10/9/2015: Arrived with NPWT intact to right foot, alginate intact to right dorsal foot and right 2nd toe  10/16/15: The patient arrived with dressings intact to wounds  NPWT functioning properly  10/23/2015: Arrived with dressing intact, KCI wound VAC  10/30/2015: Patient arrived with dressing intact to right foot and right thigh donor site  Patient states wound vac was discontinued at last MD visit  11/6/15: Arrived with dressing intact to right foot  Right leg donor site 4 x 1 5 cm  Purulent drainage assessed beneath right lateral foot eschar  11/13/2015: Arrived with dressings intact  New wound on left ankle; [place] was rubbing, has been corrected since new wound appeared  11/20/15: Patient arrived with dressings intact to all wounds  Denies changes since last visit   12/4/15: Patient arrived with dressing intact to right foot and no dressing ion left ankle but patient states it has been draining a few days  Denies changes to medications or recent falls  Left lateral ankle wound presets reopened today  12/11/15: Patient arrived with dressings intact to left and right foot  Denies changes since last visit  States he has been ambulating with bulky dressing on right foot  12/18/15: Patient arived with cast intact to RLE  Denies changes since last visit  12/29/2015: Patient arrived with TCC intact to RLE and CROW with dressing intact to LLE  Patient relates that the CROW was adjusted to relieve pressure from the lateral ankle area; feels that it is working well  Right third toe open area measuring 0 4 cm x 0 4 cm x 0 1 cm  Covered with Acticoat Flex 7 to stay intact under the TCC  1/8/16: The patient arrived with dressing intact to left lateral ankle wound and TCC intact to RLE  1/15/16: Patient arrived with crow intact to LLE, and dressings intact to right foot  1/22/16: Patient arrived with dressings intact to left and right foot  Denies changes since last visit  1/29/16: Patient arrived with bulky dressing with Acticoat intact to right foot wound  Patient arrived with Dermagran, 4x4 gauze and bhavna intact to left lateral ankle  2/5/16: Patient arrived with Acticoat and bulky dressing intact to right root  Patient arrived 4x4 gauze and tape to left lateral ankle  2/12/16: Arrived with bulky dressing intact to right foot  Strike through drainage to [employer]  Patient reported he is doing home Physical therapy currently, nursing has discharged from services  2/19/16: Patient arrived with Acticoat flex, 4x4 gauze, 5x9 ABD, Kerlix, coban intact to right foot wound  2/25/16: The patient arrived with dressing clean dry and intact  No issues since last visit  3/4/16: The patient arrived with dressing nicely intact to right foot  No issues since last visit  3/11/16: The patient arrived with dressing intact, strikethrough drainage to but not through coban   The patient relates that he is doing "a lot" with physical therapy  3/18/16: Patient arrived with cast to right foot  Denies any changes since last visit  3/25/16: Patient arrived with TCC intact to right foot  4/1/16: Patient arrived with cast to right foot  4/8/16: Patient arrived with cast intact to right lower extremity  Denies changes since last visit  4/15/2016 Patient arrived with TCC intact to RLE  Denies any changes since last visit  4/22/16:Patient arrived with TCC intact to RLE  4/29/16: Patient arrived with bulky dressing intact to right foot  Patient denies any changes since last visit  5/6/16: Arrived with Dermagran, 2x2, 4x4, cast padding, ace wrap and Coban intact to right foot  5/13/16: Patient arrived with bulky dressing intact  Denies changes since last visit  5/20/2016: Arrived with bulky dressing intact to right foot  Patient reports no problems with the dressing  5/27/2016: Patient arrived with bulky dressing intact to right foot wound  Patient denies any changes since last visit  6/3/16: Patient arrived with bulky dressing intact  Denies any changes since last visit  6/10/2016: Arrived with bulky dressing intact to right foot  6/24/2016: Patient arrived with TCC intact to Right foot  Denies changes since last visit  6/29/2016: Patient called to report while he was showering he got the bulky dressing wet  Significant other removed bulky dressing and applied 5x9, Kerlix to protect wound bed  Arrived with 5x9, Kerlix multiple layers intact to right foot  7/8/16: The patient arrived with bulky dressing intact to right foot  Patient is healed  Discharged from [facility] today  7/15/16: Patient states that he went home 7/8/16 after being discharged from [facility] and states wound reopened that evening  Denies any other changes since last visit  Arrived with Adaptic, gauze, ABD and Gill to wound  7/22/2016: Patient arrived with bulky dressing intact to right foot wound   7/29/2016: Patient arrived with bulky dressing to right foot wound with strikethrough drainage to the Coban  Patient is picking up Imodium for diarrhea today at pharmacy  8/5/2016: Arrived with bulky dressing intact with strike through drainage to the ace wraps  Patient has a new area to left posterior lower extremity multi  History of Falling: No = 0   Secondary Diagnosis: Yes = 15   Ambulatory Aid None, Bedrest, Nurse Assist = 0   No = 0   Gait: Impaired = 20 -- Short steps with shuffle, may have difficulty arising from chair, head down, significantly impaired balance, requiring furniture, support person, or walking aid to walk  Mental Status: Oriented to own ability = 0   Total Score: 35    25 - 45 = Moderate Risk        The most recent fall occurred patient denies any falls since last visit  Number of falls in the last year were 0  Nutrition Assessment Screening: Food intake over the last 3 months due to the loss of appetite, digestive problems, chewing or swallowing difficulties is graded as: 2 = no decrease in food intake   Weight loss during the last 3 months: 3 = no weight loss   Mobility scored as: 2 = goes out  Psychological Stress and Acute Disease Scored as: 0 = The patient has experienced psychological stress or acute disease in the last 3 months  Neuropsychological problems scored as: 2 = no psychological problems  Body Mass Index (BMI) scored as: 3 = BMI 23 or greater  Nutritional Assessment Screening Score: 12 - 14 points - Normal nutritional status  Provider Wound Care HPI: Isatu Rodriguez is here for offloading dressing change  Pain Assessment   the patient states they do not have pain  (on a scale of 0 to 10, the patient rates the pain at 0 )   Abuse And Domestic Violence Screen   Domestic violence screen not done today  Reason DV Screen not done: fiance present in exam room    Depression And Suicide Screen  Suicide screen not done today  Reason suicide screen not done: fiance present in exam room  Prefered Language is  [place]     Primary Language is  [place]  Readiness To Learn: Receptive  Barriers To Learning: affect  Preferred Learning: demonstration and verbal   Education Completed: further treatment/follow-up and treatment/procedure   Teaching Method: verbal and demonstration   Person Taught: patient   Evaluation Of Learning: verbalized/demonstrated understanding and needs reinforcement      Review of Systems    Constitutional: no fever, not feeling poorly, no chills and not feeling tired  Active Problems    1  Abnormal kidney function (593 9) (N28 9)   2  Benign essential hypertension (401 1) (I10)   3  Charcot's Joint Of The Foot (713 5)   4  Chronic foot ulcer (707 15) (L97 509)   5  Chronic kidney disease (CKD) stage G3a/A1, moderately decreased glomerular filtration   rate (GFR) between 45-59 mL/min/1 73 square meter and albuminuria creatinine ratio   less than 30 mg/g (585 3) (N18 3)   6  Chronic ulcer of left ankle with fat layer exposed (707 13) (L97 322)   7  Congestive heart failure (428 0) (I50 9)   8  Dehiscence of incision (998 32) (T81 31XA)   9  Diabetes mellitus with neurological 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 60) (E11 49)   10  Diabetic Nephropathy (583 81)   11  DM type 2 (diabetes mellitus, type 2) (250 00) (E11 9)   12  Postoperative wound dehiscence, subsequent encounter (V58 89,998 32) (T81 31XD)   13  Status post skin graft (V42 3) (Z94 5)    Past Medical History    1  History of High cholesterol (272 0) (E78 0)   2  History of myocardial infarction (412) (I25 2)   3  History of stroke (V12 54) (Z86 73)   4  History of Irregular heartbeat (427 9) (I49 9)    The active problems and past medical history were reviewed and updated today  Surgical History    1  History of Appendectomy (47 0)   2  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    Family History    1  Family history of Diabetes Mellitus (250 00)    2   Family history of cardiac disorder (V17 49) (Z82 49)    Social History    · Never a smoker    Current Meds   1  Atorvastatin Calcium 80 MG Oral Tablet; TAKE 1 TABLET DAILY; Therapy: 00HVM0301 to Recorded   2  Carvedilol 12 5 MG Oral Tablet; Take 1 tablet twice daily; Therapy: 19VHA6459 to (Evaluate:30Jun2016) Recorded   3  Citalopram Hydrobromide 20 MG Oral Tablet; TAKE 1 TABLET DAILY; Therapy: 65WZI7086 to Recorded   4  Lantus 100 UNIT/ML Subcutaneous Solution; 40 untis bid; Therapy: 29XMK2993 to Recorded   5  Lidocaine HCl (Local Anesth ) 4 % SOLN; Apply prn to wound prior to debridement for   pain control; Therapy: (Recorded:88Aop7183) to Recorded   6  Lisinopril 2 5 MG Oral Tablet; Therapy: ([place]) to Recorded   7  NovoLOG SOLN;   Therapy: (Recorded:80Ykm3866) to Recorded   8  Pacerone 200 MG Oral Tablet; TAKE 1 TABLET DAILY; Therapy: 54PBZ8956 to Recorded   9  Spironolactone 25 MG Oral Tablet; 1/2 tablet daily; Therapy: 57MYG3402 to Recorded   10  Vitamin C TABS; Therapy: ([place] Handy) to Recorded   11  Warfarin Sodium 5 MG Oral Tablet; TAKE 1 TABLET DAILY; Therapy: 96GNG5566 to Recorded    Allergies    1  No Known Drug Allergies    Physical Exam    Wound #1 Assessment wound #1 Location:, Right foot, started on 03/2015 reopened 7/8/16, Care for this wound started on 7/31/15, healed on 7/8/2016  Wound Status: not healed  Diabetic Ulcer Grade/Lower Extremity: Carlita Donath 2  Length: x Width: x Depth: Total: cm   Wound Volume:           Tissue type: Granulation and Slough 100% thick brown/Callus   Exudate Amount: Moderate   Exudate Type: Serosangiunous   Odor: None   Exudate Color: Tan   Wound Edges: Callous   Periwound Skin Condition: Callus, Edema            Physician/Provider Wound #1 Exam   I agree with the nursing assessment and documentation  Wound #8 Assessment wound #8 Location:, left posterior lower extremity multi, started on 8/3/2016, Care for this wound started on 8/5/2016  Wound Status: not healed     Length: 6 8cm x Width: 2 5cm x Depth: 0 1cm Total: 17sq cm   Wound Volume: 1 7cm3           Tissue type: Granulation and Slough   Color of Wound: Red - 80% and Yellow - 20%   Exudate Amount: Scant   Exudate Type: Serosangiunous   Odor: None   Wound Edges: Intact   Periwound Skin Condition: Fungal rash            Trkin Damico 1: Wound Nursing Care Plan   Impaired Tissue Integrity related to: right foot and left lateral ankle wounds   Risk for Infection related to open wound:   Impaired Mobility related to: bulky dressing on right foot   Goals   Patient will achieve 100% epithelialization:  Patient will maintain skin integrity:  Wound Nursing Care Interventions:   Provide moist wound healing:  Implement offloading measures (turn & reposition schedule/method, ortho shoes/boots, floating heels, crutches, specialty surfaces:  Teach and evaluate effectiveness of offloading measures:  Plan of care initiated 7/31/15, reviewed 8/21/15, reviewed 9/25/15, reviewed 10/30/15, reviewed  11/20/15, reviewed 12/29/15, reviewed 1/8/16, reviewed 2/12/2016, 3/11/16, 4/8/16, 5/6/16,  6/3/16, Reviewed 6/30/2016, all goals achieved plan of care resolved 7/8/2016, reviewed 8/5/2016      Procedure      Wound #1: Right foot     Nurse Dressing Change:   Wound #1 The wound located on the Right foot  Wound care rendered as per Physician/Advanced Practitioner order/plan  [facility] follow up 8/10/2016  Comments:, Patient is to return to [facility] to have total contact cast applied    Excisional Debridement Subcutaneous Tissue:   Wound was excisionally debrided to subcutaneous tissue as follows  Prior to the procedure, the patient was identified using two identifiers, the general consent was signed, the proper site of procedure was identified, and a time out was taken  Anesthesia: Local anesthesia with 4% topical lidocaine was utilized prior to the procedure for pain control     #10 surgical blade was utilized to surgically excise devitalized tissue and/or slough through epidermis, dermis and into the subcutaneous tissue  The total sq cm excised was 1sq cm  See wound assessment for further details  There was minimal bleeding controlled with gentle pressure  the patient tolerated the procedure well without complication  CPT Code(s)   [id number] - Excisional DebridementTo Subcutaneous Tissue; first 20 sq cm  Wound #8: left posterior lower extremity multi     Nurse Dressing Change:   Wound #8 The wound located on the left posterior lower extremity multi  Wound care rendered as per Physician/Advanced Practitioner order/plan  Return to [address] [place] [address] 8/10/2016    Comments:      Future Appointments    Date/Time Provider Specialty Site   08/10/2016 12:45 PM Malcom Ferrell DPM Wound Care  [address] [facility]   08/19/2016 11:15 AM Malcom Ferrell, [address] [place] [address] Drive   Electronically signed by : Santiago Barrera DPM; Aug  9 2016  1:37PM EST                       (Author)    Electronically signed by : Santiago Barrera DPM; Oct 25 2016  3:12PM EST                       (Author)

## 2018-01-25 ENCOUNTER — TELEPHONE (OUTPATIENT)
Dept: CARDIOLOGY CLINIC | Facility: CLINIC | Age: 53
End: 2018-01-25

## 2018-03-07 NOTE — PROGRESS NOTES
Dear Teri Looney,  My name is Davide Cantor and I am a Registered Nurse and Care Coordinator for [address]  We recently  spoke on the phone regarding your hospital stay and you opted to receive follow-up phone calls from me  Because of the reason that you were in the hospital, Medicare has placed you in a program called [address]  One of  the benefits of the program is that you can have a nurse call regularly to answer any questions or concerns you may have  My phone number is listed below in case you would need to contact me      Sincerely,   Davide Cantor RN  [phone]            Electronically signed Giancarlo Marroquin RN  Dec 30 2016 10:02AM EST Author

## 2018-04-14 ENCOUNTER — HOSPITAL ENCOUNTER (INPATIENT)
Facility: HOSPITAL | Age: 53
LOS: 1 days | Discharge: HOME | DRG: 280 | End: 2018-04-16
Attending: EMERGENCY MEDICINE | Admitting: INTERNAL MEDICINE
Payer: MEDICARE

## 2018-04-14 DIAGNOSIS — I21.4 NSTEMI (NON-ST ELEVATED MYOCARDIAL INFARCTION) (HCC): ICD-10-CM

## 2018-04-14 DIAGNOSIS — R07.9 CHEST PAIN: Primary | ICD-10-CM

## 2018-04-14 DIAGNOSIS — N17.9 ACUTE-ON-CHRONIC KIDNEY INJURY (HCC): ICD-10-CM

## 2018-04-14 DIAGNOSIS — R73.9 HYPERGLYCEMIA: ICD-10-CM

## 2018-04-14 DIAGNOSIS — N18.9 ACUTE-ON-CHRONIC KIDNEY INJURY (HCC): ICD-10-CM

## 2018-04-14 PROCEDURE — 93005 ELECTROCARDIOGRAM TRACING: CPT

## 2018-04-14 RX ORDER — ASPIRIN 81 MG/1
162 TABLET, CHEWABLE ORAL ONCE
Status: COMPLETED | OUTPATIENT
Start: 2018-04-15 | End: 2018-04-15

## 2018-04-14 RX ORDER — ONDANSETRON 2 MG/ML
4 INJECTION INTRAMUSCULAR; INTRAVENOUS ONCE
Status: COMPLETED | OUTPATIENT
Start: 2018-04-15 | End: 2018-04-15

## 2018-04-15 ENCOUNTER — APPOINTMENT (EMERGENCY)
Dept: RADIOLOGY | Facility: HOSPITAL | Age: 53
DRG: 280 | End: 2018-04-15
Payer: MEDICARE

## 2018-04-15 PROBLEM — I21.4 NSTEMI, INITIAL EPISODE OF CARE (HCC): Status: ACTIVE | Noted: 2017-02-11

## 2018-04-15 PROBLEM — R07.9 CHEST PAIN: Status: ACTIVE | Noted: 2018-04-15

## 2018-04-15 LAB
ALBUMIN SERPL BCP-MCNC: 3.7 G/DL (ref 3.5–5)
ALP SERPL-CCNC: 176 U/L (ref 46–116)
ALT SERPL W P-5'-P-CCNC: 54 U/L (ref 12–78)
ANION GAP SERPL CALCULATED.3IONS-SCNC: 11 MMOL/L (ref 4–13)
ANION GAP SERPL CALCULATED.3IONS-SCNC: 12 MMOL/L (ref 4–13)
APTT PPP: 42 SECONDS (ref 23–35)
AST SERPL W P-5'-P-CCNC: 36 U/L (ref 5–45)
ATRIAL RATE: 82 BPM
BASOPHILS # BLD AUTO: 0.01 THOUSANDS/ΜL (ref 0–0.1)
BASOPHILS NFR BLD AUTO: 0 % (ref 0–1)
BILIRUB SERPL-MCNC: 0.5 MG/DL (ref 0.2–1)
BUN SERPL-MCNC: 79 MG/DL (ref 5–25)
BUN SERPL-MCNC: 80 MG/DL (ref 5–25)
CALCIUM SERPL-MCNC: 8.8 MG/DL
CALCIUM SERPL-MCNC: 9 MG/DL
CHLORIDE SERPL-SCNC: 100 MMOL/L (ref 100–108)
CHLORIDE SERPL-SCNC: 98 MMOL/L (ref 100–108)
CK MB SERPL-MCNC: 1.6 % (ref 0–2.5)
CK MB SERPL-MCNC: 3.9 NG/ML (ref 0–5)
CK SERPL-CCNC: 243 U/L (ref 39–308)
CO2 SERPL-SCNC: 23 MMOL/L (ref 21–32)
CO2 SERPL-SCNC: 24 MMOL/L (ref 21–32)
CREAT SERPL-MCNC: 2.92 MG/DL (ref 0.6–1.3)
CREAT SERPL-MCNC: 3.04 MG/DL (ref 0.6–1.3)
EOSINOPHIL # BLD AUTO: 0.06 THOUSAND/ΜL (ref 0–0.61)
EOSINOPHIL NFR BLD AUTO: 1 % (ref 0–6)
ERYTHROCYTE [DISTWIDTH] IN BLOOD BY AUTOMATED COUNT: 14.4 % (ref 11.6–15.1)
ERYTHROCYTE [DISTWIDTH] IN BLOOD BY AUTOMATED COUNT: 14.5 % (ref 11.6–15.1)
GFR SERPL CREATININE-BSD FRML MDRD: 23 ML/MIN/1.73SQ M
GFR SERPL CREATININE-BSD FRML MDRD: 24 ML/MIN/1.73SQ M
GLUCOSE P FAST SERPL-MCNC: 206 MG/DL (ref 65–99)
GLUCOSE SERPL-MCNC: 185 MG/DL (ref 65–140)
GLUCOSE SERPL-MCNC: 199 MG/DL (ref 65–140)
GLUCOSE SERPL-MCNC: 206 MG/DL (ref 65–140)
GLUCOSE SERPL-MCNC: 238 MG/DL (ref 65–140)
GLUCOSE SERPL-MCNC: 255 MG/DL (ref 65–140)
GLUCOSE SERPL-MCNC: 320 MG/DL (ref 65–140)
GLUCOSE SERPL-MCNC: 354 MG/DL (ref 65–140)
GLUCOSE SERPL-MCNC: 430 MG/DL (ref 65–140)
HCT VFR BLD AUTO: 34.3 % (ref 36.5–49.3)
HCT VFR BLD AUTO: 36.1 % (ref 36.5–49.3)
HGB BLD-MCNC: 11.3 G/DL (ref 12–17)
HGB BLD-MCNC: 11.9 G/DL (ref 12–17)
INR PPP: 3.48 (ref 0.86–1.16)
INR PPP: 3.55 (ref 0.86–1.16)
LIPASE SERPL-CCNC: 305 U/L (ref 73–393)
LYMPHOCYTES # BLD AUTO: 0.88 THOUSANDS/ΜL (ref 0.6–4.47)
LYMPHOCYTES NFR BLD AUTO: 10 % (ref 14–44)
MCH RBC QN AUTO: 27.7 PG (ref 26.8–34.3)
MCH RBC QN AUTO: 27.8 PG (ref 26.8–34.3)
MCHC RBC AUTO-ENTMCNC: 32.9 G/DL (ref 31.4–37.4)
MCHC RBC AUTO-ENTMCNC: 33 G/DL (ref 31.4–37.4)
MCV RBC AUTO: 84 FL (ref 82–98)
MCV RBC AUTO: 84 FL (ref 82–98)
MONOCYTES # BLD AUTO: 0.72 THOUSAND/ΜL (ref 0.17–1.22)
MONOCYTES NFR BLD AUTO: 8 % (ref 4–12)
NEUTROPHILS # BLD AUTO: 7.44 THOUSANDS/ΜL (ref 1.85–7.62)
NEUTS SEG NFR BLD AUTO: 81 % (ref 43–75)
NT-PROBNP SERPL-MCNC: 2014 PG/ML
P AXIS: 72 DEGREES
PLATELET # BLD AUTO: 156 THOUSANDS/UL (ref 149–390)
PLATELET # BLD AUTO: 179 THOUSANDS/UL (ref 149–390)
PMV BLD AUTO: 13 FL (ref 8.9–12.7)
PMV BLD AUTO: 13.1 FL (ref 8.9–12.7)
POTASSIUM SERPL-SCNC: 4.4 MMOL/L (ref 3.5–5.3)
POTASSIUM SERPL-SCNC: 4.8 MMOL/L (ref 3.5–5.3)
PR INTERVAL: 204 MS
PROT SERPL-MCNC: 8.2 G/DL (ref 6.4–8.2)
PROTHROMBIN TIME: 36.3 SECONDS (ref 12.1–14.4)
PROTHROMBIN TIME: 36.9 SECONDS (ref 12.1–14.4)
QRS AXIS: -43 DEGREES
QRSD INTERVAL: 224 MS
QT INTERVAL: 490 MS
QTC INTERVAL: 572 MS
RBC # BLD AUTO: 4.07 MILLION/UL (ref 3.88–5.62)
RBC # BLD AUTO: 4.3 MILLION/UL (ref 3.88–5.62)
SODIUM SERPL-SCNC: 133 MMOL/L (ref 136–145)
SODIUM SERPL-SCNC: 135 MMOL/L (ref 136–145)
T WAVE AXIS: 113 DEGREES
TROPONIN I SERPL-MCNC: 0.04 NG/ML
TROPONIN I SERPL-MCNC: 0.79 NG/ML
TROPONIN I SERPL-MCNC: 6.51 NG/ML
TROPONIN I SERPL-MCNC: 6.55 NG/ML
VENTRICULAR RATE: 82 BPM
WBC # BLD AUTO: 9.05 THOUSAND/UL (ref 4.31–10.16)
WBC # BLD AUTO: 9.11 THOUSAND/UL (ref 4.31–10.16)

## 2018-04-15 PROCEDURE — 93010 ELECTROCARDIOGRAM REPORT: CPT | Performed by: INTERNAL MEDICINE

## 2018-04-15 PROCEDURE — 96374 THER/PROPH/DIAG INJ IV PUSH: CPT

## 2018-04-15 PROCEDURE — 85027 COMPLETE CBC AUTOMATED: CPT | Performed by: PHYSICIAN ASSISTANT

## 2018-04-15 PROCEDURE — 85025 COMPLETE CBC W/AUTO DIFF WBC: CPT | Performed by: EMERGENCY MEDICINE

## 2018-04-15 PROCEDURE — 83880 ASSAY OF NATRIURETIC PEPTIDE: CPT | Performed by: PHYSICIAN ASSISTANT

## 2018-04-15 PROCEDURE — 99220 PR INITIAL OBSERVATION CARE/DAY 70 MINUTES: CPT | Performed by: INTERNAL MEDICINE

## 2018-04-15 PROCEDURE — 80048 BASIC METABOLIC PNL TOTAL CA: CPT | Performed by: PHYSICIAN ASSISTANT

## 2018-04-15 PROCEDURE — 82553 CREATINE MB FRACTION: CPT | Performed by: EMERGENCY MEDICINE

## 2018-04-15 PROCEDURE — 71046 X-RAY EXAM CHEST 2 VIEWS: CPT

## 2018-04-15 PROCEDURE — 82550 ASSAY OF CK (CPK): CPT | Performed by: EMERGENCY MEDICINE

## 2018-04-15 PROCEDURE — 85730 THROMBOPLASTIN TIME PARTIAL: CPT | Performed by: EMERGENCY MEDICINE

## 2018-04-15 PROCEDURE — 82948 REAGENT STRIP/BLOOD GLUCOSE: CPT

## 2018-04-15 PROCEDURE — 84484 ASSAY OF TROPONIN QUANT: CPT | Performed by: EMERGENCY MEDICINE

## 2018-04-15 PROCEDURE — 36415 COLL VENOUS BLD VENIPUNCTURE: CPT | Performed by: EMERGENCY MEDICINE

## 2018-04-15 PROCEDURE — 83690 ASSAY OF LIPASE: CPT | Performed by: EMERGENCY MEDICINE

## 2018-04-15 PROCEDURE — 84484 ASSAY OF TROPONIN QUANT: CPT | Performed by: PHYSICIAN ASSISTANT

## 2018-04-15 PROCEDURE — 85610 PROTHROMBIN TIME: CPT | Performed by: EMERGENCY MEDICINE

## 2018-04-15 PROCEDURE — 99285 EMERGENCY DEPT VISIT HI MDM: CPT

## 2018-04-15 PROCEDURE — 99222 1ST HOSP IP/OBS MODERATE 55: CPT | Performed by: INTERNAL MEDICINE

## 2018-04-15 PROCEDURE — 80053 COMPREHEN METABOLIC PANEL: CPT | Performed by: EMERGENCY MEDICINE

## 2018-04-15 PROCEDURE — 85610 PROTHROMBIN TIME: CPT | Performed by: INTERNAL MEDICINE

## 2018-04-15 RX ORDER — LEVOTHYROXINE SODIUM 0.05 MG/1
50 TABLET ORAL
Status: DISCONTINUED | OUTPATIENT
Start: 2018-04-15 | End: 2018-04-16 | Stop reason: HOSPADM

## 2018-04-15 RX ORDER — HEPARIN SODIUM 1000 [USP'U]/ML
2000 INJECTION, SOLUTION INTRAVENOUS; SUBCUTANEOUS AS NEEDED
Status: DISCONTINUED | OUTPATIENT
Start: 2018-04-15 | End: 2018-04-15

## 2018-04-15 RX ORDER — SODIUM CHLORIDE 9 MG/ML
50 INJECTION, SOLUTION INTRAVENOUS ONCE
Status: COMPLETED | OUTPATIENT
Start: 2018-04-15 | End: 2018-04-15

## 2018-04-15 RX ORDER — HEPARIN SODIUM 10000 [USP'U]/100ML
3-20 INJECTION, SOLUTION INTRAVENOUS
Status: DISCONTINUED | OUTPATIENT
Start: 2018-04-15 | End: 2018-04-15

## 2018-04-15 RX ORDER — AMIODARONE HYDROCHLORIDE 200 MG/1
200 TABLET ORAL
Status: DISCONTINUED | OUTPATIENT
Start: 2018-04-15 | End: 2018-04-16 | Stop reason: HOSPADM

## 2018-04-15 RX ORDER — AMLODIPINE BESYLATE 5 MG/1
5 TABLET ORAL DAILY
Status: DISCONTINUED | OUTPATIENT
Start: 2018-04-15 | End: 2018-04-16 | Stop reason: HOSPADM

## 2018-04-15 RX ORDER — MELATONIN
1000 DAILY
Status: DISCONTINUED | OUTPATIENT
Start: 2018-04-15 | End: 2018-04-16 | Stop reason: HOSPADM

## 2018-04-15 RX ORDER — RANOLAZINE 500 MG/1
500 TABLET, EXTENDED RELEASE ORAL EVERY 12 HOURS SCHEDULED
Status: DISCONTINUED | OUTPATIENT
Start: 2018-04-15 | End: 2018-04-16 | Stop reason: HOSPADM

## 2018-04-15 RX ORDER — ASPIRIN 81 MG/1
81 TABLET, CHEWABLE ORAL DAILY
Status: DISCONTINUED | OUTPATIENT
Start: 2018-04-16 | End: 2018-04-15

## 2018-04-15 RX ORDER — PHYTONADIONE 5 MG/1
2.5 TABLET ORAL ONCE
Status: COMPLETED | OUTPATIENT
Start: 2018-04-15 | End: 2018-04-15

## 2018-04-15 RX ORDER — HEPARIN SODIUM 1000 [USP'U]/ML
4000 INJECTION, SOLUTION INTRAVENOUS; SUBCUTANEOUS AS NEEDED
Status: DISCONTINUED | OUTPATIENT
Start: 2018-04-15 | End: 2018-04-15

## 2018-04-15 RX ORDER — NITROGLYCERIN 0.4 MG/1
0.4 TABLET SUBLINGUAL
Status: DISCONTINUED | OUTPATIENT
Start: 2018-04-15 | End: 2018-04-16 | Stop reason: HOSPADM

## 2018-04-15 RX ORDER — CARVEDILOL 6.25 MG/1
6.25 TABLET ORAL 2 TIMES DAILY WITH MEALS
Status: DISCONTINUED | OUTPATIENT
Start: 2018-04-15 | End: 2018-04-15

## 2018-04-15 RX ORDER — ONDANSETRON 2 MG/ML
4 INJECTION INTRAMUSCULAR; INTRAVENOUS EVERY 6 HOURS PRN
Status: DISCONTINUED | OUTPATIENT
Start: 2018-04-15 | End: 2018-04-16 | Stop reason: HOSPADM

## 2018-04-15 RX ORDER — CARVEDILOL 12.5 MG/1
12.5 TABLET ORAL 2 TIMES DAILY WITH MEALS
Status: DISCONTINUED | OUTPATIENT
Start: 2018-04-15 | End: 2018-04-16 | Stop reason: HOSPADM

## 2018-04-15 RX ORDER — LEVOTHYROXINE SODIUM 0.05 MG/1
50 TABLET ORAL DAILY
COMMUNITY

## 2018-04-15 RX ORDER — NITROGLYCERIN 2.5 MG/D
1 PATCH TRANSDERMAL DAILY
COMMUNITY

## 2018-04-15 RX ORDER — HEPARIN SODIUM 1000 [USP'U]/ML
4000 INJECTION, SOLUTION INTRAVENOUS; SUBCUTANEOUS ONCE
Status: DISCONTINUED | OUTPATIENT
Start: 2018-04-15 | End: 2018-04-15

## 2018-04-15 RX ORDER — ACETAMINOPHEN 325 MG/1
650 TABLET ORAL EVERY 6 HOURS PRN
Status: DISCONTINUED | OUTPATIENT
Start: 2018-04-15 | End: 2018-04-16 | Stop reason: HOSPADM

## 2018-04-15 RX ORDER — ATORVASTATIN CALCIUM 40 MG/1
80 TABLET, FILM COATED ORAL
Status: DISCONTINUED | OUTPATIENT
Start: 2018-04-15 | End: 2018-04-16 | Stop reason: HOSPADM

## 2018-04-15 RX ORDER — CLOPIDOGREL BISULFATE 75 MG/1
75 TABLET ORAL DAILY
Status: DISCONTINUED | OUTPATIENT
Start: 2018-04-15 | End: 2018-04-16 | Stop reason: HOSPADM

## 2018-04-15 RX ADMIN — INSULIN HUMAN 12 UNITS: 100 INJECTION, SOLUTION PARENTERAL at 03:52

## 2018-04-15 RX ADMIN — PHYTONADIONE 2.5 MG: 5 TABLET ORAL at 03:01

## 2018-04-15 RX ADMIN — SODIUM CHLORIDE 50 ML/HR: 0.9 INJECTION, SOLUTION INTRAVENOUS at 03:00

## 2018-04-15 RX ADMIN — CARVEDILOL 12.5 MG: 12.5 TABLET, FILM COATED ORAL at 18:31

## 2018-04-15 RX ADMIN — LEVOTHYROXINE SODIUM 50 MCG: 50 TABLET ORAL at 06:08

## 2018-04-15 RX ADMIN — RANOLAZINE 500 MG: 500 TABLET, FILM COATED, EXTENDED RELEASE ORAL at 09:22

## 2018-04-15 RX ADMIN — INSULIN HUMAN 8 UNITS: 100 INJECTION, SOLUTION PARENTERAL at 01:58

## 2018-04-15 RX ADMIN — INSULIN DETEMIR 30 UNITS: 100 INJECTION, SOLUTION SUBCUTANEOUS at 09:26

## 2018-04-15 RX ADMIN — ONDANSETRON 4 MG: 2 INJECTION INTRAMUSCULAR; INTRAVENOUS at 00:09

## 2018-04-15 RX ADMIN — AMLODIPINE BESYLATE 5 MG: 5 TABLET ORAL at 09:23

## 2018-04-15 RX ADMIN — NITROGLYCERIN 0.5 INCH: 20 OINTMENT TOPICAL at 00:11

## 2018-04-15 RX ADMIN — ASPIRIN 81 MG 162 MG: 81 TABLET ORAL at 00:13

## 2018-04-15 RX ADMIN — AMIODARONE HYDROCHLORIDE 200 MG: 200 TABLET ORAL at 09:23

## 2018-04-15 RX ADMIN — VITAMIN D, TAB 1000IU (100/BT) 1000 UNITS: 25 TAB at 09:22

## 2018-04-15 RX ADMIN — INSULIN DETEMIR 50 UNITS: 100 INJECTION, SOLUTION SUBCUTANEOUS at 22:33

## 2018-04-15 RX ADMIN — RANOLAZINE 500 MG: 500 TABLET, FILM COATED, EXTENDED RELEASE ORAL at 22:33

## 2018-04-15 RX ADMIN — INSULIN LISPRO 6 UNITS: 100 INJECTION, SOLUTION INTRAVENOUS; SUBCUTANEOUS at 18:33

## 2018-04-15 RX ADMIN — CARVEDILOL 6.25 MG: 6.25 TABLET, FILM COATED ORAL at 09:22

## 2018-04-15 RX ADMIN — ATORVASTATIN CALCIUM 80 MG: 40 TABLET, FILM COATED ORAL at 18:31

## 2018-04-15 RX ADMIN — INSULIN LISPRO 5 UNITS: 100 INJECTION, SOLUTION INTRAVENOUS; SUBCUTANEOUS at 18:33

## 2018-04-15 NOTE — ED PROVIDER NOTES
History  Chief Complaint   Patient presents with    Chest Pain     Pt presents to ED for evaluation and treatment for chest pain for past 1 5 hours  Prior hx of MI and CHF  Pt requiring triple bbypass however not a valid candidate due to vascular compromise in legs  Pt took 2 Nitro PTA     Patient is a 50year old male with sharp nonpleuritic chest pain tonight with N/V about 1 5 hours ago  No sob  No travel  No fever  No cough  Is on coumadin for atrial fibrillation hx  Was last seen in this ED on 1/19/18 for CHF  [facility] website checked on this patient and no Rx found  History provided by:  Patient (melinda)   used: No    Chest Pain   Associated symptoms: nausea and vomiting    Associated symptoms: no cough, no fever and no shortness of breath        Prior to Admission Medications   Prescriptions Last Dose Informant Patient Reported? Taking? Cholecalciferol (VITAMIN D3) 1000 units CAPS   Yes No   Sig: Take by mouth   Insulin Detemir (LEVEMIR SC)   Yes No   Sig: Inject under the skin 30 units in the morning and 40 units at night  SPIRONOLACTONE PO   Yes No   Sig: Take 12 5 mg by mouth daily   amLODIPine (NORVASC) 5 mg tablet   No No   Sig: Take 1 tablet by mouth daily for 30 days   amiodarone 200 mg tablet   No No   Sig: Take 1 tablet by mouth daily with breakfast for 30 days   aspirin 81 mg chewable tablet   No No   Sig: Chew 1 tablet daily for 30 days   atorvastatin (LIPITOR) 80 mg tablet   No No   Sig: Take 1 tablet by mouth daily with dinner for 30 days   carvedilol (COREG) 12 5 mg tablet   Yes No   Sig: Pt taking 1/2 tab in the morning and 1/2 tab at night     docusate sodium (COLACE) 100 mg capsule   No No   Sig: Take 1 capsule by mouth 2 (two) times a day for 30 days   furosemide (LASIX) 40 mg tablet   No No   Sig: Take 1 tablet by mouth 2 (two) times a day   insulin lispro (HumaLOG) 100 units/mL injection   No No   Sig: Inject 5 Units under the skin 3 (three) times a day with meals for 30 days   nitroglycerin (NITROSTAT) 0 4 mg SL tablet   Yes No   Sig: Place 0 4 mg under the tongue every 5 (five) minutes as needed for chest pain   senna (SENOKOT) 8 6 mg   No No   Sig: Take 1 tablet by mouth daily at bedtime for 30 days      Facility-Administered Medications: None       Past Medical History:   Diagnosis Date    A-fib [facility])     Cardiac disease     MI    Carotid artery disorder ([place] [address] )     L carotid completely blocked    Hyperlipidemia     Hypertension     Insulin dependent diabetes mellitus ([place] [address] )     Peripheral vascular disease ([place] [address] )     Stroke [facility])        Past Surgical History:   Procedure Laterality Date    APPENDECTOMY      TOE AMPUTATION         Family History   Problem Relation Age of Onset    Diabetes Mother     Heart disease Father      I have reviewed and agree with the history as documented  Social History   Substance Use Topics    Smoking status: Never Smoker    Smokeless tobacco: Never Used    Alcohol use No        Review of Systems   Constitutional: Negative for fever  Respiratory: Negative for cough and shortness of breath  Cardiovascular: Positive for chest pain  Gastrointestinal: Positive for nausea and vomiting  All other systems reviewed and are negative        Physical Exam  ED Triage Vitals   Temperature Pulse Respirations Blood Pressure SpO2   04/15/18 0002 04/14/18 2343 04/14/18 2343 04/14/18 2349 04/14/18 2343   98 6 °F (37 °C) 82 18 134/69 98 %      Temp Source Heart Rate Source Patient Position - Orthostatic VS BP Location FiO2 (%)   04/15/18 0002 04/14/18 2343 04/14/18 2343 04/14/18 2343 --   Oral Monitor Lying Left arm       Pain Score       04/14/18 2343       3           Orthostatic Vital Signs  Vitals:    04/14/18 2343 04/14/18 2349 04/15/18 0030 04/15/18 0100   BP:  134/69 145/66 138/66   Pulse: 82  78 74   Patient Position - Orthostatic VS: Lying          Physical Exam   Constitutional: He is oriented to person, place, and time  He appears well-developed and well-nourished  He appears distressed (moderate)  HENT:   Head: Normocephalic and atraumatic  Mouth/Throat: Oropharynx is clear and moist    Eyes: No scleral icterus  Neck: Normal range of motion  Neck supple  Cardiovascular: Normal rate, regular rhythm and normal heart sounds  No murmur heard  Pulmonary/Chest: Effort normal and breath sounds normal  No stridor  No respiratory distress  He has no wheezes  He has no rales  He exhibits no tenderness  Abdominal: Soft  Bowel sounds are normal  He exhibits no distension  There is no tenderness  Musculoskeletal: He exhibits no edema or tenderness (No calf tenderness)  Neurological: He is alert and oriented to person, place, and time  Skin: Skin is warm and dry  No rash noted  Psychiatric: He has a normal mood and affect  Nursing note and vitals reviewed        ED Medications  Medications   insulin regular (HumuLIN R,NovoLIN R) injection 8 Units (not administered)   ondansetron (ZOFRAN) injection 4 mg (4 mg Intravenous Given 4/15/18 0009)   aspirin chewable tablet 162 mg (162 mg Oral Given 4/15/18 0013)   nitroglycerin (NITRO-BID) 2 % TD ointment 0 5 inch (0 5 inches Topical Given 4/15/18 0011)       Diagnostic Studies  Results Reviewed     Procedure Component Value Units Date/Time    Lipase [[id number]]  (Normal) Collected:  04/15/18 0002    Lab Status:  Final result Specimen:  Blood from Arm, Left Updated:  04/15/18 0049     Lipase 305 u/L     CKMB [[id number]]  (Normal) Collected:  04/15/18 0002    Lab Status:  Final result Specimen:  Blood from Arm, Left Updated:  04/15/18 0049     CK-MB Index 1 6 %      CK-MB FRACTION 3 9 ng/mL     CK Total with Reflex CKMB [[id number]]  (Normal) Collected:  04/15/18 0002    Lab Status:  Final result Specimen:  Blood from Arm, Left Updated:  04/15/18 0049     Total  U/L     Comprehensive metabolic panel [[id number]]  (Abnormal) Collected:  04/15/18 0002    Lab Status:  Final result Specimen:  Blood from Arm, Left Updated:  04/15/18 0032     Sodium 133 (L) mmol/L      Potassium 4 8 mmol/L      Chloride 98 (L) mmol/L      CO2 23 mmol/L      Anion Gap 12 mmol/L      BUN 79 (H) mg/dL      Creatinine 3 04 (H) mg/dL      Glucose 430 (H) mg/dL      Calcium 9 0 mg/dL      AST 36 U/L      ALT 54 U/L      Alkaline Phosphatase 176 (H) U/L      Total Protein 8 2 g/dL      Albumin 3 7 g/dL      Total Bilirubin 0 50 mg/dL      eGFR 23 ml/min/1 73sq m     Narrative:         National Kidney Disease Education Program recommendations are as follows:  GFR calculation is accurate only with a steady state creatinine  Chronic Kidney disease less than 60 ml/min/1 73 sq  meters  Kidney failure less than 15 ml/min/1 73 sq  meters  Troponin I [[id number]]  (Normal) Collected:  04/15/18 0002    Lab Status:  Final result Specimen:  Blood from Arm, Left Updated:  04/15/18 0028     Troponin I 0 04 ng/mL     Narrative:         [employer] Chemistry analyzer 99% cutoff is > 0 04 ng/mL in network labs    o cTnI 99% cutoff is useful only when applied to patients in the clinical setting of myocardial ischemia  o cTnI 99% cutoff should be interpreted in the context of clinical history, ECG findings and possibly cardiac imaging to establish correct diagnosis  o cTnI 99% cutoff may be suggestive but clearly not indicative of a coronary event without the clinical setting of myocardial ischemia      Protime-INR [[id number]]  (Abnormal) Collected:  04/15/18 0002    Lab Status:  Final result Specimen:  Blood from Arm, Left Updated:  04/15/18 0020     Protime 36 9 (H) seconds      INR 3 55 (H)    APTT [[id number]]  (Abnormal) Collected:  04/15/18 0002    Lab Status:  Final result Specimen:  Blood from Arm, Left Updated:  04/15/18 0020     PTT 42 (H) seconds     CBC and differential [[id number]]  (Abnormal) Collected:  04/15/18 0002    Lab Status:  Final result Specimen:  Blood from Arm, Left Updated:  04/15/18 0008     WBC 9 11 Thousand/uL      RBC 4 30 Million/uL      Hemoglobin 11 9 (L) g/dL      Hematocrit 36 1 (L) %      MCV 84 fL      MCH 27 7 pg      MCHC 33 0 g/dL      RDW 14 5 %      MPV 13 1 (H) fL      Platelets 272 Thousands/uL      Neutrophils Relative 81 (H) %      Lymphocytes Relative 10 (L) %      Monocytes Relative 8 %      Eosinophils Relative 1 %      Basophils Relative 0 %      Neutrophils Absolute 7 44 Thousands/µL      Lymphocytes Absolute 0 88 Thousands/µL      Monocytes Absolute 0 72 Thousand/µL      Eosinophils Absolute 0 06 Thousand/µL      Basophils Absolute 0 01 Thousands/µL                  XR chest 2 views   ED Interpretation by Mamie Schaumann, MD (04/15 9645)   Increased vascular markings read by me  Procedures  ECG 12 Lead Documentation  Date/Time: 4/14/2018 11:48 PM  Performed by: uLcien Gillis  Authorized by: Lucien Gillis     Indications / Diagnosis:  Chest pain  ECG reviewed by me, the ED Provider: yes    Patient location:  ED  Previous ECG:     Previous ECG:  Compared to current    Comparison ECG info:  1/20/18    Similarity:  No change  Rate:     ECG rate:  82    ECG rate assessment: normal    Rhythm:     Rhythm: sinus rhythm    Ectopy:     Ectopy: none    QRS:     QRS axis:  Left  Conduction:     Conduction: abnormal      Abnormal conduction: complete LBBB             Phone Contacts  ED Phone Contact    ED Course  ED Course as of Apr 15 0127   Sun Apr 15, 2018   0111 Pain improved             HEART Risk Score    Flowsheet Row Most Recent Value   History  2 Filed at: 04/15/2018 0112   ECG  1 Filed at: 04/15/2018 0112   Age  1 Filed at: 04/15/2018 0112   Risk Factors  2 Filed at: 04/15/2018 0112   Troponin  0 Filed at: 04/15/2018 0112   Heart Score Risk Calculator   History  2 Filed at: 04/15/2018 0112   ECG  1 Filed at: 04/15/2018 0112   Age  1 Filed at: 04/15/2018 0112   Risk Factors  2 Filed at: 04/15/2018 0112   Troponin  0 Filed at: 04/15/2018 0112   HEART Score  6 Filed at: 04/15/2018 0112   HEART Score  6 Filed at: 04/15/2018 0112                    ARTI Risk Score    Flowsheet Row Most Recent Value   Age >= 72  0 Filed at: 04/15/2018 0112   Known CAD (stenosis >= 50%)  0 Filed at: 04/15/2018 3629   Recent (<=24 hrs) Service Angina  1 Filed at: 04/15/2018 0112   ST Deviation >= 0 5 mm  0 Filed at: 04/15/2018 0112   3+ CAD Risk Factors (FHx, HTN, HLP, DM, Smoker)  1 Filed at: 04/15/2018 0112   Aspirin Use Past 7 Days  1 Filed at: 04/15/2018 0112   Elevated Cardiac Markers  0 Filed at: 04/15/2018 0112   ARTI Risk Score (Calculated)  3 Filed at: 04/15/2018 0112              MDM  Number of Diagnoses or Management Options  Diagnosis management comments: DDX including but not limited to: ACS, MI, doubt PE as patient is on coumadin, PTX, pneumonia, doubt dissection, pleurisy, pericarditis, myocarditis, rhabdomyolysis, GI etiology          Amount and/or Complexity of Data Reviewed  Clinical lab tests: ordered and reviewed  Tests in the radiology section of CPT®: ordered and reviewed  Decide to obtain previous medical records or to obtain history from someone other than the patient: yes  Obtain history from someone other than the patient: yes  Review and summarize past medical records: yes  Independent visualization of images, tracings, or specimens: yes      CritCare Time    Disposition  Final diagnoses:   Chest pain   Hyperglycemia   Acute-on-chronic kidney injury ([facility] [address] )     Time reflects when diagnosis was documented in both MDM as applicable and the Disposition within this note     Time User Action Codes Description Comment    4/15/2018  1:26 AM Naomi Pates Add [R07 9] Chest pain     4/15/2018  1:26 AM Naomi Pates Add [R73 9] Hyperglycemia     4/15/2018  1:26 AM Naomi Pates Add [N17 9,  N18 9] Acute-on-chronic kidney injury [facility])       ED Disposition     ED Disposition Condition Comment    Admit  Case was discussed with CURLY Olsen and the patient's admission status was agreed to be Admission Status: observation status to the service of Dr Debbie Williamson   Follow-up Information    None       Patient's Medications   Discharge Prescriptions    No medications on file     No discharge procedures on file      ED Provider  Electronically Signed by           Latha Mendes MD  04/15/18 9462

## 2018-04-15 NOTE — H&P
H&P- Graham Watters  1965, 50 y o  male MRN: [id number]    Unit/Bed#: ED 28 Encounter: [id number]    Primary Care Provider: Radha Adames DO   Date and time admitted to hospital: 4/14/2018 11:39 PM    * Chest pain   Assessment & Plan    · ACS r/o  · CXR- No acute pulmonary disease  · EKG- NSR, rate 82  Complete LBBB  · Troponin 0 04  Will trend  · Telemetry  · EKG for chest pain  · Nitro, ASA  · ARTI Score 3- stress test         PAF (paroxysmal atrial fibrillation) (HCC)   Assessment & Plan    · Rate controlled at 74  · Continue Coreg, Amiodarone  · Anticoagulated on Coumadin- INR 3 55  · Vitamin K 2 5mg PO now  · Hold coumadin  · Monitor daily INR  Chronic combined systolic and diastolic congestive heart failure (HCC)   Assessment & Plan    · Appears dry  · Echo 1/20/18- LVEF 40%  Mild mitral, tricuspid, aortic regurgitation  · Daily weights, I&Os  · Hold Lasix, Spironolactone for now  · Monitor closely for volume overload  CKD (chronic kidney disease)   Assessment & Plan    · Cr 3 04   · Baseline appears to be 2   · Likely prerenal from diuresis  · Gentle IVF  · Hold nephrotoxic agents  · Monitor BMP  · Consider nephrology consult  Diabetes 1 5, managed as type 2 ([facility])   Assessment & Plan    · Glucose 430  · Humulin IV 8 units given in ED  · Continue home insulin regimen with SSI coverage for now  · Monitor glucose  · Consider insulin drip  Hypertension   Assessment & Plan    · /66  · Continue Coreg, Amlodipine  Hyperlipidemia   Assessment & Plan    · Continue Lipitor  VTE Prophylaxis: Warfarin (Coumadin)  / sequential compression device   Code Status: Full Code  POLST: POLST form is not discussed and not completed at this time  Discussion with family: Discussed with patient at bedside  Anticipated Length of Stay:  Patient will be admitted on an Observation basis with an anticipated length of stay of  Less than 2 midnights  Justification for Hospital Stay: Chest pain, ACS r/o  Total Time for Visit, including Counseling / Coordination of Care: 45 minutes  Greater than 50% of this total time spent on direct patient counseling and coordination of care  Chief Complaint:   Chest pain  History of Present Illness:    George Sol  is a 50 y o  male, PMHx of CHF, CAD, A fib, DM, HTN, HLD, CKD, who presents with chest pain that began around 11PM this evening  Patient reports developing right sided, 3/10, chest pain that he can only describe as a discomfort  He denies radiation of pain  He denies associated diaphoresis, SOB, palpitations, dizziness, PARKER  He checks his weight daily and states that it has not increased  Review of Systems:    Review of Systems   Constitutional: Negative for chills, diaphoresis and fever  Respiratory: Negative for shortness of breath  Cardiovascular: Positive for chest pain  Negative for palpitations  Gastrointestinal: Negative for abdominal pain, constipation, diarrhea, nausea and vomiting  Genitourinary: Negative for dysuria, frequency, hematuria and urgency  Neurological: Negative for dizziness, light-headedness and headaches  All other systems reviewed and are negative  Past Medical and Surgical History:     Past Medical History:   Diagnosis Date    A-fib [facility])     Cardiac disease     MI    Carotid artery disorder (HCC)     L carotid completely blocked    Hyperlipidemia     Hypertension     Insulin dependent diabetes mellitus ([facility] [address] )     [facility]     Stroke [facility])        Past Surgical History:   Procedure Laterality Date    APPENDECTOMY      TOE AMPUTATION         Meds/Allergies:    Prior to Admission medications    Medication Sig Start Date End Date Taking?  Authorizing Provider   amiodarone 200 mg tablet Take 1 tablet by mouth daily with breakfast for 30 days 2/17/17 1/20/18  Eleuterio Reyes,    amLODIPine (NORVASC) 5 mg tablet Take 1 tablet by mouth daily for 30 days 2/16/17 1/20/18  Eleuterio Reyes DO   aspirin 81 mg chewable tablet Chew 1 tablet daily for 30 days 2/16/17 3/18/17  Eleuterio eRyes DO   atorvastatin (LIPITOR) 80 mg tablet Take 1 tablet by mouth daily with dinner for 30 days 2/17/17 1/20/18  Eleuterio Reyes DO   carvedilol (COREG) 12 5 mg tablet Pt taking 1/2 tab in the morning and 1/2 tab at night  7/6/15   Historical Provider, MD   Cholecalciferol (VITAMIN D3) 1000 units CAPS Take by mouth    Historical Provider, MD   docusate sodium (COLACE) 100 mg capsule Take 1 capsule by mouth 2 (two) times a day for 30 days 2/16/17 3/18/17  Eleuterio Reyes DO   furosemide (LASIX) 40 mg tablet Take 1 tablet by mouth 2 (two) times a day 1/22/18   Virginia Brandt MD   Insulin Detemir (LEVEMIR SC) Inject under the skin 30 units in the morning and 40 units at night  Historical Provider, MD   insulin lispro (HumaLOG) 100 units/mL injection Inject 5 Units under the skin 3 (three) times a day with meals for 30 days 2/16/17 3/18/17  Eleuterio Reyes DO   nitroglycerin (NITROSTAT) 0 4 mg SL tablet Place 0 4 mg under the tongue every 5 (five) minutes as needed for chest pain    Historical Provider, MD   senna (SENOKOT) 8 6 mg Take 1 tablet by mouth daily at bedtime for 30 days 2/16/17 3/18/17  Eleuterio Reyes DO   SPIRONOLACTONE PO Take 12 5 mg by mouth daily    Historical Provider, MD JANSEN have reviewed home medications with patient personally  Allergies: No Known Allergies    Social History:     Marital Status: Single   Occupation: Unknown  Patient Pre-hospital Living Situation: Home  Patient Pre-hospital Level of Mobility: Independent  Patient Pre-hospital Diet Restrictions: None    Substance Use History:   History   Alcohol Use No     History   Smoking Status    Never Smoker   Smokeless Tobacco    Never Used     History   Drug Use No       Family History:    non-contributory    Physical Exam:     Vitals:   Blood Pressure: 138/66 (04/15/18 0100)  Pulse: 74 (04/15/18 0100)  Temperature: 98 6 °F (37 °C) (04/15/18 0002)  Temp Source: Oral (04/15/18 0002)  Respirations: 18 (04/15/18 0100)  Weight - Scale: 113 kg (248 lb 3 8 oz) (04/14/18 2343)  SpO2: 95 % (04/15/18 0100)    Physical Exam   Constitutional: He is oriented to person, place, and time  He appears well-developed and well-nourished  He is cooperative  He does not appear ill  No distress  HENT:   Head: Normocephalic and atraumatic  Eyes: Conjunctivae, EOM and lids are normal  Pupils are equal, round, and reactive to light  Cardiovascular: Normal rate, regular rhythm, normal heart sounds and normal pulses  No murmur heard  Pulmonary/Chest: Effort normal and breath sounds normal  He has no wheezes  He has no rhonchi  He has no rales  Abdominal: Soft  Normal appearance and bowel sounds are normal  There is no tenderness  Musculoskeletal: Normal range of motion  LLE boot in place  Neurological: He is alert and oriented to person, place, and time  He has normal strength  He is not disoriented  No sensory deficit  Skin: Skin is warm, dry and intact  He is not diaphoretic  Psychiatric: He has a normal mood and affect  His speech is normal and behavior is normal  Cognition and memory are normal    Vitals reviewed  Additional Data:     Lab Results: I have personally reviewed pertinent reports          Results from last 7 days  Lab Units 04/15/18  0002   WBC Thousand/uL 9 11   HEMOGLOBIN g/dL 11 9*   HEMATOCRIT % 36 1*   PLATELETS Thousands/uL 179   NEUTROS PCT % 81*   LYMPHS PCT % 10*   MONOS PCT % 8   EOS PCT % 1       Results from last 7 days  Lab Units 04/15/18  0002   SODIUM mmol/L 133*   POTASSIUM mmol/L 4 8   CHLORIDE mmol/L 98*   CO2 mmol/L 23   BUN mg/dL 79*   CREATININE mg/dL 3 04*   CALCIUM mg/dL 9 0   TOTAL PROTEIN g/dL 8 2   BILIRUBIN TOTAL mg/dL 0 50   ALK PHOS U/L 176*   ALT U/L 54   AST U/L 36   GLUCOSE RANDOM mg/dL 430*       Results from last 7 days  Lab Units 04/15/18  0002   INR  3 55* Imaging: I have personally reviewed pertinent reports  XR chest 2 views   ED Interpretation by Tamiko Pineda MD (04/15 8520)   Increased vascular markings read by me  EKG, Pathology, and Other Studies Reviewed on Admission:   · EKG: NSR, rate 82  Complete LBBB  Allscripts / Epic Records Reviewed: Yes     ** Please Note: This note has been constructed using a voice recognition system   **

## 2018-04-15 NOTE — PLAN OF CARE
Problem: Potential for Falls  Goal: Patient will remain free of falls  INTERVENTIONS:  - Assess patient frequently for physical needs  -  Identify cognitive and physical deficits and behaviors that affect risk of falls    -  [place] fall precautions as indicated by assessment   - Educate patient/family on patient safety including physical limitations  - Instruct patient to call for assistance with activity based on assessment  - Modify environment to reduce risk of injury  - Consider OT/PT consult to assist with strengthening/mobility   Outcome: Progressing      Problem: PAIN - ADULT  Goal: Verbalizes/displays adequate comfort level or baseline comfort level  Interventions:  - Encourage patient to monitor pain and request assistance  - Assess pain using appropriate pain scale  - Administer analgesics based on type and severity of pain and evaluate response  - Implement non-pharmacological measures as appropriate and evaluate response  - Consider cultural and social influences on pain and pain management  - Notify physician/advanced practitioner if interventions unsuccessful or patient reports new pain  Outcome: Progressing

## 2018-04-15 NOTE — ASSESSMENT & PLAN NOTE
· Rate controlled at 74  · Continue Coreg, Amiodarone  · Anticoagulated on Coumadin- INR 3 55  · Vitamin K 2 5mg PO now  · Hold coumadin  · Monitor daily INR

## 2018-04-15 NOTE — ASSESSMENT & PLAN NOTE
· Glucose 430  · Humulin IV 8 units given in ED  · Continue home insulin regimen with SSI coverage for now  · Monitor glucose  · Consider insulin drip

## 2018-04-15 NOTE — CASE MANAGEMENT
Initial Clinical Review    · Admission: Date/Time/Statement:  4/15/2018  0127 OBSERVATION  AND CHANGED TO INPATIENT 4/15/2018  1343 RE:  NSTEMI/multivessel CAD - not a surgical candidate  Conservative medical management at this time  Medications adjusted per Cardiology  Continue anticoagulation with Coumadin for PAF  · Type 1 diabetes - most recent hemoglobin A1c on 1/20/18 was 9 1  He follows with an endocrinologist outpatient  Continue Lantus/Humalog at current dose and adjust as indicated  · Paroxysmal atrial fibrillation - currently in sinus rhythm, supratherapeutic INR, hold Coumadin today  Continue amiodarone  Will recheck INR now as he received vitamin K earlier today and might need a dose of Coumadin later today in the setting of NSTEMI  · Chronic combined systolic/diastolic CHF - last admission in January 2018 for CHF exacerbation secondary to dietary noncompliance  Currently examines euvolemic  Lasix and Aldactone held on admission secondary to KYLER on CKD  Monitor volume status closely, daily weights, I's and O's  · KYLER on CKD 3 - baseline creatinine appears to be in low 2s  He presented with a creatinine of 3 04  He is not on  ACE inhibitors or ARB due to renal insufficiency  Diuretics are on hold  To consider Nephrology consult if does not improve    Avoid hypotension/nephrotoxins     ED treatment started 4/14/2018 2343    Inpatient Admission (Order [id number])   Admission   Date: 4/15/2018 Department: [facility] [address] 3[address]  Floor Med Surg Unit Released By/Authorizing: Torito Bean MD (auto-released)   Order Information     Order Name   INPATIENT ADMISSION [263]     Order History   Inpatient   Date/Time Action Taken User Additional Information   04/15/18 1342 Release Torito Bean MD (auto-released) From Order: [id number]   04/15/18 1342 Complete Torito Bean MD            ED: Date/Time/Mode of Arrival:   ED Arrival Information     Expected Arrival [address] of Arrival Escorted By Service Admission Type    - 4/14/2018 23:36 Emergent Walk-In Self General Medicine Emergency    Arrival Complaint    Chest Pain          Chief Complaint:   Chief Complaint   Patient presents with    Chest Pain     Pt presents to ED for evaluation and treatment for chest pain for past 1 5 hours  Prior hx of MI and CHF  Pt requiring triple bbypass however not a valid candidate due to vascular compromise in legs  Pt took 2 Nitro PTA       History of Illness: 50 y o  male, PMHx of CHF, CAD, A fib, DM, HTN, HLD, CKD, who presents with chest pain that began around 11PM this evening  Patient reports developing right sided, 3/10, chest pain that he can only describe as a discomfort  He denies radiation of pain  He denies associated diaphoresis, SOB, palpitations, dizziness, PARKER  He checks his weight daily and states that it has not increased  ED Vital Signs:   ED Triage Vitals   Temperature Pulse Respirations Blood Pressure SpO2   04/15/18 0002 04/14/18 2343 04/14/18 2343 04/14/18 2349 04/14/18 2343   98 6 °F (37 °C) 82 18 134/69 98 %      Temp Source Heart Rate Source Patient Position - Orthostatic VS BP Location FiO2 (%)   04/15/18 0002 04/14/18 2343 04/14/18 2343 04/14/18 2343 --   Oral Monitor Lying Left arm       Pain Score       04/14/18 2343       3        Wt Readings from Last 1 Encounters:   04/15/18 111 kg (244 lb 4 3 oz)       Vital Signs (abnormal): none  Heart score 6    hugo -3    Abnormal Labs/Diagnostic Test Results:   Na 133  Cl 98  Bun 79  Creatinine 3 04  Glucose 430  Alkaline phosphatase 176  INR 3 55    hgb 11 9, hct 36 1  NT-proBNP 2014    ekg - sinus  Complete LBBB    4/15/2018  0258- glucose 354    0510-  Troponin 0 79              Glucose 238  Bun 80  Creatinine 2 92                 hgb 11 3, hct 34 3       CxR - mild vascular congestion    ED Treatment:   Medication Administration from 04/14/2018 2336 to 04/15/2018 0238       Date/Time Order Dose Route Action Action by Comments     04/15/2018 0009 ondansetron (ZOFRAN) injection 4 mg 4 mg Intravenous Given Franklyn Gramajo RN      04/15/2018 0013 aspirin chewable tablet 162 mg 162 mg Oral Given Franklyn Gramajo RN      04/15/2018 0011 nitroglycerin (NITRO-BID) 2 % TD ointment 0 5 inch 0 5 inch Topical Given Franklyn Gramajo RN /75     04/15/2018 0158 insulin regular (HumuLIN R,NovoLIN R) injection 8 Units 8 Units Intravenous Given Tru Villaseñor RN           Past Medical/Surgical History:    Active Ambulatory Problems     Diagnosis Date Noted    KYLER (acute kidney injury) ([facility] ) 07/26/2016    CKD (chronic kidney disease) 07/26/2016    Chronic combined systolic and diastolic congestive heart failure ([facility] ) 07/26/2016    Stroke ([facility] ) 07/26/2016    On warfarin therapy 07/26/2016    Diabetes ([facility] ) 07/26/2016    Hypertension 07/26/2016    Diabetic foot ([facility] ) 07/26/2016    Elevated troponin 12/25/2016    Acute-on-chronic kidney injury ([facility] ) 12/25/2016    Hyperglycemia 12/25/2016    Diabetes 1 5, managed as type 2 ([place] [address] ) 12/25/2016    Obesity due to excess calories 12/25/2016    PAF (paroxysmal atrial fibrillation) ([facility] ) 12/27/2016    Acute respiratory failure with hypoxia (HCC) 02/11/2017    Insulin dependent diabetes mellitus ([facility] )     Hyperlipidemia     Ischemic cardiomyopathy 02/11/2017    Non-ST elevation myocardial infarction (NSTEMI), type 2 02/11/2017    Warfarin-induced coagulopathy ([facility] ) 02/12/2017    Pneumonia 02/13/2017    A-fib ([place] [address] )      Resolved Ambulatory Problems     Diagnosis Date Noted    Diarrhea 07/26/2016    Acute gastroenteritis 07/26/2016    Positive blood culture 12/28/2016    Leukocytosis 02/11/2017    Hyperkalemia 02/11/2017     Past Medical History:   Diagnosis Date    [facility]fib [facility])     Cardiac disease     Carotid artery disorder ([facility] )     Hyperlipidemia     Hypertension     Insulin dependent diabetes mellitus ([facility] )     Peripheral vascular disease ([facility] )     Stroke ([place] [address] ) Admitting Diagnosis: Chest pain [R07 9]  Hyperglycemia [R73 9]  Acute-on-chronic kidney injury ([facility] ) [N17 9, N18 9]    Age/Sex: 50 y o  male    Assessment/Plan:   Chest pain   Assessment & Plan     · ACS r/o  · CXR- No acute pulmonary disease  · EKG- NSR, rate 82  Complete LBBB  · Troponin 0 04  Will trend  · Telemetry  · EKG for chest pain  · Nitro, ASA  · ARTI Score 3- stress test           PAF (paroxysmal atrial fibrillation) ([facility])   Assessment & Plan     · Rate controlled at 74  · Continue Coreg, Amiodarone  · Anticoagulated on Coumadin- INR 3 55   ? Vitamin K 2 5mg PO now   ? Hold coumadin  · Monitor daily INR          Chronic combined systolic and diastolic congestive heart failure (HCC)   Assessment & Plan     · Appears dry  · Echo 1/20/18- LVEF 40%  Mild mitral, tricuspid, aortic regurgitation  · Daily weights, I&Os  · Hold Lasix, Spironolactone for now  · Monitor closely for volume overload           CKD (chronic kidney disease)   Assessment & Plan     · Cr 3 04   · Baseline appears to be 2   · Likely prerenal from diuresis  · Gentle IVF  · Hold nephrotoxic agents  · Monitor BMP  · Consider nephrology consult           Diabetes 1 5, managed as type 2 ([facility] )   Assessment & Plan     · Glucose 430  · Humulin IV 8 units given in ED  · Continue home insulin regimen with SSI coverage for now  · Monitor glucose  · Consider insulin drip           Hypertension   Assessment & Plan     · /66    · Continue Coreg, Amlodipine           Hyperlipidemia   Assessment & Plan     · Continue Lipitor             Admission Orders: 4/15/2018  0127 OBSERVATION  AND CHANGED TO INPATIENT 4/15/2018  1343  Scheduled Meds:   Current Facility-Administered Medications:  acetaminophen 650 mg Oral Q6H PRN Ilda Adkins PA-C   amiodarone 200 mg Oral Daily With Breakfast Ilda Adkins PA-C   amLODIPine 5 mg Oral Daily Ilda Adkins PA-C   [START ON 4/16/2018] aspirin 81 mg Oral Daily Julian Sj, PA-C   atorvastatin 80 mg Oral Daily With Dinner Julian Confer, PA-SLIME   carvedilol 6 25 mg Oral BID With Meals Julian Confer, PA-SLIME   cholecalciferol 1,000 Units Oral Daily Julian Confer, PA-SLIME   influenza vaccine 0 5 mL Intramuscular Prior to discharge Julian Sj PA-C   insulin detemir 30 Units Subcutaneous QAM Julian Confer, PA-C   insulin detemir 50 Units Subcutaneous HS Julian Confer PA-C   insulin lispro 2-12 Units Subcutaneous TID AC Julian Confer PA-C   insulin lispro 5 Units Subcutaneous TID With Meals Julian Confer, PA-C   levothyroxine 50 mcg Oral Early Morning Julian Confer, PA-C   nitroglycerin 0 4 mg Sublingual Q5 Min PRN Julian Confer, PA-C   ondansetron 4 mg Intravenous Q6H PRN Julian Confer PA-C     Continuous Infusions:    PRN Meds: not used:    acetaminophen    influenza vaccine    nitroglycerin    Ondansetron    Fingerstick glucose qid  scds  Telemetry  NPO  Consult cardiology  Bipap at bedtime

## 2018-04-15 NOTE — ASSESSMENT & PLAN NOTE
· Appears dry  · Echo 1/20/18- LVEF 40%  Mild mitral, tricuspid, aortic regurgitation  · Daily weights, I&Os  · Hold Lasix, Spironolactone for now  · Monitor closely for volume overload

## 2018-04-15 NOTE — ASSESSMENT & PLAN NOTE
· ACS r/o  Hx of CAD  · CXR- No acute pulmonary disease  · EKG- NSR, rate 82  Complete LBBB  · Troponin 0 04  Will trend  · Telemetry  · EKG for chest pain  · Nitro, ASA    · ARTI Score 3- stress test

## 2018-04-15 NOTE — PROGRESS NOTES
Notified by RN that patient's second troponin elevated  Troponin now 0 79 from 0 04  Will D/C stress test   NPO  Will hold off on heparin drip as patient is supratherapeutic on coumadin  Consult cardiology

## 2018-04-15 NOTE — ASSESSMENT & PLAN NOTE
· Cr 3 04   · Baseline appears to be 2   · Likely prerenal from diuresis  · Gentle IVF  · Hold nephrotoxic agents  · Monitor BMP  · Consider nephrology consult

## 2018-04-15 NOTE — CONSULTS
Consultation - Cardiology   Carson Fabry  50 y o  male MRN: [id number]  Unit/Bed#: -01 Encounter: [id number]      Assessment:  1  Recurrent acute/chronic systolic chf, outpt care at [facility]  2  Known ischemic cm, stage C, nyha 2-3, lvef 40%, nuclear stress 2015 [facility] records inferior scar  3  Known Multivessel CAD with poor targets for CABG, elev trop not indicative of type 1 nstemi with acute ACS in setting of a/crf, volume overload and known chronic cad  3  Pafib, on a/c prior cva  4  LBBB, known  5, DM suboptimal control with hgba1c 9  6  CKD, 4,     Plan:  Patient has known triple-vessel coronary artery disease with poor targets per review of [facility] records  Therefore, he has had recurrent symptoms related to his ischemic cardiomyopathy and vascular disease  I would increase his carvedilol to 12 5 mg b i d     Would stop his aspirin and changed to clopidogrel 75 mg daily  Continue statin and amiodarone for both his coronary artery disease and maintenance of sinus rhythm  His ejection fraction was 40% and consistent with prior reports at [facility]       Would add Ranexa 500 mg b i d  as well  He is not on Ace inhibitor angiotensin receptor blocker due to renal insufficiency  His renal insufficiency is significant with creatinine approximately 3 0  If blood pressure tolerates would add hydralazine 25 bid to start  History of Present Illness   Physician Requesting Consult: Aleida Glover MD  Reason for Consult / Principal Problem: dyspnea, chf  HPI: Carson Fabry  is a 50y o  year old male who presents with sharp chest discomfort with nausea and vomiting last evening  He has a history of ischemic cardiomyopathy and known triple-vessel coronary artery disease with poor targets  He is cared for at [facility] per Dr  is long      Review of those records reveals an ejection fraction of approximately 40%, 40% by echocardiogram here as well in January when he was admitted for heart failure  Nuclear stress testing in 2015 with large inferior scar  Well documented coronary artery disease with poor targets in Care everywhere and epic  Symptoms persisted  Took nitroglycerin with some relief  He has known left bundle-branch block  He has paroxysmal atrial fibrillation with prior CVA and is on amiodarone and Coumadin  He takes aspirin             Review of Systems:  Review of Systems    14 systems reviewed and negative with the exception of the above and the following, fatigue      Current Facility-Administered Medications:  acetaminophen 650 mg Oral Q6H PRN [facility], PA-C   amiodarone 200 mg Oral Daily With Breakfast [facility], PA-C   amLODIPine 5 mg Oral Daily [facility], PA-C   [START ON 4/16/2018] aspirin 81 mg Oral Daily [facility], PA-C   atorvastatin 80 mg Oral Daily With Dinner [facility], PA-C   carvedilol 6 25 mg Oral BID With Meals [facility], PA-C   cholecalciferol 1,000 Units Oral Daily [facility], PA-C   influenza vaccine 0 5 mL Intramuscular Prior to discharge [facility], PA-C   insulin detemir [facility], [facility]   insulin detemir 50 Units Subcutaneous HS [facility], PA-C   insulin lispro 2-12 Units Subcutaneous TID AC [facility], PA-C   insulin lispro 5 Units Subcutaneous TID With Meals [facility], PA-C   levothyroxine 50 mcg Oral Early Morning [facility], PA-C   nitroglycerin 0 4 mg Sublingual Q5 Min PRN [facility], PA-C   ondansetron 4 mg Intravenous Q6H PRN [facility], PA-C              Historical Information   Past Medical History:   Diagnosis Date    [facility]-fib [facility])     Cardiac disease     MI    Carotid artery disorder (HCC)     L carotid completely blocked    Hyperlipidemia     Hypertension     [facility]ulin dependent [facility] )     Peripheral vascular disease [facility] )     Stroke [facility])      Past Surgical History:   Procedure Laterality Date    APPENDECTOMY      TOE AMPUTATION       History   Alcohol Use No     History   Drug Use No     History   Smoking Status    Never Smoker   Smokeless Tobacco    Never Used     Family History:   Family History   Problem Relation Age of Onset    Diabetes Mother     Heart disease Father        Meds/Allergies         No Known Allergies    Objective   Vitals: Blood pressure 139/52, pulse 76, temperature 98 4 °F (36 9 °C), temperature source Oral, resp  rate 19, height 6' (1 829 m), weight 111 kg (244 lb 4 3 oz), SpO2 98 %  , Body mass index is 33 13 kg/m² , Orthostatic Blood Pressures    Flowsheet Row Most Recent Value   Blood Pressure  139/52 filed at 04/15/2018 0232   Patient Position - Orthostatic VS  Sitting filed at 04/15/2018 0232          No intake or output data in the 24 hours ending 04/15/18 0723    Invasive Devices     Peripheral Intravenous Line            Peripheral IV 04/14/18 Left Antecubital less than 1 day                    Physical Exam:  Physical Exam    Gen: No acute distress  HEENT: anicteric, mucous membranes moist  Neck: supple, no jugular venous distention, or carotid bruit  Heart: regular, normal s1 and s2, no murmur/rub or gallop  Lungs :clear to auscultation bilaterally, no rales/rhonchi or wheeze  Abdomen: soft nontender, normoactive bowel sounds, no organomegaly  Ext: warm and perfused, LLE boot  Skin: warm, no rashes  Neuro: AAO x 3, no focal findings  Psychiatric: normal affect  Musculoskeletal: no obvious joint deformities      Lab Results:       Results from last 7 days  Lab Units 04/15/18  0551 04/15/18  0002   SODIUM mmol/L 135* 133*   POTASSIUM mmol/L 4 4 4 8   CHLORIDE mmol/L 100 98*   CO2 mmol/L 24 23   BUN mg/dL 80* 79*   CREATININE mg/dL 2 92* 3 04*   GLUCOSE RANDOM mg/dL 206* 430*   CALCIUM mg/dL 8 8 9 0         Results from last 7 days  Lab Units 04/15/18  0342 04/15/18  0002   CK TOTAL U/L  --  243   TROPONIN I ng/mL 0 79* 0 04   CK MB INDEX %  --  1 6         Results from last 7 days  Lab Units 04/15/18  0551 04/15/18  0002   WBC Thousand/uL 9 05 9 11   HEMOGLOBIN g/dL 11 3* 11 9*   HEMATOCRIT % 34 3* 36 1*   PLATELETS Thousands/uL 156 179       No results found for: CHOL  No results found for: HDL  No results found for: LDLCALC  No results found for: TRIG        Lab Results   Component Value Date    ALT 54 04/15/2018    ALT 25 01/22/2018    AST 36 04/15/2018    AST 25 01/22/2018         Results from last 7 days  Lab Units 04/15/18  0002   INR  3 55*         Lab Results   Component Value Date    NTBNP 2,014 (H) 04/15/2018    NTBNP 14,407 (H) 01/20/2018         Lab Results   Component Value Date    HGBA1C 9 1 (H) 01/20/2018    HGBA1C 9 1 (H) 02/17/2017    HGBA1C 8 6 (H) 07/27/2016       Imaging: I have personally reviewed pertinent films in PACS    Normal sinus rhythm and LBBB on tele and ECG

## 2018-04-16 VITALS
WEIGHT: 245.37 LBS | HEART RATE: 60 BPM | TEMPERATURE: 98 F | BODY MASS INDEX: 33.23 KG/M2 | DIASTOLIC BLOOD PRESSURE: 83 MMHG | SYSTOLIC BLOOD PRESSURE: 141 MMHG | OXYGEN SATURATION: 98 % | RESPIRATION RATE: 18 BRPM | HEIGHT: 72 IN

## 2018-04-16 PROBLEM — I21.4 NSTEMI (NON-ST ELEVATED MYOCARDIAL INFARCTION) (HCC): Status: ACTIVE | Noted: 2018-04-15

## 2018-04-16 LAB
ANION GAP SERPL CALCULATED.3IONS-SCNC: 10 MMOL/L (ref 4–13)
BUN SERPL-MCNC: 72 MG/DL (ref 5–25)
CALCIUM SERPL-MCNC: 8.5 MG/DL
CHLORIDE SERPL-SCNC: 102 MMOL/L (ref 100–108)
CO2 SERPL-SCNC: 23 MMOL/L (ref 21–32)
CREAT SERPL-MCNC: 2.38 MG/DL (ref 0.6–1.3)
GFR SERPL CREATININE-BSD FRML MDRD: 31 ML/MIN/1.73SQ M
GLUCOSE SERPL-MCNC: 169 MG/DL (ref 65–140)
GLUCOSE SERPL-MCNC: 204 MG/DL (ref 65–140)
GLUCOSE SERPL-MCNC: 269 MG/DL (ref 65–140)
INR PPP: 2.6 (ref 0.86–1.16)
POTASSIUM SERPL-SCNC: 4.2 MMOL/L (ref 3.5–5.3)
PROTHROMBIN TIME: 28.8 SECONDS (ref 12.1–14.4)
SODIUM SERPL-SCNC: 135 MMOL/L (ref 136–145)
TROPONIN I SERPL-MCNC: 5.61 NG/ML

## 2018-04-16 PROCEDURE — 99239 HOSP IP/OBS DSCHRG MGMT >30: CPT | Performed by: PHYSICIAN ASSISTANT

## 2018-04-16 PROCEDURE — 82948 REAGENT STRIP/BLOOD GLUCOSE: CPT

## 2018-04-16 PROCEDURE — 80048 BASIC METABOLIC PNL TOTAL CA: CPT | Performed by: INTERNAL MEDICINE

## 2018-04-16 PROCEDURE — 85610 PROTHROMBIN TIME: CPT | Performed by: INTERNAL MEDICINE

## 2018-04-16 PROCEDURE — 99232 SBSQ HOSP IP/OBS MODERATE 35: CPT | Performed by: INTERNAL MEDICINE

## 2018-04-16 RX ORDER — CARVEDILOL 12.5 MG/1
12.5 TABLET ORAL 2 TIMES DAILY WITH MEALS
Qty: 60 TABLET | Refills: 0 | Status: SHIPPED | OUTPATIENT
Start: 2018-04-16

## 2018-04-16 RX ORDER — CLOPIDOGREL BISULFATE 75 MG/1
75 TABLET ORAL DAILY
Qty: 30 TABLET | Refills: 0 | Status: SHIPPED | OUTPATIENT
Start: 2018-04-17 | End: 2018-09-20

## 2018-04-16 RX ADMIN — CARVEDILOL 12.5 MG: 12.5 TABLET, FILM COATED ORAL at 08:53

## 2018-04-16 RX ADMIN — INSULIN LISPRO 2 UNITS: 100 INJECTION, SOLUTION INTRAVENOUS; SUBCUTANEOUS at 08:59

## 2018-04-16 RX ADMIN — INSULIN LISPRO 5 UNITS: 100 INJECTION, SOLUTION INTRAVENOUS; SUBCUTANEOUS at 12:08

## 2018-04-16 RX ADMIN — VITAMIN D, TAB 1000IU (100/BT) 1000 UNITS: 25 TAB at 08:53

## 2018-04-16 RX ADMIN — INSULIN LISPRO 6 UNITS: 100 INJECTION, SOLUTION INTRAVENOUS; SUBCUTANEOUS at 12:08

## 2018-04-16 RX ADMIN — INSULIN LISPRO 5 UNITS: 100 INJECTION, SOLUTION INTRAVENOUS; SUBCUTANEOUS at 09:00

## 2018-04-16 RX ADMIN — LEVOTHYROXINE SODIUM 50 MCG: 50 TABLET ORAL at 06:33

## 2018-04-16 RX ADMIN — AMLODIPINE BESYLATE 5 MG: 5 TABLET ORAL at 08:53

## 2018-04-16 RX ADMIN — INSULIN DETEMIR 30 UNITS: 100 INJECTION, SOLUTION SUBCUTANEOUS at 08:53

## 2018-04-16 RX ADMIN — AMIODARONE HYDROCHLORIDE 200 MG: 200 TABLET ORAL at 08:52

## 2018-04-16 RX ADMIN — RANOLAZINE 500 MG: 500 TABLET, FILM COATED, EXTENDED RELEASE ORAL at 08:53

## 2018-04-16 NOTE — ASSESSMENT & PLAN NOTE
· ACS r/o  Hx of CAD  · CXR- No acute pulmonary disease  · EKG- NSR, rate 82  Complete LBBB  · Troponin peak at 6 55  · Cardiology input appreciated  They recommended increasing Coreg to 12 5 mg b i d , adding Plavix 75 mg daily, and adding Ranexa 500 mg twice a day  · Patient refused to make any medication changes unless discussed with his home cardiologist Dr Mariah Pizano   I called and spoke with Yannick Velasco and his partner  They agree with increasing the patient's Coreg 12 5 mg twice a day and added Plavix 75 mg a day  They recommended that we hold off on starting Ranexa  They will start as an outpatient if warranted  Patient will have close follow-up with their office

## 2018-04-16 NOTE — DISCHARGE INSTRUCTIONS
Take your medications as directed, and keep your follow up appointments  Adhere to a heart healthy lifestyle, maintaining a low sodium diet  Daily weight and record  If your weight increases 2-3 lbs in one day, or 5 lbs in 2 days, you are short of breath or have lower extremity swelling, please call the heart failure team at  \[facility]\"" Cardiology at [phone]

## 2018-04-16 NOTE — PHYSICIAN ADVISOR
Current patient class: Inpatient  The patient is currently on Hospital Day: 3      The patient was admitted to the hospital at [phone] on 4/15/18 for the following diagnosis:  Chest pain [R07 9]  Hyperglycemia [R73 9]  Acute-on-chronic kidney injury ([address] ) [N17 9, N18 9]       There is documentation in the medical record of an expected length of stay of at least 2 midnights  Although the patient was initially admitted as observation,  He had elevation in his troponin requiring additional cardiology evaluation  The patient is therefore expected to satisfy the 2 midnight benchmark and given the 2 midnight presumption is appropriate for INPATIENT ADMISSION  Given this expectation of a satisfying stay, CMS instructs us that the patient is most often appropriate for inpatient admission under part A provided medical necessity is documented in the chart  After review of the relevant documentation, labs, vital signs and test results, the patient is appropriate for INPATIENT ADMISSION  Admission to the hospital as an inpatient is a complex decision making process which requires the practitioner to consider the patients presenting complaint, history and physical examination and all relevant testing  With this in mind, in this case, the patient was deemed appropriate for INPATIENT ADMISSION  After review of the documentation and testing available at the time of the admission I concur with this clinical determination of medical necessity  Rationale is as follows: The patient is a 50 yrs old Male with significant medical history of previous MI, CHF, CAD, atrial fibrillation, DM, HTN and CKD who presented to the ED with CP unrelieved with at home NTG  He had a cardiac workup that was notable for an INR of 3 55, acute bump in creatinine of 3 04 from baseline of 2 as well as a troponin of  0 04 and NT-proBNP of 2 014  The patient's HEART score is 6 and ARTI 3   While in the ED, he was treated with additional NTG, ASA as well as insulin given his elevated blood glucose of over 400  He was admitted for further treatment and evaluation  Upon admission, the patient was found to have an acute elevation in his troponin to 6 55 on repeat testing  The patient has consultations to cardiology with plan for medication adjustment with increase in his carvedilol as well as initiation of clopidogrel and ranexa  The patient requires continued monitoring and evaluation given his cardiac history in the setting of NSTEMI with CHF exacerbation with need for close monitoring given KYLER with CKD  Given his elevated HEART and ARTI scores, he is at risk for increased morbidity and mortality without this continued treatment and therefore is appropriate for INPATIENT ADMISSION       The patients vitals on arrival were ED Triage Vitals   Temperature Pulse Respirations Blood Pressure SpO2   04/15/18 0002 04/14/18 2343 04/14/18 2343 04/14/18 2349 04/14/18 2343   98 6 °F (37 °C) 82 18 134/69 98 %      Temp Source Heart Rate Source Patient Position - Orthostatic VS BP Location FiO2 (%)   04/15/18 0002 04/14/18 2343 04/14/18 2343 04/14/18 2343 --   Oral Monitor Lying Left arm       Pain Score       04/14/18 2343       3           Past Medical History:   Diagnosis Date    A-fib [facility])     Cardiac disease     MI    Carotid artery disorder (HCC)     L carotid completely blocked    Hyperlipidemia     Hypertension     Insulin dependent diabetes mellitus ([facility] [place] [address] )     Peripheral vascular disease ([facility] [place] [address] )     Stroke [facility])      Past Surgical History:   Procedure Laterality Date    APPENDECTOMY      TOE AMPUTATION             Consults have been placed to:   IP CONSULT TO CARDIOLOGY    Vitals:    04/15/18 1513 04/15/18 2019 04/15/18 2229 04/16/18 0600   BP: 131/62 159/64 133/60    BP Location: Right arm  Right arm    Pulse: 62 63 62    Resp: 16  16    Temp: 97 5 °F (36 4 °C) 98 4 °F (36 9 °C) 97 9 °F (36 6 °C)    TempSrc: Oral  Oral    SpO2: 97% 97% 94% Weight:    111 kg (245 lb 6 oz)   Height:           Most recent labs:    Recent Labs      04/15/18   0002   04/15/18   0551   04/15/18   2343  04/16/18   0542   WBC  9 11   --   9 05   --    --    --    HGB  11 9*   --   11 3*   --    --    --    HCT  36 1*   --   34 3*   --    --    --    PLT  179   --   156   --    --    --    K  4 8   --   4 4   --    --   4 2   NA  133*   --   135*   --    --   135*   CALCIUM  9 0   --   8 8   --    --   8 5   BUN  79*   --   80*   --    --   72*   CREATININE  3 04*   --   2 92*   --    --   2 38*   LIPASE  305   --    --    --    --    --    INR  3 55*   --    --    < >   --   2 60*   TROPONINI  0 04   < >   --    < >  5 61*   --    CKTOTAL  243   --    --    --    --    --    AST  36   --    --    --    --    --    ALT  54   --    --    --    --    --    ALKPHOS  176*   --    --    --    --    --    BILITOT  0 50   --    --    --    --    --     < > = values in this interval not displayed         Scheduled Meds:  Current Facility-Administered Medications:  acetaminophen 650 mg Oral Q6H PRN Emaline Cage, PA-C   amiodarone 200 mg Oral Daily With Breakfast Emaline Cage, PA-C   amLODIPine 5 mg Oral Daily Emaline Cage, PA-C   atorvastatin 80 mg Oral Daily With Dinner Emaline Cage, PA-C   carvedilol 12 5 mg Oral BID With Meals Croydonie Flatter, DO   cholecalciferol 1,000 Units Oral Daily Emaline Cage, PA-C   clopidogrel 75 mg Oral Daily Gillie Flatter, DO   influenza vaccine 0 5 mL Intramuscular Prior to discharge Emaline Cage, PA-C   insulin detemir 30 Units Subcutaneous QAM Emaline Cage, PA-C   insulin detemir 50 Units Subcutaneous HS Emaline Cage, PA-C   insulin lispro 2-12 Units Subcutaneous TID AC Emaline Cage, PA-C   insulin lispro 5 Units Subcutaneous TID With Meals Emaline Cage, PA-C   levothyroxine 50 mcg Oral Early Morning Emaline Cage, PA-C   nitroglycerin 0 4 mg Sublingual Q5 Min PRN Igor William PA-C   ondansetron 4 mg Intravenous Q6H PRN Igor William PA-C   ranolazine 500 mg Oral Q12H [address] Ishmael Parmar DO     Continuous Infusions:   PRN Meds:   acetaminophen    influenza vaccine    nitroglycerin    ondansetron    Surgical procedures (if appropriate):

## 2018-04-16 NOTE — ASSESSMENT & PLAN NOTE
· Rate controlled   · Continue Coreg, Amiodarone  · INR was supratherapeutic on admission    He was given 1 dose of vitamin K 2 5 mg      · INR 2 60 - restart Coumadin

## 2018-04-16 NOTE — PROGRESS NOTES
Cardiology Progress Note - Efe Lam  50 y o  male MRN: [id number]    Unit/Bed#: -01 Encounter: [id number]      Assessment/Recommendations:  1  Recurrent acute on chronic systolic CHF: EF 31% with known ischemic cardiomyopathy  Currently appears to be euvolemic, no diuresis recommended at this time  2   Multivessel CAD: with elevated troponin - NSTEMI, type 2  Poor targets for CABG  Medical therapy with plavix, B-blocker, ranexa for symptomatic management  3   Paroxysmal afib: appears to be in SR - maintained on amiodarone to achieve this as well with recurrent CHF  Anticoagulation with warfarin for prior CVA  4  LBBB: known, not new  5   KYLER on CKD: improving, likely also reason for elevated troponin  Subjective:   Patient seen and examined  No significant events overnight   ; pertinent negatives - chest pain, chest pressure/discomfort, irregular heart beat and palpitations  Objective:     Vitals: Blood pressure 141/83, pulse 60, temperature 98 °F (36 7 °C), temperature source Oral, resp  rate 18, height 6' (1 829 m), weight 111 kg (245 lb 6 oz), SpO2 98 %  , Body mass index is 33 28 kg/m² , Orthostatic Blood Pressures    Flowsheet Row Most Recent Value   Blood Pressure  141/83 filed at 04/16/2018 0700   Patient Position - Orthostatic VS  Lying filed at 04/16/2018 0700            Intake/Output Summary (Last 24 hours) at 04/16/18 1144  Last data filed at 04/16/18 1100   Gross per 24 hour   Intake              540 ml   Output              500 ml   Net               40 ml       TELE: No significant arrhythmias seen  Physical Exam:    GEN: Efe Lam   appears well, alert and oriented x 3, pleasant and cooperative   HEENT: pupils equal, round, and reactive to light; extraocular muscles intact  NECK: supple, no carotid bruits   HEART: regular rhythm, normal S1 and S2, no murmurs, clicks, gallops or rubs   LUNGS: clear to auscultation bilaterally; no wheezes, rales, or rhonchi ABDOMEN: normal bowel sounds, soft, no tenderness, no distention  EXTREMITIES: peripheral pulses normal; no clubbing, cyanosis, or edema  NEURO: no focal findings   SKIN: normal without suspicious lesions on exposed skin    Medications:      Current Facility-Administered Medications:     acetaminophen (TYLENOL) tablet 650 mg, 650 mg, Oral, Q6H PRN, Kameron Machuca PA-C    amiodarone tablet 200 mg, 200 mg, Oral, Daily With Breakfast, Kameron Machuca PA-C, 200 mg at 04/16/18 0852    amLODIPine (NORVASC) tablet 5 mg, 5 mg, Oral, Daily, Kameron Machuca PA-C, 5 mg at 04/16/18 8528    atorvastatin (LIPITOR) tablet 80 mg, 80 mg, Oral, Daily With Dinner, Kameron Machuca PA-C, 80 mg at 04/15/18 1831    carvedilol (COREG) tablet 12 5 mg, 12 5 mg, Oral, BID With Meals, Elwanda Cranker, DO, 12 5 mg at 04/16/18 0853    cholecalciferol (VITAMIN D3) tablet 1,000 Units, 1,000 Units, Oral, Daily, Kameron Machuca PA-C, 1,000 Units at 04/16/18 0853    clopidogrel (PLAVIX) tablet 75 mg, 75 mg, Oral, Daily, Elwanda Cranker, DO    influenza inactivated quadrivalent vaccine (FLULAVAL) IM injection 0 5 mL, 0 5 mL, Intramuscular, Prior to discharge, Kameron Machuca PA-C    insulin detemir (LEVEMIR) subcutaneous injection 30 Units, 30 Units, Subcutaneous, QAM, Kameron Machuca PA-C, 30 Units at 04/16/18 0853    insulin detemir (LEVEMIR) subcutaneous injection 50 Units, 50 Units, Subcutaneous, HS, Kameron Machuca PA-C, 50 Units at 04/15/18 2233    insulin lispro (HumaLOG) 100 units/mL subcutaneous injection 2-12 Units, 2-12 Units, Subcutaneous, TID AC, 2 Units at 04/16/18 0859 **AND** Fingerstick Glucose (POCT), , , TID AC, Kameron Machuca PA-C    insulin lispro (HumaLOG) 100 units/mL subcutaneous injection 5 Units, 5 Units, Subcutaneous, TID With Meals, Kameron Machuca PA-C, 5 Units at 04/16/18 0900    levothyroxine tablet 50 mcg, 50 mcg, Oral, Early Morning, Tiffani L JORDEN Skelton, 50 mcg at 04/16/18 6059    nitroglycerin (NITROSTAT) SL tablet 0 4 mg, 0 4 mg, Sublingual, Q5 Min PRN, Ashley Barnhart PA-C    ondansetron (ZOFRAN) injection 4 mg, 4 mg, Intravenous, Q6H PRN, Ashley Barnhart PA-C    ranolazine (RANEXA) 12 hr tablet 500 mg, 500 mg, Oral, Q12H [facility], Ishmael Parmar DO, 500 mg at 04/16/18 0853     Labs & Results:      Results from last 7 days  Lab Units 04/15/18  2343 04/15/18  2054 04/15/18  0901  04/15/18  0002   CK TOTAL U/L  --   --   --   --  243   TROPONIN I ng/mL 5 61* 6 51* 6 55*  < > 0 04   CK MB INDEX %  --   --   --   --  1 6   < > = values in this interval not displayed      Results from last 7 days  Lab Units 04/15/18  0551 04/15/18  0002   WBC Thousand/uL 9 05 9 11   HEMOGLOBIN g/dL 11 3* 11 9*   HEMATOCRIT % 34 3* 36 1*   PLATELETS Thousands/uL 156 179           Results from last 7 days  Lab Units 04/16/18  0542 04/15/18  0551 04/15/18  0002   SODIUM mmol/L 135* 135* 133*   POTASSIUM mmol/L 4 2 4 4 4 8   CHLORIDE mmol/L 102 100 98*   CO2 mmol/L 23 24 23   BUN mg/dL 72* 80* 79*   CREATININE mg/dL 2 38* 2 92* 3 04*   CALCIUM mg/dL 8 5 8 8 9 0   TOTAL PROTEIN g/dL  --   --  8 2   BILIRUBIN TOTAL mg/dL  --   --  0 50   ALK PHOS U/L  --   --  176*   ALT U/L  --   --  54   AST U/L  --   --  36   GLUCOSE RANDOM mg/dL 204* 206* 430*       Results from last 7 days  Lab Units 04/16/18  0542 04/15/18  1507 04/15/18  0002   INR  2 60* 3 48* 3 55*   PTT seconds  --   --  42*           Echo: personally reviewed - EF 40%, dilated RV, CHARITO    EKG personally reviewed by Sonali Corbett MD

## 2018-04-16 NOTE — DISCHARGE SUMMARY
Discharge- Iávn Founds  1965, 50 y o  male MRN: [id number]    Unit/Bed#: -01 Encounter: [id number]    Primary Care Provider: Santos Jacinto DO   Date and time admitted to hospital: 4/14/2018 11:39 PM        * NSTEMI (non-ST elevated myocardial infarction) [facility])   Assessment & Plan    · ACS r/o  Hx of CAD  · CXR- No acute pulmonary disease  · EKG- NSR, rate 82  Complete LBBB  · Troponin peak at 6 55  · Cardiology input appreciated  They recommended increasing Coreg to 12 5 mg b i d , adding Plavix 75 mg daily, and adding Ranexa 500 mg twice a day  · Patient refused to make any medication changes unless discussed with his home cardiologist Dr Issa Ruiz   I called and spoke with Freddie Khushboo and his partner  They agree with increasing the patient's Coreg 12 5 mg twice a day and added Plavix 75 mg a day  They recommended that we hold off on starting Ranexa  They will start as an outpatient if warranted  Patient will have close follow-up with their office  KYLER (acute kidney injury) ([facility] [address] )   Assessment & Plan    · Lasix held with improvement  · Restart lasix at home dose        CKD (chronic kidney disease)   Assessment & Plan    · At baseline  · Creatinine 2 38 today        Chronic combined systolic and diastolic congestive heart failure (HCC)   Assessment & Plan    · Clinically euvolemic        Hypertension   Assessment & Plan    · BP stable        Type 1 diabetes mellitus with other specified complication (HCC)   Assessment & Plan    · Continue insulin regimen        Hyperlipidemia   Assessment & Plan    · Continue Lipitor  PAF (paroxysmal atrial fibrillation) ([facility])   Assessment & Plan    · Rate controlled   · Continue Coreg, Amiodarone  · INR was supratherapeutic on admission    He was given 1 dose of vitamin K 2 5 mg      · INR 2 60 - restart Coumadin            Discharging Physician / Practitioner: Sylvie Grady PA-C  PCP: Santos Jacinto DO  Admission Date:   Admission Orders Ordered        04/15/18 1342  Inpatient Admission  Once         04/15/18 0127  Place in Observation (expected length of stay for this patient is less than two midnights)  Once             Discharge Date: 04/16/18    Resolved Problems  Date Reviewed: 4/16/2018    None          Consultations During Hospital Stay:  · Dr Dixie Le    Procedures Performed:     · CXR - mild vascular congestion     Significant Findings / Test Results:     · NSTEMI    Incidental Findings:   · none     Test Results Pending at Discharge (will require follow up):   · none     Outpatient Tests Requested:  · none    Complications:  none    Reason for Admission: chest pain    Hospital Course: Margarita Álvarez  is a 50 y o  male patient who originally presented to the hospital on 4/14/2018 due to chest pain  His initial troponin was negative, but then peaked at 6 55  Cardiology was consulted  Patient with NSTEMI  Patient has a significant history for coronary artery disease  His disease is not amenable to surgical intervention  Cardiology recommended increasing the patient's Coreg to 12 5 mg twice a day, adding Plavix 75 mg daily, and adding Ranexa 500 mg twice a day  Patient refused to make any of these changes unless this was discussed with his primary cardiologist   I spoke with the patient's primary cardiologist and he agreed with the change in Coreg in addition of Plavix  He will add Ranexa as an outpatient if warranted  Patient was also noted to have mild AK eye present on arrival   This improved with holding patient's Lasix for a dose  Patient's Lasix will be resumed at discharge  Discussed with Dr Janeth España, Dr Sammi Bernal    Please see above list of diagnoses and related plan for additional information  Condition at Discharge: good     Discharge Day Visit / Exam:     Subjective:  Currently without complaints  No further chest pain    Vitals: Blood Pressure: 141/83 (04/16/18 0700)  Pulse: 60 (04/16/18 0700)  Temperature: 98 °F (36 7 °C) (04/16/18 0700)  Temp Source: Oral (04/16/18 0700)  Respirations: 18 (04/16/18 0700)  Height: 6' (182 9 cm) (04/15/18 0232)  Weight - Scale: 111 kg (245 lb 6 oz) (04/16/18 0600)  SpO2: 98 % (04/16/18 0700)  Exam:   Physical Exam   Constitutional: He is oriented to person, place, and time  No distress  HENT:   Head: Normocephalic and atraumatic  Neck: Normal range of motion  Neck supple  Cardiovascular: Normal rate and regular rhythm  No murmur heard  Pulmonary/Chest: Effort normal and breath sounds normal  No respiratory distress  He has no wheezes  He has no rales  Abdominal: Soft  Bowel sounds are normal  He exhibits no distension  Musculoskeletal: He exhibits no edema  Neurological: He is alert and oriented to person, place, and time  No cranial nerve deficit  Skin: Skin is warm and dry  No rash noted  Psychiatric: He has a normal mood and affect  Discussion with Family:     Discharge instructions/Information to patient and family:   See after visit summary for information provided to patient and family  Provisions for Follow-Up Care:  See after visit summary for information related to follow-up care and any pertinent home health orders  Disposition:     Home    For Discharges to [facility] SNF:   · Not Applicable to this Patient - Not Applicable to this Patient    Planned Readmission: patient at a high risk for readmission given his multiple comorbidities     Discharge Statement:  I spent 50 minutes discharging the patient  This time was spent on the day of discharge  I had direct contact with the patient on the day of discharge  Greater than 50% of the total time was spent examining patient, answering all patient questions, arranging and discussing plan of care with patient as well as directly providing post-discharge instructions  Additional time then spent on discharge activities      Discharge Medications:  See after visit summary for reconciled discharge medications provided to patient and family        ** Please Note: This note has been constructed using a voice recognition system **

## 2018-04-25 NOTE — PROGRESS NOTES
Dx - Probably NSTEMI type II in the setting of chronic renal failure with volume overload and known chronic cad

## 2018-05-06 ENCOUNTER — HOSPITAL ENCOUNTER (EMERGENCY)
Facility: HOSPITAL | Age: 53
Discharge: HOME | End: 2018-05-06
Attending: EMERGENCY MEDICINE
Payer: MEDICARE

## 2018-05-06 VITALS
WEIGHT: 249.12 LBS | TEMPERATURE: 98.2 F | DIASTOLIC BLOOD PRESSURE: 72 MMHG | OXYGEN SATURATION: 98 % | RESPIRATION RATE: 18 BRPM | SYSTOLIC BLOOD PRESSURE: 126 MMHG | BODY MASS INDEX: 33.79 KG/M2 | HEART RATE: 73 BPM

## 2018-05-06 DIAGNOSIS — T14.8XXA ABRASION: Primary | ICD-10-CM

## 2018-05-06 PROCEDURE — 99282 EMERGENCY DEPT VISIT SF MDM: CPT

## 2018-05-06 PROCEDURE — 90471 IMMUNIZATION ADMIN: CPT

## 2018-05-06 PROCEDURE — 90715 TDAP VACCINE 7 YRS/> IM: CPT | Performed by: EMERGENCY MEDICINE

## 2018-05-06 RX ADMIN — TETANUS TOXOID, REDUCED DIPHTHERIA TOXOID AND ACELLULAR PERTUSSIS VACCINE, ADSORBED 0.5 ML: 5; 2.5; 8; 8; 2.5 SUSPENSION INTRAMUSCULAR at 03:00

## 2018-05-06 NOTE — DISCHARGE INSTRUCTIONS
Abrasion   WHAT YOU NEED TO KNOW:   What is an abrasion? An abrasion is a scrape on your skin  It happens when your skin rubs against a rough surface  Some examples of an abrasion include rug burn, a skinned elbow, or road rash  Abrasions can be many shapes and sizes  The wound may hurt, bleed, bruise, or swell  How can I care for my abrasion? · Wash your hands and dry them with a clean towel  · Press a clean cloth against your wound to stop any bleeding  · Rinse your wound with a lot of clean water  Do not use harsh soap, alcohol, or iodine solutions  · Use a clean, wet cloth to remove any objects, such as small pieces of rocks or dirt  · Rub antibiotic ointment on your wound  This may help prevent infection and help your wound heal     · Cover the wound with a non-stick bandage  Change the bandage daily, and if gets wet or dirty  When should I seek immediate care? · The bleeding does not stop after 10 minutes of firm pressure  · You cannot rinse one or more foreign objects out of your wound  · You have red streaks on your skin coming from your wound  When should I contact my healthcare provider? · You have a fever or chills  · Your abrasion is red, warm, swollen, or draining pus  · You have questions or concerns about your condition or care  CARE AGREEMENT:   You have the right to help plan your care  Learn about your health condition and how it may be treated  Discuss treatment options with your caregivers to decide what care you want to receive  You always have the right to refuse treatment  The above information is an  only  It is not intended as medical advice for individual conditions or treatments  Talk to your doctor, nurse or pharmacist before following any medical regimen to see if it is safe and effective for you    © 2017 [employer] Information is for End User's use only and may not be sold, redistributed or otherwise used for commercial purposes  All illustrations and images included in CareNotes® are the copyrighted property of A [employer] M [employer]  or [employer]

## 2018-05-06 NOTE — ED PROVIDER NOTES
History  Chief Complaint   Patient presents with    Extremity Laceration     Pt  arrives with laceration to right shin after cutting it on a bolt at approx  11pm last night   (+) thinners  Denies recent tetanus  Patient presents to the emergency department for evaluation of right lower extremity wound that he injured prior to arrival after he banged his leg against a metal piece on an electric fan  He states was not the fan blade that cut him  He is on blood thinners and is having trouble stopping the bleeding from the wound  He denies any other injuries  He is uncertain of his tetanus status  He refuses analgesics  Prior to Admission Medications   Prescriptions Last Dose Informant Patient Reported? Taking? Cholecalciferol (VITAMIN D3) 1000 units CAPS  Self Yes No   Sig: Take by mouth   Insulin Detemir (LEVEMIR SC)  Self Yes No   Sig: Inject under the skin 30 units in the morning and 50 units at night      SPIRONOLACTONE PO  Self Yes No   Sig: Take 12 5 mg by mouth 2 (two) times a day     amLODIPine (NORVASC) 5 mg tablet  Self No No   Sig: Take 1 tablet by mouth daily for 30 days   amiodarone 200 mg tablet  Self No No   Sig: Take 1 tablet by mouth daily with breakfast for 30 days   atorvastatin (LIPITOR) 80 mg tablet  Self No No   Sig: Take 1 tablet by mouth daily with dinner for 30 days   carvedilol (COREG) 12 5 mg tablet   No No   Sig: Take 1 tablet (12 5 mg total) by mouth 2 (two) times a day with meals   clopidogrel (PLAVIX) 75 mg tablet   No No   Sig: Take 1 tablet (75 mg total) by mouth daily   furosemide (LASIX) 40 mg tablet  Self No No   Sig: Take 1 tablet by mouth 2 (two) times a day   insulin detemir (LEVEMIR) 100 units/mL subcutaneous injection   Yes No   Sig: Inject 50 Units under the skin daily at bedtime   insulin lispro (HumaLOG) 100 units/mL injection  Self No No   Sig: Inject 5 Units under the skin 3 (three) times a day with meals for 30 days   levothyroxine 50 mcg tablet Self Yes No   Sig: Take 50 mcg by mouth daily   nitroglycerin (NITRODUR) 0 1 mg/hr  Self Yes No   Sig: Place 1 patch on the skin daily   nitroglycerin (NITROSTAT) 0 4 mg SL tablet  Self Yes No   Sig: Place 0 4 mg under the tongue every 5 (five) minutes as needed for chest pain      Facility-Administered Medications: None       Past Medical History:   Diagnosis Date    A-fib [facility])     Cardiac disease     MI    Carotid artery disorder (HCC)     L carotid completely blocked    Hyperlipidemia     Hypertension     [facility] )     Peripheral vascular disease ([facility] )     Stroke [facility])        Past Surgical History:   Procedure Laterality Date    APPENDECTOMY      TOE AMPUTATION         Family History   Problem Relation Age of Onset    Diabetes Mother     Heart disease Father      I have reviewed and agree with the history as documented  Social History   Substance Use Topics    Smoking status: Never Smoker    Smokeless tobacco: Never Used    Alcohol use No        Review of Systems   Constitutional: Negative  Negative for fever  HENT: Negative  Eyes: Negative  Respiratory: Negative  Cardiovascular: Negative  Gastrointestinal: Negative  Endocrine: Negative  Genitourinary: Negative  Musculoskeletal: Negative  Skin: Positive for wound  Allergic/Immunologic: Negative  Neurological: Negative  Hematological: Bruises/bleeds easily  Psychiatric/Behavioral: Negative          Physical Exam  ED Triage Vitals   Temperature Pulse Respirations Blood Pressure SpO2   05/06/18 0245 05/06/18 0245 05/06/18 0245 05/06/18 0248 05/06/18 0245   98 2 °F (36 8 °C) 73 18 126/72 98 %      Temp Source Heart Rate Source Patient Position - Orthostatic VS BP Location FiO2 (%)   05/06/18 0245 05/06/18 0245 05/06/18 0245 05/06/18 0245 --   Oral Monitor Sitting Right arm       Pain Score       05/06/18 0245       No Pain           Orthostatic Vital Signs  Vitals:    05/06/18 0245 05/06/18 0248   BP:  126/72   Pulse: 73    Patient Position - Orthostatic VS: Sitting        Physical Exam   Constitutional: He is oriented to person, place, and time  He appears well-developed and well-nourished  No distress  Nontoxic appearance  Patient looks comfortable sitting upright in the stretcher  Musculoskeletal: He exhibits tenderness  He exhibits no edema or deformity  Patient with a 3-4 mm area on the right anterior distal shin that is a deep abrasion  There is some bloody oozing  There is no induration or fluctuance  Neurological: He is alert and oriented to person, place, and time  He displays normal reflexes  No cranial nerve deficit or sensory deficit  He exhibits normal muscle tone  Coordination normal    Skin: Skin is warm and dry  He is not diaphoretic  Psychiatric: He has a normal mood and affect  His behavior is normal  Judgment and thought content normal    Nursing note and vitals reviewed  ED Medications  Medications   tetanus-diphtheria-acellular pertussis (BOOSTRIX) IM injection 0 5 mL (0 5 mL Intramuscular Given 5/6/18 0300)       Diagnostic Studies  Results Reviewed     None                 No orders to display              Procedures  Procedures       Phone Contacts  ED Phone Contact    ED Course  ED Course as of May 06 0310   Sun May 06, 2018   0300 Patient is stable for discharge  His wound was irrigated and pressure dressing applied  He refused analgesics  He was given a tetanus booster  MDM  CritCare Time    Disposition  Final diagnoses:   Abrasion     Time reflects when diagnosis was documented in both MDM as applicable and the Disposition within this note     Time User Action Codes Description Comment    5/6/2018  3:00 AM Jose Eden [address]  [address] [place]       ED Disposition     ED Disposition Condition Comment    Discharge  Wendcolt Sers  discharge to home/self care      Condition at discharge: Stable        Follow-up Information     Follow up With Specialties Details Why Contact Info    Private physician  Schedule an appointment as soon as possible for a visit          Patient's Medications   Discharge Prescriptions    No medications on file     No discharge procedures on file      ED Provider  Electronically Signed by           Angelica Lerma MD  05/06/18 5709

## 2018-09-20 ENCOUNTER — HOSPITAL ENCOUNTER (INPATIENT)
Facility: HOSPITAL | Age: 53
LOS: 1 days | Discharge: HOME | DRG: 280 | End: 2018-09-21
Attending: EMERGENCY MEDICINE | Admitting: INTERNAL MEDICINE
Payer: MEDICARE

## 2018-09-20 ENCOUNTER — APPOINTMENT (INPATIENT)
Dept: NON INVASIVE DIAGNOSTICS | Facility: HOSPITAL | Age: 53
DRG: 280 | End: 2018-09-20
Payer: MEDICARE

## 2018-09-20 ENCOUNTER — APPOINTMENT (EMERGENCY)
Dept: RADIOLOGY | Facility: HOSPITAL | Age: 53
DRG: 280 | End: 2018-09-20
Payer: MEDICARE

## 2018-09-20 DIAGNOSIS — N17.9 ACUTE-ON-CHRONIC KIDNEY INJURY (HCC): ICD-10-CM

## 2018-09-20 DIAGNOSIS — N18.9 ACUTE-ON-CHRONIC KIDNEY INJURY (HCC): ICD-10-CM

## 2018-09-20 DIAGNOSIS — J18.9 PNEUMONIA: ICD-10-CM

## 2018-09-20 DIAGNOSIS — I50.9 CHF (CONGESTIVE HEART FAILURE) (HCC): Primary | ICD-10-CM

## 2018-09-20 DIAGNOSIS — R09.02 HYPOXIA: ICD-10-CM

## 2018-09-20 DIAGNOSIS — I50.43 ACUTE ON CHRONIC COMBINED SYSTOLIC AND DIASTOLIC CONGESTIVE HEART FAILURE (HCC): ICD-10-CM

## 2018-09-20 DIAGNOSIS — R77.8 ELEVATED TROPONIN: ICD-10-CM

## 2018-09-20 DIAGNOSIS — R73.9 HYPERGLYCEMIA: ICD-10-CM

## 2018-09-20 LAB
ALBUMIN SERPL BCP-MCNC: 3.7 G/DL (ref 3.5–5)
ALP SERPL-CCNC: 151 U/L (ref 46–116)
ALT SERPL W P-5'-P-CCNC: 47 U/L (ref 12–78)
ANION GAP SERPL CALCULATED.3IONS-SCNC: 12 MMOL/L (ref 4–13)
ANION GAP SERPL CALCULATED.3IONS-SCNC: 7 MMOL/L (ref 4–13)
APTT PPP: 43 SECONDS (ref 24–36)
AST SERPL W P-5'-P-CCNC: 28 U/L (ref 5–45)
ATRIAL RATE: 241 BPM
BACTERIA UR QL AUTO: ABNORMAL /HPF
BASE EX.OXY STD BLDV CALC-SCNC: 70.9 % (ref 60–80)
BASE EXCESS BLDV CALC-SCNC: -0.8 MMOL/L
BASOPHILS # BLD AUTO: 0.03 THOUSANDS/ΜL (ref 0–0.1)
BASOPHILS NFR BLD AUTO: 0 % (ref 0–1)
BILIRUB SERPL-MCNC: 0.7 MG/DL (ref 0.2–1)
BILIRUB UR QL STRIP: NEGATIVE
BUN SERPL-MCNC: 50 MG/DL (ref 5–25)
BUN SERPL-MCNC: 51 MG/DL (ref 5–25)
CALCIUM SERPL-MCNC: 8 MG/DL (ref 8.3–10.1)
CALCIUM SERPL-MCNC: 8.3 MG/DL (ref 8.3–10.1)
CHLORIDE SERPL-SCNC: 100 MMOL/L (ref 100–108)
CHLORIDE SERPL-SCNC: 100 MMOL/L (ref 100–108)
CK MB SERPL-MCNC: 1.7 % (ref 0–2.5)
CK MB SERPL-MCNC: 5 NG/ML (ref 0–5)
CK SERPL-CCNC: 294 U/L (ref 39–308)
CLARITY UR: CLEAR
CO2 SERPL-SCNC: 23 MMOL/L (ref 21–32)
CO2 SERPL-SCNC: 25 MMOL/L (ref 21–32)
COLOR UR: YELLOW
CREAT SERPL-MCNC: 2.93 MG/DL (ref 0.6–1.3)
CREAT SERPL-MCNC: 3.12 MG/DL (ref 0.6–1.3)
DEPRECATED D DIMER PPP: <270 NG/ML (FEU) (ref 0–424)
EOSINOPHIL # BLD AUTO: 0.05 THOUSAND/ΜL (ref 0–0.61)
EOSINOPHIL NFR BLD AUTO: 0 % (ref 0–6)
ERYTHROCYTE [DISTWIDTH] IN BLOOD BY AUTOMATED COUNT: 14.7 % (ref 11.6–15.1)
ERYTHROCYTE [DISTWIDTH] IN BLOOD BY AUTOMATED COUNT: 14.8 % (ref 11.6–15.1)
EST. AVERAGE GLUCOSE BLD GHB EST-MCNC: 186 MG/DL
GFR SERPL CREATININE-BSD FRML MDRD: 22 ML/MIN/1.73SQ M
GFR SERPL CREATININE-BSD FRML MDRD: 24 ML/MIN/1.73SQ M
GLUCOSE SERPL-MCNC: 131 MG/DL (ref 65–140)
GLUCOSE SERPL-MCNC: 189 MG/DL (ref 65–140)
GLUCOSE SERPL-MCNC: 265 MG/DL (ref 65–140)
GLUCOSE SERPL-MCNC: 277 MG/DL (ref 65–140)
GLUCOSE SERPL-MCNC: 353 MG/DL (ref 65–140)
GLUCOSE SERPL-MCNC: 358 MG/DL (ref 65–140)
GLUCOSE UR STRIP-MCNC: ABNORMAL MG/DL
HBA1C MFR BLD: 8.1 % (ref 4.2–6.3)
HCO3 BLDV-SCNC: 23.7 MMOL/L (ref 24–30)
HCT VFR BLD AUTO: 36.2 % (ref 36.5–49.3)
HCT VFR BLD AUTO: 36.8 % (ref 36.5–49.3)
HGB BLD-MCNC: 11.6 G/DL (ref 12–17)
HGB BLD-MCNC: 12 G/DL (ref 12–17)
HGB UR QL STRIP.AUTO: ABNORMAL
HYALINE CASTS #/AREA URNS LPF: ABNORMAL /LPF
IMM GRANULOCYTES # BLD AUTO: 0.05 THOUSAND/UL (ref 0–0.2)
IMM GRANULOCYTES NFR BLD AUTO: 0 % (ref 0–2)
INR PPP: 3.5 (ref 0.86–1.17)
KETONES UR STRIP-MCNC: NEGATIVE MG/DL
LACTATE SERPL-SCNC: 1.9 MMOL/L (ref 0.5–2)
LEUKOCYTE ESTERASE UR QL STRIP: NEGATIVE
LYMPHOCYTES # BLD AUTO: 0.71 THOUSANDS/ΜL (ref 0.6–4.47)
LYMPHOCYTES NFR BLD AUTO: 6 % (ref 14–44)
MAGNESIUM SERPL-MCNC: 2.4 MG/DL (ref 1.6–2.6)
MCH RBC QN AUTO: 27.7 PG (ref 26.8–34.3)
MCH RBC QN AUTO: 27.7 PG (ref 26.8–34.3)
MCHC RBC AUTO-ENTMCNC: 32 G/DL (ref 31.4–37.4)
MCHC RBC AUTO-ENTMCNC: 32.6 G/DL (ref 31.4–37.4)
MCV RBC AUTO: 85 FL (ref 82–98)
MCV RBC AUTO: 86 FL (ref 82–98)
MONOCYTES # BLD AUTO: 0.9 THOUSAND/ΜL (ref 0.17–1.22)
MONOCYTES NFR BLD AUTO: 8 % (ref 4–12)
NEUTROPHILS # BLD AUTO: 9.68 THOUSANDS/ΜL (ref 1.85–7.62)
NEUTS SEG NFR BLD AUTO: 86 % (ref 43–75)
NITRITE UR QL STRIP: NEGATIVE
NON-SQ EPI CELLS URNS QL MICRO: ABNORMAL /HPF
NRBC BLD AUTO-RTO: 0 /100 WBCS
NT-PROBNP SERPL-MCNC: 8167 PG/ML
O2 CT BLDV-SCNC: 12.2 ML/DL
P AXIS: 61 DEGREES
PCO2 BLDV: 38.6 MM HG (ref 42–50)
PH BLDV: 7.41 [PH] (ref 7.3–7.4)
PH UR STRIP.AUTO: 5.5 [PH] (ref 4.5–8)
PHOSPHATE SERPL-MCNC: 2.3 MG/DL (ref 2.7–4.5)
PLATELET # BLD AUTO: 160 THOUSANDS/UL (ref 149–390)
PLATELET # BLD AUTO: 170 THOUSANDS/UL (ref 149–390)
PMV BLD AUTO: 13.1 FL (ref 8.9–12.7)
PMV BLD AUTO: 13.5 FL (ref 8.9–12.7)
PO2 BLDV: 37.9 MM HG (ref 35–45)
POTASSIUM SERPL-SCNC: 4.9 MMOL/L (ref 3.5–5.3)
POTASSIUM SERPL-SCNC: 5 MMOL/L (ref 3.5–5.3)
PROT SERPL-MCNC: 8.3 G/DL (ref 6.4–8.2)
PROT UR STRIP-MCNC: ABNORMAL MG/DL
PROTHROMBIN TIME: 34.1 SECONDS (ref 11.8–14.2)
QRS AXIS: -43 DEGREES
QRSD INTERVAL: 162 MS
QT INTERVAL: 492 MS
QTC INTERVAL: 553 MS
RBC # BLD AUTO: 4.19 MILLION/UL (ref 3.88–5.62)
RBC # BLD AUTO: 4.33 MILLION/UL (ref 3.88–5.62)
RBC #/AREA URNS AUTO: ABNORMAL /HPF
SODIUM SERPL-SCNC: 132 MMOL/L (ref 136–145)
SODIUM SERPL-SCNC: 135 MMOL/L (ref 136–145)
SP GR UR STRIP.AUTO: 1.02 (ref 1–1.03)
T WAVE AXIS: 109 DEGREES
TROPONIN I SERPL-MCNC: 0.16 NG/ML
TROPONIN I SERPL-MCNC: 0.16 NG/ML
TROPONIN I SERPL-MCNC: 0.23 NG/ML
TROPONIN I SERPL-MCNC: 0.24 NG/ML
UROBILINOGEN UR QL STRIP.AUTO: 0.2 E.U./DL
VENTRICULAR RATE: 76 BPM
WBC # BLD AUTO: 11.27 THOUSAND/UL (ref 4.31–10.16)
WBC # BLD AUTO: 11.42 THOUSAND/UL (ref 4.31–10.16)
WBC #/AREA URNS AUTO: ABNORMAL /HPF

## 2018-09-20 PROCEDURE — 99223 1ST HOSP IP/OBS HIGH 75: CPT | Performed by: INTERNAL MEDICINE

## 2018-09-20 PROCEDURE — 99222 1ST HOSP IP/OBS MODERATE 55: CPT | Performed by: INTERNAL MEDICINE

## 2018-09-20 PROCEDURE — 36415 COLL VENOUS BLD VENIPUNCTURE: CPT | Performed by: EMERGENCY MEDICINE

## 2018-09-20 PROCEDURE — 84100 ASSAY OF PHOSPHORUS: CPT | Performed by: PHYSICIAN ASSISTANT

## 2018-09-20 PROCEDURE — 85730 THROMBOPLASTIN TIME PARTIAL: CPT | Performed by: EMERGENCY MEDICINE

## 2018-09-20 PROCEDURE — 82553 CREATINE MB FRACTION: CPT | Performed by: EMERGENCY MEDICINE

## 2018-09-20 PROCEDURE — 82948 REAGENT STRIP/BLOOD GLUCOSE: CPT

## 2018-09-20 PROCEDURE — 83880 ASSAY OF NATRIURETIC PEPTIDE: CPT | Performed by: EMERGENCY MEDICINE

## 2018-09-20 PROCEDURE — 82550 ASSAY OF CK (CPK): CPT | Performed by: EMERGENCY MEDICINE

## 2018-09-20 PROCEDURE — 93005 ELECTROCARDIOGRAM TRACING: CPT

## 2018-09-20 PROCEDURE — 84484 ASSAY OF TROPONIN QUANT: CPT | Performed by: EMERGENCY MEDICINE

## 2018-09-20 PROCEDURE — 81001 URINALYSIS AUTO W/SCOPE: CPT | Performed by: PHYSICIAN ASSISTANT

## 2018-09-20 PROCEDURE — 84484 ASSAY OF TROPONIN QUANT: CPT | Performed by: PHYSICIAN ASSISTANT

## 2018-09-20 PROCEDURE — 82805 BLOOD GASES W/O2 SATURATION: CPT | Performed by: EMERGENCY MEDICINE

## 2018-09-20 PROCEDURE — 96365 THER/PROPH/DIAG IV INF INIT: CPT

## 2018-09-20 PROCEDURE — 80053 COMPREHEN METABOLIC PANEL: CPT | Performed by: EMERGENCY MEDICINE

## 2018-09-20 PROCEDURE — 96375 TX/PRO/DX INJ NEW DRUG ADDON: CPT

## 2018-09-20 PROCEDURE — 85025 COMPLETE CBC W/AUTO DIFF WBC: CPT | Performed by: EMERGENCY MEDICINE

## 2018-09-20 PROCEDURE — 94760 N-INVAS EAR/PLS OXIMETRY 1: CPT

## 2018-09-20 PROCEDURE — 83735 ASSAY OF MAGNESIUM: CPT | Performed by: PHYSICIAN ASSISTANT

## 2018-09-20 PROCEDURE — 85610 PROTHROMBIN TIME: CPT | Performed by: EMERGENCY MEDICINE

## 2018-09-20 PROCEDURE — 71045 X-RAY EXAM CHEST 1 VIEW: CPT

## 2018-09-20 PROCEDURE — 83605 ASSAY OF LACTIC ACID: CPT | Performed by: EMERGENCY MEDICINE

## 2018-09-20 PROCEDURE — 85027 COMPLETE CBC AUTOMATED: CPT | Performed by: PHYSICIAN ASSISTANT

## 2018-09-20 PROCEDURE — 85610 PROTHROMBIN TIME: CPT | Performed by: PHYSICIAN ASSISTANT

## 2018-09-20 PROCEDURE — 5A09357 ASSISTANCE WITH RESPIRATORY VENTILATION, LESS THAN 24 CONSECUTIVE HOURS, CONTINUOUS POSITIVE AIRWAY PRESSURE: ICD-10-PCS | Performed by: INTERNAL MEDICINE

## 2018-09-20 PROCEDURE — 83036 HEMOGLOBIN GLYCOSYLATED A1C: CPT | Performed by: PHYSICIAN ASSISTANT

## 2018-09-20 PROCEDURE — 94664 DEMO&/EVAL PT USE INHALER: CPT

## 2018-09-20 PROCEDURE — 85379 FIBRIN DEGRADATION QUANT: CPT | Performed by: EMERGENCY MEDICINE

## 2018-09-20 PROCEDURE — 80048 BASIC METABOLIC PNL TOTAL CA: CPT | Performed by: PHYSICIAN ASSISTANT

## 2018-09-20 PROCEDURE — 87040 BLOOD CULTURE FOR BACTERIA: CPT | Performed by: EMERGENCY MEDICINE

## 2018-09-20 PROCEDURE — 94660 CPAP INITIATION&MGMT: CPT

## 2018-09-20 PROCEDURE — 93010 ELECTROCARDIOGRAM REPORT: CPT | Performed by: INTERNAL MEDICINE

## 2018-09-20 PROCEDURE — 99285 EMERGENCY DEPT VISIT HI MDM: CPT

## 2018-09-20 RX ORDER — MELATONIN
1000 DAILY
Status: DISCONTINUED | OUTPATIENT
Start: 2018-09-20 | End: 2018-09-21 | Stop reason: HOSPADM

## 2018-09-20 RX ORDER — ACETAMINOPHEN 325 MG/1
650 TABLET ORAL EVERY 6 HOURS PRN
Status: DISCONTINUED | OUTPATIENT
Start: 2018-09-20 | End: 2018-09-21 | Stop reason: HOSPADM

## 2018-09-20 RX ORDER — WARFARIN SODIUM 5 MG/1
5 TABLET ORAL
Status: ON HOLD | COMMUNITY
End: 2018-09-21

## 2018-09-20 RX ORDER — ASPIRIN 81 MG/1
162 TABLET, CHEWABLE ORAL ONCE
Status: COMPLETED | OUTPATIENT
Start: 2018-09-20 | End: 2018-09-20

## 2018-09-20 RX ORDER — CLOPIDOGREL BISULFATE 75 MG/1
75 TABLET ORAL DAILY
Status: DISCONTINUED | OUTPATIENT
Start: 2018-09-20 | End: 2018-09-21 | Stop reason: HOSPADM

## 2018-09-20 RX ORDER — CLOPIDOGREL BISULFATE 75 MG/1
75 TABLET ORAL DAILY
COMMUNITY

## 2018-09-20 RX ORDER — AMLODIPINE BESYLATE 5 MG/1
5 TABLET ORAL DAILY
Status: DISCONTINUED | OUTPATIENT
Start: 2018-09-20 | End: 2018-09-20

## 2018-09-20 RX ORDER — ASPIRIN 81 MG/1
81 TABLET ORAL DAILY
COMMUNITY

## 2018-09-20 RX ORDER — AMLODIPINE BESYLATE 5 MG/1
5 TABLET ORAL DAILY
Status: DISCONTINUED | OUTPATIENT
Start: 2018-09-20 | End: 2018-09-21 | Stop reason: HOSPADM

## 2018-09-20 RX ORDER — LISINOPRIL 2.5 MG/1
2.5 TABLET ORAL
COMMUNITY

## 2018-09-20 RX ORDER — FUROSEMIDE 10 MG/ML
60 INJECTION INTRAMUSCULAR; INTRAVENOUS
Status: DISCONTINUED | OUTPATIENT
Start: 2018-09-20 | End: 2018-09-20

## 2018-09-20 RX ORDER — ATORVASTATIN CALCIUM 40 MG/1
80 TABLET, FILM COATED ORAL
Status: DISCONTINUED | OUTPATIENT
Start: 2018-09-20 | End: 2018-09-21 | Stop reason: HOSPADM

## 2018-09-20 RX ORDER — AMIODARONE HYDROCHLORIDE 200 MG/1
200 TABLET ORAL
Status: DISCONTINUED | OUTPATIENT
Start: 2018-09-20 | End: 2018-09-21 | Stop reason: HOSPADM

## 2018-09-20 RX ORDER — FUROSEMIDE 10 MG/ML
40 INJECTION INTRAMUSCULAR; INTRAVENOUS
Status: DISCONTINUED | OUTPATIENT
Start: 2018-09-20 | End: 2018-09-20

## 2018-09-20 RX ORDER — LEVOTHYROXINE SODIUM 0.05 MG/1
50 TABLET ORAL
Status: DISCONTINUED | OUTPATIENT
Start: 2018-09-20 | End: 2018-09-21 | Stop reason: HOSPADM

## 2018-09-20 RX ORDER — ONDANSETRON 2 MG/ML
4 INJECTION INTRAMUSCULAR; INTRAVENOUS EVERY 6 HOURS PRN
Status: DISCONTINUED | OUTPATIENT
Start: 2018-09-20 | End: 2018-09-21 | Stop reason: HOSPADM

## 2018-09-20 RX ORDER — AZITHROMYCIN 250 MG/1
500 TABLET, FILM COATED ORAL ONCE
Status: COMPLETED | OUTPATIENT
Start: 2018-09-20 | End: 2018-09-20

## 2018-09-20 RX ORDER — ASPIRIN 81 MG/1
81 TABLET ORAL DAILY
Status: DISCONTINUED | OUTPATIENT
Start: 2018-09-20 | End: 2018-09-21 | Stop reason: HOSPADM

## 2018-09-20 RX ORDER — WARFARIN SODIUM 7.5 MG/1
7.5 TABLET ORAL
COMMUNITY
End: 2018-09-21 | Stop reason: HOSPADM

## 2018-09-20 RX ORDER — FUROSEMIDE 10 MG/ML
20 INJECTION INTRAMUSCULAR; INTRAVENOUS ONCE
Status: COMPLETED | OUTPATIENT
Start: 2018-09-20 | End: 2018-09-20

## 2018-09-20 RX ORDER — CARVEDILOL 6.25 MG/1
6.25 TABLET ORAL 2 TIMES DAILY WITH MEALS
Status: DISCONTINUED | OUTPATIENT
Start: 2018-09-20 | End: 2018-09-21 | Stop reason: HOSPADM

## 2018-09-20 RX ORDER — FUROSEMIDE 10 MG/ML
80 INJECTION INTRAMUSCULAR; INTRAVENOUS
Status: DISCONTINUED | OUTPATIENT
Start: 2018-09-20 | End: 2018-09-21 | Stop reason: HOSPADM

## 2018-09-20 RX ADMIN — INSULIN DETEMIR 20 UNITS: 100 INJECTION, SOLUTION SUBCUTANEOUS at 21:11

## 2018-09-20 RX ADMIN — VITAMIN D, TAB 1000IU (100/BT) 1000 UNITS: 25 TAB at 08:37

## 2018-09-20 RX ADMIN — CARVEDILOL 6.25 MG: 6.25 TABLET, FILM COATED ORAL at 08:34

## 2018-09-20 RX ADMIN — NITROGLYCERIN 0.5 INCH: 20 OINTMENT TOPICAL at 01:55

## 2018-09-20 RX ADMIN — AMIODARONE HYDROCHLORIDE 200 MG: 200 TABLET ORAL at 08:35

## 2018-09-20 RX ADMIN — FUROSEMIDE 80 MG: 10 INJECTION, SOLUTION INTRAMUSCULAR; INTRAVENOUS at 15:29

## 2018-09-20 RX ADMIN — CEFTRIAXONE 1000 MG: 1 INJECTION, POWDER, FOR SOLUTION INTRAMUSCULAR; INTRAVENOUS at 02:33

## 2018-09-20 RX ADMIN — INSULIN DETEMIR 30 UNITS: 100 INJECTION, SOLUTION SUBCUTANEOUS at 08:47

## 2018-09-20 RX ADMIN — FUROSEMIDE 60 MG: 10 INJECTION, SOLUTION INTRAMUSCULAR; INTRAVENOUS at 08:37

## 2018-09-20 RX ADMIN — INSULIN LISPRO 2 UNITS: 100 INJECTION, SOLUTION INTRAVENOUS; SUBCUTANEOUS at 18:01

## 2018-09-20 RX ADMIN — AZITHROMYCIN 500 MG: 250 TABLET, FILM COATED ORAL at 02:32

## 2018-09-20 RX ADMIN — INSULIN LISPRO 15 UNITS: 100 INJECTION, SOLUTION INTRAVENOUS; SUBCUTANEOUS at 18:00

## 2018-09-20 RX ADMIN — ASPIRIN 81 MG: 81 TABLET, COATED ORAL at 08:34

## 2018-09-20 RX ADMIN — CARVEDILOL 6.25 MG: 6.25 TABLET, FILM COATED ORAL at 17:11

## 2018-09-20 RX ADMIN — INSULIN LISPRO 10 UNITS: 100 INJECTION, SOLUTION INTRAVENOUS; SUBCUTANEOUS at 13:04

## 2018-09-20 RX ADMIN — INSULIN LISPRO 15 UNITS: 100 INJECTION, SOLUTION INTRAVENOUS; SUBCUTANEOUS at 08:39

## 2018-09-20 RX ADMIN — ATORVASTATIN CALCIUM 80 MG: 40 TABLET, FILM COATED ORAL at 15:31

## 2018-09-20 RX ADMIN — INSULIN HUMAN 4 UNITS: 100 INJECTION, SOLUTION PARENTERAL at 03:02

## 2018-09-20 RX ADMIN — ASPIRIN 81 MG 162 MG: 81 TABLET ORAL at 01:55

## 2018-09-20 RX ADMIN — INSULIN LISPRO 15 UNITS: 100 INJECTION, SOLUTION INTRAVENOUS; SUBCUTANEOUS at 13:05

## 2018-09-20 RX ADMIN — LEVOTHYROXINE SODIUM 50 MCG: 50 TABLET ORAL at 05:02

## 2018-09-20 RX ADMIN — INSULIN LISPRO 6 UNITS: 100 INJECTION, SOLUTION INTRAVENOUS; SUBCUTANEOUS at 08:49

## 2018-09-20 RX ADMIN — ONDANSETRON 4 MG: 2 INJECTION INTRAMUSCULAR; INTRAVENOUS at 10:46

## 2018-09-20 RX ADMIN — FUROSEMIDE 20 MG: 10 INJECTION, SOLUTION INTRAMUSCULAR; INTRAVENOUS at 01:57

## 2018-09-20 RX ADMIN — AMLODIPINE BESYLATE 5 MG: 5 TABLET ORAL at 08:36

## 2018-09-20 NOTE — CONSULTS
Consultation - Nephrology   Wilmanmarisel Peña  50 y o  male MRN: [id number]  Unit/Bed#: -01 Encounter: [id number]    ASSESSMENT:  1  Acute Kidney Injury, POA- likely secondary to volume overload with relative hypotension and decreased effective circulating volume  - check PVR  - check UA  - avoid hypotension  - avoid nephrotoxics  - lasix increased to 60mg IV BID  - monitor intake/output/weight  2  Chronic Kidney Disease stage IIIB/IV- Baseline creatinine appears to be 2-2 4  Presumed etiology is diabetes and hypertension and cardiorenal   He has a referral to [facility] but never made an appointment yet  I informed him of the importance of making this appointment  3  Congestive Heart Failure- secondary to dietary indiscretion  - lasix increased to 60mg IV BID  - he states he takes lasix 40mg AM & 20mg PM at home which has worked well for him when he follows a low sodium diet  - follows with cardiologist at [facility]  4  Hyponatremia- corrects for glucose, monitor  - he is on a mild fluid restriction  5  Hypertension- relative hypotension on admission, now improved  - hold parameters on antihypertensives  - lisinopril on hold      PLAN:  Increase lasix to 60mg BID  Hold parameters on antihypertensives  Check labs      HISTORY OF PRESENT ILLNESS:  Requesting Physician: Lynn Meier MD  Reason for Consult: Claudette Davila  is a 50y o  year old male who was admitted to [address] [place] [address] after presenting with shortness of breath which started early this morning  The patient has a history of CHF, heart disease, diabetes, and CKD  He usually follows a low sodium diet but ate french fries with salt last night and woke up in the middle of the night trying to catch his breath  A renal consultation is requested today for assistance in the management of acute kidney injury on chronic kidney disease in the setting of congestive heart failure    Currently, the patient states when he lays flat his breathing is still bad  He is coughing some  He is not very hungry  He denies NSAIDs  He denies N/V/D  He reports compliance with his medications      PAST MEDICAL HISTORY:  Past Medical History:   Diagnosis Date    [facility]fib [facility])     Cardiac disease     MI    Carotid artery disorder (HCC)     L carotid completely blocked    Charcot's joint of foot     left    Hyperlipidemia     Hypertension     Insulin dependent diabetes mellitus (HCC)     Peripheral vascular disease (HCC)     Sleep apnea treated with nocturnal BiPAP     Stroke (HCC)        PAST SURGICAL HISTORY:  Past Surgical History:   Procedure Laterality Date    APPENDECTOMY      TOE AMPUTATION         ALLERGIES:  No Known Allergies    SOCIAL HISTORY:  History   Alcohol Use No     History   Drug Use No     History   Smoking Status    Never Smoker   Smokeless Tobacco    Never Used       FAMILY HISTORY:  Family History   Problem Relation Age of Onset    Diabetes Mother     Heart disease Father        MEDICATIONS:    Current Facility-Administered Medications:     acetaminophen (TYLENOL) tablet 650 mg, 650 mg, Oral, Q6H PRN, Julian Gustafson PA-C    amiodarone tablet 200 mg, 200 mg, Oral, Daily With Breakfast, Julian Gustafson PA-C    amLODIPine (NORVASC) tablet 5 mg, 5 mg, Oral, Daily, Jenny Zamudio PA-C    aspirin (ECOTRIN LOW STRENGTH) EC tablet 81 mg, 81 mg, Oral, Daily, Tiffani Skelton PA-C    atorvastatin (LIPITOR) tablet 80 mg, 80 mg, Oral, Daily With Dinner, Julian Gustafson PA-C    carvedilol (COREG) tablet 6 25 mg, 6 25 mg, Oral, BID With Meals, [address] [place], [place] [address]JORDEN    cholecalciferol (VITAMIN D3) tablet 1,000 Units, 1,000 Units, Oral, Daily, Julian Gustafson PA-C    clopidogrel (PLAVIX) tablet 75 mg, 75 mg, Oral, Daily, Tiffani Skelton PA-C    furosemide (LASIX) injection 60 mg, 60 mg, Intravenous, BID (diuretic), Jenny Zamudio PA-C    insulin detemir (LEVEMIR) subcutaneous injection 30 Units, 30 Units, Subcutaneous, QAM, Tiffani Skelton PA-C    insulin detemir (LEVEMIR) subcutaneous injection 40 Units, 40 Units, Subcutaneous, HS, Tiffani Skelton PA-C    insulin lispro (HumaLOG) 100 units/mL subcutaneous injection 15 Units, 15 Units, Subcutaneous, TID With Meals, Adonis Cano PA-C    insulin lispro (HumaLOG) 100 units/mL subcutaneous injection 2-12 Units, 2-12 Units, Subcutaneous, TID AC **AND** Fingerstick Glucose (POCT), , , TID AC, Adonis Cano PA-C    levothyroxine tablet 50 mcg, 50 mcg, Oral, Early Morning, Adonis Cano PA-C, 50 mcg at 09/20/18 0502    ondansetron (ZOFRAN) injection 4 mg, 4 mg, Intravenous, Q6H PRN, Adonis Cano PA-C    REVIEW OF SYSTEMS:  A complete review of systems was performed and found to be negative unless otherwise noted in the history of present illness  General: No fevers, chills  Cardiovascular:  + chest discomfort, No leg edema  Respiratory: No cough, sputum production,  + shortness of breath  Gastrointestinal:  No nausea/vomiting,  No diarrhea,  No abdominal pain  Genitourinary: No hematuria  No foamy urine    No dysuria    PHYSICAL EXAM:  Current Weight: Weight - Scale: 113 kg (249 lb 1 9 oz)  First Weight: Weight - Scale: 114 kg (251 lb 5 2 oz)  Vitals:    09/20/18 0355 09/20/18 0442 09/20/18 0600 09/20/18 0701   BP: 100/60   143/62   BP Location: Left arm   Right arm   Pulse: 76   74   Resp: 20   16   Temp: 98 3 °F (36 8 °C)   98 2 °F (36 8 °C)   TempSrc: Oral   Oral   SpO2: 93% 97%  97%   Weight: 114 kg (250 lb 10 6 oz)  113 kg (249 lb 1 9 oz)    Height: 5' 11" (1 803 m)          Intake/Output Summary (Last 24 hours) at 09/20/18 0834  Last data filed at 09/20/18 0701   Gross per 24 hour   Intake               50 ml   Output              450 ml   Net             -400 ml     General: NAD  Skin: no rash  HEENT: normocephalic  Neck: supple  Chest: CTAB  CVS: RRR  Abdomen: soft nt nd  Extremities: no edema  Neuro: alert awake  Psych: mood and affect appropriate     Lab Results:     Results from last 7 days  Lab Units 09/20/18  0806 09/20/18  0519 09/20/18  0154   WBC Thousand/uL  --  11 27* 11 42*   HEMOGLOBIN g/dL  --  11 6* 12 0   HEMATOCRIT %  --  36 2* 36 8   PLATELETS Thousands/uL  --  160 170   SODIUM mmol/L 132*  --  135*   POTASSIUM mmol/L 4 9  --  5 0   CHLORIDE mmol/L 100  --  100   CO2 mmol/L 25  --  23   BUN mg/dL 51*  --  50*   CREATININE mg/dL 2 93*  --  3 12*   CALCIUM mg/dL 8 0*  --  8 3   MAGNESIUM mg/dL 2 4  --   --    PHOSPHORUS mg/dL 2 3*  --   --    ALK PHOS U/L  --   --  151*   ALT U/L  --   --  47   AST U/L  --   --  28

## 2018-09-20 NOTE — ASSESSMENT & PLAN NOTE
· Troponin 0 16 on arrival   · EKG- NSR, rate 76  Complete LBBB  · Likely NSTEMI type II secondary to acute CHF  · Trend troponin  · Telemetry  · EKG for chest pain  · Nitro, ASA, Plavix, Lipitor  · Appreciate Cardiology input

## 2018-09-20 NOTE — ASSESSMENT & PLAN NOTE
· Cr 3 12   · Baseline 2 0-2 3  · Likely secondary to hypervolemia  · Hold Lisinopril  · Monitor BMP with diuresis  · Consult Nephrology

## 2018-09-20 NOTE — ASSESSMENT & PLAN NOTE
· Presented with SOB and chest pain  · CXR- pulmonary edema  · BNP 8,167  Previously 2,014 04/2018  · Daily weights, I&Os  · Fluid/Na Restriction  · IV Lasix  · Echo  · Echo 01/20/2018- LVEF 40%  Mild Mitral, Tricuspid, Aortic regurgitation  · Telemetry  · Consult Cardiology      Wt Readings from Last 3 Encounters:   09/20/18 114 kg (251 lb 5 2 oz)   05/06/18 113 kg (249 lb 1 9 oz)   04/16/18 111 kg (245 lb 6 oz)

## 2018-09-20 NOTE — CASE MANAGEMENT
Initial Clinical Review    Admission: Date/Time/Statement: 9/20/18 @ 0255     Orders Placed This Encounter   Procedures    Inpatient Admission (expected length of stay for this patient is greater than two midnights)     Telemetry     Standing Status:   Standing     Number of Occurrences:   1     Order Specific Question:   Admitting Physician     Answer:   Franny Irby [[id number]]     Order Specific Question:   Level of Care     Answer:   Med Surg [16]     Order Specific Question:   Bed request comments     Answer:   telemetry; droplet precautions     Order Specific Question:   Estimated length of stay     Answer:   More than 2 Midnights     Order Specific Question:   Certification     Answer:   I certify that inpatient services are medically necessary for this patient for a duration of greater than two midnights  See H&P and MD Progress Notes for additional information about the patient's course of treatment  ED: Date/Time/Mode of Arrival:   ED Arrival Information     Expected Arrival Acuity Means of Arrival Escorted By Service Admission Type    - 9/20/2018 01:32 Emergent Wheelchair Family Member Hospitalist Emergency    Arrival Complaint    Chest pain          Chief Complaint:   Chief Complaint   Patient presents with    Chest Pain     Reports centrally located chest discomfort that start approx  1 hr  ago  Patient reports hx  heart attacks (last approx 1 year prior)  (+) Difficulty to draw deep breaths  (-) n/v  History of Illness: 50 y o  male, PMHx of CHF, A fib, DM, HTN, CKD, HLD, KODY, who presents with SOB and chest tightness that began last night  Patient states that he started having "heavy breathing" after he laid down to go to bed last night  He then developed a tightness in his chest  He state that he still is having some SOB at times  He notices a slight discomfort over his left chest, but states that it is not pain  He states that this is improved compared to earlier   He notes a chronic cough that is slightly worse than usual     ED Vital Signs:   ED Triage Vitals   Temperature Pulse Respirations Blood Pressure SpO2   09/20/18 0136 09/20/18 0136 09/20/18 0136 09/20/18 0148 09/20/18 0136   98 3 °F (36 8 °C) 77 20 118/50 91 %      Temp Source Heart Rate Source Patient Position - Orthostatic VS BP Location FiO2 (%)   09/20/18 0136 09/20/18 0136 09/20/18 0136 09/20/18 0136 --   Oral Monitor Sitting Right arm       Pain Score       09/20/18 0136       2        Wt Readings from Last 1 Encounters:   09/20/18 113 kg (249 lb 1 9 oz)       Vital Signs (abnormal): 91%  Room air  Exam - respiratory distress  Rales  Abnormal Labs/Diagnostic Test Results:   NT-proBNP 8167  Troponin 0 16  Na 135  Bun 50  Creatinine 3 12  Glucose 358  Alkaline phosphatase 151  Total protein 8 3  Wbc 11 42    CxR- per ED physician CHF, cardiomegaly  ? Right perihilar infiltrate    Blood gas, venous [[id number]] (Abnormal) Collected: 09/20/18 0211   Order Status: Completed Lab Status: Final result Updated: 09/20/18 0218   Specimen: Blood from Arm, Left     pH, Ruddy 7 406 (H) 7 300 - 7 400     pCO2, Ruddy 38 6 (L) 42 0 - 50 0 mm Hg     pO2, Ruddy 37 9 35 0 - 45 0 mm Hg     HCO3, Ruddy 23 7 (L) 24 - 30 mmol/L     Base Excess, Ruddy -0 8 mmol/L     O2 Content, Ruddy 12 2 ml/dL     O2 HGB, VENOUS 70 9 60 0 - 80 0 %      0806- troponin 0 23  Na 132  Bun 51  Creatinine 2 93  Glucose 265  Calcium 8  ED Treatment: oxygen 2 to 6 liters  Blood cultures     Medication Administration from 09/20/2018 0132 to 09/20/2018 0354       Date/Time Order Dose Route Action Comments     09/20/2018 0155 aspirin chewable tablet 162 mg 162 mg Oral Given      09/20/2018 0155 nitroglycerin (NITRO-BID) 2 % TD ointment 0 5 inch 0 5 inch Topical Given /50 manual - Dr Dewayne Juarez aware and med order unchanged     09/20/2018 0157 furosemide (LASIX) injection 20 mg 20 mg Intravenous Given      09/20/2018 0304 ceftriaxone (ROCEPHIN) 1 g/50 mL in dextrose IVPB 0 mg Intravenous Stopped      09/20/2018 0233 ceftriaxone (ROCEPHIN) 1 g/50 mL in dextrose IVPB 1,000 mg Intravenous New Bag      09/20/2018 0232 azithromycin (ZITHROMAX) tablet 500 mg 500 mg Oral Given      09/20/2018 0302 insulin regular (HumuLIN R,NovoLIN R) injection 4 Units 4 Units Intravenous Given           Past Medical/Surgical History:   Past Medical History:   Diagnosis Date    [facility]-fib [facility])     Cardiac disease     Carotid artery disorder (HCC)     Charcot's joint of foot     Hyperlipidemia     Hypertension     Insulin dependent diabetes mellitus ([place] [address] )     [facility])     Sleep apnea treated with nocturnal BiPAP     Stroke [facility])        Admitting Diagnosis: CHF (congestive heart failure) ([place] [address] ) [I50 9]  Chest pain [R07 9]  Pneumonia [J18 9]  Hyperglycemia [R73 9]  Hypoxia [R09 02]  Elevated troponin [R74 8]  Acute-on-chronic kidney injury ([place] [address] ) [N17 9, N18 9]    Age/Sex: 50 y o  male    Assessment/Plan:   Acute on chronic combined systolic and diastolic congestive heart failure (HCC)   Assessment & Plan     · Presented with SOB and chest pain  · CXR- pulmonary edema  · BNP 8,167  Previously 2,014 04/2018  · Daily weights, I&Os  · Fluid/Na Restriction  · IV Lasix  · Echo   ? Echo 01/20/2018- LVEF 40%  Mild Mitral, Tricuspid, Aortic regurgitation  · Telemetry  · Consult Cardiology          Wt Readings from Last 3 Encounters:   09/20/18 114 kg (251 lb 5 2 oz)   05/06/18 113 kg (249 lb 1 9 oz)   04/16/18 111 kg (245 lb 6 oz)              NSTEMI (non-ST elevated myocardial infarction) ([facility])   Assessment & Plan     · Troponin 0 16 on arrival   · EKG- NSR, rate 76  Complete LBBB  · Likely NSTEMI type II secondary to acute CHF  · Trend troponin  · Telemetry  · EKG for chest pain  · Nitro, ASA, Plavix, Lipitor  · Appreciate Cardiology input           Acute-on-chronic kidney injury ([place] [address] )   Assessment & Plan     · Cr 3 12   · Baseline 2 0-2 3    · Likely secondary to hypervolemia  · Hold Lisinopril  · Monitor BMP with diuresis  · Consult Nephrology           PAF (paroxysmal atrial fibrillation) (HCC)   Assessment & Plan     · Rate controlled at 74  · Continue Coreg, Amiodarone  · Anticoagulated on Coumadin- INR 3 5   ? Hold Coumadin for now  ? Repeat INR           Hypertension   Assessment & Plan     · /54  · Continue Amlodipine, Coreg           Insulin dependent diabetes mellitus ([facility] )   Assessment & Plan             Lab Results   Component Value Date     HGBA1C 9 1 (H) 01/20/2018         No results for input(s): POCGLU in the last 72 hours      Blood Sugar Average: Last 72 hrs:  Glucose 358 on arrival   Received 4units Humulin in ED  Continue home Levemir, Novolog  Check A1C           Hyperlipidemia   Assessment & Plan     · Continue Lipitor           Sleep apnea treated with nocturnal BiPAP   Assessment & Plan     · BIPAP QHS            Admission Orders:  9/20/2018  0255 INPATIENT   Scheduled Meds:   Current Facility-Administered Medications:  acetaminophen 650 mg Oral Q6H PRN   amiodarone 200 mg Oral Daily With Breakfast   amLODIPine 5 mg Oral Daily   aspirin 81 mg Oral Daily   atorvastatin 80 mg Oral Daily With Dinner   carvedilol 6 25 mg Oral BID With Meals   cholecalciferol 1,000 Units Oral Daily   clopidogrel 75 mg Oral Daily   furosemide 60 mg Intravenous BID (diuretic)   insulin detemir 30 Units Subcutaneous QAM   insulin detemir 40 Units Subcutaneous HS   insulin lispro 15 Units Subcutaneous TID With Meals   insulin lispro 2-12 Units Subcutaneous TID AC   levothyroxine 50 mcg Oral Early Morning   ondansetron 4 mg Intravenous Q6H PRN     Continuous Infusions:    PRN Meds: not used  Respiratory protocol - on 4 liters     Fingerstick glucose qid  scds  Telemetry  Fluid restriction  Echo  BIPAP at bedtime

## 2018-09-20 NOTE — ED NOTES
Patient's Nitro 0 1mg/hr patch from home removed from left chest wall per MD order        Aundrea Mcgill, RN  09/20/18 6048

## 2018-09-20 NOTE — RESPIRATORY THERAPY NOTE
RT Protocol Note  Miguelangel Kilpatrick  50 y o  male MRN: [id number]  Unit/Bed#: -01 Encounter: [id number]    Assessment    Principal Problem:    Acute on chronic combined systolic and diastolic congestive heart failure (HCC)  Active Problems:    Hypertension    Acute-on-chronic kidney injury ([place] [address] )    PAF (paroxysmal atrial fibrillation) ([facility])    Insulin dependent diabetes mellitus ([facility])    Hyperlipidemia    NSTEMI (non-ST elevated myocardial infarction) ([place] [address] )    Sleep apnea treated with nocturnal BiPAP      Home Pulmonary Medications:  None  Home Devices/Therapy: BiPAP/CPAP    Past Medical History:   Diagnosis Date    A-fib ([place] [address] )     Cardiac disease     MI    Carotid artery disorder ([facility])     L carotid completely blocked    Charcot's joint of foot     left    Hyperlipidemia     Hypertension     Insulin dependent diabetes mellitus ([place] [address] )     Peripheral vascular disease ([place] [address] )     Sleep apnea treated with nocturnal BiPAP     Stroke [facility])      Social History     Social History    Marital status: Single     Spouse name: N/A    Number of children: N/A    Years of education: N/A     Social History Main Topics    Smoking status: Never Smoker    Smokeless tobacco: Never Used    Alcohol use No    Drug use: No    Sexual activity: Yes     Other Topics Concern    None     Social History Narrative    None       Subjective  Patient states he does not wear oxygen at home and only requires it during worsening of CHF  States he does not have a pulmonary history and does not take any breathing treatments at home  Objective    Physical Exam:   Assessment Type: Assess only  General Appearance: Alert, Awake  Respiratory Pattern: Normal  Chest Assessment: Chest expansion symmetrical  Bilateral Breath Sounds: Clear  R Breath Sounds: Clear  L Breath Sounds: Clear  Cough: Non-productive    Vitals:  Blood pressure 100/60, pulse 76, temperature 98 3 °F (36 8 °C), temperature source Oral, resp   rate 20, height 5' 11" (1 803 m), weight 114 kg (250 lb 10 6 oz), SpO2 97 %  Imaging and other studies: I have personally reviewed pertinent reports  Plan    Respiratory Plan: Discontinue Protocol        Resp Comments: Patient examined per RT protocol  SPO2 97% on 5L NC  Breath sounds clear bilaterally  No respiratory distress observed  Will D/C protocol  Patient states he wears BIPAP 19/13 HS

## 2018-09-20 NOTE — H&P
H&P- Fred Morris  1965, 50 y o  male MRN: [id number]    Unit/Bed#: -01 Encounter: [id number]    Primary Care Provider: Nelsy Bowman DO   Date and time admitted to hospital: 9/20/2018  1:33 AM    * Acute on chronic combined systolic and diastolic congestive heart failure ([facility] )   Assessment & Plan    · Presented with SOB and chest pain  · CXR- pulmonary edema  · BNP 8,167  Previously 2,014 04/2018  · Daily weights, I&Os  · Fluid/Na Restriction  · IV Lasix  · Echo  · Echo 01/20/2018- LVEF 40%  Mild Mitral, Tricuspid, Aortic regurgitation  · Telemetry  · Consult Cardiology  Wt Readings from Last 3 Encounters:   09/20/18 114 kg (251 lb 5 2 oz)   05/06/18 113 kg (249 lb 1 9 oz)   04/16/18 111 kg (245 lb 6 oz)           NSTEMI (non-ST elevated myocardial infarction) ([facility])   Assessment & Plan    · Troponin 0 16 on arrival   · EKG- NSR, rate 76  Complete LBBB  · Likely NSTEMI type II secondary to acute CHF  · Trend troponin  · Telemetry  · EKG for chest pain  · Nitro, ASA, Plavix, Lipitor  · Appreciate Cardiology input  Acute-on-chronic kidney injury ([facility] )   Assessment & Plan    · Cr 3 12   · Baseline 2 0-2 3  · Likely secondary to hypervolemia  · Hold Lisinopril  · Monitor BMP with diuresis  · Consult Nephrology  PAF (paroxysmal atrial fibrillation) ([facility])   Assessment & Plan    · Rate controlled at 74  · Continue Coreg, Amiodarone  · Anticoagulated on Coumadin- INR 3 5   · Hold Coumadin for now  · Repeat INR  Hypertension   Assessment & Plan    · /54  · Continue Amlodipine, Coreg  Insulin dependent diabetes mellitus ([facility] )   Assessment & Plan    Lab Results   Component Value Date    HGBA1C 9 1 (H) 01/20/2018       No results for input(s): POCGLU in the last 72 hours  Blood Sugar Average: Last 72 hrs:  Glucose 358 on arrival   Received 4units Humulin in ED  Continue home Levemir, Novolog  Check A1C          Hyperlipidemia   Assessment & Plan · Continue Lipitor  Sleep apnea treated with nocturnal BiPAP   Assessment & Plan    · BIPAP QHS  VTE Prophylaxis: Warfarin (Coumadin)  / sequential compression device   Code Status: Full Code  POLST: POLST form is not discussed and not completed at this time  Discussion with family: Discussed with patient at bedside  Anticipated Length of Stay:  Patient will be admitted on an Inpatient basis with an anticipated length of stay of  Greater than 2 midnights  Justification for Hospital Stay: Acute on Chronic CHF, NSTEMI type II, KYLER on CKD  Total Time for Visit, including Counseling / Coordination of Care: 45 minutes  Greater than 50% of this total time spent on direct patient counseling and coordination of care  Chief Complaint:   Chest pain, SOB  History of Present Illness:    Chano Polanco  is a 50 y o  male, PMHx of CHF, A fib, DM, HTN, CKD, HLD, KODY, who presents with SOB and chest tightness that began last night  Patient states that he started having "heavy breathing" after he laid down to go to bed last night  He then developed a tightness in his chest  He state that he still is having some SOB at times  He notices a slight discomfort over his left chest, but states that it is not pain  He states that this is improved compared to earlier  He notes a chronic cough that is slightly worse than usual   Denies fever, chills, diaphoresis, wheezing, palpitations, LE edema, weight gain, abdominal pain, N/V, change in BM, urinary symptoms, dizziness, HA  Review of Systems:    Review of Systems   Constitutional: Negative for chills, diaphoresis and fever  Respiratory: Positive for cough and shortness of breath  Negative for wheezing  Cardiovascular: Positive for chest pain  Negative for palpitations and leg swelling  Gastrointestinal: Negative for abdominal pain, constipation, diarrhea, nausea and vomiting     Genitourinary: Negative for dysuria, frequency, hematuria and urgency  Neurological: Negative for dizziness, light-headedness and headaches  All other systems reviewed and are negative  Past Medical and Surgical History:     Past Medical History:   Diagnosis Date    A-fib [facility])     Cardiac disease     MI    Carotid artery disorder (HCC)     L carotid completely blocked    Charcot's joint of foot     left    Hyperlipidemia     Hypertension     Insulin dependent [facility] mellitus ([facility] )     Peripheral vascular disease ([facility] )     Sleep apnea treated with nocturnal BiPAP     Stroke [facility])        Past Surgical History:   Procedure Laterality Date    APPENDECTOMY      TOE AMPUTATION         Meds/Allergies:    Prior to Admission medications    Medication Sig Start Date End Date Taking? Authorizing Provider   amiodarone 200 mg tablet Take 1 tablet by mouth daily with breakfast for 30 days 2/17/17 9/20/18 Yes Eleuterio Reyes,    AMLODIPINE BESYLATE PO Take 5 mg by mouth daily   Yes Historical Provider, MD   aspirin (ECOTRIN LOW STRENGTH) 81 mg EC tablet Take 81 mg by mouth daily   Yes Historical Provider, MD   atorvastatin (LIPITOR) 80 mg tablet Take 1 tablet by mouth daily with dinner for 30 days 2/17/17 9/20/18 Yes Eleuterio Reyes DO   carvedilol (COREG) 12 5 mg tablet Take 1 tablet (12 5 mg total) by mouth 2 (two) times a day with meals  Patient taking differently: Take 6 25 mg by mouth 2 (two) times a day with meals   4/16/18  Yes Adriano Gupta PA-C   Cholecalciferol (VITAMIN D3) 1000 units CAPS Take by mouth   Yes Historical Provider, MD   clopidogrel (PLAVIX) 75 mg tablet Take 75 mg by mouth daily   Yes Historical Provider, MD   furosemide (LASIX) 40 mg tablet Take 1 tablet by mouth 2 (two) times a day 1/22/18  Yes Maura Martinez MD   glucose blood (CONTOUR NEXT TEST) test strip Use as directed   8/16/16  Yes Historical Provider, MD   insulin aspart (NovoLOG) 100 Units/mL injection pen Inject 15 Units under the skin 3 (three) times a day with meals 1/2/18  Yes Historical Provider, MD   levothyroxine 50 mcg tablet Take 50 mcg by mouth daily   Yes Historical Provider, MD   lisinopril (ZESTRIL) 2 5 mg tablet Take 2 5 mg by mouth daily at bedtime   Yes Historical Provider, MD   nitroglycerin (NITRODUR) 0 1 mg/hr Place 1 patch on the skin daily   Yes Historical Provider, MD   nitroglycerin (NITROSTAT) 0 4 mg SL tablet Place 0 4 mg under the tongue every 5 (five) minutes as needed for chest pain   Yes Historical Provider, MD   SPIRONOLACTONE PO Take 12 5 mg by mouth daily     Yes Historical Provider, MD   warfarin (COUMADIN) 5 mg tablet Take 5 mg by mouth daily   Yes Historical Provider, MD   warfarin (COUMADIN) 7 5 mg tablet Take 7 5 mg by mouth daily   Yes Historical Provider, MD   insulin detemir (LEVEMIR) 100 units/mL subcutaneous injection Inject under the skin daily at bedtime      Historical Provider, MD   amLODIPine (NORVASC) 5 mg tablet Take 1 tablet by mouth daily for 30 days 2/16/17 9/20/18  Eleuterio Reyes DO   clopidogrel (PLAVIX) 75 mg tablet Take 1 tablet (75 mg total) by mouth daily 4/17/18 9/20/18  Nikki Vieyra PA-C   Insulin Detemir (LEVEMIR SC) Inject under the skin 30 units in the morning and 50 units at night    9/20/18  Historical Provider, MD   insulin lispro (HumaLOG) 100 units/mL injection Inject 5 Units under the skin 3 (three) times a day with meals for 30 days 2/16/17 9/20/18  Eleuterio Jalloh DO     I have reviewed home medications with patient personally  Allergies: No Known Allergies    Social History:     Marital Status: Single   Occupation: Unknown  Patient Pre-hospital Living Situation: Home  Patient Pre-hospital Level of Mobility: Independent  Patient Pre-hospital Diet Restrictions: None    Substance Use History:   History   Alcohol Use No     History   Smoking Status    Never Smoker   Smokeless Tobacco    Never Used     History   Drug Use No       Family History:    non-contributory    Physical Exam:     Vitals:   Blood Pressure: 100/60 (09/20/18 0355)  Pulse: 76 (09/20/18 0355)  Temperature: 98 3 °F (36 8 °C) (09/20/18 0355)  Temp Source: Oral (09/20/18 0355)  Respirations: 20 (09/20/18 0355)  Height: 5' 11" (180 3 cm) (09/20/18 0355)  Weight - Scale: 114 kg (250 lb 10 6 oz) (09/20/18 0355)  SpO2: 93 % (09/20/18 0355)    Physical Exam   Constitutional: He is oriented to person, place, and time  He appears well-developed and well-nourished  He is cooperative  He does not appear ill  No distress  HENT:   Head: Normocephalic and atraumatic  Eyes: Conjunctivae, EOM and lids are normal  Pupils are equal, round, and reactive to light  Cardiovascular: Normal rate, regular rhythm, normal heart sounds and normal pulses  No LE edema bilaterally  Pulmonary/Chest: Effort normal  He has no wheezes  He has no rhonchi  He has rales (bilateral bases)  Abdominal: Soft  Normal appearance and bowel sounds are normal  There is no tenderness  Musculoskeletal: Normal range of motion  Neurological: He is alert and oriented to person, place, and time  He has normal strength  He is not disoriented  No sensory deficit  Skin: Skin is warm, dry and intact  He is not diaphoretic  Psychiatric: He has a normal mood and affect  His speech is normal and behavior is normal  Cognition and memory are normal    Vitals reviewed  Additional Data:     Lab Results: I have personally reviewed pertinent reports          Results from last 7 days  Lab Units 09/20/18  0154   WBC Thousand/uL 11 42*   HEMOGLOBIN g/dL 12 0   HEMATOCRIT % 36 8   PLATELETS Thousands/uL 170   NEUTROS PCT % 86*   LYMPHS PCT % 6*   MONOS PCT % 8   EOS PCT % 0       Results from last 7 days  Lab Units 09/20/18  0154   SODIUM mmol/L 135*   POTASSIUM mmol/L 5 0   CHLORIDE mmol/L 100   CO2 mmol/L 23   BUN mg/dL 50*   CREATININE mg/dL 3 12*   CALCIUM mg/dL 8 3   ALK PHOS U/L 151*   ALT U/L 47   AST U/L 28       Results from last 7 days  Lab Units 09/20/18  0154   INR  3 50*               Imaging: I have personally reviewed pertinent reports  XR chest 1 view portable   ED Interpretation by Rebecca Osorio MD (09/20 0245)   CHF, cardiomegaly, ? R perihilar infiltrate read by me  EKG, Pathology, and Other Studies Reviewed on Admission:   · EKG: NSR, rate 76  Complete LBBB  Allscripts / Epic Records Reviewed: Yes     ** Please Note: This note has been constructed using a voice recognition system   **

## 2018-09-20 NOTE — ASSESSMENT & PLAN NOTE
Lab Results   Component Value Date    HGBA1C 9 1 (H) 01/20/2018       No results for input(s): POCGLU in the last 72 hours  Blood Sugar Average: Last 72 hrs:  Glucose 358 on arrival   Received 4units Humulin in ED  Continue home Levemir, Novolog  Check A1C

## 2018-09-20 NOTE — ASSESSMENT & PLAN NOTE
· Rate controlled at 74  · Continue Coreg, Amiodarone  · Anticoagulated on Coumadin- INR 3 5   · Hold Coumadin for now  · Repeat INR

## 2018-09-20 NOTE — CONSULTS
Consultation - Cardiology   Jaquelin Bravo  50 y o  male MRN: [id number]  Unit/Bed#: -01 Encounter: [id number]    Assessment/Plan     Assessment:    Acute on Chronic Systolic CHF  Acute on Chronic Renal Failure  Paroxysmal Atrial Fibrillation    Plan:    1  Acute on Chronic Systolic CHF: Continue to diurese as tolerated  LVEF 40%  Continue to diurese as tolerated  No ACE/ARB due to acute renal failure  2  PAF: In NSR  On chronic amiodarone and anticoagulation with warfarin  INR was 3 5  History of Present Illness   Physician Requesting Consult: Ash Arrieta MD  Reason for Consult / Principal Problem: CHF  HPI: Jaquelin Bravo  is a 50y o  year old male who presents with chest discomfort and shortness of breath with mild exertion  He has a history of chronic systolic CHF, atrial fibrillation, Hypertension, CKD 3  He was found to be in clinical heart failure and admitted  He also has acute on chronic renal failure  He follows with [facility] cardiology  He also has PAF on amiodarone and warfarin  He currently is comfortable on supplemental oxygen  No chest pain or dyspnea at rest  He has mild LE edema  Inpatient consult to Cardiology  Consult performed by: Elaina oRdarte  Consult ordered by: Eugene Dubois          Review of Systems   Constitutional: Positive for fatigue  HENT: Negative  Eyes: Negative  Respiratory: Positive for shortness of breath  Cardiovascular: Positive for chest pain and leg swelling  Gastrointestinal: Negative  Endocrine: Negative  Genitourinary: Negative  Musculoskeletal: Negative  Skin: Negative  Allergic/Immunologic: Negative  Neurological: Negative  Hematological: Negative  Psychiatric/Behavioral: Negative          Historical Information   Past Medical History:   Diagnosis Date    A-fib [facility])     Cardiac disease     MI    Carotid artery disorder (HCC)     L carotid completely blocked    Charcot's joint of foot     left  Hyperlipidemia     Hypertension     Insulin dependent diabetes mellitus (HCC)     Peripheral vascular disease (HCC)     Sleep apnea treated with nocturnal BiPAP     Stroke [facility])      Past Surgical History:   Procedure Laterality Date    APPENDECTOMY      TOE AMPUTATION       History   Alcohol Use No     History   Drug Use No     History   Smoking Status    Never Smoker   Smokeless Tobacco    Never Used     Family History:   Family History   Problem Relation Age of Onset    Diabetes Mother     Heart disease Father        Meds/Allergies   current meds:   Current Facility-Administered Medications   Medication Dose Route Frequency    acetaminophen (TYLENOL) tablet 650 mg  650 mg Oral Q6H PRN    amiodarone tablet 200 mg  200 mg Oral Daily With Breakfast    amLODIPine (NORVASC) tablet 5 mg  5 mg Oral Daily    aspirin (ECOTRIN LOW STRENGTH) EC tablet 81 mg  81 mg Oral Daily    atorvastatin (LIPITOR) tablet 80 mg  80 mg Oral Daily With Dinner    carvedilol (COREG) tablet 6 25 mg  6 25 mg Oral BID With Meals    cholecalciferol (VITAMIN D3) tablet 1,000 Units  1,000 Units Oral Daily    clopidogrel (PLAVIX) tablet 75 mg  75 mg Oral Daily    furosemide (LASIX) injection 80 mg  80 mg Intravenous BID (diuretic)    insulin detemir (LEVEMIR) subcutaneous injection 30 Units  30 Units Subcutaneous QAM    insulin detemir (LEVEMIR) subcutaneous injection 40 Units  40 Units Subcutaneous HS    insulin lispro (HumaLOG) 100 units/mL subcutaneous injection 15 Units  15 Units Subcutaneous TID With Meals    insulin lispro (HumaLOG) 100 units/mL subcutaneous injection 2-12 Units  2-12 Units Subcutaneous TID AC    levothyroxine tablet 50 mcg  50 mcg Oral Early Morning    ondansetron (ZOFRAN) injection 4 mg  4 mg Intravenous Q6H PRN     No Known Allergies    Objective   Vitals: Blood pressure 143/62, pulse 74, temperature 98 2 °F (36 8 °C), temperature source Oral, resp   rate 16, height 5' 11" (1 803 m), weight 113 kg (249 lb 1 9 oz), SpO2 97 %  Orthostatic Blood Pressures      Most Recent Value   Blood Pressure  143/62 filed at 09/20/2018 0701   Patient Position - Orthostatic VS  Lying filed at 09/20/2018 0701            Intake/Output Summary (Last 24 hours) at 09/20/18 1041  Last data filed at 09/20/18 0900   Gross per 24 hour   Intake              290 ml   Output              450 ml   Net             -160 ml       Invasive Devices     Peripheral Intravenous Line            Peripheral IV 09/20/18 Left Forearm less than 1 day    Peripheral IV 09/20/18 Right Antecubital less than 1 day                Physical Exam   Constitutional: He is oriented to person, place, and time  No distress  HENT:   Mouth/Throat: No oropharyngeal exudate  Eyes: No scleral icterus  Neck: JVD present  Cardiovascular: Normal rate and regular rhythm  No murmur heard  Pulmonary/Chest: Effort normal  No respiratory distress  He has no wheezes  He has rales  Abdominal: Soft  Bowel sounds are normal  He exhibits no distension  There is no tenderness  There is no rebound  Musculoskeletal: He exhibits edema  Neurological: He is oriented to person, place, and time  Skin: Skin is warm and dry  He is not diaphoretic  Psychiatric: He has a normal mood and affect  Lab Results:   I have personally reviewed pertinent lab results      CBC with diff:   Results from last 7 days  Lab Units 09/20/18  0519   WBC Thousand/uL 11 27*   RBC Million/uL 4 19   HEMOGLOBIN g/dL 11 6*   HEMATOCRIT % 36 2*   MCV fL 86   MCH pg 27 7   MCHC g/dL 32 0   RDW % 14 7   MPV fL 13 5*   PLATELETS Thousands/uL 160     CMP:   Results from last 7 days  Lab Units 09/20/18  0806 09/20/18  0154   SODIUM mmol/L 132* 135*   POTASSIUM mmol/L 4 9 5 0   CHLORIDE mmol/L 100 100   CO2 mmol/L 25 23   BUN mg/dL 51* 50*   CREATININE mg/dL 2 93* 3 12*   CALCIUM mg/dL 8 0* 8 3   AST U/L  --  28   ALT U/L  --  47   ALK PHOS U/L  --  151*   EGFR ml/min/1 73sq m 24 22     Troponin: 0  Lab Value Date/Time   TROPONINI 0 23 (H) 09/20/2018 0806   TROPONINI 0 16 (H) 09/20/2018 0519   TROPONINI 0 16 (H) 09/20/2018 0154   TROPONINI 5 61 (H) 04/15/2018 2343   TROPONINI 6 51 (H) 04/15/2018 2054   TROPONINI 6 55 (H) 04/15/2018 0901   TROPONINI 0 79 (H) 04/15/2018 0342   TROPONINI 0 04 04/15/2018 0002   TROPONINI 2 76 (H) 01/20/2018 0840   TROPONINI 0 22 (H) 01/20/2018 0452   TROPONINI <0 02 01/20/2018 0030   TROPONINI 1 58 (H) 02/11/2017 1607   TROPONINI 1 61 (H) 02/11/2017 1219   TROPONINI 1 31 (H) 02/11/2017 0924   TROPONINI 0 90 (H) 02/11/2017 0643   TROPONINI 0 39 (H) 02/11/2017 0429   TROPONINI 0 03 02/11/2017 0056   TROPONINI 11 65 (H) 12/25/2016 1454   TROPONINI 12 38 (H) 12/25/2016 1247   TROPONINI 0 61 (H) 12/25/2016 0215   TROPONINI <0 04 08/12/2015 0822   TROPONINI 0 05 (H) 08/12/2015 0014   TROPONINI <0 04 08/11/2015 1833   TROPONINI 0 07 (H) 07/24/2015 0002   TROPONINI 0 06 (H) 07/23/2015 1547     BNP:   Results from last 7 days  Lab Units 09/20/18  0806   SODIUM mmol/L 132*   POTASSIUM mmol/L 4 9   CHLORIDE mmol/L 100   CO2 mmol/L 25   BUN mg/dL 51*   CREATININE mg/dL 2 93*   CALCIUM mg/dL 8 0*   EGFR ml/min/1 73sq m 24     Coags:   Results from last 7 days  Lab Units 09/20/18  0154   PTT seconds 43*   INR  3 50*     TSH:     Magnesium:   Results from last 7 days  Lab Units 09/20/18  0806   MAGNESIUM mg/dL 2 4     Lipid Profile:     Imaging: I have personally reviewed pertinent films in PACS  EKG: NSR LBBB      Code Status: Level 1 - Full Code  Advance Directive and Living Will:      Power of :    POLST:      Counseling / Coordination of Care  Total floor / unit time spent today 45 minutes  Greater than 50% of total time was spent with the patient and / or family counseling and / or coordination of care  A description of the counseling / coordination of care

## 2018-09-21 VITALS
HEIGHT: 71 IN | OXYGEN SATURATION: 96 % | WEIGHT: 249.12 LBS | SYSTOLIC BLOOD PRESSURE: 136 MMHG | BODY MASS INDEX: 34.88 KG/M2 | HEART RATE: 60 BPM | TEMPERATURE: 98.1 F | DIASTOLIC BLOOD PRESSURE: 60 MMHG | RESPIRATION RATE: 18 BRPM

## 2018-09-21 LAB
ANION GAP SERPL CALCULATED.3IONS-SCNC: 8 MMOL/L (ref 4–13)
BUN SERPL-MCNC: 53 MG/DL (ref 5–25)
CALCIUM SERPL-MCNC: 8.4 MG/DL (ref 8.3–10.1)
CHLORIDE SERPL-SCNC: 101 MMOL/L (ref 100–108)
CO2 SERPL-SCNC: 27 MMOL/L (ref 21–32)
CREAT SERPL-MCNC: 2.68 MG/DL (ref 0.6–1.3)
GFR SERPL CREATININE-BSD FRML MDRD: 27 ML/MIN/1.73SQ M
GLUCOSE SERPL-MCNC: 137 MG/DL (ref 65–140)
GLUCOSE SERPL-MCNC: 150 MG/DL (ref 65–140)
GLUCOSE SERPL-MCNC: 154 MG/DL (ref 65–140)
GLUCOSE SERPL-MCNC: 227 MG/DL (ref 65–140)
INR PPP: 3.95 (ref 0.86–1.17)
POTASSIUM SERPL-SCNC: 4.1 MMOL/L (ref 3.5–5.3)
PROTHROMBIN TIME: 37.4 SECONDS (ref 11.8–14.2)
SODIUM SERPL-SCNC: 136 MMOL/L (ref 136–145)

## 2018-09-21 PROCEDURE — 99239 HOSP IP/OBS DSCHRG MGMT >30: CPT | Performed by: INTERNAL MEDICINE

## 2018-09-21 PROCEDURE — 80048 BASIC METABOLIC PNL TOTAL CA: CPT | Performed by: PHYSICIAN ASSISTANT

## 2018-09-21 PROCEDURE — 99232 SBSQ HOSP IP/OBS MODERATE 35: CPT | Performed by: INTERNAL MEDICINE

## 2018-09-21 PROCEDURE — 94660 CPAP INITIATION&MGMT: CPT

## 2018-09-21 PROCEDURE — 94760 N-INVAS EAR/PLS OXIMETRY 1: CPT

## 2018-09-21 PROCEDURE — 82948 REAGENT STRIP/BLOOD GLUCOSE: CPT

## 2018-09-21 RX ORDER — WARFARIN SODIUM 5 MG/1
5 TABLET ORAL
Qty: 10 TABLET | Refills: 0 | Status: SHIPPED | OUTPATIENT
Start: 2018-09-21

## 2018-09-21 RX ORDER — FUROSEMIDE 40 MG/1
40 TABLET ORAL 2 TIMES DAILY
Qty: 60 TABLET | Refills: 0 | Status: SHIPPED | OUTPATIENT
Start: 2018-09-21

## 2018-09-21 RX ADMIN — INSULIN LISPRO 15 UNITS: 100 INJECTION, SOLUTION INTRAVENOUS; SUBCUTANEOUS at 11:38

## 2018-09-21 RX ADMIN — CARVEDILOL 6.25 MG: 6.25 TABLET, FILM COATED ORAL at 08:18

## 2018-09-21 RX ADMIN — CLOPIDOGREL 75 MG: 75 TABLET, FILM COATED ORAL at 08:18

## 2018-09-21 RX ADMIN — ASPIRIN 81 MG: 81 TABLET, COATED ORAL at 08:18

## 2018-09-21 RX ADMIN — AMLODIPINE BESYLATE 5 MG: 5 TABLET ORAL at 08:18

## 2018-09-21 RX ADMIN — VITAMIN D, TAB 1000IU (100/BT) 1000 UNITS: 25 TAB at 08:18

## 2018-09-21 RX ADMIN — INSULIN LISPRO 15 UNITS: 100 INJECTION, SOLUTION INTRAVENOUS; SUBCUTANEOUS at 08:20

## 2018-09-21 RX ADMIN — FUROSEMIDE 80 MG: 10 INJECTION, SOLUTION INTRAMUSCULAR; INTRAVENOUS at 08:18

## 2018-09-21 RX ADMIN — AMIODARONE HYDROCHLORIDE 200 MG: 200 TABLET ORAL at 08:18

## 2018-09-21 RX ADMIN — INSULIN LISPRO 4 UNITS: 100 INJECTION, SOLUTION INTRAVENOUS; SUBCUTANEOUS at 11:38

## 2018-09-21 RX ADMIN — INSULIN DETEMIR 30 UNITS: 100 INJECTION, SOLUTION SUBCUTANEOUS at 08:17

## 2018-09-21 RX ADMIN — INSULIN LISPRO 2 UNITS: 100 INJECTION, SOLUTION INTRAVENOUS; SUBCUTANEOUS at 08:21

## 2018-09-21 NOTE — PLAN OF CARE
CARDIOVASCULAR - ADULT     Maintains optimal cardiac output and hemodynamic stability Progressing     Absence of cardiac dysrhythmias or at baseline rhythm Progressing        METABOLIC, FLUID AND ELECTROLYTES - ADULT     Fluid balance maintained Progressing     Glucose maintained within target range Progressing        PAIN - ADULT     Verbalizes/displays adequate comfort level or baseline comfort level Progressing        RESPIRATORY - ADULT     Achieves optimal ventilation and oxygenation Progressing        SAFETY ADULT     Patient will remain free of falls Progressing

## 2018-09-21 NOTE — PHYSICIAN ADVISOR
Current patient class: Inpatient  The patient is currently on Hospital Day: 2 at [address] [facility]      The patient was admitted to the hospital at [phone] on 9/20/18 for the following diagnosis:  CHF (congestive heart failure) ([facility] ) [I50 9]  Chest pain [R07 9]  Pneumonia [J18 9]  Hyperglycemia [R73 9]  Hypoxia [R09 02]  Elevated troponin [R74 8]  Acute-on-chronic kidney injury ([facility] ) [N17 9, N18 9]       There is documentation in the medical record of an expected length of stay of at least 2 midnights  The patient is therefore expected to satisfy the 2 midnight benchmark and given the 2 midnight presumption is appropriate for INPATIENT ADMISSION  Given this expectation of a satisfying stay, CMS instructs us that the patient is most often appropriate for inpatient admission under part A provided medical necessity is documented in the chart  After review of the relevant documentation, labs, vital signs and test results, the patient is appropriate for INPATIENT ADMISSION  Admission to the hospital as an inpatient is a complex decision making process which requires the practitioner to consider the patients presenting complaint, history and physical examination and all relevant testing  With this in mind, in this case, the patient was deemed appropriate for INPATIENT ADMISSION  After review of the documentation and testing available at the time of the admission I concur with this clinical determination of medical necessity  Rationale is as follows: The patient is a 50 yrs old Male who presented to the ED at 9/20/2018  1:33 AM with a chief complaint of Chest Pain (Reports centrally located chest discomfort that start approx  1 hr  ago  Patient reports hx  heart attacks (last approx 1 year prior)  (+) Difficulty to draw deep breaths   (-) n/v  )     Given the need for further hospitalization, and along with the documentation of medical necessity present in the chart, the patient is appropriate for inpatient admission  The patient is expected to satisfy the 2 midnight benchmark, and will require further acute medical care  The patient does have comorbid conditions which increases the risk for significant adverse outcome  Given this the patient is appropriate for inpatient admission        The patients vitals on arrival were ED Triage Vitals   Temperature Pulse Respirations Blood Pressure SpO2   09/20/18 0136 09/20/18 0136 09/20/18 0136 09/20/18 0148 09/20/18 0136   98 3 °F (36 8 °C) 77 20 118/50 91 %      Temp Source Heart Rate Source Patient Position - Orthostatic VS BP Location FiO2 (%)   09/20/18 0136 09/20/18 0136 09/20/18 0136 09/20/18 0136 --   Oral Monitor Sitting Right arm       Pain Score       09/20/18 0136       2           Past Medical History:   Diagnosis Date    A-fib [facility])     Cardiac disease     MI    Carotid artery disorder (HCC)     L carotid completely blocked    Charcot's joint of foot     left    Hyperlipidemia     Hypertension     Insulin dependent diabetes mellitus ([facility] [address] )     [facility]     Sleep apnea treated with nocturnal BiPAP     Stroke [facility])      Past Surgical History:   Procedure Laterality Date    APPENDECTOMY      TOE AMPUTATION             Consults have been placed to:   IP CONSULT TO CARDIOLOGY  IP CONSULT TO NEPHROLOGY    Vitals:    09/20/18 0701 09/20/18 1609 09/20/18 2205 09/20/18 2346   BP: 143/62 119/56 142/59    BP Location: Right arm Right arm Right arm    Pulse: 74 65 66    Resp: 16 18 18    Temp: 98 2 °F (36 8 °C) 98 4 °F (36 9 °C) 98 3 °F (36 8 °C)    TempSrc: Oral Oral Oral    SpO2: 97% 96% 92% 99%   Weight:       Height:           Most recent labs:    Recent Labs      09/20/18   0154  09/20/18   0519  09/20/18   0806  09/20/18   1057  09/20/18   2358   WBC  11 42*  11 27*   --    --    --    HGB  12 0  11 6*   --    --    --    HCT  36 8  36 2*   --    --    --    PLT  170  160   --    --    --    K  5 0   --   4 9 --    --    NA  135*   --   132*   --    --    CALCIUM  8 3   --   8 0*   --    --    BUN  50*   --   51*   --    --    CREATININE  3 12*   --   2 93*   --    --    INR  3 50*   --    --    --   3 95*   TROPONINI  0 16*  0 16*  0 23*  0 24*   --    CKTOTAL  294   --    --    --    --    AST  28   --    --    --    --    ALT  47   --    --    --    --    ALKPHOS  151*   --    --    --    --        Scheduled Meds:  Current Facility-Administered Medications:  acetaminophen 650 mg Oral Q6H PRN Igor William PA-C   amiodarone 200 mg Oral Daily With Breakfast Igor William PA-C   amLODIPine 5 mg Oral Daily Jennyayla Zamudio PA-C   aspirin 81 mg Oral Daily Igor William PA-C   atorvastatin 80 mg Oral Daily With Dinner Igor William PA-C   carvedilol 6 25 mg Oral BID With Meals [address] [place], [place] [id number]   cholecalciferol 1,000 Units Oral Daily Igor William PA-C   clopidogrel 75 mg Oral Daily Igor William PA-C   furosemide 80 mg Intravenous BID (diuretic) Alvin Mendez MD   insulin detemir 30 Units Subcutaneous QAM Igor William PA-C   insulin detemir 40 Units Subcutaneous HS Igor William PA-C   insulin lispro 15 Units Subcutaneous TID With Meals Igor William PA-C   insulin lispro 2-12 Units Subcutaneous TID AC Igor William PA-C   levothyroxine 50 mcg Oral Early Morning Igor William PA-C   ondansetron 4 mg Intravenous Q6H PRN Igor William PA-C     Continuous Infusions:   PRN Meds:   acetaminophen    ondansetron    Surgical procedures (if appropriate):

## 2018-09-21 NOTE — SOCIAL WORK
CM spoke with patient at bedside  Patient is agreeable to CM making a follow up appointment with PCP  Patient follows up with Dr Laurie Manning with [facility]  Appointment made for 9/26/28 at 1240       CM reviewed with patient re:bundle program  Patient declined copy of bundle program

## 2018-09-21 NOTE — PROGRESS NOTES
NEPHROLOGY PROGRESS NOTE   Elise Garcia  50 y o  male MRN: [id number]  Unit/Bed#: -01 Encounter: [id number]  Reason for Consult: KYLER/CKD    ASSESSMENT/PLAN:  1  Acute Kidney Injury, POA- likely secondary to volume overload with relative hypotension and decreased effective circulating volume  - UA: + glucose, small blood, 1+ protein  - intrinsic workup as an outpatient is warranted per [facility]  - avoid hypotension  - avoid nephrotoxics  - lasix increased to 80mg IV BID with good UO  - monitor intake/output/weight  2  Chronic Kidney Disease stage IIIB/IV- Baseline creatinine appears to be 2-2 7  Presumed etiology is diabetes and hypertension and cardiorenal   He has a referral to [address] [facility] but never made an appointment yet  I informed him of the importance of making this appointment  3  Congestive Heart Failure- secondary to dietary indiscretion  - good urine output with IV lasix  - he states he takes lasix 40mg AM & 20mg PM at home which has worked well for him when he follows a low sodium diet  - on discharge: lasix 80mg AM & 40mg PM  - follows with cardiologist at [facility]  4  Hyponatremia- corrects for glucose, monitor  - he is on a mild fluid restriction  5  Hypertension- relative hypotension on admission, now improved  - hold parameters on antihypertensives  - lisinopril on hold    Disposition:  Okay to discharge from a renal standpoint  Needs to follow up with [facility] with [facility]  Discharge on lasix 80mg AM and 40mg PM  Discussed with Dr Yuli Aleman:  Patient feels well  States phlegm is gone    States breathing is back to normal     OBJECTIVE:  Current Weight: Weight - Scale: 113 kg (249 lb 1 9 oz)  Vitals:    09/20/18 2205 09/20/18 2346 09/21/18 0315 09/21/18 0700   BP: 142/59   136/60   BP Location: Right arm   Right arm   Pulse: 66   60   Resp: 18   18   Temp: 98 3 °F (36 8 °C)   98 1 °F (36 7 °C)   TempSrc: Oral   Oral   SpO2: 92% 99% 96% 96%   Weight:       Height: Intake/Output Summary (Last 24 hours) at 09/21/18 1041  Last data filed at 09/21/18 4260   Gross per 24 hour   Intake             1380 ml   Output             2885 ml   Net            -1505 ml     General: NAD  Skin: no rash  HEENT: normocephalic  Neck: supple  Chest: CTAB  Heart: RRR  Abdomen: soft nt nd  Extremities: no edema  Neuro: alert awake  Psych: mood and affect appropriate    Medications:    Current Facility-Administered Medications:     acetaminophen (TYLENOL) tablet 650 mg, 650 mg, Oral, Q6H PRN, Duran Rivas PA-C    amiodarone tablet 200 mg, 200 mg, Oral, Daily With Breakfast, Duran Rivas PA-C, 200 mg at 09/21/18 0818    amLODIPine (NORVASC) tablet 5 mg, 5 mg, Oral, Daily, Jenny Zamudio PA-C, 5 mg at 09/21/18 0818    aspirin (ECOTRIN LOW STRENGTH) EC tablet 81 mg, 81 mg, Oral, Daily, Duran Rivas PA-C, 81 mg at 09/21/18 0818    atorvastatin (LIPITOR) tablet 80 mg, 80 mg, Oral, Daily With Dinner, Duran Rivas PA-C, 80 mg at 09/20/18 1531    carvedilol (COREG) tablet 6 25 mg, 6 25 mg, Oral, BID With Meals, Nikia Chamorro PA-C, 6 25 mg at 09/21/18 0818    cholecalciferol (VITAMIN D3) tablet 1,000 Units, 1,000 Units, Oral, Daily, Duran Rivas PA-C, 1,000 Units at 09/21/18 0818    clopidogrel (PLAVIX) tablet 75 mg, 75 mg, Oral, Daily, Duran Rivas PA-C, 75 mg at 09/21/18 0818    furosemide (LASIX) injection 80 mg, 80 mg, Intravenous, BID (diuretic), Nas Arevalo MD, 80 mg at 09/21/18 0818    insulin detemir (LEVEMIR) subcutaneous injection 30 Units, 30 Units, Subcutaneous, QAMDuran PA-C, 30 Units at 09/21/18 0817    insulin detemir (LEVEMIR) subcutaneous injection 40 Units, 40 Units, Subcutaneous, HS, Duran Rivas PA-C, 20 Units at 09/20/18 2111    insulin lispro (HumaLOG) 100 units/mL subcutaneous injection 15 Units, 15 Units, Subcutaneous, TID With Meals, Duran Rivas PA-C, 15 Units at 09/21/18 0820   insulin lispro (HumaLOG) 100 units/mL subcutaneous injection 2-12 Units, 2-12 Units, Subcutaneous, TID AC, 2 Units at 09/21/18 1264 **AND** Fingerstick Glucose (POCT), , , TID AC, Chris Montelongo PA-C    levothyroxine tablet 50 mcg, 50 mcg, Oral, Early Morning, Chris Montelongo PA-C, 50 mcg at 09/20/18 0502    ondansetron [facility] injection 4 mg, 4 mg, Intravenous, Q6H PRN, Chris Montelongo PA-C, 4 mg at 09/20/18 1046    Laboratory Results:    Results from last 7 days  Lab Units 09/21/18  0813 09/20/18  0806 09/20/18  0519 09/20/18  0154   WBC Thousand/uL  --   --  11 27* 11 42*   HEMOGLOBIN g/dL  --   --  11 6* 12 0   HEMATOCRIT %  --   --  36 2* 36 8   PLATELETS Thousands/uL  --   --  160 170   SODIUM mmol/L 136 132*  --  135*   POTASSIUM mmol/L 4 1 4 9  --  5 0   CHLORIDE mmol/L 101 100  --  100   CO2 mmol/L 27 25  --  23   BUN mg/dL 53* 51*  --  50*   CREATININE mg/dL 2 68* 2 93*  --  3 12*   CALCIUM mg/dL 8 4 8 0*  --  8 3   MAGNESIUM mg/dL  --  2 4  --   --    PHOSPHORUS mg/dL  --  2 3*  --   --

## 2018-09-21 NOTE — PLAN OF CARE
Problem: DISCHARGE PLANNING - CARE MANAGEMENT  Goal: Discharge to post-acute care or home with appropriate resources  INTERVENTIONS:  - Conduct assessment to determine patient/family and health care team treatment goals, and need for post-acute services based on payer coverage, community resources, and patient preferences, and barriers to discharge  - Address psychosocial, clinical, and financial barriers to discharge as identified in assessment in conjunction with the patient/family and health care team  - Arrange appropriate level of post-acute services according to patients   needs and preference and payer coverage in collaboration with the physician and health care team  - Communicate with and update the patient/family, physician, and health care team regarding progress on the discharge plan  - Arrange appropriate transportation to post-acute venues  Outcome: Completed Date Met: 09/21/18  CM spoke with patient at bedside  Patient is agreeable to CM making a follow up appointment with PCP  Patient follows up with Dr Samira Asif with [facility]  Appointment made for 9/26/28 at 1240       CM reviewed with patient re:bundle program  Patient declined copy of bundle program

## 2018-09-21 NOTE — DISCHARGE SUMMARY
Discharge Summary - [address] [facility]    Patient Information: Marta Buchanan 50 y o  male MRN: [id number]  Unit/Bed#: -18 Encounter: [id number]    Discharging Physician / Practitioner: Andrea Maloney MD  PCP: Alicia Vance DO  Admission Date: 9/20/2018  Discharge Date: 09/21/18    Disposition:     Home    Reason for Admission:  Chest pain and shortness of breath    Discharge Diagnoses:     Principal Problem:    Acute on chronic combined systolic and diastolic congestive heart failure ([place] [address] )  Active Problems:    Hypertension    Acute-on-chronic kidney injury ([place] [address] )    PAF (paroxysmal atrial fibrillation) ([facility])    Insulin dependent diabetes mellitus ([place] [address] )    Hyperlipidemia    NSTEMI (non-ST elevated myocardial infarction) ([place] [address] )    Sleep apnea treated with nocturnal BiPAP  Resolved Problems:    * No resolved hospital problems  *      Consultations During Hospital Stay:  · Cardiology  · Nephrology    Procedures Performed:     · Chest x-ray showed cardiomegaly and findings of CHF      Hospital Course: Marta Buchanan  is a 50 y o  male patient who originally presented to the hospital on 9/20/2018 due to chest pain and shortness of breath  Patient has multiple medical comorbidities including history of CHF and chronic kidney disease  He came to the emergency department complaining of central chest pain associated with shortness of breath  On evaluation in the emergency department patient was noted to be grossly volume overloaded requiring IV diuretics  He was admitted and was seen by Nephrology and Cardiology,  recommended IV diuretics  Patient's symptoms have improved and returned to baseline  He is being discharged with Lasix 40 mg b i d   But was also asked to take extra dose of Lasix if he gains more than 2 lb  He will follow with his cardiologist for further management  He was also asked to follow with nephrologist in outpatient setting      Condition at Discharge: good Discharge Day Visit / Exam:     Subjective:  Feeling better today and anxious to go home  Vitals: Blood Pressure: 136/60 (09/21/18 0700)  Pulse: 60 (09/21/18 0700)  Temperature: 98 1 °F (36 7 °C) (09/21/18 0700)  Temp Source: Oral (09/21/18 0700)  Respirations: 18 (09/21/18 0700)  Height: 5' 11" (180 3 cm) (09/20/18 0355)  Weight - Scale: 113 kg (249 lb 1 9 oz) (09/20/18 0600)  SpO2: 96 % (09/21/18 0700)  Exam:   Physical Exam     Gen -Patient comfortable at rest  Neck- Supple  No thyromegaly or lymphadenopathy  Lungs-Clear bilaterally without any wheeze or rales   Heart S1-S2, regular rate and rhythm, no murmurs  Abdomen-soft nontender, no organomegaly  Bowel sounds present  Extremities-no cyanosi,  Clubbing; edema noted  Skin- no rash  Neuro-awake alert and oriented     Discussion with Family:     Discharge instructions/Information to patient and family:   See after visit summary for information provided to patient and family  Provisions for Follow-Up Care:  See after visit summary for information related to follow-up care and any pertinent home health orders  Planned Readmission: no     Discharge Statement:  I spent 35 minutes discharging the patient  This time was spent on the day of discharge  I had direct contact with the patient on the day of discharge  Greater than 50% of the total time was spent examining patient, answering all patient questions, arranging and discussing plan of care with patient as well as directly providing post-discharge instructions  Additional time then spent on discharge activities  Discharge Medications:  See after visit summary for reconciled discharge medications provided to patient and family        ** Please Note: This note has been constructed using a voice recognition system **

## 2018-09-21 NOTE — PROGRESS NOTES
Cardiology Progress Note - Efe Lam  50 y o  male MRN: [id number]    Unit/Bed#: -01 Encounter: [id number]        Subjective:    No significant events overnight  No chest pain  His dyspnea has improved  ROS    Objective:   Vitals: Blood pressure 136/60, pulse 60, temperature 98 1 °F (36 7 °C), temperature source Oral, resp  rate 18, height 5' 11" (1 803 m), weight 113 kg (249 lb 1 9 oz), SpO2 96 %  , Body mass index is 34 75 kg/m² , Orthostatic Blood Pressures      Most Recent Value   Blood Pressure  136/60 filed at 09/21/2018 0700   Patient Position - Orthostatic VS  Lying filed at 09/21/2018 9849         Systolic (98HGK), JYZ:605 , Min:119 , GRU:757     Diastolic (70ARL), KQL:16, Min:56, Max:60      Intake/Output Summary (Last 24 hours) at 09/21/18 0949  Last data filed at 09/21/18 0925   Gross per 24 hour   Intake             1380 ml   Output             2885 ml   Net            -1505 ml     Weight (last 2 days)     Date/Time   Weight    09/20/18 0600  113 (249 12)    09/20/18 0355  114 (250 66)    09/20/18 0136  114 (251 32)                    Physical Exam   Constitutional: He is oriented to person, place, and time  No distress  HENT:   Mouth/Throat: No oropharyngeal exudate  Eyes: No scleral icterus  Cardiovascular: Normal rate and regular rhythm  Murmur heard  Pulmonary/Chest: Effort normal and breath sounds normal  No respiratory distress  He has no wheezes  He has no rales  Abdominal: Soft  Bowel sounds are normal  He exhibits no distension  There is no tenderness  There is no rebound  Musculoskeletal: He exhibits no edema  Neurological: He is alert and oriented to person, place, and time  Skin: Skin is warm and dry  He is not diaphoretic  Psychiatric: He has a normal mood and affect   His behavior is normal          Laboratory Results:    Results from last 7 days  Lab Units 09/20/18  1057 09/20/18  0806 09/20/18  0519 09/20/18  0154   CK TOTAL U/L  --   --   --  294 TROPONIN I ng/mL 0 24* 0 23* 0 16* 0 16*   CK MB INDEX %  --   --   --  1 7       CBC with diff:   Results from last 7 days  Lab Units 09/20/18  0519 09/20/18  0154   WBC Thousand/uL 11 27* 11 42*   HEMOGLOBIN g/dL 11 6* 12 0   HEMATOCRIT % 36 2* 36 8   MCV fL 86 85   PLATELETS Thousands/uL 160 170   MCH pg 27 7 27 7   MCHC g/dL 32 0 32 6   RDW % 14 7 14 8   MPV fL 13 5* 13 1*   NRBC AUTO /100 WBCs  --  0         CMP:  Results from last 7 days  Lab Units 09/21/18  0813 09/20/18  0806 09/20/18  0154   SODIUM mmol/L 136 132* 135*   POTASSIUM mmol/L 4 1 4 9 5 0   CHLORIDE mmol/L 101 100 100   CO2 mmol/L 27 25 23   BUN mg/dL 53* 51* 50*   CREATININE mg/dL 2 68* 2 93* 3 12*   CALCIUM mg/dL 8 4 8 0* 8 3   AST U/L  --   --  28   ALT U/L  --   --  47   ALK PHOS U/L  --   --  151*   EGFR ml/min/1 73sq m 27 24 22         BMP:  Results from last 7 days  Lab Units 09/21/18  0813 09/20/18  0806 09/20/18  0154   SODIUM mmol/L 136 132* 135*   POTASSIUM mmol/L 4 1 4 9 5 0   CHLORIDE mmol/L 101 100 100   CO2 mmol/L 27 25 23   BUN mg/dL 53* 51* 50*   CREATININE mg/dL 2 68* 2 93* 3 12*   CALCIUM mg/dL 8 4 8 0* 8 3       BNP: No results for input(s): BNP in the last 72 hours      Magnesium:   Results from last 7 days  Lab Units 09/20/18  0806   MAGNESIUM mg/dL 2 4       Coags:   Results from last 7 days  Lab Units 09/20/18  2358 09/20/18  0154   PTT seconds  --  43*   INR  3 95* 3 50*       TSH: No results found for: TSH    Hemoglobin A1C   Results from last 7 days  Lab Units 09/20/18  0520   HEMOGLOBIN A1C % 8 1*       Lipid Profile:       Cardiac testing:   Results for orders placed during the hospital encounter of 01/19/18   Echo complete with contrast if indicated    Narrative [facility], [address] [place] [address]  [phone]    Transthoracic Echocardiogram  2D, M-mode, Doppler, and Color Doppler    Study date:  20-Jan-2018    Patient: Marixa Santoyo  MR number: [id number]  Account number: [id number]  : 1965  Age: 50 years  Gender: Male  Status: Inpatient  Location: Bedside  Height: 72 in  Weight: 238 5 lb  BP: 140/ 58 mmHg    Indications: Heart Failure    Diagnoses: I50 9 - Heart failure, unspecified    Sonographer:  DON Olson, RDCS  Primary Physician:  Lauren Ramos DO  Referring Physician:  Sherry Merchant DO  Group:  [id number]  Interpreting Physician:  Padmini Acharya MD    SUMMARY    LEFT VENTRICLE:  The ventricle was mildly to moderately dilated  Systolic function was moderately reduced  Ejection fraction was estimated to be 40 %  This study was inadequate for the evaluation of regional wall motion  Wall thickness was normal     RIGHT VENTRICLE:  The ventricle was dilated  LEFT ATRIUM:  The atrium was dilated  RIGHT ATRIUM:  The atrium was dilated  MITRAL VALVE:  There was mild regurgitation  AORTIC VALVE:  The valve was trileaflet  Leaflets exhibited sclerosis  There was mild regurgitation  TRICUSPID VALVE:  There was mild regurgitation  HISTORY: PRIOR HISTORY: CHF, elevated Troponins, Afib, Stroke, Hypertension, Hyperlipidemia    PROCEDURE: The procedure was performed at the bedside  This was a routine study  The transthoracic approach was used  The study included complete 2D imaging, M-mode, complete spectral Doppler, and color Doppler  The heart rate was 64 bpm,  at the start of the study  Images were obtained from the parasternal, apical, subcostal, and suprasternal notch acoustic windows  Echocardiographic views were limited due to poor acoustic window availability and decreased penetration  Image quality was adequate  LEFT VENTRICLE: The ventricle was mildly to moderately dilated  Systolic function was moderately reduced  Ejection fraction was estimated to be 40 %  This study was inadequate for the evaluation of regional wall motion  Wall thickness was  normal     RIGHT VENTRICLE: The ventricle was dilated      LEFT ATRIUM: The atrium was dilated  RIGHT ATRIUM: The atrium was dilated  MITRAL VALVE: Valve structure was normal  There was normal leaflet separation  DOPPLER: The transmitral velocity was within the normal range  There was no evidence for stenosis  There was mild regurgitation  AORTIC VALVE: The valve was trileaflet  Leaflets exhibited sclerosis  DOPPLER: There was mild regurgitation  TRICUSPID VALVE: The valve structure was normal  There was normal leaflet separation  DOPPLER: The transtricuspid velocity was within the normal range  There was no evidence for stenosis  There was mild regurgitation  PULMONIC VALVE: Leaflets exhibited normal thickness, no calcification, and normal cuspal separation  DOPPLER: The transpulmonic velocity was within the normal range  There was no regurgitation  PERICARDIUM: There was no pericardial effusion  The pericardium was normal in appearance  AORTA: The root exhibited normal size  SYSTEM MEASUREMENT TABLES    2D  %FS: 19 65 %  AV Diam: 3 08 cm  EDV(Teich): 163 35 ml  EF(Teich): 39 78 %  ESV(Teich): 98 37 ml  IVSd: 1 2 cm  LA Area: 19 38 cm2  LA Diam: 5 16 cm  LVEDV MOD A4C: 193 26 ml  LVEF MOD A4C: 39 54 %  LVESV MOD A4C: 116 84 ml  LVIDd: 5 75 cm  LVIDs: 4 62 cm  LVLd A4C: 9 94 cm  LVLs A4C: 9 19 cm  LVPWd: 1 05 cm  RA Area: 23 48 cm2  RVIDd: 5 41 cm  SV MOD A4C: 76 43 ml  SV(Teich): 64 98 ml    CW  AV MaxP 22 mmHg  AV Vmax: 1 89 m/s    MM  TAPSE: 1 74 cm    PW  E': 0 04 m/s  E/E': 29 93  MV A Magnus: 0 32 m/s  MV Dec Schoolcraft: 9 39 m/s2  MV DecT: 132 44 ms  MV E Magnus: 1 24 m/s  MV E/A Ratio: 3 89  MV PHT: 38 41 ms  MVA By PHT: 5 73 cm2    [employer] Accredited Echocardiography Laboratory    Prepared and electronically signed by    Anais Esteban MD  Signed 2018 09:52:48       No results found for this or any previous visit  No results found for this or any previous visit  No results found for this or any previous visit      Meds/Allergies current meds:   Current Facility-Administered Medications   Medication Dose Route Frequency    acetaminophen (TYLENOL) tablet 650 mg  650 mg Oral Q6H PRN    amiodarone tablet 200 mg  200 mg Oral Daily With Breakfast    amLODIPine (NORVASC) tablet 5 mg  5 mg Oral Daily    aspirin (ECOTRIN LOW STRENGTH) EC tablet 81 mg  81 mg Oral Daily    atorvastatin (LIPITOR) tablet 80 mg  80 mg Oral Daily With Dinner    carvedilol (COREG) tablet 6 25 mg  6 25 mg Oral BID With Meals    cholecalciferol (VITAMIN D3) tablet 1,000 Units  1,000 Units Oral Daily    clopidogrel (PLAVIX) tablet 75 mg  75 mg Oral Daily    furosemide (LASIX) injection 80 mg  80 mg Intravenous BID (diuretic)    insulin detemir (LEVEMIR) subcutaneous injection 30 Units  30 Units Subcutaneous QAM    insulin detemir (LEVEMIR) subcutaneous injection 40 Units  40 Units Subcutaneous HS    insulin lispro (HumaLOG) 100 units/mL subcutaneous injection 15 Units  15 Units Subcutaneous TID With Meals    insulin lispro (HumaLOG) 100 units/mL subcutaneous injection 2-12 Units  2-12 Units Subcutaneous TID AC    levothyroxine tablet 50 mcg  50 mcg Oral Early Morning    ondansetron (ZOFRAN) injection 4 mg  4 mg Intravenous Q6H PRN     Prescriptions Prior to Admission   Medication    amiodarone 200 mg tablet    AMLODIPINE BESYLATE PO    aspirin (ECOTRIN LOW STRENGTH) 81 mg EC tablet    atorvastatin (LIPITOR) 80 mg tablet    carvedilol (COREG) 12 5 mg tablet    Cholecalciferol (VITAMIN D3) 1000 units CAPS    clopidogrel (PLAVIX) 75 mg tablet    furosemide (LASIX) 40 mg tablet    glucose blood (CONTOUR NEXT TEST) test strip    insulin aspart (NovoLOG) 100 Units/mL injection pen    insulin detemir (LEVEMIR) 100 units/mL subcutaneous injection    levothyroxine 50 mcg tablet    lisinopril (ZESTRIL) 2 5 mg tablet    nitroglycerin (NITRODUR) 0 1 mg/hr    nitroglycerin (NITROSTAT) 0 4 mg SL tablet    SPIRONOLACTONE PO    warfarin (COUMADIN) 5 mg tablet    warfarin (COUMADIN) 7 5 mg tablet    insulin detemir (LEVEMIR) 100 units/mL subcutaneous injection          Assessment:  Principal Problem:    Acute on chronic combined systolic and diastolic congestive heart failure ([facility])  Active Problems:    Hypertension    Acute-on-chronic kidney injury ([facility] )    PAF (paroxysmal atrial fibrillation) ([facility])    Insulin dependent diabetes mellitus ([facility])    Hyperlipidemia    NSTEMI (non-ST elevated myocardial infarction) ([facility] )    Sleep apnea treated with nocturnal BiPAP      Assessment:     Acute on Chronic Systolic CHF  Acute on Chronic Renal Failure  Paroxysmal Atrial Fibrillation     Plan:     1  Acute on Chronic Systolic CHF: Continue to diurese as tolerated  LVEF 40%  Continue to diurese as tolerated  No ACE/ARB due to acute renal failure  He is close to euvolemic  Can transition to oral diuretics by tomorrow       2  PAF: In NSR  On chronic amiodarone and anticoagulation with warfarin  INR was 3 5  Counseling / Coordination of Care  Total floor / unit time spent today 25 minutes  Greater than 50% of total time was spent with the patient and / or family counseling and / or coordination of care  A description of the counseling / coordination of care

## 2018-09-25 LAB
BACTERIA BLD CULT: NORMAL
BACTERIA BLD CULT: NORMAL

## 2018-10-17 ENCOUNTER — APPOINTMENT (EMERGENCY)
Dept: RADIOLOGY | Facility: HOSPITAL | Age: 53
End: 2018-10-17
Payer: MEDICARE

## 2018-10-17 ENCOUNTER — HOSPITAL ENCOUNTER (EMERGENCY)
Facility: HOSPITAL | Age: 53
Discharge: HOME | End: 2018-10-17
Attending: EMERGENCY MEDICINE
Payer: MEDICARE

## 2018-10-17 VITALS
DIASTOLIC BLOOD PRESSURE: 53 MMHG | WEIGHT: 251.99 LBS | SYSTOLIC BLOOD PRESSURE: 116 MMHG | OXYGEN SATURATION: 97 % | RESPIRATION RATE: 20 BRPM | TEMPERATURE: 97.9 F | HEART RATE: 72 BPM | BODY MASS INDEX: 35.14 KG/M2

## 2018-10-17 DIAGNOSIS — R06.02 SHORTNESS OF BREATH: Primary | ICD-10-CM

## 2018-10-17 LAB
ALBUMIN SERPL BCP-MCNC: 3.4 G/DL (ref 3.5–5)
ALP SERPL-CCNC: 114 U/L (ref 46–116)
ALT SERPL W P-5'-P-CCNC: 28 U/L (ref 12–78)
ANION GAP SERPL CALCULATED.3IONS-SCNC: 9 MMOL/L (ref 4–13)
AST SERPL W P-5'-P-CCNC: 17 U/L (ref 5–45)
BASOPHILS # BLD AUTO: 0.05 THOUSANDS/ΜL (ref 0–0.1)
BASOPHILS NFR BLD AUTO: 1 % (ref 0–1)
BILIRUB SERPL-MCNC: 0.6 MG/DL (ref 0.2–1)
BUN SERPL-MCNC: 64 MG/DL (ref 5–25)
CALCIUM SERPL-MCNC: 8.7 MG/DL (ref 8.3–10.1)
CHLORIDE SERPL-SCNC: 99 MMOL/L (ref 100–108)
CO2 SERPL-SCNC: 25 MMOL/L (ref 21–32)
CREAT SERPL-MCNC: 2.77 MG/DL (ref 0.6–1.3)
EOSINOPHIL # BLD AUTO: 0.09 THOUSAND/ΜL (ref 0–0.61)
EOSINOPHIL NFR BLD AUTO: 1 % (ref 0–6)
ERYTHROCYTE [DISTWIDTH] IN BLOOD BY AUTOMATED COUNT: 15.8 % (ref 11.6–15.1)
GFR SERPL CREATININE-BSD FRML MDRD: 25 ML/MIN/1.73SQ M
GLUCOSE SERPL-MCNC: 359 MG/DL (ref 65–140)
HCT VFR BLD AUTO: 35.1 % (ref 36.5–49.3)
HGB BLD-MCNC: 11.2 G/DL (ref 12–17)
IMM GRANULOCYTES # BLD AUTO: 0.02 THOUSAND/UL (ref 0–0.2)
IMM GRANULOCYTES NFR BLD AUTO: 0 % (ref 0–2)
LYMPHOCYTES # BLD AUTO: 0.9 THOUSANDS/ΜL (ref 0.6–4.47)
LYMPHOCYTES NFR BLD AUTO: 12 % (ref 14–44)
MCH RBC QN AUTO: 27.3 PG (ref 26.8–34.3)
MCHC RBC AUTO-ENTMCNC: 31.9 G/DL (ref 31.4–37.4)
MCV RBC AUTO: 86 FL (ref 82–98)
MONOCYTES # BLD AUTO: 0.61 THOUSAND/ΜL (ref 0.17–1.22)
MONOCYTES NFR BLD AUTO: 8 % (ref 4–12)
NEUTROPHILS # BLD AUTO: 5.78 THOUSANDS/ΜL (ref 1.85–7.62)
NEUTS SEG NFR BLD AUTO: 78 % (ref 43–75)
NRBC BLD AUTO-RTO: 0 /100 WBCS
NT-PROBNP SERPL-MCNC: 4918 PG/ML
PLATELET # BLD AUTO: 213 THOUSANDS/UL (ref 149–390)
PMV BLD AUTO: 12.5 FL (ref 8.9–12.7)
POTASSIUM SERPL-SCNC: 4.3 MMOL/L (ref 3.5–5.3)
PROT SERPL-MCNC: 7.7 G/DL (ref 6.4–8.2)
RBC # BLD AUTO: 4.1 MILLION/UL (ref 3.88–5.62)
SODIUM SERPL-SCNC: 133 MMOL/L (ref 136–145)
TROPONIN I SERPL-MCNC: 0.02 NG/ML
WBC # BLD AUTO: 7.45 THOUSAND/UL (ref 4.31–10.16)

## 2018-10-17 PROCEDURE — 99285 EMERGENCY DEPT VISIT HI MDM: CPT

## 2018-10-17 PROCEDURE — 85025 COMPLETE CBC W/AUTO DIFF WBC: CPT | Performed by: EMERGENCY MEDICINE

## 2018-10-17 PROCEDURE — 71046 X-RAY EXAM CHEST 2 VIEWS: CPT

## 2018-10-17 PROCEDURE — 83880 ASSAY OF NATRIURETIC PEPTIDE: CPT | Performed by: EMERGENCY MEDICINE

## 2018-10-17 PROCEDURE — 80053 COMPREHEN METABOLIC PANEL: CPT | Performed by: EMERGENCY MEDICINE

## 2018-10-17 PROCEDURE — 36415 COLL VENOUS BLD VENIPUNCTURE: CPT | Performed by: EMERGENCY MEDICINE

## 2018-10-17 PROCEDURE — 84484 ASSAY OF TROPONIN QUANT: CPT | Performed by: EMERGENCY MEDICINE

## 2018-10-17 PROCEDURE — 93005 ELECTROCARDIOGRAM TRACING: CPT

## 2018-10-17 NOTE — DISCHARGE INSTRUCTIONS
Shortness of Breath   WHAT YOU NEED TO KNOW:   Shortness of breath is a feeling that you cannot get enough air when you breathe in  You may have this feeling only during activity, or all the time  Your symptoms can range from mild to severe  Shortness of breath may be a sign of a serious health condition that needs immediate care  DISCHARGE INSTRUCTIONS:   Return to the emergency department if:   · Your signs and symptoms are the same or worse within 24 hours of treatment  · The skin over your ribs or on your neck sinks in when you breathe  · You feel confused or dizzy  Contact your healthcare provider if:   · You have new or worsening symptoms  · You have questions or concerns about your condition or care  Medicines:   · Medicines  may be used to treat the cause of your symptoms  You may need medicine to treat a bacterial infection or reduce anxiety  Other medicines may be used to open your airway, reduce swelling, or remove extra fluid  If you have a heart condition, you may need medicine to help your heart beat more strongly or regularly  · Take your medicine as directed  Contact your healthcare provider if you think your medicine is not helping or if you have side effects  Tell him or her if you are allergic to any medicine  Keep a list of the medicines, vitamins, and herbs you take  Include the amounts, and when and why you take them  Bring the list or the pill bottles to follow-up visits  Carry your medicine list with you in case of an emergency  Manage shortness of breath:   · Create an action plan  You and your healthcare provider can work together to create a plan for how to handle shortness of breath  The plan can include daily activities, treatment changes, and what to do if you have severe breathing problems  · Lean forward on your elbows when you sit  This helps your lungs expand and may make it easier to breathe      · Use pursed-lip breathing any time you feel short of breath  Breathe in through your nose and then slowly breathe out through your mouth with your lips slightly puckered  It should take you twice as long to breathe out as it did to breathe in  · Do not smoke  Nicotine and other chemicals in cigarettes and cigars can cause lung damage and make shortness of breath worse  Ask your healthcare provider for information if you currently smoke and need help to quit  E-cigarettes or smokeless tobacco still contain nicotine  Talk to your healthcare provider before you use these products  · Reach or maintain a healthy weight  Your healthcare provider can help you create a safe weight loss plan if you are overweight  · Exercise as directed  Exercise can help your lungs work more easily  Exercise can also help you lose weight if needed  Try to get at least 30 minutes of exercise most days of the week  Follow up with your healthcare provider or specialist as directed:  Write down your questions so you remember to ask them during your visits  © 2017 [employer] Information is for End User's use only and may not be sold, redistributed or otherwise used for commercial purposes  All illustrations and images included in CareNotes® are the copyrighted property of [employer]  or [employer]  The above information is an  only  It is not intended as medical advice for individual conditions or treatments  Talk to your doctor, nurse or pharmacist before following any medical regimen to see if it is safe and effective for you

## 2018-10-17 NOTE — ED PROVIDER NOTES
History  Chief Complaint   Patient presents with    Shortness of Breath     Pt states that approx 1 5 hrs ago he was sitting at his computer and became SOB  Pt has hx of CHF  -chest pain  50 y o  Male with pmh significant for htn, chf, iddm, hypothyroidism, dyslipidemia and CAD presents with chief complaint of shortness of breath  Patient reports he was sitting at his computer when he began to feel short of breath  He has a slightly productive cough as well  Patient reports his dyspnea is mild but he was worried that it might get worse as it has in the past with his CHF exacerbations  History provided by:  Patient   used: No    Shortness of Breath   Severity:  Mild  Onset quality:  Sudden  Duration:  1 hour  Timing:  Constant  Progression:  Improving  Chronicity:  Recurrent  Relieved by:  Nothing  Worsened by:  Nothing  Ineffective treatments:  None tried  Associated symptoms: cough and sputum production    Associated symptoms: no chest pain, no diaphoresis, no fever, no rash, no vomiting and no wheezing        Prior to Admission Medications   Prescriptions Last Dose Informant Patient Reported? Taking?    AMLODIPINE BESYLATE PO   Yes No   Sig: Take 5 mg by mouth daily   Cholecalciferol (VITAMIN D3) 1000 units CAPS  Self Yes No   Sig: Take by mouth   SPIRONOLACTONE PO  Self Yes No   Sig: Take 12 5 mg by mouth daily     amiodarone 200 mg tablet  Self No No   Sig: Take 1 tablet by mouth daily with breakfast for 30 days   aspirin (ECOTRIN LOW STRENGTH) 81 mg EC tablet   Yes No   Sig: Take 81 mg by mouth daily   atorvastatin (LIPITOR) 80 mg tablet  Self No No   Sig: Take 1 tablet by mouth daily with dinner for 30 days   carvedilol (COREG) 12 5 mg tablet   No No   Sig: Take 1 tablet (12 5 mg total) by mouth 2 (two) times a day with meals   Patient taking differently: Take 6 25 mg by mouth 2 (two) times a day with meals     clopidogrel (PLAVIX) 75 mg tablet   Yes No   Sig: Take 75 mg by mouth daily   furosemide (LASIX) 40 mg tablet   No No   Sig: Take 1 tablet (40 mg total) by mouth 2 (two) times a day   glucose blood (CONTOUR NEXT TEST) test strip   Yes No   Sig: Use as directed  insulin aspart (NovoLOG) 100 Units/mL injection pen   Yes No   Sig: Inject 15 Units under the skin 3 (three) times a day with meals   insulin detemir (LEVEMIR) 100 units/mL subcutaneous injection   Yes No   Sig: Inject under the skin daily at bedtime     insulin detemir (LEVEMIR) 100 units/mL subcutaneous injection   Yes No   Sig: Inject 30 Units under the skin every morning   levothyroxine 50 mcg tablet  Self Yes No   Sig: Take 50 mcg by mouth daily   lisinopril (ZESTRIL) 2 5 mg tablet   Yes No   Sig: Take 2 5 mg by mouth daily at bedtime   nitroglycerin (NITRODUR) 0 1 mg/hr  Self Yes No   Sig: Place 1 patch on the skin daily   nitroglycerin (NITROSTAT) 0 4 mg SL tablet  Self Yes No   Sig: Place 0 4 mg under the tongue every 5 (five) minutes as needed for chest pain   warfarin (COUMADIN) 5 mg tablet   No No   Sig: Take 1 tablet (5 mg total) by mouth daily      Facility-Administered Medications: None       Past Medical History:   Diagnosis Date    A-fib [facility])     Cardiac disease     MI    Carotid artery disorder (HCC)     L carotid completely blocked    Charcot's joint of foot     left    Hyperlipidemia     Hypertension     Insulin dependent diabetes mellitus (HCC)     type 1    Peripheral vascular disease ([address] )     Sleep apnea treated with nocturnal BiPAP     Stroke [facility])        Past Surgical History:   Procedure Laterality Date    APPENDECTOMY      TOE AMPUTATION         Family History   Problem Relation Age of Onset    Diabetes Mother     Heart disease Father      I have reviewed and agree with the history as documented      Social History   Substance Use Topics    Smoking status: Never Smoker    Smokeless tobacco: Never Used    Alcohol use 1 2 oz/week     1 Cans of beer, 1 Shots of liquor per week Comment: rarely        Review of Systems   Constitutional: Negative for chills, diaphoresis and fever  Respiratory: Positive for cough, sputum production and shortness of breath  Negative for wheezing  Cardiovascular: Negative for chest pain and palpitations  Gastrointestinal: Negative for diarrhea, nausea and vomiting  Genitourinary: Negative for dysuria and frequency  Skin: Negative for rash  All other systems reviewed and are negative  Physical Exam  Physical Exam   Constitutional: He is oriented to person, place, and time  He appears well-developed and well-nourished  No distress  HENT:   Head: Normocephalic and atraumatic  Eyes: Pupils are equal, round, and reactive to light  EOM are normal    Neck: Normal range of motion  No JVD present  Cardiovascular: Normal rate, regular rhythm, normal heart sounds and intact distal pulses  Exam reveals no gallop and no friction rub  No murmur heard  Pulmonary/Chest: Effort normal and breath sounds normal  No respiratory distress  He has no wheezes  He has no rales  He exhibits no tenderness  Musculoskeletal: Normal range of motion  He exhibits no edema or tenderness  Left foot in orthopedic boot     Neurological: He is alert and oriented to person, place, and time  Skin: Skin is warm and dry  Psychiatric: He has a normal mood and affect  His behavior is normal  Judgment and thought content normal    Nursing note and vitals reviewed        Vital Signs  ED Triage Vitals [10/17/18 0254]   Temperature Pulse Respirations Blood Pressure SpO2   97 9 °F (36 6 °C) 80 22 136/57 96 %      Temp Source Heart Rate Source Patient Position - Orthostatic VS BP Location FiO2 (%)   Oral Monitor Sitting Right arm --      Pain Score       No Pain           Vitals:    10/17/18 0254   BP: 136/57   Pulse: 80   Patient Position - Orthostatic VS: Sitting       Visual Acuity      ED Medications  Medications - No data to display    Diagnostic Studies  Results Reviewed     Procedure Component Value Units Date/Time    B-type natriuretic peptide [[id number]]  (Abnormal) Collected:  10/17/18 0400    Lab Status:  Final result Specimen:  Blood from Arm, Right Updated:  10/17/18 0430     NT-proBNP 4,918 (H) pg/mL     Troponin I [[id number]]  (Normal) Collected:  10/17/18 0400    Lab Status:  Final result Specimen:  Blood from Arm, Right Updated:  10/17/18 0425     Troponin I 0 02 ng/mL     Comprehensive metabolic panel [[id number]]  (Abnormal) Collected:  10/17/18 0400    Lab Status:  Final result Specimen:  Blood from Arm, Right Updated:  10/17/18 0423     Sodium 133 (L) mmol/L      Potassium 4 3 mmol/L      Chloride 99 (L) mmol/L      CO2 25 mmol/L      ANION GAP 9 mmol/L      BUN 64 (H) mg/dL      Creatinine 2 77 (H) mg/dL      Glucose 359 (H) mg/dL      Calcium 8 7 mg/dL      AST 17 U/L      ALT 28 U/L      Alkaline Phosphatase 114 U/L      Total Protein 7 7 g/dL      Albumin 3 4 (L) g/dL      Total Bilirubin 0 60 mg/dL      eGFR 25 ml/min/1 73sq m     Narrative:         National Kidney Disease Education Program recommendations are as follows:  GFR calculation is accurate only with a steady state creatinine  Chronic Kidney disease less than 60 ml/min/1 73 sq  meters  Kidney failure less than 15 ml/min/1 73 sq  meters      CBC and differential [[id number]]  (Abnormal) Collected:  10/17/18 0324    Lab Status:  Final result Specimen:  Blood from Arm, Right Updated:  10/17/18 0332     WBC 7 45 Thousand/uL      RBC 4 10 Million/uL      Hemoglobin 11 2 (L) g/dL      Hematocrit 35 1 (L) %      MCV 86 fL      MCH 27 3 pg      MCHC 31 9 g/dL      RDW 15 8 (H) %      MPV 12 5 fL      Platelets 788 Thousands/uL      nRBC 0 /100 WBCs      Neutrophils Relative 78 (H) %      Immat GRANS % 0 %      Lymphocytes Relative 12 (L) %      Monocytes Relative 8 %      Eosinophils Relative 1 %      Basophils Relative 1 %      Neutrophils Absolute 5 78 Thousands/µL      Immature Grans Absolute 0 02 Thousand/uL      Lymphocytes Absolute 0 90 Thousands/µL      Monocytes Absolute 0 61 Thousand/µL      Eosinophils Absolute 0 09 Thousand/µL      Basophils Absolute 0 05 Thousands/µL                  X-ray chest 2 views   ED Interpretation by Chetna Lemon MD (10/17 0378)   This film was interpreted independently by me  No infiltrate, cardiac silhouette normal, no pleural effusions or pulmonary edema  Procedures  ECG 12 Lead Documentation  Date/Time: 10/17/2018 3:10 AM  Performed by: Claudio Brownlee by: Christie Hawkins     Indications / Diagnosis:  Shortness of breath  ECG reviewed by me, the ED Provider: yes    Patient location:  ED  Previous ECG:     Previous ECG:  Compared to current    Comparison ECG info:  09/20/18    Similarity:  No change  Interpretation:     Interpretation: abnormal    Rate:     ECG rate:  75    ECG rate assessment: normal    Rhythm:     Rhythm: sinus rhythm    Ectopy:     Ectopy: none    QRS:     QRS axis:  Left    QRS intervals:   Wide  Conduction:     Conduction: abnormal      Abnormal conduction: complete LBBB and 1st degree    ST segments:     ST segments:  Normal  T waves:     T waves: normal             Phone Contacts  ED Phone Contact    ED Course                               MDM  Number of Diagnoses or Management Options  Shortness of breath: new and requires workup  Diagnosis management comments: Background: 50 y o  male presents with dyspnea    Differential DX includes but is not limited to: pneumonia, chf, atypical acs, anemia    Plan: cardiac workup with bnp         Amount and/or Complexity of Data Reviewed  Clinical lab tests: ordered and reviewed  Tests in the radiology section of CPT®: ordered and reviewed  Independent visualization of images, tracings, or specimens: yes    Risk of Complications, Morbidity, and/or Mortality  Presenting problems: high  Diagnostic procedures: high  Management options: high    Patient Progress  Patient progress: stable    CritCare Time    Disposition  Final diagnoses:   Shortness of breath     Time reflects when diagnosis was documented in both MDM as applicable and the Disposition within this note     Time User Action Codes Description Comment    10/17/2018  4:41 AM Johnny You Add [R06 02] Shortness of breath       ED Disposition     ED Disposition Condition Comment    Discharge  Eveleen Life  discharge to home/self care  Condition at discharge: Good        Follow-up Information     Follow up With Specialties Details Why Contact Info    Danielle Gutiérrez DO Family Medicine Schedule an appointment as soon as possible for a visit As needed [address] [place] [address]  [phone]            Patient's Medications   Discharge Prescriptions    No medications on file     No discharge procedures on file      ED Provider  Electronically Signed by           Kadie House MD  10/17/18 2852

## 2018-10-18 LAB
ATRIAL RATE: 75 BPM
P AXIS: 68 DEGREES
PR INTERVAL: 224 MS
QRS AXIS: -42 DEGREES
QRSD INTERVAL: 218 MS
QT INTERVAL: 526 MS
QTC INTERVAL: 587 MS
T WAVE AXIS: 114 DEGREES
VENTRICULAR RATE: 75 BPM

## 2018-10-18 PROCEDURE — 93010 ELECTROCARDIOGRAM REPORT: CPT | Performed by: INTERNAL MEDICINE

## 2018-12-04 ENCOUNTER — PATIENT OUTREACH (OUTPATIENT)
Dept: OTHER | Facility: HOSPITAL | Age: 53
End: 2018-12-04

## 2018-12-20 ENCOUNTER — PATIENT OUTREACH (OUTPATIENT)
Dept: CASE MANAGEMENT | Facility: OTHER | Age: 53
End: 2018-12-20

## 2018-12-20 NOTE — PROGRESS NOTES
CM called patient, spoke with CURTIS montgomery, who reported patient is weighing himself daily, and is having no issues at this time  CM made Hope aware Bundle episode is closing, CM  Will not be making any follow up calls, Cm encouraged patient or Hope to call with any concerns or issues  CM verified Emilia has CM's contact information

## 2019-01-07 ENCOUNTER — TRANSCRIBE ORDERS (OUTPATIENT)
Dept: ADMINISTRATIVE | Facility: HOSPITAL | Age: 54
End: 2019-01-07

## 2019-01-07 DIAGNOSIS — N18.4 CHRONIC KIDNEY DISEASE, STAGE IV (SEVERE) (HCC): Primary | ICD-10-CM

## 2022-06-25 NOTE — CONSULTS
Consult - Podiatry   Mazin Nelson  50 y o  male MRN: [id number]  Unit/Bed#: -01 Encounter: [id number]    Assessment/Plan     Assessment:  1  B/L charcot deformity  2  B/L H/O DM ulcer with hallux amputations, healed  3  Callus    Plan:  1  The consult was requested for a diabetic foot ulcer  The patient does not have an ulcer anywhere on his lower extremity  There is a minor callus on the medial aspect of his right foot that is more dry skin that hyperkeratotic buildup  There is no acute pathology to his feet  He wears his Charcot boot on his left foot with no issues  Patient is pleased with the podiatric care he received his outpatient  He is well known to Dr Eneida Mo  He may follow up at our office as needed  History of Present Illness     HPI:  Mazin Nelson  is a 50 y o  male who presents with acute exacerbation of heart failure  I was asked to evaluate an ulcer on his lower extremity  The patient states he does not have any ulcerations on his lower extremity  He has not have a wound on his foot for over a year  He does have history of diabetic foot amputations and Charcot deformity of his feet  He wears a crow boot on his left foot routinely  He has no acute concerns to his lower extremities and was surprised that I was consulted  Consults  Review of Systems   Constitutional: Negative  HENT: Negative  Eyes: Negative  Respiratory:  Mild shortness of breath  Cardiovascular: Negative  Gastrointestinal: Negative  Musculoskeletal:  Charcot deformity   Skin:  Negative   Neurological:  Diabetic neuropathy      Psych: negative      Historical Information   Past Medical History:   Diagnosis Date    A-fib ([facility] )     Carotid artery disorder (HCC)     L carotid completely blocked    Hyperlipidemia     Hypertension     Insulin dependent diabetes mellitus ([facility] )     Stroke [facility])      Past Surgical History:   Procedure Laterality Date    APPENDECTOMY      TOE AMPUTATION       Social History   History   Alcohol Use    1 2 oz/week    1 Cans of beer, 1 Shots of liquor per week     History   Drug Use No     History   Smoking Status    Never Smoker   Smokeless Tobacco    Never Used     Family History:   Family History   Problem Relation Age of Onset    Diabetes Mother     Heart disease Father        Meds/Allergies   Prescriptions Prior to Admission   Medication    amiodarone 200 mg tablet    amLODIPine (NORVASC) 5 mg tablet    atorvastatin (LIPITOR) 80 mg tablet    carvedilol (COREG) 12 5 mg tablet    Cholecalciferol (VITAMIN D3) 1000 units CAPS    Insulin Aspart (NOVOLOG SC)    Insulin Detemir (LEVEMIR SC)    lisinopril (ZESTRIL) 2 5 mg tablet    nitroglycerin (NITRODUR) 0 1 mg/hr    nitroglycerin (NITROSTAT) 0 4 mg SL tablet    SPIRONOLACTONE PO    warfarin (COUMADIN) 7 5 mg tablet    aspirin 81 mg chewable tablet    docusate sodium (COLACE) 100 mg capsule    furosemide (LASIX) 40 mg tablet    insulin lispro (HumaLOG) 100 units/mL injection    senna (SENOKOT) 8 6 mg     No Known Allergies    Objective   First Vitals:   Blood Pressure: 149/65 (01/19/18 2359)  Pulse: 75 (01/19/18 2359)  Temperature: 98 9 °F (37 2 °C) (01/20/18 0215)  Temp Source: Oral (01/20/18 0238)  Respirations: 20 (01/19/18 2359)  Height: 6' (182 9 cm) (01/20/18 0238)  Weight - Scale: 113 kg (248 lb 3 8 oz) (01/19/18 2359)  SpO2: 92 % (01/19/18 2359)    Current Vitals:   Blood Pressure: 135/60 (01/20/18 1503)  Pulse: 63 (01/20/18 1503)  Temperature: 98 7 °F (37 1 °C) (01/20/18 1503)  Temp Source: Oral (01/20/18 1503)  Respirations: 18 (01/20/18 1503)  Height: 6' (182 9 cm) (01/20/18 0238)  Weight - Scale: 109 kg (239 lb 3 2 oz) (without boot ) (01/20/18 0700)  SpO2: 94 % (01/20/18 1503)        /60 (BP Location: Right arm)   Pulse 63   Temp 98 7 °F (37 1 °C) (Oral)   Resp 18   Ht 6' (1 829 m)   Wt 109 kg (239 lb 3 2 oz) Comment: without boot    SpO2 94%   BMI 32 44 kg/m² Physical Exam:  General:    Alert, cooperative, no distress   Head:    Normocephalic, without obvious abnormality, atraumatic   Eyes:    PERRL, conjunctiva/corneas clear, EOM's intact            Nose:   Moist mucous membranes, no drainage or sinus tenderness   Throat:   No tenderness, no exudates   Neck:   Supple, symmetrical, trachea midline, no JVD   Back:     Symmetric, no CVA tenderness   Lungs:     Clear to auscultation bilaterally, respirations unlabored   Chest wall:    No tenderness or deformity   Heart:    Regular rate and rhythm, S1 and S2 normal   Abdomen:     Soft, non-tender, bowel sounds active all four quadrants           Extremities:   Pedal pulses intact  Capillary refill to digits is less than 3 seconds  Toes are warm to touch  Lack of protective sensation  Gross sensation intact  Nails are mycotic  Bilateral hallux amputation well coapted  There is no open lesion or any acute pathology and either foot  Neurologic:   CNII-XII intact        Lab Results:   Admission on 01/19/2018   Component Date Value    WBC 01/20/2018 9 67     RBC 01/20/2018 3 83*    Hemoglobin 01/20/2018 10 6*    Hematocrit 01/20/2018 32 7*    MCV 01/20/2018 85     MCH 01/20/2018 27 7     MCHC 01/20/2018 32 4     RDW 01/20/2018 16 2*    MPV 01/20/2018 13 2*    Platelets 73/40/7043 198     Neutrophils Relative 01/20/2018 88*    Lymphocytes Relative 01/20/2018 5*    Monocytes Relative 01/20/2018 6     Eosinophils Relative 01/20/2018 1     Basophils Relative 01/20/2018 0     Neutrophils Absolute 01/20/2018 8 56*    Lymphocytes Absolute 01/20/2018 0 49*    Monocytes Absolute 01/20/2018 0 54     Eosinophils Absolute 01/20/2018 0 05     Basophils Absolute 01/20/2018 0 03     Sodium 01/20/2018 134*    Potassium 01/20/2018 5 3     Chloride 01/20/2018 100     CO2 01/20/2018 22     Anion Gap 01/20/2018 12     BUN 01/20/2018 61*    Creatinine 01/20/2018 2 25*    Glucose 01/20/2018 479*    Calcium 01/20/2018 9 0     eGFR 01/20/2018 33     NT-proBNP 01/20/2018 4618*    Troponin I 01/20/2018 <0 02     Ventricular Rate 01/20/2018 72     Atrial Rate 01/20/2018 72     VT Interval 01/20/2018 198     QRSD Interval 01/20/2018 162     QT Interval 01/20/2018 524     QTC Interval 01/20/2018 573     P Axis 01/20/2018 70     QRS Axis 01/20/2018 -34     T Wave [place] 01/20/2018 123     POC Glucose 01/20/2018 433*    Hemoglobin A1C 01/20/2018 9 1*    EAG 01/20/2018 214     Troponin I 01/20/2018 0 22*    WBC 01/20/2018 9 82     RBC 01/20/2018 3 74*    Hemoglobin 01/20/2018 10 2*    Hematocrit 01/20/2018 31 9*    MCV 01/20/2018 85     MCH 01/20/2018 27 3     MCHC 01/20/2018 32 0     RDW 01/20/2018 16 4*    Platelets 90/07/4164 183     MPV 01/20/2018 12 9*    Protime 01/20/2018 21 1*    INR 01/20/2018 1 76*    POC Glucose 01/20/2018 472*    Troponin I 01/20/2018 2 76*    POC Glucose 01/20/2018 364*    POC Glucose 01/20/2018 418*    Sodium 01/20/2018 133*    Potassium 01/20/2018 4 3     Chloride 01/20/2018 99*    CO2 01/20/2018 21     Anion Gap 01/20/2018 13     BUN 01/20/2018 66*    Creatinine 01/20/2018 2 31*    Glucose 01/20/2018 420*    Calcium 01/20/2018 9 2     eGFR 01/20/2018 32     NT-proBNP 01/20/2018 99483*     Imaging: I have personally reviewed pertinent films in PACS  EKG, Pathology, and Other Studies: I have personally reviewed pertinent reports  Code Status: Level 1 - Full Code  Advance Directive and Living Will:      Power of :    POLST:        Portions of the record may have been created with voice recognition software  Occasional wrong word or "sound a like" substitutions may have occurred due to the inherent limitations of voice recognition software  Read the chart carefully and recognize, using context, where substitutions have occurred  Discharged

## 2022-11-10 NOTE — MISCELLANEOUS
Physical Exam    Wound #1 Assessment wound #1 Location:, Right foot, started on 03/2015, Care for this wound started on 7/31/15  Wound Status: not healed  Diabetic Ulcer Grade/Lower Extremity: Neena Heaps 2  Length: 1cm x Width: 0 1cm x Depth: 0 2cm   Total: 0 2sq cm   Wound Volume: 0 04cm3           Tissue type: Subcutaneous and Granulation   Color of Wound: Red - 98% and Yellow - 2%   Exudate Amount: Moderate   Odor: None   Exudate Color: Yellow   Wound Edges: Intact and Callous   Periwound Skin Condition: Callus        Physician/Provider Wound #1 Exam   I agree with the nursing assessment and documentation  Wound #7 Assessment wound #7 Location:, left lateral ankle, started on 11/1/2015, Care for this wound started on 12/5/15, healed on  Wound Status: not healed  Diabetic Ulcer Grade/Lower Extremity: Neena Heaps 2  Length: 0 6cm x Width: 0 5cm x Depth: 0 1cm   Total: 0 3sq cm   Wound Volume: 0 03cm3           Tissue type: Subcutaneous and Slough   Color of Wound: Yellow - 90% and Pink - 10%   Exudate Amount: Minimal   Odor: None   Exudate Color: Yellow   Wound Edges: Intact   Periwound Skin Condition: Macerated, Callus      Physician/Provider Wound #7 Exam   I agree with the nursing assessment and documentation  Wound Drsg  Orders/Instructions  Wound Identification Dressing Orders--Instructions:   Wound Identification and Instructions   Wound #1: Right foot    Wound Care Instructions  Discussed with Patient/Caregiver  Dressing Type: Acticoat 7  Wash with mild soap and water, normal saline, wound cleanser or as specified  Apply specified dressing to wound base/bed  Secondary dressing apply: Gauze, ABD, cast padding  Secure with: Kerlix, and tape  Dressing change frequency: Weekly  Offloading: Surgical shoeto-- right foot with bulky dressing  Comments/Other:   Bulky dressing applied today; to stay clean dry and intact until appointment next week  Apply compression using: Coban     Wound #7: left lateral ankle   Wound Care Instructions  Discussed with Patient/Caregiver  Dressing Type: Vivek Bravo with mild soap and water, normal saline, wound cleanser or as specified  Apply specified dressing to wound base/bed  Secondary dressing apply: Gauze  Secure with: tape, vac film or tegaderm  Dressing change frequency: Three times per week, M/W/F  Offloading: Crow braceto-- LLE      Wound Goals  Wound Goals:   Healing Goals:   Fair healing potential secondary to moderate comorbid conditions  Wound edges will appear with evidence of contraction and epithelialization   Patient will achieve full wound closure with ability to wear appropriate shoewear       Future Appointments    Date/Time Provider Specialty Site   01/22/2016 11:00 AM Selma Deng DPM Wound Care [place] [id number]     [address]: Wound Nursing Care Plan   Impaired Tissue Integrity related to: right foot and left lateral ankle wounds   Risk for Infection related to open wound:   Impaired Mobility related to: bulky dressing on right foot   Goals   Patient will achieve 100% epithelialization:  Patient will maintain skin integrity:  Wound Nursing Care Interventions:   Provide moist wound healing:  Implement offloading measures (turn & reposition schedule/method, ortho shoes/boots, floating heels, crutches, specialty surfaces:  Teach and evaluate effectiveness of offloading measures:   Plan of care initiated 7/31/15, reviewed 8/21/15, reviewed 9/25/15, reviewed 10/30/15 , reviewed 11/20/15, reviewed 12/29/15, reviewed 1/8/16      Signatures   Electronically signed by : Alma Fraire DPM; Jan 19 2016  1:28PM EST                       (Author)    Electronically signed by : Alma Fraire DPM; Jan 29 2016 10:32AM EST                       (Author) Statement Selected